# Patient Record
Sex: MALE | Race: WHITE | Employment: OTHER | RURAL
[De-identification: names, ages, dates, MRNs, and addresses within clinical notes are randomized per-mention and may not be internally consistent; named-entity substitution may affect disease eponyms.]

---

## 2017-01-03 ENCOUNTER — NURSE ONLY (OUTPATIENT)
Dept: PRIMARY CARE CLINIC | Age: 72
End: 2017-01-03
Payer: MEDICARE

## 2017-01-03 ENCOUNTER — HOSPITAL ENCOUNTER (OUTPATIENT)
Dept: OTHER | Age: 72
Discharge: HOME | End: 2017-01-03
Attending: INTERNAL MEDICINE | Admitting: INTERNAL MEDICINE

## 2017-01-03 DIAGNOSIS — R79.89 LOW TESTOSTERONE: ICD-10-CM

## 2017-01-03 DIAGNOSIS — E53.8 B12 DEFICIENCY: Primary | ICD-10-CM

## 2017-01-03 PROCEDURE — 96372 THER/PROPH/DIAG INJ SC/IM: CPT | Performed by: INTERNAL MEDICINE

## 2017-01-03 RX ORDER — TESTOSTERONE CYPIONATE 100 MG/ML
100 INJECTION, SOLUTION INTRAMUSCULAR ONCE
Status: COMPLETED | OUTPATIENT
Start: 2017-01-03 | End: 2017-01-03

## 2017-01-03 RX ORDER — CYANOCOBALAMIN 1000 UG/ML
1000 INJECTION INTRAMUSCULAR; SUBCUTANEOUS ONCE
Status: COMPLETED | OUTPATIENT
Start: 2017-01-03 | End: 2017-01-03

## 2017-01-03 RX ADMIN — TESTOSTERONE CYPIONATE 100 MG: 100 INJECTION, SOLUTION INTRAMUSCULAR at 12:14

## 2017-01-03 RX ADMIN — CYANOCOBALAMIN 1000 MCG: 1000 INJECTION INTRAMUSCULAR; SUBCUTANEOUS at 12:13

## 2017-01-04 DIAGNOSIS — Z79.01 ANTICOAGULANT LONG-TERM USE: ICD-10-CM

## 2017-01-04 DIAGNOSIS — I48.20 CHRONIC ATRIAL FIBRILLATION (HCC): ICD-10-CM

## 2017-01-09 RX ORDER — WARFARIN SODIUM 5 MG/1
TABLET ORAL
Qty: 90 TABLET | Refills: 3 | Status: SHIPPED | OUTPATIENT
Start: 2017-01-09 | End: 2017-02-07 | Stop reason: SDUPTHER

## 2017-01-23 RX ORDER — NIFEDIPINE 90 MG/1
TABLET, EXTENDED RELEASE ORAL
Qty: 90 TABLET | Refills: 3 | Status: SHIPPED | OUTPATIENT
Start: 2017-01-23 | End: 2018-01-23 | Stop reason: SDUPTHER

## 2017-01-31 RX ORDER — POTASSIUM CHLORIDE 750 MG/1
TABLET, FILM COATED, EXTENDED RELEASE ORAL
Qty: 90 TABLET | Refills: 3 | Status: SHIPPED | OUTPATIENT
Start: 2017-01-31 | End: 2018-02-12 | Stop reason: SDUPTHER

## 2017-02-02 RX ORDER — ATORVASTATIN CALCIUM 20 MG/1
TABLET, FILM COATED ORAL
Qty: 90 TABLET | Refills: 3 | Status: SHIPPED | OUTPATIENT
Start: 2017-02-02 | End: 2018-01-03 | Stop reason: SDUPTHER

## 2017-02-02 RX ORDER — TESTOSTERONE CYPIONATE 200 MG/ML
INJECTION, SOLUTION INTRAMUSCULAR
Qty: 1 ML | Refills: 2 | Status: SHIPPED | OUTPATIENT
Start: 2017-02-02 | End: 2017-05-22 | Stop reason: ALTCHOICE

## 2017-02-03 ENCOUNTER — NURSE ONLY (OUTPATIENT)
Dept: PRIMARY CARE CLINIC | Age: 72
End: 2017-02-03
Payer: MEDICARE

## 2017-02-03 ENCOUNTER — HOSPITAL ENCOUNTER (OUTPATIENT)
Dept: OTHER | Age: 72
Discharge: HOME | End: 2017-02-03
Attending: INTERNAL MEDICINE | Admitting: INTERNAL MEDICINE

## 2017-02-03 DIAGNOSIS — E53.8 B12 DEFICIENCY: Primary | ICD-10-CM

## 2017-02-03 DIAGNOSIS — E34.9 TESTOSTERONE DEFICIENCY: ICD-10-CM

## 2017-02-03 PROCEDURE — 96372 THER/PROPH/DIAG INJ SC/IM: CPT | Performed by: INTERNAL MEDICINE

## 2017-02-03 RX ORDER — CYANOCOBALAMIN 1000 UG/ML
1000 INJECTION INTRAMUSCULAR; SUBCUTANEOUS ONCE
Status: COMPLETED | OUTPATIENT
Start: 2017-02-03 | End: 2017-02-03

## 2017-02-03 RX ORDER — TESTOSTERONE CYPIONATE 100 MG/ML
100 INJECTION, SOLUTION INTRAMUSCULAR ONCE
Status: COMPLETED | OUTPATIENT
Start: 2017-02-03 | End: 2017-02-03

## 2017-02-03 RX ADMIN — CYANOCOBALAMIN 1000 MCG: 1000 INJECTION INTRAMUSCULAR; SUBCUTANEOUS at 11:55

## 2017-02-03 RX ADMIN — TESTOSTERONE CYPIONATE 100 MG: 100 INJECTION, SOLUTION INTRAMUSCULAR at 11:56

## 2017-02-06 DIAGNOSIS — Z79.01 ANTICOAGULANT LONG-TERM USE: ICD-10-CM

## 2017-02-06 DIAGNOSIS — I48.20 CHRONIC ATRIAL FIBRILLATION (HCC): ICD-10-CM

## 2017-02-08 RX ORDER — WARFARIN SODIUM 1 MG/1
TABLET ORAL
Qty: 30 TABLET | Refills: 3 | Status: SHIPPED | OUTPATIENT
Start: 2017-02-08 | End: 2017-06-08 | Stop reason: SDUPTHER

## 2017-02-08 RX ORDER — WARFARIN SODIUM 5 MG/1
TABLET ORAL
Qty: 90 TABLET | Refills: 3 | Status: SHIPPED | OUTPATIENT
Start: 2017-02-08 | End: 2018-05-15 | Stop reason: SDUPTHER

## 2017-02-21 ENCOUNTER — OFFICE VISIT (OUTPATIENT)
Dept: PRIMARY CARE CLINIC | Age: 72
End: 2017-02-21
Payer: MEDICARE

## 2017-02-21 VITALS
HEART RATE: 67 BPM | BODY MASS INDEX: 35.67 KG/M2 | WEIGHT: 263 LBS | DIASTOLIC BLOOD PRESSURE: 60 MMHG | RESPIRATION RATE: 18 BRPM | OXYGEN SATURATION: 98 % | SYSTOLIC BLOOD PRESSURE: 132 MMHG

## 2017-02-21 DIAGNOSIS — I10 ESSENTIAL HYPERTENSION: ICD-10-CM

## 2017-02-21 DIAGNOSIS — I48.91 ATRIAL FIBRILLATION, UNSPECIFIED TYPE (HCC): ICD-10-CM

## 2017-02-21 DIAGNOSIS — E29.1 HYPOGONADISM MALE: ICD-10-CM

## 2017-02-21 DIAGNOSIS — R73.02 GLUCOSE INTOLERANCE (IMPAIRED GLUCOSE TOLERANCE): ICD-10-CM

## 2017-02-21 DIAGNOSIS — E78.5 HYPERLIPIDEMIA, UNSPECIFIED HYPERLIPIDEMIA TYPE: Primary | ICD-10-CM

## 2017-02-21 PROCEDURE — 99213 OFFICE O/P EST LOW 20 MIN: CPT | Performed by: INTERNAL MEDICINE

## 2017-02-21 ASSESSMENT — ENCOUNTER SYMPTOMS
SHORTNESS OF BREATH: 0
VOMITING: 0
WHEEZING: 0
BACK PAIN: 1
NAUSEA: 0
ABDOMINAL PAIN: 0
SINUS PRESSURE: 0
COUGH: 0
EYE DISCHARGE: 0

## 2017-03-06 ENCOUNTER — NURSE ONLY (OUTPATIENT)
Dept: PRIMARY CARE CLINIC | Age: 72
End: 2017-03-06
Payer: MEDICARE

## 2017-03-06 ENCOUNTER — HOSPITAL ENCOUNTER (OUTPATIENT)
Dept: OTHER | Age: 72
Discharge: HOME | End: 2017-03-06
Attending: INTERNAL MEDICINE | Admitting: INTERNAL MEDICINE

## 2017-03-06 DIAGNOSIS — E53.8 B12 DEFICIENCY: ICD-10-CM

## 2017-03-06 DIAGNOSIS — E29.1 HYPOGONADISM MALE: ICD-10-CM

## 2017-03-06 PROCEDURE — 96372 THER/PROPH/DIAG INJ SC/IM: CPT | Performed by: INTERNAL MEDICINE

## 2017-03-06 RX ORDER — TESTOSTERONE CYPIONATE 200 MG/ML
200 INJECTION INTRAMUSCULAR ONCE
Status: COMPLETED | OUTPATIENT
Start: 2017-03-06 | End: 2017-03-06

## 2017-03-06 RX ORDER — CYANOCOBALAMIN 1000 UG/ML
1000 INJECTION INTRAMUSCULAR; SUBCUTANEOUS ONCE
Status: COMPLETED | OUTPATIENT
Start: 2017-03-06 | End: 2017-03-06

## 2017-03-06 RX ADMIN — CYANOCOBALAMIN 1000 MCG: 1000 INJECTION INTRAMUSCULAR; SUBCUTANEOUS at 13:51

## 2017-03-06 RX ADMIN — TESTOSTERONE CYPIONATE 200 MG: 200 INJECTION INTRAMUSCULAR at 13:53

## 2017-03-08 DIAGNOSIS — Z79.01 ANTICOAGULANT LONG-TERM USE: ICD-10-CM

## 2017-03-08 DIAGNOSIS — I48.20 CHRONIC ATRIAL FIBRILLATION (HCC): ICD-10-CM

## 2017-03-08 LAB — INR BLD: 3.4

## 2017-03-09 ENCOUNTER — ANTI-COAG VISIT (OUTPATIENT)
Dept: PRIMARY CARE CLINIC | Age: 72
End: 2017-03-09

## 2017-03-09 DIAGNOSIS — I48.91 ATRIAL FIBRILLATION, UNSPECIFIED TYPE (HCC): ICD-10-CM

## 2017-03-09 DIAGNOSIS — I82.90 BLOOD CLOT IN VEIN: ICD-10-CM

## 2017-03-09 DIAGNOSIS — Z79.01 ANTICOAGULANT LONG-TERM USE: ICD-10-CM

## 2017-03-14 RX ORDER — TAMSULOSIN HYDROCHLORIDE 0.4 MG/1
CAPSULE ORAL
Qty: 30 CAPSULE | Refills: 3 | Status: SHIPPED | OUTPATIENT
Start: 2017-03-14 | End: 2017-08-08 | Stop reason: SDUPTHER

## 2017-04-06 ENCOUNTER — NURSE ONLY (OUTPATIENT)
Dept: PRIMARY CARE CLINIC | Age: 72
End: 2017-04-06
Payer: MEDICARE

## 2017-04-06 ENCOUNTER — HOSPITAL ENCOUNTER (OUTPATIENT)
Dept: OTHER | Age: 72
Discharge: HOME | End: 2017-04-06
Attending: INTERNAL MEDICINE | Admitting: INTERNAL MEDICINE

## 2017-04-06 DIAGNOSIS — E53.8 B12 DEFICIENCY: Primary | ICD-10-CM

## 2017-04-06 PROCEDURE — 96372 THER/PROPH/DIAG INJ SC/IM: CPT | Performed by: INTERNAL MEDICINE

## 2017-04-06 RX ORDER — CYANOCOBALAMIN 1000 UG/ML
1000 INJECTION INTRAMUSCULAR; SUBCUTANEOUS ONCE
Status: COMPLETED | OUTPATIENT
Start: 2017-04-06 | End: 2017-04-06

## 2017-04-06 RX ADMIN — CYANOCOBALAMIN 1000 MCG: 1000 INJECTION INTRAMUSCULAR; SUBCUTANEOUS at 15:39

## 2017-04-10 ENCOUNTER — TELEPHONE (OUTPATIENT)
Dept: PRIMARY CARE CLINIC | Age: 72
End: 2017-04-10

## 2017-04-10 DIAGNOSIS — Z79.01 ANTICOAGULANT LONG-TERM USE: ICD-10-CM

## 2017-04-10 DIAGNOSIS — I48.20 CHRONIC ATRIAL FIBRILLATION (HCC): ICD-10-CM

## 2017-04-10 RX ORDER — CYCLOBENZAPRINE HCL 10 MG
TABLET ORAL
Qty: 30 TABLET | Refills: 3 | Status: SHIPPED | OUTPATIENT
Start: 2017-04-10 | End: 2017-08-08 | Stop reason: SDUPTHER

## 2017-04-11 RX ORDER — CITALOPRAM 20 MG/1
TABLET ORAL
Qty: 90 TABLET | Refills: 3 | Status: SHIPPED | OUTPATIENT
Start: 2017-04-11 | End: 2018-01-19 | Stop reason: SDUPTHER

## 2017-05-08 ENCOUNTER — HOSPITAL ENCOUNTER (OUTPATIENT)
Dept: OTHER | Age: 72
Discharge: HOME | End: 2017-05-08
Attending: INTERNAL MEDICINE | Admitting: INTERNAL MEDICINE

## 2017-05-10 LAB
CHOLESTEROL, TOTAL: 141 MG/DL
CHOLESTEROL/HDL RATIO: 2.7
HBA1C MFR BLD: 6.4 %
HDLC SERPL-MCNC: 31 MG/DL (ref 35–70)
LDL CHOLESTEROL CALCULATED: 83 MG/DL (ref 0–160)
PROSTATE SPECIFIC ANTIGEN: 0.5 NG/ML
TRIGL SERPL-MCNC: 134 MG/DL
VLDLC SERPL CALC-MCNC: 27 MG/DL

## 2017-05-11 DIAGNOSIS — I10 ESSENTIAL HYPERTENSION: ICD-10-CM

## 2017-05-11 DIAGNOSIS — Z12.5 SCREENING FOR PROSTATE CANCER: ICD-10-CM

## 2017-05-11 DIAGNOSIS — E78.5 HYPERLIPIDEMIA, UNSPECIFIED HYPERLIPIDEMIA TYPE: ICD-10-CM

## 2017-05-11 DIAGNOSIS — R73.02 GLUCOSE INTOLERANCE (IMPAIRED GLUCOSE TOLERANCE): ICD-10-CM

## 2017-05-22 ENCOUNTER — OFFICE VISIT (OUTPATIENT)
Dept: PRIMARY CARE CLINIC | Age: 72
End: 2017-05-22
Payer: MEDICARE

## 2017-05-22 ENCOUNTER — HOSPITAL ENCOUNTER (OUTPATIENT)
Dept: OTHER | Age: 72
Discharge: HOME | End: 2017-05-22
Attending: INTERNAL MEDICINE | Admitting: INTERNAL MEDICINE

## 2017-05-22 VITALS
HEIGHT: 72 IN | WEIGHT: 255.8 LBS | DIASTOLIC BLOOD PRESSURE: 64 MMHG | HEART RATE: 70 BPM | OXYGEN SATURATION: 97 % | RESPIRATION RATE: 18 BRPM | SYSTOLIC BLOOD PRESSURE: 122 MMHG | BODY MASS INDEX: 34.65 KG/M2

## 2017-05-22 DIAGNOSIS — I10 ESSENTIAL HYPERTENSION: Primary | ICD-10-CM

## 2017-05-22 DIAGNOSIS — I48.91 ATRIAL FIBRILLATION, UNSPECIFIED TYPE (HCC): ICD-10-CM

## 2017-05-22 DIAGNOSIS — E78.5 HYPERLIPIDEMIA, UNSPECIFIED HYPERLIPIDEMIA TYPE: ICD-10-CM

## 2017-05-22 DIAGNOSIS — R73.02 GLUCOSE INTOLERANCE (IMPAIRED GLUCOSE TOLERANCE): ICD-10-CM

## 2017-05-22 PROCEDURE — 99213 OFFICE O/P EST LOW 20 MIN: CPT | Performed by: INTERNAL MEDICINE

## 2017-05-22 ASSESSMENT — ENCOUNTER SYMPTOMS
ABDOMINAL PAIN: 0
NAUSEA: 0
WHEEZING: 0
SINUS PRESSURE: 0
EYE DISCHARGE: 0
COUGH: 0
SHORTNESS OF BREATH: 0
VOMITING: 0
BACK PAIN: 1

## 2017-05-22 ASSESSMENT — PATIENT HEALTH QUESTIONNAIRE - PHQ9
SUM OF ALL RESPONSES TO PHQ QUESTIONS 1-9: 0
2. FEELING DOWN, DEPRESSED OR HOPELESS: 0
SUM OF ALL RESPONSES TO PHQ9 QUESTIONS 1 & 2: 0
1. LITTLE INTEREST OR PLEASURE IN DOING THINGS: 0

## 2017-05-30 DIAGNOSIS — I48.91 ATRIAL FIBRILLATION, UNSPECIFIED TYPE (HCC): ICD-10-CM

## 2017-06-08 RX ORDER — WARFARIN SODIUM 1 MG/1
TABLET ORAL
Qty: 38 TABLET | Refills: 2 | Status: SHIPPED | OUTPATIENT
Start: 2017-06-08 | End: 2017-08-16 | Stop reason: SDUPTHER

## 2017-06-09 RX ORDER — FUROSEMIDE 40 MG/1
TABLET ORAL
Qty: 180 TABLET | Refills: 1 | Status: SHIPPED | OUTPATIENT
Start: 2017-06-09 | End: 2018-01-03 | Stop reason: SDUPTHER

## 2017-06-26 ENCOUNTER — HOSPITAL ENCOUNTER (OUTPATIENT)
Dept: OTHER | Age: 72
Discharge: HOME | End: 2017-06-26
Attending: INTERNAL MEDICINE | Admitting: INTERNAL MEDICINE

## 2017-06-26 ENCOUNTER — NURSE ONLY (OUTPATIENT)
Dept: PRIMARY CARE CLINIC | Age: 72
End: 2017-06-26
Payer: MEDICARE

## 2017-06-26 DIAGNOSIS — E53.8 B12 DEFICIENCY: Primary | ICD-10-CM

## 2017-06-26 PROCEDURE — 96372 THER/PROPH/DIAG INJ SC/IM: CPT | Performed by: INTERNAL MEDICINE

## 2017-06-26 RX ORDER — CYANOCOBALAMIN 1000 UG/ML
1000 INJECTION INTRAMUSCULAR; SUBCUTANEOUS ONCE
Status: COMPLETED | OUTPATIENT
Start: 2017-06-26 | End: 2017-06-26

## 2017-06-26 RX ADMIN — CYANOCOBALAMIN 1000 MCG: 1000 INJECTION INTRAMUSCULAR; SUBCUTANEOUS at 09:37

## 2017-07-05 ENCOUNTER — HOSPITAL ENCOUNTER (OUTPATIENT)
Dept: OTHER | Age: 72
Discharge: HOME | End: 2017-07-05
Attending: FAMILY MEDICINE | Admitting: FAMILY MEDICINE

## 2017-07-05 ENCOUNTER — TELEPHONE (OUTPATIENT)
Dept: PRIMARY CARE CLINIC | Age: 72
End: 2017-07-05

## 2017-07-05 ENCOUNTER — OFFICE VISIT (OUTPATIENT)
Dept: PRIMARY CARE CLINIC | Age: 72
End: 2017-07-05
Payer: MEDICARE

## 2017-07-05 VITALS
WEIGHT: 259 LBS | RESPIRATION RATE: 20 BRPM | HEIGHT: 72 IN | DIASTOLIC BLOOD PRESSURE: 60 MMHG | HEART RATE: 60 BPM | SYSTOLIC BLOOD PRESSURE: 110 MMHG | BODY MASS INDEX: 35.08 KG/M2 | OXYGEN SATURATION: 98 %

## 2017-07-05 DIAGNOSIS — M25.531 PAIN AND SWELLING OF RIGHT WRIST: Primary | ICD-10-CM

## 2017-07-05 DIAGNOSIS — M25.431 PAIN AND SWELLING OF RIGHT WRIST: ICD-10-CM

## 2017-07-05 DIAGNOSIS — M25.531 PAIN AND SWELLING OF RIGHT WRIST: ICD-10-CM

## 2017-07-05 DIAGNOSIS — M25.431 PAIN AND SWELLING OF RIGHT WRIST: Primary | ICD-10-CM

## 2017-07-05 PROCEDURE — 99213 OFFICE O/P EST LOW 20 MIN: CPT | Performed by: FAMILY MEDICINE

## 2017-07-05 RX ORDER — METHYLPREDNISOLONE 4 MG/1
TABLET ORAL
Qty: 1 KIT | Refills: 0 | Status: SHIPPED | OUTPATIENT
Start: 2017-07-05 | End: 2017-07-11

## 2017-07-07 ENCOUNTER — TELEPHONE (OUTPATIENT)
Dept: PRIMARY CARE CLINIC | Age: 72
End: 2017-07-07

## 2017-07-15 PROBLEM — M25.531 PAIN AND SWELLING OF RIGHT WRIST: Status: ACTIVE | Noted: 2017-07-15

## 2017-07-15 PROBLEM — M25.431 PAIN AND SWELLING OF RIGHT WRIST: Status: ACTIVE | Noted: 2017-07-15

## 2017-07-15 ASSESSMENT — ENCOUNTER SYMPTOMS
CONSTIPATION: 0
VOMITING: 0
EYE ITCHING: 0
RHINORRHEA: 0
SHORTNESS OF BREATH: 0
SORE THROAT: 0
ABDOMINAL PAIN: 0
COUGH: 0
NAUSEA: 0
DIARRHEA: 0
EYE DISCHARGE: 0

## 2017-07-31 ENCOUNTER — NURSE ONLY (OUTPATIENT)
Dept: PRIMARY CARE CLINIC | Age: 72
End: 2017-07-31
Payer: MEDICARE

## 2017-07-31 ENCOUNTER — HOSPITAL ENCOUNTER (OUTPATIENT)
Dept: OTHER | Age: 72
Discharge: HOME | End: 2017-07-31
Attending: INTERNAL MEDICINE | Admitting: INTERNAL MEDICINE

## 2017-07-31 DIAGNOSIS — E53.8 B12 DEFICIENCY: Primary | ICD-10-CM

## 2017-07-31 PROCEDURE — 96372 THER/PROPH/DIAG INJ SC/IM: CPT | Performed by: INTERNAL MEDICINE

## 2017-07-31 RX ORDER — CYANOCOBALAMIN 1000 UG/ML
1000 INJECTION INTRAMUSCULAR; SUBCUTANEOUS ONCE
Status: COMPLETED | OUTPATIENT
Start: 2017-07-31 | End: 2017-07-31

## 2017-07-31 RX ADMIN — CYANOCOBALAMIN 1000 MCG: 1000 INJECTION INTRAMUSCULAR; SUBCUTANEOUS at 12:03

## 2017-08-08 RX ORDER — TAMSULOSIN HYDROCHLORIDE 0.4 MG/1
CAPSULE ORAL
Qty: 30 CAPSULE | Refills: 3 | Status: SHIPPED | OUTPATIENT
Start: 2017-08-08 | End: 2018-01-19 | Stop reason: SDUPTHER

## 2017-08-08 RX ORDER — CYCLOBENZAPRINE HCL 10 MG
TABLET ORAL
Qty: 30 TABLET | Refills: 3 | Status: SHIPPED | OUTPATIENT
Start: 2017-08-08 | End: 2018-01-08 | Stop reason: SDUPTHER

## 2017-08-16 RX ORDER — WARFARIN SODIUM 1 MG/1
TABLET ORAL
Qty: 38 TABLET | Refills: 2 | Status: SHIPPED | OUTPATIENT
Start: 2017-08-16 | End: 2017-12-29 | Stop reason: SDUPTHER

## 2017-08-22 ENCOUNTER — TELEPHONE (OUTPATIENT)
Dept: PRIMARY CARE CLINIC | Age: 72
End: 2017-08-22

## 2017-08-24 ENCOUNTER — OFFICE VISIT (OUTPATIENT)
Dept: CARDIOLOGY | Facility: CLINIC | Age: 72
End: 2017-08-24

## 2017-08-24 VITALS
WEIGHT: 260 LBS | SYSTOLIC BLOOD PRESSURE: 114 MMHG | HEIGHT: 70 IN | DIASTOLIC BLOOD PRESSURE: 66 MMHG | HEART RATE: 66 BPM | BODY MASS INDEX: 37.22 KG/M2

## 2017-08-24 DIAGNOSIS — I48.91 ATRIAL FIBRILLATION AND FLUTTER (HCC): Primary | ICD-10-CM

## 2017-08-24 DIAGNOSIS — I48.92 ATRIAL FIBRILLATION AND FLUTTER (HCC): Primary | ICD-10-CM

## 2017-08-24 DIAGNOSIS — E78.2 MIXED HYPERLIPIDEMIA: ICD-10-CM

## 2017-08-24 DIAGNOSIS — I10 ESSENTIAL HYPERTENSION: ICD-10-CM

## 2017-08-24 PROCEDURE — 99214 OFFICE O/P EST MOD 30 MIN: CPT | Performed by: INTERNAL MEDICINE

## 2017-08-24 RX ORDER — NIFEDIPINE 90 MG/1
90 TABLET, EXTENDED RELEASE ORAL DAILY
COMMUNITY
End: 2019-09-06 | Stop reason: HOSPADM

## 2017-08-24 RX ORDER — ASPIRIN 81 MG/1
81 TABLET ORAL DAILY
COMMUNITY
End: 2021-11-01

## 2017-08-24 NOTE — PROGRESS NOTES
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 A-fib/A-flutter  a) s/p ECV x 2  b) Normal LVEF per echo 8/2011  c) chronic coumadin  2. DILAN-compliant with CPAP  3. HTN  4. HLP  7/15 144/172/29/81  5. Normal coronary arteries per remote LHC  6. DVt s/p IVC filter  7. 2015 L traumatic femur fx- Sajadi    Allergies  Allergies   Allergen Reactions   • Codeine    • Keflex [Cephalexin]    • Morphine And Related    • Penicillins        Current Medications    Current Outpatient Prescriptions:   •  aspirin 81 MG EC tablet, Take 81 mg by mouth Daily., Disp: , Rfl:   •  atorvastatin (LIPITOR) 20 MG tablet, Take 20 mg by mouth Daily., Disp: , Rfl:   •  citalopram (CeleXA) 20 MG tablet, Take 20 mg by mouth Daily., Disp: , Rfl:   •  cyclobenzaprine (FLEXERIL) 10 MG tablet, Take 10 mg by mouth Every Night., Disp: , Rfl:   •  furosemide (LASIX) 40 MG tablet, Take 40 mg by mouth 2 (Two) Times a Day As Needed., Disp: , Rfl:   •  hydrochlorothiazide (MICROZIDE) 12.5 MG capsule, Take 12.5 mg by mouth Daily., Disp: , Rfl:   •  metoprolol tartrate (LOPRESSOR) 25 MG tablet, Take 25 mg by mouth 2 (Two) Times a Day., Disp: , Rfl:   •  NIFEdipine XL (PROCARDIA XL) 90 MG 24 hr tablet, Take 90 mg by mouth Daily., Disp: , Rfl:   •  potassium chloride (K-DUR) 10 MEQ CR tablet, Take 10 mEq by mouth Daily., Disp: , Rfl:   •  tamsulosin (FLOMAX) 0.4 MG capsule 24 hr capsule, Take 1 capsule by mouth Daily., Disp: , Rfl:   •  warfarin (COUMADIN) 5 MG tablet, daily., Disp: , Rfl:       History of Present Illness   Pt here for follow up. No new complaints. Is able to do yard work and housework. Does some resistance  "exercises. Edema is stable. He takes lasix most days for it. 1-2 a day depending on his symptoms. States he was recently told he was prediabetic, has lost about 10 pounds.  Pt denies any chest pain, dyspnea, dyspnea on exertion, orthopnea, PND, palpitations, or claudication.     ROS:  All systems have been reviewed and are negative with the exception of those mentioned in the HPI.    OBJECTIVE:  Vitals:    08/24/17 1435   BP: 114/66   BP Location: Right arm   Patient Position: Sitting   Pulse: 66   Weight: 260 lb (118 kg)   Height: 70\" (177.8 cm)     Physical Exam   Constitutional: He is oriented to person, place, and time. He appears well-developed and well-nourished. No distress.   Cardiovascular: Normal rate, regular rhythm, normal heart sounds and intact distal pulses.    No murmur heard.  Pulses:       Radial pulses are 2+ on the right side, and 2+ on the left side.        Dorsalis pedis pulses are 2+ on the right side, and 2+ on the left side.        Posterior tibial pulses are 2+ on the right side, and 2+ on the left side.   Pulmonary/Chest: Effort normal and breath sounds normal. He has no wheezes. He has no rales.   Abdominal: Soft. Bowel sounds are normal. There is no tenderness. There is no guarding.   Musculoskeletal: He exhibits edema (L>R). He exhibits no tenderness.   Neurological: He is alert and oriented to person, place, and time.   Skin: Skin is warm and dry. No rash noted.   Psychiatric: He has a normal mood and affect.   Nursing note and vitals reviewed.      Diagnostic Data:  Procedures      ASSESSMENT:   Diagnosis Plan   1. Atrial fibrillation and flutter     2. Essential hypertension     3. Mixed hyperlipidemia         PLAN:  1. Well controled, a/c with coumadin  2. Well controled today, continue antihypertensives  3. Labs per PCP, continue statin therapy with atorvastatin    Ewelina Kumar MD, thank you for referring Mr. Palencia for evaluation.  I have forwarded my electronically generated " recommendations to you for review.  Please do not hesitate to call with any questions.    Scribed for Zack Almanza MD by Valerie Alicia PA-C. 8/24/2017  2:55 PM    Zack Almanza MD, FACC

## 2017-10-05 ENCOUNTER — HOSPITAL ENCOUNTER (OUTPATIENT)
Dept: OTHER | Age: 72
Discharge: HOME | End: 2017-10-05
Attending: INTERNAL MEDICINE | Admitting: INTERNAL MEDICINE

## 2017-10-05 ENCOUNTER — NURSE ONLY (OUTPATIENT)
Dept: PRIMARY CARE CLINIC | Age: 72
End: 2017-10-05
Payer: MEDICARE

## 2017-10-05 DIAGNOSIS — Z23 NEED FOR INFLUENZA VACCINATION: ICD-10-CM

## 2017-10-05 DIAGNOSIS — E53.8 B12 DEFICIENCY: Primary | ICD-10-CM

## 2017-10-05 PROCEDURE — 96372 THER/PROPH/DIAG INJ SC/IM: CPT | Performed by: INTERNAL MEDICINE

## 2017-10-05 PROCEDURE — G0008 ADMIN INFLUENZA VIRUS VAC: HCPCS | Performed by: INTERNAL MEDICINE

## 2017-10-05 PROCEDURE — 90688 IIV4 VACCINE SPLT 0.5 ML IM: CPT | Performed by: INTERNAL MEDICINE

## 2017-10-05 RX ORDER — CYANOCOBALAMIN 1000 UG/ML
1000 INJECTION INTRAMUSCULAR; SUBCUTANEOUS ONCE
Status: COMPLETED | OUTPATIENT
Start: 2017-10-05 | End: 2017-10-05

## 2017-10-05 RX ADMIN — CYANOCOBALAMIN 1000 MCG: 1000 INJECTION INTRAMUSCULAR; SUBCUTANEOUS at 14:27

## 2017-11-01 ENCOUNTER — OFFICE VISIT (OUTPATIENT)
Dept: PRIMARY CARE CLINIC | Age: 72
End: 2017-11-01
Payer: MEDICARE

## 2017-11-01 ENCOUNTER — HOSPITAL ENCOUNTER (OUTPATIENT)
Dept: OTHER | Age: 72
Discharge: HOME | End: 2017-11-01
Attending: INTERNAL MEDICINE | Admitting: INTERNAL MEDICINE

## 2017-11-01 VITALS
BODY MASS INDEX: 35.15 KG/M2 | SYSTOLIC BLOOD PRESSURE: 122 MMHG | HEART RATE: 62 BPM | OXYGEN SATURATION: 97 % | WEIGHT: 259.2 LBS | DIASTOLIC BLOOD PRESSURE: 60 MMHG | RESPIRATION RATE: 18 BRPM

## 2017-11-01 DIAGNOSIS — E78.5 HYPERLIPIDEMIA, UNSPECIFIED HYPERLIPIDEMIA TYPE: ICD-10-CM

## 2017-11-01 DIAGNOSIS — E53.8 B12 DEFICIENCY: ICD-10-CM

## 2017-11-01 DIAGNOSIS — Z91.81 HISTORY OF FALL: ICD-10-CM

## 2017-11-01 DIAGNOSIS — R73.02 GLUCOSE INTOLERANCE (IMPAIRED GLUCOSE TOLERANCE): ICD-10-CM

## 2017-11-01 DIAGNOSIS — I48.91 ATRIAL FIBRILLATION, UNSPECIFIED TYPE (HCC): ICD-10-CM

## 2017-11-01 DIAGNOSIS — I10 ESSENTIAL HYPERTENSION: ICD-10-CM

## 2017-11-01 DIAGNOSIS — N30.01 ACUTE CYSTITIS WITH HEMATURIA: Primary | ICD-10-CM

## 2017-11-01 LAB
BILIRUBIN, POC: ABNORMAL
BLOOD URINE, POC: ABNORMAL
CLARITY, POC: ABNORMAL
COLOR, POC: ABNORMAL
GLUCOSE URINE, POC: ABNORMAL
KETONES, POC: ABNORMAL
LEUKOCYTE EST, POC: ABNORMAL
NITRITE, POC: ABNORMAL
PH, POC: 6
PROTEIN, POC: ABNORMAL
SPECIFIC GRAVITY, POC: 1.01
UROBILINOGEN, POC: 0.2

## 2017-11-01 PROCEDURE — G8417 CALC BMI ABV UP PARAM F/U: HCPCS | Performed by: INTERNAL MEDICINE

## 2017-11-01 PROCEDURE — 1123F ACP DISCUSS/DSCN MKR DOCD: CPT | Performed by: INTERNAL MEDICINE

## 2017-11-01 PROCEDURE — 99214 OFFICE O/P EST MOD 30 MIN: CPT | Performed by: INTERNAL MEDICINE

## 2017-11-01 PROCEDURE — 3017F COLORECTAL CA SCREEN DOC REV: CPT | Performed by: INTERNAL MEDICINE

## 2017-11-01 PROCEDURE — G8484 FLU IMMUNIZE NO ADMIN: HCPCS | Performed by: INTERNAL MEDICINE

## 2017-11-01 PROCEDURE — 81002 URINALYSIS NONAUTO W/O SCOPE: CPT | Performed by: INTERNAL MEDICINE

## 2017-11-01 PROCEDURE — 1036F TOBACCO NON-USER: CPT | Performed by: INTERNAL MEDICINE

## 2017-11-01 PROCEDURE — G8427 DOCREV CUR MEDS BY ELIG CLIN: HCPCS | Performed by: INTERNAL MEDICINE

## 2017-11-01 PROCEDURE — 4040F PNEUMOC VAC/ADMIN/RCVD: CPT | Performed by: INTERNAL MEDICINE

## 2017-11-01 RX ORDER — SULFAMETHOXAZOLE AND TRIMETHOPRIM 800; 160 MG/1; MG/1
1 TABLET ORAL 2 TIMES DAILY
Qty: 14 TABLET | Refills: 0 | Status: SHIPPED | OUTPATIENT
Start: 2017-11-01 | End: 2017-11-08

## 2017-11-01 ASSESSMENT — ENCOUNTER SYMPTOMS
EYE DISCHARGE: 0
COUGH: 0
ABDOMINAL PAIN: 0
SHORTNESS OF BREATH: 0
SINUS PRESSURE: 0
WHEEZING: 0
VOMITING: 0
BACK PAIN: 0
NAUSEA: 0

## 2017-11-01 NOTE — PROGRESS NOTES
SUBJECTIVE:    Patient ID: Suleman Mccoy is a 74 y.o. male. Chief Complaint   Patient presents with   [facility] Other     odor in urine     HPI:  Pt reported some burning when he voids, frequency and urgency. Sx for the past few weeks and getting worse. Patient denied any fever or chills. Patient denied any flank pain. He tried nothing. Patient reports he had a fall recently and landed on his left hip. Patient had hip replaced in the past. Is having some pain in that area. Scheduled to follow up with Ortho on Monday for this. Patient has had hypertension for few years. He has been compliant with taking medications, without side effects from it. He has been following a low-sodium, is active and occasionally exercises. Weight is stable, compared to last visit. His blood pressure is stable at this time. Patient was diagnosed with Afib years ago. He has been compliant with taking meds w/o SE. He is taking coumadin as well. Patient denies any side effect including easy bruising, bleeding. ..ect. Patient has been compliant with taking Coumadin and checking INR as supposed to. No CP or palpitation. Patient's medications, allergies, past medical, surgical, social and family histories were reviewed and updated as appropriate.        Outpatient Prescriptions Marked as Taking for the 11/1/17 encounter (Office Visit) with Saqib Dahl MD   Medication Sig Dispense Refill    warfarin (COUMADIN) 1 MG tablet TAKE 1 TABLET BY MOUTH ONCE DAILY AND 3 TABLETS ON FRIDAYS 38 tablet 2    cyclobenzaprine (FLEXERIL) 10 MG tablet TAKE ONE TABLET BY MOUTH ONCE NIGHTLY AT BEDTIME 30 tablet 3    tamsulosin (FLOMAX) 0.4 MG capsule TAKE ONE CAPSULE BY MOUTH ONCE DAILY 30 capsule 3    furosemide (LASIX) 40 MG tablet TAKE ONE TABLET BY MOUTH TWICE A DAY AS NEEDED 180 tablet 1    citalopram (CELEXA) 20 MG tablet TAKE ONE TABLET BY MOUTH ONCE DAILY 90 tablet 3    metoprolol tartrate (LOPRESSOR) 25 MG tablet TAKE 1 TABLET BY MOUTH 2 TIMES DAILY 180 tablet 3    warfarin (COUMADIN) 5 MG tablet TAKE ONE TABLET BY MOUTH ONCE DAILY 90 tablet 3    atorvastatin (LIPITOR) 20 MG tablet TAKE ONE TABLET BY MOUTH ONCE DAILY 90 tablet 3    potassium chloride (KLOR-CON) 10 MEQ extended release tablet TAKE ONE TABLET BY MOUTH ONCE DAILY 90 tablet 3    NIFEdipine (PROCARDIA XL) 90 MG extended release tablet TAKE ONE TABLET BY MOUTH ONCE DAILY 90 tablet 3    fluticasone (FLONASE) 50 MCG/ACT nasal spray USE ONE SPRAY IN EACH NOSTRIL DAILY 16 g 3    hydrochlorothiazide (MICROZIDE) 12.5 MG capsule TAKE ONE CAPSULE BY MOUTH ONCE DAILY 90 capsule 3    polyethylene glycol (MIRALAX) powder Take 17 g by mouth daily as needed  510 g 0    triamcinolone (KENALOG) 0.1 % cream APPLY TO AFFECTED AREA TWICE A DAY 1 Tube 0    aspirin 81 MG tablet Take 81 mg by mouth daily. Review of Systems   Constitutional: Negative for chills, fatigue and fever. HENT: Negative for congestion, ear pain and sinus pressure. Eyes: Negative for discharge and visual disturbance. Respiratory: Negative for cough, shortness of breath and wheezing. Cardiovascular: Negative for chest pain and palpitations. JOY   Gastrointestinal: Negative for abdominal pain, nausea and vomiting. Endocrine: Negative for cold intolerance and heat intolerance. Genitourinary: Positive for dysuria, frequency and urgency. Musculoskeletal: Positive for arthralgias and gait problem. Negative for back pain. As in HPI   Skin: Negative for pallor and rash. Allergic/Immunologic: Negative for food allergies and immunocompromised state. Neurological: Negative for dizziness, numbness and headaches. Hematological: Negative for adenopathy. Bruises/bleeds easily. Psychiatric/Behavioral: Negative for agitation and sleep disturbance. The patient is not nervous/anxious.         OBJECTIVE:   Wt Readings from Last 3 Encounters:   11/01/17 259 lb 3.2 oz (117.6 kg)   07/05/17 259 lb (117.5 kg)   05/22/17 255 lb 12.8 oz (116 kg)     BP Readings from Last 3 Encounters:   11/01/17 122/60   07/05/17 110/60   05/22/17 122/64       /60 (Site: Right Arm, Position: Sitting, Cuff Size: Large Adult)   Pulse 62   Resp 18   Wt 259 lb 3.2 oz (117.6 kg)   SpO2 97%   BMI 35.15 kg/m²      Physical Exam   Constitutional: He is oriented to person, place, and time. He appears well-developed and well-nourished. obese   HENT:   Head: Normocephalic and atraumatic. Right Ear: External ear normal.   Left Ear: External ear normal.   Nose: Nose normal.   Mouth/Throat: Oropharynx is clear and moist.   Eyes: Conjunctivae and EOM are normal. Pupils are equal, round, and reactive to light. Neck: Neck supple. No JVD present. No thyromegaly present. Cardiovascular: Normal rate, regular rhythm and normal heart sounds. Exam reveals no friction rub. No murmur heard. Pulmonary/Chest: Effort normal and breath sounds normal. He has no wheezes. He has no rales. Abdominal: Soft. Bowel sounds are normal. There is no tenderness. There is no rebound. Musculoskeletal: He exhibits no edema or tenderness. Neurological: He is alert and oriented to person, place, and time. Skin: No rash noted. No erythema. Bruise/ecchymotic area that measures around 5 inches in diameter at the lateral upper aspect of his left thigh. Nontender to palpate. No fluctuation or fluid collection. Psychiatric: He has a normal mood and affect. Judgment normal.   Nursing note and vitals reviewed.         Lab Results   Component Value Date     03/25/2015    K 3.9 03/25/2015     03/25/2015    CO2 20 03/25/2015    GLUCOSE 122 03/25/2015    BUN 23 03/25/2015    CREATININE 1.06 03/25/2015    CALCIUM 9.3 03/25/2015    PROT 6.9 11/18/2014    LABALBU 4.3 03/25/2015    BILITOT 0.3 03/25/2015    ALT 20 03/25/2015    AST 19 03/25/2015       Hemoglobin A1C (%)   Date Value   05/08/2017 6.4 (H)     LDL Calculated (mg/dL)   Date

## 2017-11-01 NOTE — PROGRESS NOTES
Have you seen any other physician or provider since your last visit no    Have you had any other diagnostic tests since your last visit? no    Have you changed or stopped any medications since your last visit? no     Pt co he had a fall last Friday and injured his left knee and hip they are bruised he is going to see ortho Monday for that. Pt states his urine has been having an odor.

## 2017-11-08 RX ORDER — HYDROCHLOROTHIAZIDE 12.5 MG/1
CAPSULE, GELATIN COATED ORAL
Qty: 90 CAPSULE | Refills: 3 | Status: SHIPPED | OUTPATIENT
Start: 2017-11-08 | End: 2018-09-06 | Stop reason: SDUPTHER

## 2017-11-28 ENCOUNTER — HOSPITAL ENCOUNTER (OUTPATIENT)
Dept: OTHER | Age: 72
Discharge: HOME | End: 2017-11-28
Attending: INTERNAL MEDICINE | Admitting: INTERNAL MEDICINE

## 2017-11-28 ENCOUNTER — NURSE ONLY (OUTPATIENT)
Dept: PRIMARY CARE CLINIC | Age: 72
End: 2017-11-28
Payer: MEDICARE

## 2017-11-28 ENCOUNTER — TELEPHONE (OUTPATIENT)
Dept: PRIMARY CARE CLINIC | Age: 72
End: 2017-11-28

## 2017-11-28 DIAGNOSIS — E53.8 B12 DEFICIENCY: Primary | ICD-10-CM

## 2017-11-28 PROCEDURE — 96372 THER/PROPH/DIAG INJ SC/IM: CPT | Performed by: INTERNAL MEDICINE

## 2017-11-28 RX ORDER — CYANOCOBALAMIN 1000 UG/ML
1000 INJECTION INTRAMUSCULAR; SUBCUTANEOUS ONCE
Status: COMPLETED | OUTPATIENT
Start: 2017-11-28 | End: 2017-11-28

## 2017-11-28 RX ORDER — TRIAMCINOLONE ACETONIDE 1 MG/G
CREAM TOPICAL
Qty: 1 TUBE | Refills: 0 | Status: SHIPPED | OUTPATIENT
Start: 2017-11-28 | End: 2018-01-03 | Stop reason: ALTCHOICE

## 2017-11-28 RX ADMIN — CYANOCOBALAMIN 1000 MCG: 1000 INJECTION INTRAMUSCULAR; SUBCUTANEOUS at 13:42

## 2017-11-28 NOTE — PROGRESS NOTES
After obtaining consent, and per orders of Dr. Cara Martinez, injection of b12 given in right deltoid by Jacquelyn Aaron. Patient instructed to remain in clinic for 20 minutes afterwards, and to report any adverse reaction to me immediately.

## 2017-11-30 ENCOUNTER — TELEPHONE (OUTPATIENT)
Dept: PRIMARY CARE CLINIC | Age: 72
End: 2017-11-30

## 2017-11-30 DIAGNOSIS — R73.02 GLUCOSE INTOLERANCE (IMPAIRED GLUCOSE TOLERANCE): ICD-10-CM

## 2017-11-30 DIAGNOSIS — E78.5 HYPERLIPIDEMIA, UNSPECIFIED HYPERLIPIDEMIA TYPE: ICD-10-CM

## 2017-11-30 DIAGNOSIS — Z91.81 HISTORY OF FALL: ICD-10-CM

## 2017-11-30 DIAGNOSIS — I48.91 ATRIAL FIBRILLATION, UNSPECIFIED TYPE (HCC): ICD-10-CM

## 2017-11-30 LAB
AVERAGE GLUCOSE: NORMAL
HBA1C MFR BLD: 5.3 %
TSH SERPL DL<=0.05 MIU/L-ACNC: 1.73 UIU/ML

## 2017-11-30 NOTE — TELEPHONE ENCOUNTER
----- Message from Ankit Smith MD sent at 11/30/2017  2:49 PM EST -----  Cont same dose of coumadin.  Check INR monthly

## 2017-12-29 RX ORDER — WARFARIN SODIUM 1 MG/1
TABLET ORAL
Qty: 38 TABLET | Refills: 5 | Status: SHIPPED | OUTPATIENT
Start: 2017-12-29 | End: 2018-08-24 | Stop reason: SDUPTHER

## 2018-01-02 ENCOUNTER — NURSE ONLY (OUTPATIENT)
Dept: PRIMARY CARE CLINIC | Age: 73
End: 2018-01-02
Payer: MEDICARE

## 2018-01-02 ENCOUNTER — HOSPITAL ENCOUNTER (OUTPATIENT)
Dept: OTHER | Age: 73
Discharge: HOME | End: 2018-01-02
Attending: INTERNAL MEDICINE | Admitting: INTERNAL MEDICINE

## 2018-01-02 DIAGNOSIS — E53.8 B12 DEFICIENCY: ICD-10-CM

## 2018-01-02 DIAGNOSIS — Z79.01 ANTICOAGULANT LONG-TERM USE: Primary | ICD-10-CM

## 2018-01-02 PROCEDURE — 96372 THER/PROPH/DIAG INJ SC/IM: CPT | Performed by: INTERNAL MEDICINE

## 2018-01-02 RX ORDER — CYANOCOBALAMIN 1000 UG/ML
1000 INJECTION INTRAMUSCULAR; SUBCUTANEOUS ONCE
Status: COMPLETED | OUTPATIENT
Start: 2018-01-02 | End: 2018-01-02

## 2018-01-02 RX ADMIN — CYANOCOBALAMIN 1000 MCG: 1000 INJECTION INTRAMUSCULAR; SUBCUTANEOUS at 13:34

## 2018-01-03 ENCOUNTER — OFFICE VISIT (OUTPATIENT)
Dept: PRIMARY CARE CLINIC | Age: 73
End: 2018-01-03
Payer: MEDICARE

## 2018-01-03 VITALS
HEART RATE: 62 BPM | WEIGHT: 259 LBS | SYSTOLIC BLOOD PRESSURE: 124 MMHG | BODY MASS INDEX: 35.08 KG/M2 | OXYGEN SATURATION: 96 % | DIASTOLIC BLOOD PRESSURE: 60 MMHG | HEIGHT: 72 IN

## 2018-01-03 DIAGNOSIS — L98.9 SKIN LESIONS: ICD-10-CM

## 2018-01-03 DIAGNOSIS — I48.91 ATRIAL FIBRILLATION, UNSPECIFIED TYPE (HCC): ICD-10-CM

## 2018-01-03 DIAGNOSIS — E78.5 HYPERLIPIDEMIA, UNSPECIFIED HYPERLIPIDEMIA TYPE: Primary | ICD-10-CM

## 2018-01-03 DIAGNOSIS — R73.02 GLUCOSE INTOLERANCE (IMPAIRED GLUCOSE TOLERANCE): ICD-10-CM

## 2018-01-03 DIAGNOSIS — I10 ESSENTIAL HYPERTENSION: ICD-10-CM

## 2018-01-03 PROCEDURE — 3017F COLORECTAL CA SCREEN DOC REV: CPT | Performed by: INTERNAL MEDICINE

## 2018-01-03 PROCEDURE — 4040F PNEUMOC VAC/ADMIN/RCVD: CPT | Performed by: INTERNAL MEDICINE

## 2018-01-03 PROCEDURE — G8417 CALC BMI ABV UP PARAM F/U: HCPCS | Performed by: INTERNAL MEDICINE

## 2018-01-03 PROCEDURE — 1036F TOBACCO NON-USER: CPT | Performed by: INTERNAL MEDICINE

## 2018-01-03 PROCEDURE — 99213 OFFICE O/P EST LOW 20 MIN: CPT | Performed by: INTERNAL MEDICINE

## 2018-01-03 PROCEDURE — G8427 DOCREV CUR MEDS BY ELIG CLIN: HCPCS | Performed by: INTERNAL MEDICINE

## 2018-01-03 PROCEDURE — 1123F ACP DISCUSS/DSCN MKR DOCD: CPT | Performed by: INTERNAL MEDICINE

## 2018-01-03 PROCEDURE — G8484 FLU IMMUNIZE NO ADMIN: HCPCS | Performed by: INTERNAL MEDICINE

## 2018-01-03 RX ORDER — FLUTICASONE PROPIONATE 50 MCG
1 SPRAY, SUSPENSION (ML) NASAL DAILY
Qty: 16 G | Refills: 3 | Status: SHIPPED | OUTPATIENT
Start: 2018-01-03 | End: 2018-09-06 | Stop reason: SDUPTHER

## 2018-01-03 RX ORDER — FUROSEMIDE 40 MG/1
40 TABLET ORAL 2 TIMES DAILY PRN
Qty: 180 TABLET | Refills: 1 | Status: SHIPPED | OUTPATIENT
Start: 2018-01-03 | End: 2019-09-11 | Stop reason: ALTCHOICE

## 2018-01-03 RX ORDER — WARFARIN SODIUM 1 MG/1
TABLET ORAL
Qty: 38 TABLET | Refills: 5 | Status: CANCELLED | OUTPATIENT
Start: 2018-01-03

## 2018-01-03 RX ORDER — CLINDAMYCIN HYDROCHLORIDE 300 MG/1
CAPSULE ORAL
Refills: 2 | COMMUNITY
Start: 2017-12-04 | End: 2018-01-03 | Stop reason: ALTCHOICE

## 2018-01-03 RX ORDER — CLOTRIMAZOLE 1 %
CREAM (GRAM) TOPICAL
Qty: 45 G | Refills: 1 | Status: SHIPPED | OUTPATIENT
Start: 2018-01-03 | End: 2018-01-19 | Stop reason: SDUPTHER

## 2018-01-03 RX ORDER — ATORVASTATIN CALCIUM 20 MG/1
20 TABLET, FILM COATED ORAL DAILY
Qty: 90 TABLET | Refills: 3 | Status: SHIPPED | OUTPATIENT
Start: 2018-01-03 | End: 2018-11-13 | Stop reason: SDUPTHER

## 2018-01-03 ASSESSMENT — ENCOUNTER SYMPTOMS
ABDOMINAL PAIN: 0
COUGH: 0
SHORTNESS OF BREATH: 0
NAUSEA: 0
BACK PAIN: 0
WHEEZING: 0
EYE DISCHARGE: 0
VOMITING: 0
SINUS PRESSURE: 0

## 2018-01-03 NOTE — PROGRESS NOTES
(FLEXERIL) 10 MG tablet TAKE ONE TABLET BY MOUTH ONCE NIGHTLY AT BEDTIME 30 tablet 3    tamsulosin (FLOMAX) 0.4 MG capsule TAKE ONE CAPSULE BY MOUTH ONCE DAILY 30 capsule 3    furosemide (LASIX) 40 MG tablet TAKE ONE TABLET BY MOUTH TWICE A DAY AS NEEDED 180 tablet 1    citalopram (CELEXA) 20 MG tablet TAKE ONE TABLET BY MOUTH ONCE DAILY 90 tablet 3    warfarin (COUMADIN) 5 MG tablet TAKE ONE TABLET BY MOUTH ONCE DAILY 90 tablet 3    atorvastatin (LIPITOR) 20 MG tablet TAKE ONE TABLET BY MOUTH ONCE DAILY 90 tablet 3    potassium chloride (KLOR-CON) 10 MEQ extended release tablet TAKE ONE TABLET BY MOUTH ONCE DAILY 90 tablet 3    NIFEdipine (PROCARDIA XL) 90 MG extended release tablet TAKE ONE TABLET BY MOUTH ONCE DAILY 90 tablet 3    fluticasone (FLONASE) 50 MCG/ACT nasal spray USE ONE SPRAY IN EACH NOSTRIL DAILY 16 g 3    polyethylene glycol (MIRALAX) powder Take 17 g by mouth daily as needed  510 g 0    aspirin 81 MG tablet Take 81 mg by mouth daily. Review of Systems   Constitutional: Negative for chills, fatigue and fever. HENT: Negative for congestion, ear pain and sinus pressure. Eyes: Negative for discharge and visual disturbance. Respiratory: Negative for cough, shortness of breath and wheezing. Cardiovascular: Negative for chest pain and palpitations. JOY   Gastrointestinal: Negative for abdominal pain, nausea and vomiting. Endocrine: Negative for cold intolerance and heat intolerance. Genitourinary: Positive for dysuria, frequency and urgency. Musculoskeletal: Positive for arthralgias and gait problem. Negative for back pain. Skin: Negative for pallor and rash. Allergic/Immunologic: Negative for food allergies and immunocompromised state. Neurological: Negative for dizziness, numbness and headaches. Hematological: Negative for adenopathy. Bruises/bleeds easily. Psychiatric/Behavioral: Negative for agitation and sleep disturbance.  The patient is not nervous/anxious. OBJECTIVE:   Wt Readings from Last 3 Encounters:   01/03/18 259 lb (117.5 kg)   11/01/17 259 lb 3.2 oz (117.6 kg)   07/05/17 259 lb (117.5 kg)     BP Readings from Last 3 Encounters:   01/03/18 124/60   11/01/17 122/60   07/05/17 110/60       /60 (Site: Right Arm, Position: Sitting, Cuff Size: Medium Adult)   Pulse 62   Ht 6' (1.829 m)   Wt 259 lb (117.5 kg)   SpO2 96%   BMI 35.13 kg/m²      Physical Exam   Constitutional: He is oriented to person, place, and time. He appears well-developed and well-nourished. obese   HENT:   Head: Normocephalic and atraumatic. Right Ear: External ear normal.   Left Ear: External ear normal.   Nose: Nose normal.   Mouth/Throat: Oropharynx is clear and moist.   Eyes: Conjunctivae and EOM are normal. Pupils are equal, round, and reactive to light. Neck: Neck supple. No JVD present. No thyromegaly present. Cardiovascular: Normal rate, regular rhythm and normal heart sounds. Exam reveals no friction rub. No murmur heard. Pulmonary/Chest: Effort normal and breath sounds normal. He has no wheezes. He has no rales. Abdominal: Soft. Bowel sounds are normal. There is no tenderness. There is no rebound. Musculoskeletal: He exhibits no tenderness. Edema: +1 B LE R>L. Neurological: He is alert and oriented to person, place, and time. Skin: No rash noted. No erythema. Several skin lesion on his face, head and chest. Slightly raised, slightly brownish discoloration. Nontender. Slightly rough surface. Psychiatric: He has a normal mood and affect. Judgment normal.   Nursing note and vitals reviewed.       Lab Results   Component Value Date     03/25/2015    K 3.9 03/25/2015     03/25/2015    CO2 20 03/25/2015    GLUCOSE 122 03/25/2015    BUN 23 03/25/2015    CREATININE 1.06 03/25/2015    CALCIUM 9.3 03/25/2015    PROT 6.9 11/18/2014    LABALBU 4.3 03/25/2015    BILITOT 0.3 03/25/2015    ALT 20 03/25/2015    AST 19 03/25/2015

## 2018-01-03 NOTE — PROGRESS NOTES
Have you seen any other physician or provider since your last visit no    Have you had any other diagnostic tests since your last visit?  yes - Dr. Mal Prieto ordered labs on last visit    Have you changed or stopped any medications since your last visit? no       Pt is here for 2 month f/u for HTN and hyperlipidemia

## 2018-01-08 RX ORDER — CYCLOBENZAPRINE HCL 10 MG
TABLET ORAL
Qty: 30 TABLET | Refills: 4 | Status: SHIPPED | OUTPATIENT
Start: 2018-01-08 | End: 2018-06-06 | Stop reason: SDUPTHER

## 2018-01-10 DIAGNOSIS — Z79.01 ANTICOAGULANT LONG-TERM USE: ICD-10-CM

## 2018-01-19 RX ORDER — CITALOPRAM 20 MG/1
TABLET ORAL
Qty: 90 TABLET | Refills: 3 | Status: SHIPPED | OUTPATIENT
Start: 2018-01-19 | End: 2019-02-19 | Stop reason: SDUPTHER

## 2018-01-19 RX ORDER — CLOTRIMAZOLE 1 %
CREAM (GRAM) TOPICAL
Qty: 30 G | Refills: 1 | Status: SHIPPED | OUTPATIENT
Start: 2018-01-19 | End: 2018-03-12 | Stop reason: ALTCHOICE

## 2018-01-19 RX ORDER — TRIAMCINOLONE ACETONIDE 1 MG/G
CREAM TOPICAL
Qty: 30 G | Refills: 0 | Status: SHIPPED | OUTPATIENT
Start: 2018-01-19 | End: 2018-03-12 | Stop reason: ALTCHOICE

## 2018-01-19 RX ORDER — TAMSULOSIN HYDROCHLORIDE 0.4 MG/1
CAPSULE ORAL
Qty: 30 CAPSULE | Refills: 4 | Status: SHIPPED | OUTPATIENT
Start: 2018-01-19 | End: 2018-07-02 | Stop reason: SDUPTHER

## 2018-02-01 ENCOUNTER — HOSPITAL ENCOUNTER (OUTPATIENT)
Dept: OTHER | Age: 73
Discharge: HOME | End: 2018-02-01
Attending: INTERNAL MEDICINE | Admitting: INTERNAL MEDICINE

## 2018-02-01 ENCOUNTER — NURSE ONLY (OUTPATIENT)
Dept: PRIMARY CARE CLINIC | Age: 73
End: 2018-02-01
Payer: MEDICARE

## 2018-02-01 DIAGNOSIS — E53.8 B12 DEFICIENCY: Primary | ICD-10-CM

## 2018-02-01 PROCEDURE — 96372 THER/PROPH/DIAG INJ SC/IM: CPT | Performed by: INTERNAL MEDICINE

## 2018-02-01 RX ORDER — CYANOCOBALAMIN 1000 UG/ML
1000 INJECTION INTRAMUSCULAR; SUBCUTANEOUS ONCE
Status: COMPLETED | OUTPATIENT
Start: 2018-02-01 | End: 2018-02-01

## 2018-02-01 RX ADMIN — CYANOCOBALAMIN 1000 MCG: 1000 INJECTION INTRAMUSCULAR; SUBCUTANEOUS at 14:07

## 2018-02-13 RX ORDER — POTASSIUM CHLORIDE 750 MG/1
TABLET, FILM COATED, EXTENDED RELEASE ORAL
Qty: 90 TABLET | Refills: 3 | Status: SHIPPED | OUTPATIENT
Start: 2018-02-13 | End: 2018-05-25 | Stop reason: SDUPTHER

## 2018-03-06 ENCOUNTER — NURSE ONLY (OUTPATIENT)
Dept: PRIMARY CARE CLINIC | Age: 73
End: 2018-03-06
Payer: MEDICARE

## 2018-03-06 ENCOUNTER — HOSPITAL ENCOUNTER (OUTPATIENT)
Dept: OTHER | Age: 73
Discharge: HOME | End: 2018-03-06
Attending: INTERNAL MEDICINE | Admitting: INTERNAL MEDICINE

## 2018-03-06 DIAGNOSIS — E53.8 B12 DEFICIENCY: Primary | ICD-10-CM

## 2018-03-06 PROCEDURE — 96372 THER/PROPH/DIAG INJ SC/IM: CPT | Performed by: INTERNAL MEDICINE

## 2018-03-06 RX ORDER — CYANOCOBALAMIN 1000 UG/ML
1000 INJECTION INTRAMUSCULAR; SUBCUTANEOUS ONCE
Status: COMPLETED | OUTPATIENT
Start: 2018-03-06 | End: 2018-03-06

## 2018-03-06 RX ADMIN — CYANOCOBALAMIN 1000 MCG: 1000 INJECTION INTRAMUSCULAR; SUBCUTANEOUS at 15:56

## 2018-03-06 NOTE — PROGRESS NOTES
After obtaining consent, and per orders of Dr. Zenaida Pope, injection of b12 given in right deltoid by Patrick Mcqueen. Patient instructed to remain in clinic for 20 minutes afterwards, and to report any adverse reaction to me immediately.

## 2018-03-12 ENCOUNTER — OFFICE VISIT (OUTPATIENT)
Dept: PRIMARY CARE CLINIC | Age: 73
End: 2018-03-12
Payer: MEDICARE

## 2018-03-12 VITALS
HEART RATE: 101 BPM | DIASTOLIC BLOOD PRESSURE: 70 MMHG | OXYGEN SATURATION: 96 % | TEMPERATURE: 97.8 F | RESPIRATION RATE: 20 BRPM | WEIGHT: 252 LBS | SYSTOLIC BLOOD PRESSURE: 116 MMHG | BODY MASS INDEX: 34.13 KG/M2 | HEIGHT: 72 IN

## 2018-03-12 DIAGNOSIS — J06.9 UPPER RESPIRATORY TRACT INFECTION, UNSPECIFIED TYPE: Primary | ICD-10-CM

## 2018-03-12 PROCEDURE — 1123F ACP DISCUSS/DSCN MKR DOCD: CPT | Performed by: FAMILY MEDICINE

## 2018-03-12 PROCEDURE — 3017F COLORECTAL CA SCREEN DOC REV: CPT | Performed by: FAMILY MEDICINE

## 2018-03-12 PROCEDURE — 99213 OFFICE O/P EST LOW 20 MIN: CPT | Performed by: FAMILY MEDICINE

## 2018-03-12 PROCEDURE — 4040F PNEUMOC VAC/ADMIN/RCVD: CPT | Performed by: FAMILY MEDICINE

## 2018-03-12 PROCEDURE — G8417 CALC BMI ABV UP PARAM F/U: HCPCS | Performed by: FAMILY MEDICINE

## 2018-03-12 PROCEDURE — G8427 DOCREV CUR MEDS BY ELIG CLIN: HCPCS | Performed by: FAMILY MEDICINE

## 2018-03-12 PROCEDURE — G8482 FLU IMMUNIZE ORDER/ADMIN: HCPCS | Performed by: FAMILY MEDICINE

## 2018-03-12 PROCEDURE — 1036F TOBACCO NON-USER: CPT | Performed by: FAMILY MEDICINE

## 2018-03-12 RX ORDER — AZITHROMYCIN 250 MG/1
TABLET, FILM COATED ORAL
Qty: 1 PACKET | Refills: 0 | Status: SHIPPED | OUTPATIENT
Start: 2018-03-12 | End: 2018-05-03 | Stop reason: ALTCHOICE

## 2018-03-12 RX ORDER — BENZONATATE 200 MG/1
200 CAPSULE ORAL 3 TIMES DAILY PRN
Qty: 30 CAPSULE | Refills: 0 | Status: SHIPPED | OUTPATIENT
Start: 2018-03-12 | End: 2018-03-19

## 2018-03-12 ASSESSMENT — ENCOUNTER SYMPTOMS
EYE ITCHING: 0
ABDOMINAL PAIN: 0
EYE REDNESS: 0
NAUSEA: 0
SORE THROAT: 0
EYE DISCHARGE: 0
CONSTIPATION: 0
VOMITING: 0
DIARRHEA: 1
RHINORRHEA: 0
SHORTNESS OF BREATH: 1
COUGH: 1

## 2018-03-12 NOTE — PROGRESS NOTES
1. Have you seen another provider since your last visit? Dermatology    2. Have you had any other diagnostic tests since your last visit? No    3. Have you changed or stopped any medications since your last visit?  No
(trace) is present. Left Ear: External ear normal. A middle ear effusion (trace) is present. Nose: Right sinus exhibits no maxillary sinus tenderness and no frontal sinus tenderness. Left sinus exhibits no maxillary sinus tenderness and no frontal sinus tenderness. Mouth/Throat: Posterior oropharyngeal erythema (PND) present. Eyes: Conjunctivae and EOM are normal.   Neck: Neck supple. Cardiovascular: Normal rate, regular rhythm and normal heart sounds. Exam reveals no gallop and no friction rub. No murmur heard. Pulmonary/Chest: Effort normal and breath sounds normal. No respiratory distress. Abdominal: Soft. Bowel sounds are normal. He exhibits no distension. There is no tenderness. Lymphadenopathy:     He has no cervical adenopathy. Neurological: He is alert and oriented to person, place, and time. No cranial nerve deficit. Skin: Skin is warm and dry. No results found for requested labs within last 30 days. Hemoglobin A1C (%)   Date Value   11/28/2017 5.3     LDL Calculated (mg/dL)   Date Value   05/08/2017 83         Lab Results   Component Value Date    WBC 10.4 07/12/2014    NEUTROABS 7.7 07/12/2014    HGB 13.7 07/12/2014    HCT 40.3 07/12/2014    MCV 85.4 07/12/2014     07/12/2014       Lab Results   Component Value Date    TSH 1.730 11/28/2017         ASSESSMENT/PLAN:   Problem List Items Addressed This Visit     Upper respiratory tract infection - Primary     Patient encouraged to use flonase twice a day. Will cover for bacterial infection with zithromax. May take tessalon perles as needed for cough. Return if symptoms worsen or fail to improve.

## 2018-03-21 DIAGNOSIS — Z79.01 ANTICOAGULANT LONG-TERM USE: ICD-10-CM

## 2018-05-01 ENCOUNTER — HOSPITAL ENCOUNTER (OUTPATIENT)
Dept: OTHER | Age: 73
Discharge: HOME | End: 2018-05-01
Attending: INTERNAL MEDICINE | Admitting: INTERNAL MEDICINE

## 2018-05-01 ENCOUNTER — NURSE ONLY (OUTPATIENT)
Dept: PRIMARY CARE CLINIC | Age: 73
End: 2018-05-01
Payer: MEDICARE

## 2018-05-01 DIAGNOSIS — E53.8 B12 DEFICIENCY: Primary | ICD-10-CM

## 2018-05-01 PROCEDURE — 96372 THER/PROPH/DIAG INJ SC/IM: CPT | Performed by: INTERNAL MEDICINE

## 2018-05-01 RX ORDER — CYANOCOBALAMIN 1000 UG/ML
1000 INJECTION INTRAMUSCULAR; SUBCUTANEOUS ONCE
Status: COMPLETED | OUTPATIENT
Start: 2018-05-01 | End: 2018-05-01

## 2018-05-01 RX ADMIN — CYANOCOBALAMIN 1000 MCG: 1000 INJECTION INTRAMUSCULAR; SUBCUTANEOUS at 14:49

## 2018-05-02 DIAGNOSIS — Z79.01 ANTICOAGULANT LONG-TERM USE: ICD-10-CM

## 2018-05-03 ENCOUNTER — OFFICE VISIT (OUTPATIENT)
Dept: PRIMARY CARE CLINIC | Age: 73
End: 2018-05-03
Payer: MEDICARE

## 2018-05-03 VITALS
DIASTOLIC BLOOD PRESSURE: 84 MMHG | HEART RATE: 64 BPM | RESPIRATION RATE: 18 BRPM | SYSTOLIC BLOOD PRESSURE: 126 MMHG | WEIGHT: 268.4 LBS | OXYGEN SATURATION: 98 % | BODY MASS INDEX: 36.4 KG/M2

## 2018-05-03 DIAGNOSIS — R73.02 GLUCOSE INTOLERANCE (IMPAIRED GLUCOSE TOLERANCE): ICD-10-CM

## 2018-05-03 DIAGNOSIS — E78.5 HYPERLIPIDEMIA, UNSPECIFIED HYPERLIPIDEMIA TYPE: ICD-10-CM

## 2018-05-03 DIAGNOSIS — I48.91 ATRIAL FIBRILLATION, UNSPECIFIED TYPE (HCC): ICD-10-CM

## 2018-05-03 DIAGNOSIS — E53.8 B12 DEFICIENCY: ICD-10-CM

## 2018-05-03 DIAGNOSIS — I10 ESSENTIAL HYPERTENSION: Primary | ICD-10-CM

## 2018-05-03 PROCEDURE — 3017F COLORECTAL CA SCREEN DOC REV: CPT | Performed by: INTERNAL MEDICINE

## 2018-05-03 PROCEDURE — 99213 OFFICE O/P EST LOW 20 MIN: CPT | Performed by: INTERNAL MEDICINE

## 2018-05-03 PROCEDURE — G8427 DOCREV CUR MEDS BY ELIG CLIN: HCPCS | Performed by: INTERNAL MEDICINE

## 2018-05-03 PROCEDURE — G8417 CALC BMI ABV UP PARAM F/U: HCPCS | Performed by: INTERNAL MEDICINE

## 2018-05-03 PROCEDURE — 1036F TOBACCO NON-USER: CPT | Performed by: INTERNAL MEDICINE

## 2018-05-03 PROCEDURE — 4040F PNEUMOC VAC/ADMIN/RCVD: CPT | Performed by: INTERNAL MEDICINE

## 2018-05-03 PROCEDURE — 1123F ACP DISCUSS/DSCN MKR DOCD: CPT | Performed by: INTERNAL MEDICINE

## 2018-05-03 ASSESSMENT — ENCOUNTER SYMPTOMS
COUGH: 0
EYE DISCHARGE: 0
VOMITING: 0
BACK PAIN: 0
ABDOMINAL PAIN: 0
WHEEZING: 0
SINUS PRESSURE: 0
NAUSEA: 0
SHORTNESS OF BREATH: 0

## 2018-05-15 RX ORDER — WARFARIN SODIUM 5 MG/1
TABLET ORAL
Qty: 90 TABLET | Refills: 3 | Status: SHIPPED | OUTPATIENT
Start: 2018-05-15 | End: 2019-03-19 | Stop reason: SDUPTHER

## 2018-06-06 ENCOUNTER — HOSPITAL ENCOUNTER (OUTPATIENT)
Dept: OTHER | Age: 73
Discharge: HOME | End: 2018-06-06
Attending: INTERNAL MEDICINE | Admitting: INTERNAL MEDICINE

## 2018-06-07 LAB
ALBUMIN SERPL-MCNC: 4.3 G/DL
ALP BLD-CCNC: 100 U/L
ALT SERPL-CCNC: 25 U/L
ANION GAP SERPL CALCULATED.3IONS-SCNC: ABNORMAL MMOL/L
AST SERPL-CCNC: 18 U/L
AVERAGE GLUCOSE: ABNORMAL
BILIRUB SERPL-MCNC: 0.4 MG/DL (ref 0.1–1.4)
BUN BLDV-MCNC: 18 MG/DL
CALCIUM SERPL-MCNC: 8.9 MG/DL
CHLORIDE BLD-SCNC: 101 MMOL/L
CO2: 24 MMOL/L
CREAT SERPL-MCNC: 0.98 MG/DL
GFR CALCULATED: ABNORMAL
GLUCOSE BLD-MCNC: 198 MG/DL
HBA1C MFR BLD: 5.9 %
POTASSIUM SERPL-SCNC: 4.2 MMOL/L
SODIUM BLD-SCNC: 142 MMOL/L
TOTAL PROTEIN: 6.5

## 2018-06-07 RX ORDER — CYCLOBENZAPRINE HCL 10 MG
TABLET ORAL
Qty: 30 TABLET | Refills: 5 | Status: SHIPPED | OUTPATIENT
Start: 2018-06-07 | End: 2019-02-12 | Stop reason: SDUPTHER

## 2018-06-08 ENCOUNTER — HOSPITAL ENCOUNTER (OUTPATIENT)
Dept: OTHER | Age: 73
Discharge: HOME | End: 2018-06-08
Attending: INTERNAL MEDICINE | Admitting: INTERNAL MEDICINE

## 2018-06-11 DIAGNOSIS — R73.02 GLUCOSE INTOLERANCE (IMPAIRED GLUCOSE TOLERANCE): ICD-10-CM

## 2018-06-11 DIAGNOSIS — I48.91 ATRIAL FIBRILLATION, UNSPECIFIED TYPE (HCC): ICD-10-CM

## 2018-06-11 DIAGNOSIS — E78.5 HYPERLIPIDEMIA, UNSPECIFIED HYPERLIPIDEMIA TYPE: ICD-10-CM

## 2018-06-11 DIAGNOSIS — I10 ESSENTIAL HYPERTENSION: ICD-10-CM

## 2018-06-12 ENCOUNTER — TELEPHONE (OUTPATIENT)
Dept: PRIMARY CARE CLINIC | Age: 73
End: 2018-06-12

## 2018-06-12 NOTE — TELEPHONE ENCOUNTER
Called pt left a message for them to return call regarding lab work. If pt returns call please inform of results of labs .

## 2018-06-12 NOTE — TELEPHONE ENCOUNTER
----- Message from Henriette Babinski, MD sent at 6/11/2018  4:53 PM EDT -----  Cont same dose of coumadin.  Check INR monthly

## 2018-07-02 RX ORDER — TAMSULOSIN HYDROCHLORIDE 0.4 MG/1
CAPSULE ORAL
Qty: 30 CAPSULE | Refills: 4 | Status: SHIPPED | OUTPATIENT
Start: 2018-07-02 | End: 2018-12-14 | Stop reason: SDUPTHER

## 2018-07-05 ENCOUNTER — NURSE ONLY (OUTPATIENT)
Dept: PRIMARY CARE CLINIC | Age: 73
End: 2018-07-05
Payer: MEDICARE

## 2018-07-05 ENCOUNTER — HOSPITAL ENCOUNTER (OUTPATIENT)
Dept: OTHER | Age: 73
Discharge: HOME | End: 2018-07-05
Attending: INTERNAL MEDICINE | Admitting: INTERNAL MEDICINE

## 2018-07-05 DIAGNOSIS — E53.8 B12 DEFICIENCY: Primary | ICD-10-CM

## 2018-07-05 DIAGNOSIS — Z79.01 ANTICOAGULANT LONG-TERM USE: Primary | ICD-10-CM

## 2018-07-05 PROCEDURE — 96372 THER/PROPH/DIAG INJ SC/IM: CPT | Performed by: INTERNAL MEDICINE

## 2018-07-05 RX ORDER — CYANOCOBALAMIN 1000 UG/ML
1000 INJECTION INTRAMUSCULAR; SUBCUTANEOUS ONCE
Status: COMPLETED | OUTPATIENT
Start: 2018-07-05 | End: 2018-07-05

## 2018-07-05 RX ADMIN — CYANOCOBALAMIN 1000 MCG: 1000 INJECTION INTRAMUSCULAR; SUBCUTANEOUS at 15:29

## 2018-07-10 DIAGNOSIS — Z79.01 ANTICOAGULANT LONG-TERM USE: ICD-10-CM

## 2018-08-13 ENCOUNTER — HOSPITAL ENCOUNTER (OUTPATIENT)
Facility: HOSPITAL | Age: 73
Discharge: HOME | End: 2018-08-13
Payer: MEDICARE

## 2018-08-13 ENCOUNTER — NURSE ONLY (OUTPATIENT)
Dept: PRIMARY CARE CLINIC | Age: 73
End: 2018-08-13
Payer: MEDICARE

## 2018-08-13 DIAGNOSIS — E53.8 B12 DEFICIENCY: Primary | ICD-10-CM

## 2018-08-13 PROCEDURE — 96372 THER/PROPH/DIAG INJ SC/IM: CPT | Performed by: INTERNAL MEDICINE

## 2018-08-13 PROCEDURE — 36415 COLL VENOUS BLD VENIPUNCTURE: CPT

## 2018-08-13 RX ORDER — CYANOCOBALAMIN 1000 UG/ML
1000 INJECTION INTRAMUSCULAR; SUBCUTANEOUS ONCE
Status: COMPLETED | OUTPATIENT
Start: 2018-08-13 | End: 2018-08-13

## 2018-08-13 RX ADMIN — CYANOCOBALAMIN 1000 MCG: 1000 INJECTION INTRAMUSCULAR; SUBCUTANEOUS at 11:58

## 2018-08-13 NOTE — PROGRESS NOTES
Per Pat Villar MD orders Melene Chamber was given 1 ml  of b12 IM, in the right deltoid. No complications or reactions were noted. Patient tolerated procedure well.

## 2018-08-14 ENCOUNTER — TELEPHONE (OUTPATIENT)
Dept: PRIMARY CARE CLINIC | Age: 73
End: 2018-08-14

## 2018-08-14 DIAGNOSIS — Z79.01 ANTICOAGULANT LONG-TERM USE: ICD-10-CM

## 2018-08-14 NOTE — TELEPHONE ENCOUNTER
----- Message from Josh Dorantes MD sent at 8/14/2018 12:01 PM EDT -----  Cont same dose of coumadin.  Check INR monthly

## 2018-08-22 ENCOUNTER — OFFICE VISIT (OUTPATIENT)
Dept: PRIMARY CARE CLINIC | Age: 73
End: 2018-08-22
Payer: MEDICARE

## 2018-08-22 VITALS
BODY MASS INDEX: 36.29 KG/M2 | OXYGEN SATURATION: 97 % | TEMPERATURE: 98.3 F | SYSTOLIC BLOOD PRESSURE: 118 MMHG | DIASTOLIC BLOOD PRESSURE: 64 MMHG | WEIGHT: 267.6 LBS | HEART RATE: 68 BPM | RESPIRATION RATE: 18 BRPM

## 2018-08-22 DIAGNOSIS — R73.02 GLUCOSE INTOLERANCE (IMPAIRED GLUCOSE TOLERANCE): ICD-10-CM

## 2018-08-22 DIAGNOSIS — Z23 NEED FOR VACCINATION AGAINST STREPTOCOCCUS PNEUMONIAE USING PNEUMOCOCCAL CONJUGATE VACCINE 13: ICD-10-CM

## 2018-08-22 DIAGNOSIS — R35.0 BENIGN PROSTATIC HYPERPLASIA WITH URINARY FREQUENCY: ICD-10-CM

## 2018-08-22 DIAGNOSIS — N40.1 BENIGN PROSTATIC HYPERPLASIA WITH URINARY FREQUENCY: ICD-10-CM

## 2018-08-22 DIAGNOSIS — I10 ESSENTIAL HYPERTENSION: ICD-10-CM

## 2018-08-22 DIAGNOSIS — I48.91 ATRIAL FIBRILLATION, UNSPECIFIED TYPE (HCC): Primary | ICD-10-CM

## 2018-08-22 DIAGNOSIS — Z12.5 SCREENING FOR PROSTATE CANCER: ICD-10-CM

## 2018-08-22 PROCEDURE — 90670 PCV13 VACCINE IM: CPT | Performed by: INTERNAL MEDICINE

## 2018-08-22 PROCEDURE — 1123F ACP DISCUSS/DSCN MKR DOCD: CPT | Performed by: INTERNAL MEDICINE

## 2018-08-22 PROCEDURE — 1101F PT FALLS ASSESS-DOCD LE1/YR: CPT | Performed by: INTERNAL MEDICINE

## 2018-08-22 PROCEDURE — G0009 ADMIN PNEUMOCOCCAL VACCINE: HCPCS | Performed by: INTERNAL MEDICINE

## 2018-08-22 PROCEDURE — 99213 OFFICE O/P EST LOW 20 MIN: CPT | Performed by: INTERNAL MEDICINE

## 2018-08-22 PROCEDURE — 1036F TOBACCO NON-USER: CPT | Performed by: INTERNAL MEDICINE

## 2018-08-22 PROCEDURE — 3017F COLORECTAL CA SCREEN DOC REV: CPT | Performed by: INTERNAL MEDICINE

## 2018-08-22 PROCEDURE — G8417 CALC BMI ABV UP PARAM F/U: HCPCS | Performed by: INTERNAL MEDICINE

## 2018-08-22 PROCEDURE — G8427 DOCREV CUR MEDS BY ELIG CLIN: HCPCS | Performed by: INTERNAL MEDICINE

## 2018-08-22 PROCEDURE — 4040F PNEUMOC VAC/ADMIN/RCVD: CPT | Performed by: INTERNAL MEDICINE

## 2018-08-22 RX ORDER — POTASSIUM CHLORIDE 750 MG/1
TABLET, FILM COATED, EXTENDED RELEASE ORAL
Qty: 90 TABLET | Refills: 0 | Status: SHIPPED | OUTPATIENT
Start: 2018-08-22 | End: 2018-12-14 | Stop reason: SDUPTHER

## 2018-08-22 RX ORDER — FINASTERIDE 5 MG/1
5 TABLET, FILM COATED ORAL DAILY
Qty: 30 TABLET | Refills: 3 | Status: SHIPPED | OUTPATIENT
Start: 2018-08-22 | End: 2018-11-27 | Stop reason: SDUPTHER

## 2018-08-22 ASSESSMENT — ENCOUNTER SYMPTOMS
ABDOMINAL PAIN: 0
SHORTNESS OF BREATH: 0
BACK PAIN: 0
NAUSEA: 0
VOMITING: 0
COUGH: 0
SINUS PRESSURE: 0
WHEEZING: 0
EYE DISCHARGE: 0

## 2018-08-22 ASSESSMENT — PATIENT HEALTH QUESTIONNAIRE - PHQ9
SUM OF ALL RESPONSES TO PHQ9 QUESTIONS 1 & 2: 0
1. LITTLE INTEREST OR PLEASURE IN DOING THINGS: 0
2. FEELING DOWN, DEPRESSED OR HOPELESS: 0
SUM OF ALL RESPONSES TO PHQ QUESTIONS 1-9: 0
SUM OF ALL RESPONSES TO PHQ QUESTIONS 1-9: 0

## 2018-08-22 NOTE — PROGRESS NOTES
tablet     Sig: TAKE ONE TABLET BY MOUTH ONCE DAILY     Dispense:  90 tablet     Refill:  0    finasteride (PROSCAR) 5 MG tablet     Sig: Take 1 tablet by mouth daily     Dispense:  30 tablet     Refill:  3        Written by Saeid Yanez CMA, acting as a scribe for Dr. William Borden on 8/22/2018 at 1:53 PM.       I, Dr. Wilder Goddard, personally performed the services described in the documentation as scribed by Saeid Yanez CMA, in my presence and it is both accurate and complete.

## 2018-08-24 RX ORDER — WARFARIN SODIUM 1 MG/1
TABLET ORAL
Qty: 38 TABLET | Refills: 5 | Status: SHIPPED | OUTPATIENT
Start: 2018-08-24 | End: 2019-04-04 | Stop reason: SDUPTHER

## 2018-08-27 ENCOUNTER — OFFICE VISIT (OUTPATIENT)
Dept: CARDIOLOGY | Facility: CLINIC | Age: 73
End: 2018-08-27

## 2018-08-27 ENCOUNTER — LAB (OUTPATIENT)
Dept: LAB | Facility: HOSPITAL | Age: 73
End: 2018-08-27
Attending: INTERNAL MEDICINE

## 2018-08-27 VITALS
HEART RATE: 62 BPM | HEIGHT: 72 IN | WEIGHT: 266 LBS | DIASTOLIC BLOOD PRESSURE: 50 MMHG | BODY MASS INDEX: 36.03 KG/M2 | SYSTOLIC BLOOD PRESSURE: 110 MMHG

## 2018-08-27 DIAGNOSIS — I10 ESSENTIAL HYPERTENSION: ICD-10-CM

## 2018-08-27 DIAGNOSIS — R06.09 DOE (DYSPNEA ON EXERTION): Primary | ICD-10-CM

## 2018-08-27 DIAGNOSIS — E78.2 MIXED HYPERLIPIDEMIA: ICD-10-CM

## 2018-08-27 DIAGNOSIS — I48.0 PAROXYSMAL ATRIAL FIBRILLATION (HCC): Primary | ICD-10-CM

## 2018-08-27 DIAGNOSIS — R06.09 DOE (DYSPNEA ON EXERTION): ICD-10-CM

## 2018-08-27 PROCEDURE — 99213 OFFICE O/P EST LOW 20 MIN: CPT | Performed by: INTERNAL MEDICINE

## 2018-08-27 PROCEDURE — 85025 COMPLETE CBC W/AUTO DIFF WBC: CPT

## 2018-08-27 PROCEDURE — 36415 COLL VENOUS BLD VENIPUNCTURE: CPT

## 2018-08-27 PROCEDURE — 80053 COMPREHEN METABOLIC PANEL: CPT

## 2018-08-27 RX ORDER — FINASTERIDE 5 MG/1
5 TABLET, FILM COATED ORAL DAILY
Refills: 3 | COMMUNITY
Start: 2018-08-22

## 2018-08-27 RX ORDER — WARFARIN SODIUM 1 MG/1
1 TABLET ORAL SEE ADMIN INSTRUCTIONS
Status: ON HOLD | COMMUNITY
End: 2019-09-03

## 2018-08-27 NOTE — PROGRESS NOTES
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 A-fib/A-flutter  a) s/p ECV x 2  b) Normal LVEF per echo 8/2011  c) chronic coumadin  2. DILAN-compliant with CPAP  3. HTN  4. HLP  7/15 144/172/29/81  5. Normal coronary arteries per remote LHC  6. DVt s/p IVC filter  7. 2015 L traumatic femur fx- Sajadi    Allergies  Allergies   Allergen Reactions   • Codeine    • Keflex [Cephalexin]    • Morphine And Related    • Penicillins        Current Medications    Current Outpatient Prescriptions:   •  aspirin 81 MG EC tablet, Take 81 mg by mouth Daily., Disp: , Rfl:   •  atorvastatin (LIPITOR) 20 MG tablet, Take 20 mg by mouth Daily., Disp: , Rfl:   •  citalopram (CeleXA) 20 MG tablet, Take 20 mg by mouth Daily., Disp: , Rfl:   •  cyclobenzaprine (FLEXERIL) 10 MG tablet, Take 10 mg by mouth Every Night., Disp: , Rfl:   •  finasteride (PROSCAR) 5 MG tablet, 5 mg Daily., Disp: , Rfl: 3  •  fluticasone (VERAMYST) 27.5 MCG/SPRAY nasal spray, 2 sprays into the nostril(s) as directed by provider As Needed for Rhinitis., Disp: , Rfl:   •  furosemide (LASIX) 40 MG tablet, Take 40 mg by mouth 2 (Two) Times a Day As Needed., Disp: , Rfl:   •  hydrochlorothiazide (MICROZIDE) 12.5 MG capsule, Take 12.5 mg by mouth Daily., Disp: , Rfl:   •  metoprolol tartrate (LOPRESSOR) 25 MG tablet, Take 25 mg by mouth 2 (Two) Times a Day., Disp: , Rfl:   •  NIFEdipine XL (PROCARDIA XL) 90 MG 24 hr tablet, Take 90 mg by mouth Daily., Disp: , Rfl:   •  potassium chloride (K-DUR) 10 MEQ CR tablet, Take 10 mEq by mouth Daily., Disp: , Rfl:   •  tamsulosin (FLOMAX) 0.4 MG capsule 24 hr capsule, Take 1 capsule by mouth Daily., Disp: , Rfl:   •  warfarin  "(COUMADIN) 1 MG tablet, Take 1 mg by mouth Daily. 3 tablets on fridays, Disp: , Rfl:   •  warfarin (COUMADIN) 5 MG tablet, daily., Disp: , Rfl:       History of Present Illness   Pt here for follow up. No new complaints. Is able to do yard work and housework. Vacationed 3X this summer and gained some weight.  Pt denies any chest pain, dyspnea, dyspnea on exertion, orthopnea, PND, palpitations, or claudication.     ROS:  All systems have been reviewed and are negative with the exception of those mentioned in the HPI.    OBJECTIVE:  Vitals:    08/27/18 1356   BP: 110/50   BP Location: Right arm   Patient Position: Sitting   Pulse: 62   Weight: 121 kg (266 lb)   Height: 182.9 cm (72\")     Physical Exam   Constitutional: He is oriented to person, place, and time. He appears well-developed and well-nourished. No distress.   Cardiovascular: Normal rate, regular rhythm, normal heart sounds and intact distal pulses.    No murmur heard.  Pulses:       Radial pulses are 2+ on the right side, and 2+ on the left side.        Dorsalis pedis pulses are 2+ on the right side, and 2+ on the left side.        Posterior tibial pulses are 2+ on the right side, and 2+ on the left side.   Pulmonary/Chest: Effort normal and breath sounds normal. He has no wheezes. He has no rales.   Abdominal: Soft. Bowel sounds are normal. There is no tenderness. There is no guarding.   Musculoskeletal: He exhibits edema (L>R). He exhibits no tenderness.   Neurological: He is alert and oriented to person, place, and time.   Skin: Skin is warm and dry. No rash noted.   Psychiatric: He has a normal mood and affect.   Nursing note and vitals reviewed.      Diagnostic Data:  Procedures      ASSESSMENT:   Diagnosis Plan   1. Paroxysmal atrial fibrillation (CMS/HCC)     2. Essential hypertension     3. Mixed hyperlipidemia         PLAN:  1. Well controlled, a/c with coumadin  2. Well controled today, continue antihypertensives would check cbc, cmp as bp low " today  3. Labs per PCP, continue statin therapy with atorvastatin    Ewelina Kumar MD, thank you for referring Mr. Palencia for evaluation.  I have forwarded my electronically generated recommendations to you for review.  Please do not hesitate to call with any questions.      Zack Almanza MD, FACC

## 2018-09-06 ENCOUNTER — NURSE ONLY (OUTPATIENT)
Dept: PRIMARY CARE CLINIC | Age: 73
End: 2018-09-06
Payer: MEDICARE

## 2018-09-06 ENCOUNTER — HOSPITAL ENCOUNTER (OUTPATIENT)
Facility: HOSPITAL | Age: 73
Discharge: HOME | End: 2018-09-06
Payer: MEDICARE

## 2018-09-06 DIAGNOSIS — E53.8 B12 DEFICIENCY: Primary | ICD-10-CM

## 2018-09-06 PROCEDURE — 36415 COLL VENOUS BLD VENIPUNCTURE: CPT

## 2018-09-06 PROCEDURE — 96372 THER/PROPH/DIAG INJ SC/IM: CPT | Performed by: INTERNAL MEDICINE

## 2018-09-06 RX ORDER — FLUTICASONE PROPIONATE 50 MCG
SPRAY, SUSPENSION (ML) NASAL
Qty: 16 G | Refills: 3 | Status: SHIPPED | OUTPATIENT
Start: 2018-09-06 | End: 2019-01-20

## 2018-09-06 RX ORDER — CYANOCOBALAMIN 1000 UG/ML
1000 INJECTION INTRAMUSCULAR; SUBCUTANEOUS ONCE
Status: COMPLETED | OUTPATIENT
Start: 2018-09-06 | End: 2018-09-06

## 2018-09-06 RX ORDER — HYDROCHLOROTHIAZIDE 12.5 MG/1
CAPSULE, GELATIN COATED ORAL
Qty: 90 CAPSULE | Refills: 3 | Status: SHIPPED | OUTPATIENT
Start: 2018-09-06 | End: 2019-10-30 | Stop reason: SDUPTHER

## 2018-09-06 RX ADMIN — CYANOCOBALAMIN 1000 MCG: 1000 INJECTION INTRAMUSCULAR; SUBCUTANEOUS at 13:34

## 2018-09-09 DIAGNOSIS — I48.91 ATRIAL FIBRILLATION, UNSPECIFIED TYPE (HCC): ICD-10-CM

## 2018-10-10 ENCOUNTER — HOSPITAL ENCOUNTER (OUTPATIENT)
Facility: HOSPITAL | Age: 73
Discharge: HOME | End: 2018-10-10
Payer: MEDICARE

## 2018-10-10 ENCOUNTER — NURSE ONLY (OUTPATIENT)
Dept: PRIMARY CARE CLINIC | Age: 73
End: 2018-10-10
Payer: MEDICARE

## 2018-10-10 DIAGNOSIS — E53.8 B12 DEFICIENCY: Primary | ICD-10-CM

## 2018-10-10 DIAGNOSIS — Z23 NEED FOR INFLUENZA VACCINATION: ICD-10-CM

## 2018-10-10 PROCEDURE — G0008 ADMIN INFLUENZA VIRUS VAC: HCPCS | Performed by: INTERNAL MEDICINE

## 2018-10-10 PROCEDURE — 96372 THER/PROPH/DIAG INJ SC/IM: CPT | Performed by: INTERNAL MEDICINE

## 2018-10-10 PROCEDURE — 90688 IIV4 VACCINE SPLT 0.5 ML IM: CPT | Performed by: INTERNAL MEDICINE

## 2018-10-10 PROCEDURE — 36415 COLL VENOUS BLD VENIPUNCTURE: CPT

## 2018-10-10 RX ORDER — CYANOCOBALAMIN 1000 UG/ML
1000 INJECTION INTRAMUSCULAR; SUBCUTANEOUS ONCE
Status: COMPLETED | OUTPATIENT
Start: 2018-10-10 | End: 2018-10-10

## 2018-10-10 RX ADMIN — CYANOCOBALAMIN 1000 MCG: 1000 INJECTION INTRAMUSCULAR; SUBCUTANEOUS at 11:58

## 2018-11-01 RX ORDER — NIFEDIPINE 90 MG/1
TABLET, EXTENDED RELEASE ORAL
Qty: 90 TABLET | Refills: 3 | Status: SHIPPED | OUTPATIENT
Start: 2018-11-01 | End: 2019-09-11 | Stop reason: ALTCHOICE

## 2018-11-05 ENCOUNTER — NURSE ONLY (OUTPATIENT)
Dept: PRIMARY CARE CLINIC | Age: 73
End: 2018-11-05
Payer: MEDICARE

## 2018-11-05 ENCOUNTER — HOSPITAL ENCOUNTER (OUTPATIENT)
Facility: HOSPITAL | Age: 73
Discharge: HOME | End: 2018-11-05
Payer: MEDICARE

## 2018-11-05 DIAGNOSIS — E53.8 B12 DEFICIENCY: Primary | ICD-10-CM

## 2018-11-05 PROCEDURE — 36415 COLL VENOUS BLD VENIPUNCTURE: CPT

## 2018-11-05 PROCEDURE — 96372 THER/PROPH/DIAG INJ SC/IM: CPT | Performed by: INTERNAL MEDICINE

## 2018-11-05 RX ORDER — CYANOCOBALAMIN 1000 UG/ML
1000 INJECTION INTRAMUSCULAR; SUBCUTANEOUS ONCE
Status: COMPLETED | OUTPATIENT
Start: 2018-11-05 | End: 2018-11-05

## 2018-11-05 RX ADMIN — CYANOCOBALAMIN 1000 MCG: 1000 INJECTION INTRAMUSCULAR; SUBCUTANEOUS at 14:31

## 2018-11-06 ENCOUNTER — TELEPHONE (OUTPATIENT)
Dept: PRIMARY CARE CLINIC | Age: 73
End: 2018-11-06

## 2018-11-13 RX ORDER — ATORVASTATIN CALCIUM 20 MG/1
TABLET, FILM COATED ORAL
Qty: 90 TABLET | Refills: 3 | Status: SHIPPED | OUTPATIENT
Start: 2018-11-13 | End: 2019-02-12 | Stop reason: SDUPTHER

## 2018-11-20 ENCOUNTER — OFFICE VISIT (OUTPATIENT)
Dept: PRIMARY CARE CLINIC | Age: 73
End: 2018-11-20
Payer: MEDICARE

## 2018-11-20 VITALS
BODY MASS INDEX: 35.94 KG/M2 | HEART RATE: 97 BPM | RESPIRATION RATE: 18 BRPM | WEIGHT: 265 LBS | SYSTOLIC BLOOD PRESSURE: 138 MMHG | OXYGEN SATURATION: 97 % | DIASTOLIC BLOOD PRESSURE: 64 MMHG

## 2018-11-20 DIAGNOSIS — R73.02 GLUCOSE INTOLERANCE (IMPAIRED GLUCOSE TOLERANCE): ICD-10-CM

## 2018-11-20 DIAGNOSIS — I10 ESSENTIAL HYPERTENSION: ICD-10-CM

## 2018-11-20 DIAGNOSIS — E78.5 HYPERLIPIDEMIA, UNSPECIFIED HYPERLIPIDEMIA TYPE: Primary | ICD-10-CM

## 2018-11-20 DIAGNOSIS — I48.91 ATRIAL FIBRILLATION, UNSPECIFIED TYPE (HCC): ICD-10-CM

## 2018-11-20 PROBLEM — M25.531 PAIN AND SWELLING OF RIGHT WRIST: Status: RESOLVED | Noted: 2017-07-15 | Resolved: 2018-11-20

## 2018-11-20 PROBLEM — M25.431 PAIN AND SWELLING OF RIGHT WRIST: Status: RESOLVED | Noted: 2017-07-15 | Resolved: 2018-11-20

## 2018-11-20 PROBLEM — J06.9 UPPER RESPIRATORY TRACT INFECTION: Status: RESOLVED | Noted: 2018-03-12 | Resolved: 2018-11-20

## 2018-11-20 PROCEDURE — 1101F PT FALLS ASSESS-DOCD LE1/YR: CPT | Performed by: INTERNAL MEDICINE

## 2018-11-20 PROCEDURE — 3017F COLORECTAL CA SCREEN DOC REV: CPT | Performed by: INTERNAL MEDICINE

## 2018-11-20 PROCEDURE — 99213 OFFICE O/P EST LOW 20 MIN: CPT | Performed by: INTERNAL MEDICINE

## 2018-11-20 PROCEDURE — 4040F PNEUMOC VAC/ADMIN/RCVD: CPT | Performed by: INTERNAL MEDICINE

## 2018-11-20 PROCEDURE — G8427 DOCREV CUR MEDS BY ELIG CLIN: HCPCS | Performed by: INTERNAL MEDICINE

## 2018-11-20 PROCEDURE — G8417 CALC BMI ABV UP PARAM F/U: HCPCS | Performed by: INTERNAL MEDICINE

## 2018-11-20 PROCEDURE — 1123F ACP DISCUSS/DSCN MKR DOCD: CPT | Performed by: INTERNAL MEDICINE

## 2018-11-20 PROCEDURE — 1036F TOBACCO NON-USER: CPT | Performed by: INTERNAL MEDICINE

## 2018-11-20 PROCEDURE — G8482 FLU IMMUNIZE ORDER/ADMIN: HCPCS | Performed by: INTERNAL MEDICINE

## 2018-11-20 ASSESSMENT — ENCOUNTER SYMPTOMS
EYE DISCHARGE: 0
NAUSEA: 0
VOMITING: 0
WHEEZING: 0
SINUS PRESSURE: 0
SHORTNESS OF BREATH: 0
COUGH: 0
ABDOMINAL PAIN: 0
BACK PAIN: 1

## 2018-11-20 NOTE — PROGRESS NOTES
SUBJECTIVE:    Patient ID: Mary Rosen is a 74 y. o.male. Chief Complaint   Patient presents with    Hyperlipidemia    Hypertension    Atrial Fibrillation         HPI:  Patient has had hyperlipidemia for several years. He has been compliant with low fat diet. He has been compliant with taking the medications, without side effects. His weight is stable compared to last visit. He is active, and never exercises. Patient was diagnosed with Afib few years ago. He has been compliant with taking meds w/o SE. He is taking coumadin as well. Patient denies any side effect including easy bruising, bleeding. ..ect. Patient has been compliant with taking Coumadin and checking INR as supposed to. No CP or palpitation. Patient was noted to have a slightly elevated glucose on fasting lab. He denies any personal or family history of diabetes. He does not check his blood sugars. There are no symptoms of hyperglycemia. Blood pressure has been stable. Patient's medications, allergies, past medical, surgical, social and family histories were reviewed and updated as appropriate in electronic medical record.         Outpatient Prescriptions Marked as Taking for the 11/20/18 encounter (Office Visit) with Abbi Nugent MD   Medication Sig Dispense Refill    atorvastatin (LIPITOR) 20 MG tablet TAKE ONE TABLET BY MOUTH ONCE DAILY 90 tablet 3    NIFEdipine (PROCARDIA XL) 90 MG extended release tablet TAKE ONE TABLET BY MOUTH ONCE DAILY 90 tablet 3    hydrochlorothiazide (MICROZIDE) 12.5 MG capsule TAKE ONE CAPSULE BY MOUTH ONCE DAILY 90 capsule 3    fluticasone (FLONASE) 50 MCG/ACT nasal spray USE 1 SPRAY IN EACH NOSTRIL ONCE DAILY 16 g 3    warfarin (COUMADIN) 1 MG tablet TAKE 1 TABLET BY MOUTH ONCE DAILY AND 3 TABLETS ON FRIDAYS 38 tablet 5    potassium chloride (KLOR-CON) 10 MEQ extended release tablet TAKE ONE TABLET BY MOUTH ONCE DAILY 90 tablet 0    finasteride (PROSCAR) 5 MG tablet Take 1 tablet by mouth advised him on low-sodium diet, exercise and weight control. I am going to continue current medication. Will monitor his renal function every few months, have advised him to check blood pressure frequently and to keep a record of this. - Comprehensive Metabolic Panel;  Future

## 2018-11-27 RX ORDER — FINASTERIDE 5 MG/1
5 TABLET, FILM COATED ORAL DAILY
Qty: 30 TABLET | Refills: 3 | Status: SHIPPED | OUTPATIENT
Start: 2018-11-27 | End: 2019-04-04 | Stop reason: SDUPTHER

## 2018-12-14 RX ORDER — POTASSIUM CHLORIDE 750 MG/1
TABLET, EXTENDED RELEASE ORAL
Qty: 90 TABLET | Refills: 0 | Status: SHIPPED | OUTPATIENT
Start: 2018-12-14 | End: 2019-01-24 | Stop reason: SDUPTHER

## 2018-12-14 RX ORDER — TAMSULOSIN HYDROCHLORIDE 0.4 MG/1
CAPSULE ORAL
Qty: 30 CAPSULE | Refills: 4 | Status: SHIPPED | OUTPATIENT
Start: 2018-12-14 | End: 2019-06-06 | Stop reason: SDUPTHER

## 2018-12-31 ENCOUNTER — HOSPITAL ENCOUNTER (OUTPATIENT)
Facility: HOSPITAL | Age: 73
Discharge: HOME | End: 2018-12-31
Payer: MEDICARE

## 2018-12-31 ENCOUNTER — NURSE ONLY (OUTPATIENT)
Dept: PRIMARY CARE CLINIC | Age: 73
End: 2018-12-31
Payer: MEDICARE

## 2018-12-31 DIAGNOSIS — E53.8 B12 DEFICIENCY: Primary | ICD-10-CM

## 2018-12-31 DIAGNOSIS — I48.91 ATRIAL FIBRILLATION, UNSPECIFIED TYPE (HCC): ICD-10-CM

## 2018-12-31 PROCEDURE — 96372 THER/PROPH/DIAG INJ SC/IM: CPT | Performed by: INTERNAL MEDICINE

## 2018-12-31 PROCEDURE — 36415 COLL VENOUS BLD VENIPUNCTURE: CPT

## 2018-12-31 RX ORDER — CYANOCOBALAMIN 1000 UG/ML
1000 INJECTION INTRAMUSCULAR; SUBCUTANEOUS ONCE
Status: COMPLETED | OUTPATIENT
Start: 2018-12-31 | End: 2018-12-31

## 2018-12-31 RX ADMIN — CYANOCOBALAMIN 1000 MCG: 1000 INJECTION INTRAMUSCULAR; SUBCUTANEOUS at 12:18

## 2019-01-20 ENCOUNTER — APPOINTMENT (OUTPATIENT)
Dept: GENERAL RADIOLOGY | Facility: HOSPITAL | Age: 74
DRG: 194 | End: 2019-01-20
Payer: MEDICARE

## 2019-01-20 ENCOUNTER — HOSPITAL ENCOUNTER (EMERGENCY)
Facility: HOSPITAL | Age: 74
Discharge: HOME | DRG: 194 | End: 2019-01-20
Attending: FAMILY MEDICINE
Payer: MEDICARE

## 2019-01-20 ENCOUNTER — HOSPITAL ENCOUNTER (INPATIENT)
Facility: HOSPITAL | Age: 74
LOS: 1 days | Discharge: HOME | DRG: 194 | End: 2019-01-22
Attending: EMERGENCY MEDICINE | Admitting: PEDIATRICS
Payer: MEDICARE

## 2019-01-20 ENCOUNTER — APPOINTMENT (OUTPATIENT)
Dept: CT IMAGING | Facility: HOSPITAL | Age: 74
DRG: 194 | End: 2019-01-20
Payer: MEDICARE

## 2019-01-20 VITALS
HEART RATE: 94 BPM | HEIGHT: 72 IN | RESPIRATION RATE: 20 BRPM | WEIGHT: 270 LBS | SYSTOLIC BLOOD PRESSURE: 136 MMHG | TEMPERATURE: 98 F | DIASTOLIC BLOOD PRESSURE: 61 MMHG | OXYGEN SATURATION: 95 % | BODY MASS INDEX: 36.57 KG/M2

## 2019-01-20 DIAGNOSIS — J18.9 PNEUMONIA DUE TO ORGANISM: Primary | ICD-10-CM

## 2019-01-20 DIAGNOSIS — R09.02 HYPOXIA: ICD-10-CM

## 2019-01-20 DIAGNOSIS — S20.229A CONTUSION OF BACK, UNSPECIFIED LATERALITY, INITIAL ENCOUNTER: ICD-10-CM

## 2019-01-20 DIAGNOSIS — R53.1 GENERAL WEAKNESS: ICD-10-CM
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
## 2019-01-20 LAB
ANION GAP SERPL CALCULATED.3IONS-SCNC: 12 MMOL/L (ref 3–16)
BASOPHILS ABSOLUTE: 0 K/UL (ref 0–0.1)
BASOPHILS RELATIVE PERCENT: 0.3 %
BUN BLDV-MCNC: 17 MG/DL (ref 6–20)
CALCIUM SERPL-MCNC: 9.2 MG/DL (ref 8.5–10.5)
CHLORIDE BLD-SCNC: 98 MMOL/L (ref 98–107)
CO2: 26 MMOL/L (ref 20–30)
CREAT SERPL-MCNC: 1.2 MG/DL (ref 0.4–1.2)
EOSINOPHILS ABSOLUTE: 0 K/UL (ref 0–0.4)
EOSINOPHILS RELATIVE PERCENT: 0 %
GFR AFRICAN AMERICAN: >59
GFR NON-AFRICAN AMERICAN: 59
GLUCOSE BLD-MCNC: 159 MG/DL (ref 74–106)
HCT VFR BLD CALC: 47.5 % (ref 40–54)
HEMOGLOBIN: 15.5 G/DL (ref 13–18)
IMMATURE GRANULOCYTES #: 0 K/UL
IMMATURE GRANULOCYTES %: 0.3 % (ref 0–5)
LYMPHOCYTES ABSOLUTE: 1.5 K/UL (ref 1.5–4)
LYMPHOCYTES RELATIVE PERCENT: 15.8 %
MCH RBC QN AUTO: 28.2 PG (ref 27–32)
MCHC RBC AUTO-ENTMCNC: 32.6 G/DL (ref 31–35)
MCV RBC AUTO: 86.5 FL (ref 80–100)
MONOCYTES ABSOLUTE: 1.1 K/UL (ref 0.2–0.8)
MONOCYTES RELATIVE PERCENT: 11.9 %
NEUTROPHILS ABSOLUTE: 6.6 K/UL (ref 2–7.5)
NEUTROPHILS RELATIVE PERCENT: 71.7 %
PDW BLD-RTO: 13 % (ref 11–16)
PLATELET # BLD: 203 K/UL (ref 150–400)
PMV BLD AUTO: 10.7 FL (ref 6–10)
POTASSIUM SERPL-SCNC: 4.1 MMOL/L (ref 3.4–5.1)
PRO-BNP: 567 PG/ML (ref 0–1800)
RBC # BLD: 5.49 M/UL (ref 4.5–6)
REASON FOR REJECTION: NORMAL
REJECTED TEST: NORMAL
SODIUM BLD-SCNC: 136 MMOL/L (ref 136–145)
TROPONIN: <0.3 NG/ML
WBC # BLD: 9.3 K/UL (ref 4–11)

## 2019-01-20 PROCEDURE — 99285 EMERGENCY DEPT VISIT HI MDM: CPT

## 2019-01-20 PROCEDURE — 72131 CT LUMBAR SPINE W/O DYE: CPT

## 2019-01-20 PROCEDURE — 6370000000 HC RX 637 (ALT 250 FOR IP): Performed by: EMERGENCY MEDICINE

## 2019-01-20 PROCEDURE — 83880 ASSAY OF NATRIURETIC PEPTIDE: CPT

## 2019-01-20 PROCEDURE — 93005 ELECTROCARDIOGRAM TRACING: CPT

## 2019-01-20 PROCEDURE — 85025 COMPLETE CBC W/AUTO DIFF WBC: CPT

## 2019-01-20 PROCEDURE — 99283 EMERGENCY DEPT VISIT LOW MDM: CPT

## 2019-01-20 PROCEDURE — 96365 THER/PROPH/DIAG IV INF INIT: CPT

## 2019-01-20 PROCEDURE — 36415 COLL VENOUS BLD VENIPUNCTURE: CPT

## 2019-01-20 PROCEDURE — 84484 ASSAY OF TROPONIN QUANT: CPT

## 2019-01-20 PROCEDURE — 87040 BLOOD CULTURE FOR BACTERIA: CPT

## 2019-01-20 PROCEDURE — 71045 X-RAY EXAM CHEST 1 VIEW: CPT

## 2019-01-20 PROCEDURE — 6360000002 HC RX W HCPCS: Performed by: FAMILY MEDICINE

## 2019-01-20 PROCEDURE — 80048 BASIC METABOLIC PNL TOTAL CA: CPT

## 2019-01-20 RX ORDER — LEVOFLOXACIN 250 MG/1
250 TABLET ORAL DAILY
Qty: 10 TABLET | Refills: 0 | Status: ON HOLD | OUTPATIENT
Start: 2019-01-20 | End: 2019-01-22 | Stop reason: HOSPADM

## 2019-01-20 RX ORDER — HYDROCODONE BITARTRATE AND ACETAMINOPHEN 7.5; 325 MG/1; MG/1
1 TABLET ORAL EVERY 6 HOURS PRN
Qty: 12 TABLET | Refills: 0 | Status: SHIPPED | OUTPATIENT
Start: 2019-01-20 | End: 2019-01-23

## 2019-01-20 RX ORDER — LEVOFLOXACIN 5 MG/ML
500 INJECTION, SOLUTION INTRAVENOUS ONCE
Status: COMPLETED | OUTPATIENT
Start: 2019-01-20 | End: 2019-01-20

## 2019-01-20 RX ORDER — OXYCODONE HYDROCHLORIDE AND ACETAMINOPHEN 5; 325 MG/1; MG/1
2 TABLET ORAL ONCE
Status: COMPLETED | OUTPATIENT
Start: 2019-01-20 | End: 2019-01-20

## 2019-01-20 RX ADMIN — OXYCODONE AND ACETAMINOPHEN 2 TABLET: 5; 325 TABLET ORAL at 23:33

## 2019-01-20 RX ADMIN — LEVOFLOXACIN 500 MG: 5 INJECTION, SOLUTION INTRAVENOUS at 17:30

## 2019-01-20 ASSESSMENT — ENCOUNTER SYMPTOMS
COUGH: 1
SHORTNESS OF BREATH: 0
VOMITING: 0
ABDOMINAL PAIN: 0
NAUSEA: 0
TROUBLE SWALLOWING: 0

## 2019-01-20 ASSESSMENT — PAIN SCALES - GENERAL: PAINLEVEL_OUTOF10: 10

## 2019-01-21 ENCOUNTER — APPOINTMENT (OUTPATIENT)
Dept: GENERAL RADIOLOGY | Facility: HOSPITAL | Age: 74
DRG: 194 | End: 2019-01-21
Payer: MEDICARE

## 2019-01-21 ENCOUNTER — APPOINTMENT (OUTPATIENT)
Dept: CT IMAGING | Facility: HOSPITAL | Age: 74
DRG: 194 | End: 2019-01-21
Payer: MEDICARE
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
## 2019-01-21 PROBLEM — R09.02 HYPOXIA: Status: ACTIVE | Noted: 2019-01-21

## 2019-01-21 PROBLEM — J18.9 PNEUMONIA: Status: ACTIVE | Noted: 2019-01-21

## 2019-01-21 LAB
INR BLD: 1.8 (ref 0.94–1.06)
PROTHROMBIN TIME: 18 SEC (ref 9.4–10.6)
RAPID INFLUENZA  B AGN: NEGATIVE
RAPID INFLUENZA A AGN: NEGATIVE

## 2019-01-21 PROCEDURE — 94640 AIRWAY INHALATION TREATMENT: CPT

## 2019-01-21 PROCEDURE — G8979 MOBILITY GOAL STATUS: HCPCS

## 2019-01-21 PROCEDURE — 6370000000 HC RX 637 (ALT 250 FOR IP): Performed by: INTERNAL MEDICINE

## 2019-01-21 PROCEDURE — 6360000002 HC RX W HCPCS: Performed by: PEDIATRICS

## 2019-01-21 PROCEDURE — 6370000000 HC RX 637 (ALT 250 FOR IP): Performed by: PEDIATRICS

## 2019-01-21 PROCEDURE — 74176 CT ABD & PELVIS W/O CONTRAST: CPT

## 2019-01-21 PROCEDURE — G8988 SELF CARE GOAL STATUS: HCPCS

## 2019-01-21 PROCEDURE — G8978 MOBILITY CURRENT STATUS: HCPCS

## 2019-01-21 PROCEDURE — G8987 SELF CARE CURRENT STATUS: HCPCS

## 2019-01-21 PROCEDURE — 36415 COLL VENOUS BLD VENIPUNCTURE: CPT

## 2019-01-21 PROCEDURE — G8998 SWALLOW D/C STATUS: HCPCS

## 2019-01-21 PROCEDURE — 85610 PROTHROMBIN TIME: CPT

## 2019-01-21 PROCEDURE — 97161 PT EVAL LOW COMPLEX 20 MIN: CPT

## 2019-01-21 PROCEDURE — 72070 X-RAY EXAM THORAC SPINE 2VWS: CPT

## 2019-01-21 PROCEDURE — 94760 N-INVAS EAR/PLS OXIMETRY 1: CPT

## 2019-01-21 PROCEDURE — 2580000003 HC RX 258: Performed by: PEDIATRICS

## 2019-01-21 PROCEDURE — 1200000000 HC SEMI PRIVATE

## 2019-01-21 PROCEDURE — G8997 SWALLOW GOAL STATUS: HCPCS

## 2019-01-21 PROCEDURE — 92610 EVALUATE SWALLOWING FUNCTION: CPT

## 2019-01-21 PROCEDURE — 99222 1ST HOSP IP/OBS MODERATE 55: CPT | Performed by: INTERNAL MEDICINE

## 2019-01-21 PROCEDURE — 87804 INFLUENZA ASSAY W/OPTIC: CPT

## 2019-01-21 PROCEDURE — G8996 SWALLOW CURRENT STATUS: HCPCS

## 2019-01-21 PROCEDURE — 2700000000 HC OXYGEN THERAPY PER DAY

## 2019-01-21 PROCEDURE — 97165 OT EVAL LOW COMPLEX 30 MIN: CPT

## 2019-01-21 RX ORDER — FUROSEMIDE 40 MG/1
40 TABLET ORAL 2 TIMES DAILY PRN
Status: DISCONTINUED | OUTPATIENT
Start: 2019-01-21 | End: 2019-01-22 | Stop reason: HOSPADM

## 2019-01-21 RX ORDER — BISACODYL 10 MG
10 SUPPOSITORY, RECTAL RECTAL DAILY PRN
Status: CANCELLED | OUTPATIENT
Start: 2019-01-21

## 2019-01-21 RX ORDER — CITALOPRAM 20 MG/1
20 TABLET ORAL DAILY
Status: DISCONTINUED | OUTPATIENT
Start: 2019-01-21 | End: 2019-01-22 | Stop reason: HOSPADM

## 2019-01-21 RX ORDER — ATORVASTATIN CALCIUM 20 MG/1
20 TABLET, FILM COATED ORAL DAILY
Status: DISCONTINUED | OUTPATIENT
Start: 2019-01-21 | End: 2019-01-22 | Stop reason: HOSPADM

## 2019-01-21 RX ORDER — SODIUM CHLORIDE 0.9 % (FLUSH) 0.9 %
10 SYRINGE (ML) INJECTION EVERY 12 HOURS SCHEDULED
Status: DISCONTINUED | OUTPATIENT
Start: 2019-01-21 | End: 2019-01-22 | Stop reason: HOSPADM

## 2019-01-21 RX ORDER — IPRATROPIUM BROMIDE AND ALBUTEROL SULFATE 2.5; .5 MG/3ML; MG/3ML
1 SOLUTION RESPIRATORY (INHALATION)
Status: DISCONTINUED | OUTPATIENT
Start: 2019-01-21 | End: 2019-01-22 | Stop reason: HOSPADM

## 2019-01-21 RX ORDER — HYDROCHLOROTHIAZIDE 25 MG/1
12.5 TABLET ORAL DAILY
Status: DISCONTINUED | OUTPATIENT
Start: 2019-01-21 | End: 2019-01-22 | Stop reason: HOSPADM

## 2019-01-21 RX ORDER — FINASTERIDE 5 MG/1
5 TABLET, FILM COATED ORAL DAILY
Status: DISCONTINUED | OUTPATIENT
Start: 2019-01-21 | End: 2019-01-22 | Stop reason: HOSPADM

## 2019-01-21 RX ORDER — WARFARIN SODIUM 5 MG/1
5 TABLET ORAL DAILY
Status: DISCONTINUED | OUTPATIENT
Start: 2019-01-21 | End: 2019-01-21

## 2019-01-21 RX ORDER — CYCLOBENZAPRINE HCL 10 MG
10 TABLET ORAL NIGHTLY
Status: DISCONTINUED | OUTPATIENT
Start: 2019-01-21 | End: 2019-01-22 | Stop reason: HOSPADM

## 2019-01-21 RX ORDER — POTASSIUM CHLORIDE 750 MG/1
10 TABLET, EXTENDED RELEASE ORAL DAILY
Status: DISCONTINUED | OUTPATIENT
Start: 2019-01-21 | End: 2019-01-22 | Stop reason: HOSPADM

## 2019-01-21 RX ORDER — ASPIRIN 81 MG/1
81 TABLET, CHEWABLE ORAL DAILY
Status: DISCONTINUED | OUTPATIENT
Start: 2019-01-21 | End: 2019-01-22 | Stop reason: HOSPADM

## 2019-01-21 RX ORDER — METHYLPREDNISOLONE SODIUM SUCCINATE 40 MG/ML
40 INJECTION, POWDER, LYOPHILIZED, FOR SOLUTION INTRAMUSCULAR; INTRAVENOUS EVERY 12 HOURS
Status: DISCONTINUED | OUTPATIENT
Start: 2019-01-21 | End: 2019-01-22 | Stop reason: HOSPADM

## 2019-01-21 RX ORDER — LEVOFLOXACIN 5 MG/ML
500 INJECTION, SOLUTION INTRAVENOUS EVERY 24 HOURS
Status: DISCONTINUED | OUTPATIENT
Start: 2019-01-21 | End: 2019-01-22 | Stop reason: HOSPADM

## 2019-01-21 RX ORDER — WARFARIN SODIUM 1 MG/1
1 TABLET ORAL
Status: DISCONTINUED | OUTPATIENT
Start: 2019-01-24 | End: 2019-01-21

## 2019-01-21 RX ORDER — HYDROCODONE BITARTRATE AND ACETAMINOPHEN 7.5; 325 MG/1; MG/1
1 TABLET ORAL EVERY 6 HOURS PRN
Status: DISCONTINUED | OUTPATIENT
Start: 2019-01-21 | End: 2019-01-22 | Stop reason: HOSPADM

## 2019-01-21 RX ORDER — WARFARIN SODIUM 1 MG/1
1 TABLET ORAL DAILY
Status: DISCONTINUED | OUTPATIENT
Start: 2019-01-21 | End: 2019-01-21

## 2019-01-21 RX ORDER — TAMSULOSIN HYDROCHLORIDE 0.4 MG/1
0.4 CAPSULE ORAL DAILY
Status: DISCONTINUED | OUTPATIENT
Start: 2019-01-21 | End: 2019-01-22 | Stop reason: HOSPADM

## 2019-01-21 RX ORDER — SODIUM CHLORIDE 0.9 % (FLUSH) 0.9 %
10 SYRINGE (ML) INJECTION PRN
Status: DISCONTINUED | OUTPATIENT
Start: 2019-01-21 | End: 2019-01-22 | Stop reason: HOSPADM

## 2019-01-21 RX ADMIN — IPRATROPIUM BROMIDE AND ALBUTEROL SULFATE 1 AMPULE: .5; 3 SOLUTION RESPIRATORY (INHALATION) at 21:57

## 2019-01-21 RX ADMIN — WARFARIN SODIUM 6 MG: 5 TABLET ORAL at 17:49

## 2019-01-21 RX ADMIN — METHYLPREDNISOLONE SODIUM SUCCINATE 40 MG: 40 INJECTION, POWDER, FOR SOLUTION INTRAMUSCULAR; INTRAVENOUS at 09:59

## 2019-01-21 RX ADMIN — NIFEDIPINE 90 MG: 60 TABLET, FILM COATED, EXTENDED RELEASE ORAL at 09:48

## 2019-01-21 RX ADMIN — IPRATROPIUM BROMIDE AND ALBUTEROL SULFATE 1 AMPULE: .5; 3 SOLUTION RESPIRATORY (INHALATION) at 09:42

## 2019-01-21 RX ADMIN — METOPROLOL TARTRATE 25 MG: 25 TABLET ORAL at 09:48

## 2019-01-21 RX ADMIN — POTASSIUM CHLORIDE 10 MEQ: 10 TABLET, EXTENDED RELEASE ORAL at 09:48

## 2019-01-21 RX ADMIN — ATORVASTATIN CALCIUM 20 MG: 20 TABLET, FILM COATED ORAL at 21:02

## 2019-01-21 RX ADMIN — LEVOFLOXACIN 500 MG: 5 INJECTION, SOLUTION INTRAVENOUS at 10:00

## 2019-01-21 RX ADMIN — IPRATROPIUM BROMIDE AND ALBUTEROL SULFATE 1 AMPULE: .5; 3 SOLUTION RESPIRATORY (INHALATION) at 17:53

## 2019-01-21 RX ADMIN — CITALOPRAM HYDROBROMIDE 20 MG: 20 TABLET ORAL at 09:48

## 2019-01-21 RX ADMIN — METOPROLOL TARTRATE 25 MG: 25 TABLET ORAL at 21:02

## 2019-01-21 RX ADMIN — Medication 10 ML: at 21:02

## 2019-01-21 RX ADMIN — METHYLPREDNISOLONE SODIUM SUCCINATE 40 MG: 40 INJECTION, POWDER, FOR SOLUTION INTRAMUSCULAR; INTRAVENOUS at 21:02

## 2019-01-21 RX ADMIN — ASPIRIN 81 MG 81 MG: 81 TABLET ORAL at 09:48

## 2019-01-21 RX ADMIN — CYCLOBENZAPRINE HYDROCHLORIDE 10 MG: 10 TABLET, FILM COATED ORAL at 21:02

## 2019-01-21 RX ADMIN — TAMSULOSIN HYDROCHLORIDE 0.4 MG: 0.4 CAPSULE ORAL at 09:48

## 2019-01-21 RX ADMIN — HYDROCHLOROTHIAZIDE 12.5 MG: 25 TABLET ORAL at 09:48

## 2019-01-21 RX ADMIN — FINASTERIDE 5 MG: 5 TABLET, FILM COATED ORAL at 09:48

## 2019-01-21 RX ADMIN — IPRATROPIUM BROMIDE AND ALBUTEROL SULFATE 1 AMPULE: .5; 3 SOLUTION RESPIRATORY (INHALATION) at 14:52

## 2019-01-21 ASSESSMENT — PAIN SCALES - GENERAL: PAINLEVEL_OUTOF10: 2

## 2019-01-22 VITALS
WEIGHT: 250.4 LBS | SYSTOLIC BLOOD PRESSURE: 150 MMHG | TEMPERATURE: 97.5 F | HEIGHT: 72 IN | DIASTOLIC BLOOD PRESSURE: 76 MMHG | HEART RATE: 104 BPM | RESPIRATION RATE: 18 BRPM | OXYGEN SATURATION: 91 % | BODY MASS INDEX: 33.92 KG/M2

## 2019-01-22 LAB
ANION GAP SERPL CALCULATED.3IONS-SCNC: 16 MMOL/L (ref 3–16)
BUN BLDV-MCNC: 41 MG/DL (ref 6–20)
CALCIUM SERPL-MCNC: 9.3 MG/DL (ref 8.5–10.5)
CHLORIDE BLD-SCNC: 98 MMOL/L (ref 98–107)
CO2: 23 MMOL/L (ref 20–30)
CREAT SERPL-MCNC: 1.3 MG/DL (ref 0.4–1.2)
GFR AFRICAN AMERICAN: >59
GFR NON-AFRICAN AMERICAN: 54
GLUCOSE BLD-MCNC: 270 MG/DL (ref 74–106)
HCT VFR BLD CALC: 41.3 % (ref 40–54)
HEMOGLOBIN: 13.7 G/DL (ref 13–18)
INR BLD: 2.7 (ref 0.94–1.06)
MCH RBC QN AUTO: 27.9 PG (ref 27–32)
MCHC RBC AUTO-ENTMCNC: 33.2 G/DL (ref 31–35)
MCV RBC AUTO: 84.1 FL (ref 80–100)
PDW BLD-RTO: 12.7 % (ref 11–16)
PLATELET # BLD: 238 K/UL (ref 150–400)
PMV BLD AUTO: 11 FL (ref 6–10)
POTASSIUM SERPL-SCNC: 3.6 MMOL/L (ref 3.4–5.1)
PROTHROMBIN TIME: 25.8 SEC (ref 9.4–10.6)
RBC # BLD: 4.91 M/UL (ref 4.5–6)
SODIUM BLD-SCNC: 137 MMOL/L (ref 136–145)
WBC # BLD: 11.4 K/UL (ref 4–11)

## 2019-01-22 PROCEDURE — 2580000003 HC RX 258: Performed by: PEDIATRICS

## 2019-01-22 PROCEDURE — 99238 HOSP IP/OBS DSCHRG MGMT 30/<: CPT | Performed by: INTERNAL MEDICINE

## 2019-01-22 PROCEDURE — 85027 COMPLETE CBC AUTOMATED: CPT

## 2019-01-22 PROCEDURE — 94760 N-INVAS EAR/PLS OXIMETRY 1: CPT

## 2019-01-22 PROCEDURE — 36415 COLL VENOUS BLD VENIPUNCTURE: CPT

## 2019-01-22 PROCEDURE — 85610 PROTHROMBIN TIME: CPT

## 2019-01-22 PROCEDURE — 6370000000 HC RX 637 (ALT 250 FOR IP): Performed by: PEDIATRICS

## 2019-01-22 PROCEDURE — 2700000000 HC OXYGEN THERAPY PER DAY

## 2019-01-22 PROCEDURE — 94667 MNPJ CHEST WALL 1ST: CPT

## 2019-01-22 PROCEDURE — 94618 PULMONARY STRESS TESTING: CPT

## 2019-01-22 PROCEDURE — 94640 AIRWAY INHALATION TREATMENT: CPT

## 2019-01-22 PROCEDURE — 6360000002 HC RX W HCPCS: Performed by: NURSE PRACTITIONER

## 2019-01-22 PROCEDURE — 80048 BASIC METABOLIC PNL TOTAL CA: CPT

## 2019-01-22 PROCEDURE — 6360000002 HC RX W HCPCS: Performed by: PEDIATRICS

## 2019-01-22 PROCEDURE — 6370000000 HC RX 637 (ALT 250 FOR IP): Performed by: NURSE PRACTITIONER

## 2019-01-22 RX ORDER — ALBUTEROL SULFATE 90 UG/1
2 AEROSOL, METERED RESPIRATORY (INHALATION) EVERY 6 HOURS PRN
Qty: 1 INHALER | Refills: 0 | Status: SHIPPED | OUTPATIENT
Start: 2019-01-22 | End: 2019-05-21 | Stop reason: ALTCHOICE

## 2019-01-22 RX ORDER — FUROSEMIDE 10 MG/ML
20 INJECTION INTRAMUSCULAR; INTRAVENOUS ONCE
Status: COMPLETED | OUTPATIENT
Start: 2019-01-22 | End: 2019-01-22

## 2019-01-22 RX ORDER — POLYETHYLENE GLYCOL 3350 17 G/17G
17 POWDER, FOR SOLUTION ORAL DAILY
Status: DISCONTINUED | OUTPATIENT
Start: 2019-01-22 | End: 2019-01-22 | Stop reason: HOSPADM

## 2019-01-22 RX ORDER — GUAIFENESIN 600 MG/1
600 TABLET, EXTENDED RELEASE ORAL 2 TIMES DAILY
Qty: 20 TABLET | Refills: 0 | Status: SHIPPED | OUTPATIENT
Start: 2019-01-22 | End: 2019-02-12 | Stop reason: ALTCHOICE

## 2019-01-22 RX ORDER — LEVOFLOXACIN 750 MG/1
750 TABLET ORAL DAILY
Qty: 8 TABLET | Refills: 0 | Status: SHIPPED | OUTPATIENT
Start: 2019-01-22 | End: 2019-01-30

## 2019-01-22 RX ORDER — SENNOSIDES 8.6 MG
1 TABLET ORAL 2 TIMES DAILY
Status: DISCONTINUED | OUTPATIENT
Start: 2019-01-22 | End: 2019-01-22 | Stop reason: CLARIF

## 2019-01-22 RX ORDER — SENNA PLUS 8.6 MG/1
1 TABLET ORAL 2 TIMES DAILY
Status: DISCONTINUED | OUTPATIENT
Start: 2019-01-22 | End: 2019-01-22 | Stop reason: HOSPADM

## 2019-01-22 RX ORDER — PREDNISONE 10 MG/1
TABLET ORAL
Qty: 18 TABLET | Refills: 0 | Status: SHIPPED | OUTPATIENT
Start: 2019-01-22 | End: 2019-02-12 | Stop reason: ALTCHOICE

## 2019-01-22 RX ORDER — ACETYLCYSTEINE 200 MG/ML
200 SOLUTION ORAL; RESPIRATORY (INHALATION)
Status: DISCONTINUED | OUTPATIENT
Start: 2019-01-22 | End: 2019-01-22 | Stop reason: HOSPADM

## 2019-01-22 RX ORDER — GUAIFENESIN 600 MG/1
600 TABLET, EXTENDED RELEASE ORAL 2 TIMES DAILY
Status: DISCONTINUED | OUTPATIENT
Start: 2019-01-22 | End: 2019-01-22 | Stop reason: HOSPADM

## 2019-01-22 RX ADMIN — IPRATROPIUM BROMIDE AND ALBUTEROL SULFATE 1 AMPULE: .5; 3 SOLUTION RESPIRATORY (INHALATION) at 09:21

## 2019-01-22 RX ADMIN — IPRATROPIUM BROMIDE AND ALBUTEROL SULFATE 1 AMPULE: .5; 3 SOLUTION RESPIRATORY (INHALATION) at 14:19

## 2019-01-22 RX ADMIN — FINASTERIDE 5 MG: 5 TABLET, FILM COATED ORAL at 09:30

## 2019-01-22 RX ADMIN — POLYETHYLENE GLYCOL 3350 17 G: 17 POWDER, FOR SOLUTION ORAL at 10:47

## 2019-01-22 RX ADMIN — LEVOFLOXACIN 500 MG: 5 INJECTION, SOLUTION INTRAVENOUS at 09:29

## 2019-01-22 RX ADMIN — ASPIRIN 81 MG 81 MG: 81 TABLET ORAL at 09:31

## 2019-01-22 RX ADMIN — METOPROLOL TARTRATE 25 MG: 25 TABLET ORAL at 09:30

## 2019-01-22 RX ADMIN — SENNOSIDES 8.6 MG: 8.6 TABLET, FILM COATED ORAL at 10:47

## 2019-01-22 RX ADMIN — FUROSEMIDE 20 MG: 10 INJECTION, SOLUTION INTRAMUSCULAR; INTRAVENOUS at 14:28

## 2019-01-22 RX ADMIN — GUAIFENESIN 600 MG: 600 TABLET, EXTENDED RELEASE ORAL at 14:28

## 2019-01-22 RX ADMIN — POTASSIUM CHLORIDE 10 MEQ: 10 TABLET, EXTENDED RELEASE ORAL at 09:30

## 2019-01-22 RX ADMIN — Medication 10 ML: at 09:29

## 2019-01-22 RX ADMIN — TAMSULOSIN HYDROCHLORIDE 0.4 MG: 0.4 CAPSULE ORAL at 09:29

## 2019-01-22 RX ADMIN — METHYLPREDNISOLONE SODIUM SUCCINATE 40 MG: 40 INJECTION, POWDER, FOR SOLUTION INTRAMUSCULAR; INTRAVENOUS at 09:29

## 2019-01-22 RX ADMIN — CITALOPRAM HYDROBROMIDE 20 MG: 20 TABLET ORAL at 09:30

## 2019-01-22 RX ADMIN — IPRATROPIUM BROMIDE AND ALBUTEROL SULFATE 1 AMPULE: .5; 3 SOLUTION RESPIRATORY (INHALATION) at 02:55

## 2019-01-22 RX ADMIN — HYDROCHLOROTHIAZIDE 12.5 MG: 25 TABLET ORAL at 09:30

## 2019-01-22 RX ADMIN — NIFEDIPINE 90 MG: 60 TABLET, FILM COATED, EXTENDED RELEASE ORAL at 09:30

## 2019-01-22 RX ADMIN — ATORVASTATIN CALCIUM 20 MG: 20 TABLET, FILM COATED ORAL at 09:29

## 2019-01-24 ENCOUNTER — CARE COORDINATION (OUTPATIENT)
Dept: CARE COORDINATION | Age: 74
End: 2019-01-24

## 2019-01-24 RX ORDER — POTASSIUM CHLORIDE 750 MG/1
TABLET, EXTENDED RELEASE ORAL
Qty: 90 TABLET | Refills: 0 | Status: SHIPPED | OUTPATIENT
Start: 2019-01-24 | End: 2019-07-05 | Stop reason: SDUPTHER

## 2019-01-26 LAB
BLOOD CULTURE, ROUTINE: NORMAL
CULTURE, BLOOD 2: NORMAL

## 2019-02-12 ENCOUNTER — HOSPITAL ENCOUNTER (OUTPATIENT)
Facility: HOSPITAL | Age: 74
Discharge: HOME | End: 2019-02-12
Payer: MEDICARE

## 2019-02-12 ENCOUNTER — OFFICE VISIT (OUTPATIENT)
Dept: PRIMARY CARE CLINIC | Age: 74
End: 2019-02-12
Payer: MEDICARE

## 2019-02-12 VITALS
SYSTOLIC BLOOD PRESSURE: 138 MMHG | BODY MASS INDEX: 33.5 KG/M2 | OXYGEN SATURATION: 95 % | DIASTOLIC BLOOD PRESSURE: 64 MMHG | RESPIRATION RATE: 18 BRPM | HEART RATE: 93 BPM | WEIGHT: 247 LBS

## 2019-02-12 DIAGNOSIS — I10 ESSENTIAL HYPERTENSION: ICD-10-CM

## 2019-02-12 DIAGNOSIS — Z91.81 HISTORY OF FALL: ICD-10-CM

## 2019-02-12 DIAGNOSIS — I48.91 ATRIAL FIBRILLATION, UNSPECIFIED TYPE (HCC): ICD-10-CM

## 2019-02-12 DIAGNOSIS — Z87.01 HISTORY OF PNEUMONIA: ICD-10-CM

## 2019-02-12 DIAGNOSIS — R93.89 ABNORMAL CXR: ICD-10-CM

## 2019-02-12 DIAGNOSIS — I10 ESSENTIAL HYPERTENSION: Primary | ICD-10-CM

## 2019-02-12 PROCEDURE — G8417 CALC BMI ABV UP PARAM F/U: HCPCS | Performed by: INTERNAL MEDICINE

## 2019-02-12 PROCEDURE — 36415 COLL VENOUS BLD VENIPUNCTURE: CPT

## 2019-02-12 PROCEDURE — 1036F TOBACCO NON-USER: CPT | Performed by: INTERNAL MEDICINE

## 2019-02-12 PROCEDURE — 1123F ACP DISCUSS/DSCN MKR DOCD: CPT | Performed by: INTERNAL MEDICINE

## 2019-02-12 PROCEDURE — 1101F PT FALLS ASSESS-DOCD LE1/YR: CPT | Performed by: INTERNAL MEDICINE

## 2019-02-12 PROCEDURE — G8427 DOCREV CUR MEDS BY ELIG CLIN: HCPCS | Performed by: INTERNAL MEDICINE

## 2019-02-12 PROCEDURE — 99213 OFFICE O/P EST LOW 20 MIN: CPT | Performed by: INTERNAL MEDICINE

## 2019-02-12 PROCEDURE — 4040F PNEUMOC VAC/ADMIN/RCVD: CPT | Performed by: INTERNAL MEDICINE

## 2019-02-12 PROCEDURE — G8482 FLU IMMUNIZE ORDER/ADMIN: HCPCS | Performed by: INTERNAL MEDICINE

## 2019-02-12 PROCEDURE — 3017F COLORECTAL CA SCREEN DOC REV: CPT | Performed by: INTERNAL MEDICINE

## 2019-02-12 PROCEDURE — 1111F DSCHRG MED/CURRENT MED MERGE: CPT | Performed by: INTERNAL MEDICINE

## 2019-02-12 RX ORDER — ATORVASTATIN CALCIUM 20 MG/1
TABLET, FILM COATED ORAL
Qty: 90 TABLET | Refills: 3 | Status: SHIPPED | OUTPATIENT
Start: 2019-02-12 | End: 2019-11-19 | Stop reason: SDUPTHER

## 2019-02-12 RX ORDER — CYCLOBENZAPRINE HCL 10 MG
TABLET ORAL
Qty: 30 TABLET | Refills: 5 | Status: SHIPPED | OUTPATIENT
Start: 2019-02-12 | End: 2019-07-29 | Stop reason: SDUPTHER

## 2019-02-12 ASSESSMENT — ENCOUNTER SYMPTOMS
WHEEZING: 0
BACK PAIN: 1
SHORTNESS OF BREATH: 0
SINUS PRESSURE: 0
COUGH: 0
EYE DISCHARGE: 0
ABDOMINAL PAIN: 0
NAUSEA: 0
VOMITING: 0

## 2019-02-12 ASSESSMENT — PATIENT HEALTH QUESTIONNAIRE - PHQ9
2. FEELING DOWN, DEPRESSED OR HOPELESS: 0
1. LITTLE INTEREST OR PLEASURE IN DOING THINGS: 0
SUM OF ALL RESPONSES TO PHQ QUESTIONS 1-9: 0
SUM OF ALL RESPONSES TO PHQ QUESTIONS 1-9: 0
SUM OF ALL RESPONSES TO PHQ9 QUESTIONS 1 & 2: 0

## 2019-02-13 ENCOUNTER — TELEPHONE (OUTPATIENT)
Dept: PRIMARY CARE CLINIC | Age: 74
End: 2019-02-13

## 2019-02-13 ENCOUNTER — HOSPITAL ENCOUNTER (OUTPATIENT)
Facility: HOSPITAL | Age: 74
Discharge: HOME | End: 2019-02-13
Payer: MEDICARE

## 2019-02-13 ENCOUNTER — NURSE ONLY (OUTPATIENT)
Dept: PRIMARY CARE CLINIC | Age: 74
End: 2019-02-13
Payer: MEDICARE

## 2019-02-13 DIAGNOSIS — R30.0 DYSURIA: Primary | ICD-10-CM

## 2019-02-13 LAB
BILIRUBIN, POC: 1
BLOOD URINE, POC: ABNORMAL
CLARITY, POC: CLEAR
COLOR, POC: ABNORMAL
GLUCOSE URINE, POC: ABNORMAL
KETONES, POC: ABNORMAL
LEUKOCYTE EST, POC: ABNORMAL
NITRITE, POC: ABNORMAL
PH, POC: 6
PROTEIN, POC: 30
SPECIFIC GRAVITY, POC: 1.02
UROBILINOGEN, POC: 0.2

## 2019-02-13 PROCEDURE — 81002 URINALYSIS NONAUTO W/O SCOPE: CPT | Performed by: INTERNAL MEDICINE

## 2019-02-13 PROCEDURE — 36415 COLL VENOUS BLD VENIPUNCTURE: CPT

## 2019-02-13 RX ORDER — CIPROFLOXACIN 250 MG/1
250 TABLET, FILM COATED ORAL 2 TIMES DAILY
Qty: 14 TABLET | Refills: 0 | Status: SHIPPED | OUTPATIENT
Start: 2019-02-13 | End: 2019-02-23
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
## 2019-02-20 RX ORDER — CITALOPRAM 20 MG/1
TABLET ORAL
Qty: 90 TABLET | Refills: 3 | Status: SHIPPED | OUTPATIENT
Start: 2019-02-20 | End: 2019-11-19 | Stop reason: SDUPTHER

## 2019-03-19 RX ORDER — WARFARIN SODIUM 5 MG/1
TABLET ORAL
Qty: 90 TABLET | Refills: 3 | Status: SHIPPED | OUTPATIENT
Start: 2019-03-19 | End: 2019-09-11 | Stop reason: ALTCHOICE

## 2019-04-04 RX ORDER — FINASTERIDE 5 MG/1
5 TABLET, FILM COATED ORAL DAILY
Qty: 30 TABLET | Refills: 3 | Status: SHIPPED | OUTPATIENT
Start: 2019-04-04 | End: 2019-12-26

## 2019-04-04 RX ORDER — WARFARIN SODIUM 1 MG/1
TABLET ORAL
Qty: 38 TABLET | Refills: 5 | Status: SHIPPED | OUTPATIENT
Start: 2019-04-04 | End: 2019-09-11 | Stop reason: ALTCHOICE

## 2019-05-20 ENCOUNTER — HOSPITAL ENCOUNTER (OUTPATIENT)
Facility: HOSPITAL | Age: 74
Discharge: HOME | End: 2019-05-20
Payer: MEDICARE

## 2019-05-20 ENCOUNTER — NURSE ONLY (OUTPATIENT)
Dept: PRIMARY CARE CLINIC | Age: 74
End: 2019-05-20
Payer: MEDICARE

## 2019-05-20 DIAGNOSIS — I48.91 ATRIAL FIBRILLATION, UNSPECIFIED TYPE (HCC): Primary | ICD-10-CM

## 2019-05-20 DIAGNOSIS — E53.8 B12 DEFICIENCY: ICD-10-CM

## 2019-05-20 DIAGNOSIS — I48.91 ATRIAL FIBRILLATION, UNSPECIFIED TYPE (HCC): ICD-10-CM

## 2019-05-20 PROCEDURE — 96372 THER/PROPH/DIAG INJ SC/IM: CPT | Performed by: INTERNAL MEDICINE

## 2019-05-20 PROCEDURE — 36415 COLL VENOUS BLD VENIPUNCTURE: CPT

## 2019-05-20 RX ORDER — CYANOCOBALAMIN 1000 UG/ML
1000 INJECTION INTRAMUSCULAR; SUBCUTANEOUS ONCE
Status: COMPLETED | OUTPATIENT
Start: 2019-05-20 | End: 2019-05-20

## 2019-05-20 RX ADMIN — CYANOCOBALAMIN 1000 MCG: 1000 INJECTION INTRAMUSCULAR; SUBCUTANEOUS at 12:52

## 2019-05-20 NOTE — PROGRESS NOTES
Administrations This Visit     cyanocobalamin injection 1,000 mcg     Admin Date  05/20/2019 Action  Given Dose  1000 mcg Route  Intramuscular Administered By  [facility] [place]

## 2019-05-21 ENCOUNTER — OFFICE VISIT (OUTPATIENT)
Dept: PRIMARY CARE CLINIC | Age: 74
End: 2019-05-21
Payer: MEDICARE

## 2019-05-21 VITALS
HEART RATE: 79 BPM | OXYGEN SATURATION: 97 % | DIASTOLIC BLOOD PRESSURE: 58 MMHG | SYSTOLIC BLOOD PRESSURE: 120 MMHG | WEIGHT: 259.2 LBS | BODY MASS INDEX: 35.15 KG/M2 | RESPIRATION RATE: 18 BRPM

## 2019-05-21 DIAGNOSIS — R73.02 GLUCOSE INTOLERANCE (IMPAIRED GLUCOSE TOLERANCE): ICD-10-CM

## 2019-05-21 DIAGNOSIS — E78.5 HYPERLIPIDEMIA, UNSPECIFIED HYPERLIPIDEMIA TYPE: ICD-10-CM

## 2019-05-21 DIAGNOSIS — I48.91 ATRIAL FIBRILLATION, UNSPECIFIED TYPE (HCC): ICD-10-CM

## 2019-05-21 DIAGNOSIS — I10 ESSENTIAL HYPERTENSION: Primary | ICD-10-CM

## 2019-05-21 DIAGNOSIS — I25.10 CORONARY ARTERY CALCIFICATION SEEN ON CT SCAN: ICD-10-CM

## 2019-05-21 PROBLEM — J18.9 PNEUMONIA: Status: RESOLVED | Noted: 2019-01-21 | Resolved: 2019-05-21

## 2019-05-21 PROBLEM — R09.02 HYPOXIA: Status: RESOLVED | Noted: 2019-01-21 | Resolved: 2019-05-21

## 2019-05-21 PROCEDURE — 3017F COLORECTAL CA SCREEN DOC REV: CPT | Performed by: INTERNAL MEDICINE

## 2019-05-21 PROCEDURE — G8598 ASA/ANTIPLAT THER USED: HCPCS | Performed by: INTERNAL MEDICINE

## 2019-05-21 PROCEDURE — G8427 DOCREV CUR MEDS BY ELIG CLIN: HCPCS | Performed by: INTERNAL MEDICINE

## 2019-05-21 PROCEDURE — G8417 CALC BMI ABV UP PARAM F/U: HCPCS | Performed by: INTERNAL MEDICINE

## 2019-05-21 PROCEDURE — 1036F TOBACCO NON-USER: CPT | Performed by: INTERNAL MEDICINE

## 2019-05-21 PROCEDURE — 1123F ACP DISCUSS/DSCN MKR DOCD: CPT | Performed by: INTERNAL MEDICINE

## 2019-05-21 PROCEDURE — 99214 OFFICE O/P EST MOD 30 MIN: CPT | Performed by: INTERNAL MEDICINE

## 2019-05-21 PROCEDURE — 4040F PNEUMOC VAC/ADMIN/RCVD: CPT | Performed by: INTERNAL MEDICINE

## 2019-05-21 ASSESSMENT — ENCOUNTER SYMPTOMS
SINUS PRESSURE: 0
NAUSEA: 0
WHEEZING: 0
SHORTNESS OF BREATH: 0
BACK PAIN: 1
ABDOMINAL PAIN: 0
VOMITING: 0
COUGH: 0
EYE DISCHARGE: 0

## 2019-05-21 NOTE — PROGRESS NOTES
10 MG tablet TAKE ONE TABLET BY MOUTH ONCE NIGHTLY AT BEDTIME 30 tablet 5    atorvastatin (LIPITOR) 20 MG tablet TAKE ONE TABLET BY MOUTH ONCE DAILY 90 tablet 3    potassium chloride (KLOR-CON M) 10 MEQ extended release tablet TAKE ONE TABLET BY MOUTH ONCE DAILY 90 tablet 0    tamsulosin (FLOMAX) 0.4 MG capsule TAKE ONE CAPSULE BY MOUTH ONCE DAILY 30 capsule 4    NIFEdipine (PROCARDIA XL) 90 MG extended release tablet TAKE ONE TABLET BY MOUTH ONCE DAILY 90 tablet 3    hydrochlorothiazide (MICROZIDE) 12.5 MG capsule TAKE ONE CAPSULE BY MOUTH ONCE DAILY 90 capsule 3    furosemide (LASIX) 40 MG tablet Take 1 tablet by mouth 2 times daily as needed (swelling) 180 tablet 1    aspirin 81 MG tablet Take 81 mg by mouth daily. Review of Systems   Constitutional: Negative for chills, fatigue and fever. HENT: Negative for congestion, ear pain and sinus pressure. Eyes: Negative for discharge and visual disturbance. Respiratory: Negative for cough, shortness of breath and wheezing. Cardiovascular: Positive for leg swelling (occasional ). Negative for chest pain and palpitations. JOY   Gastrointestinal: Negative for abdominal pain, nausea and vomiting. Endocrine: Negative for cold intolerance and heat intolerance. Genitourinary: Negative for dysuria, frequency and urgency. Musculoskeletal: Positive for arthralgias and back pain. Skin: Negative for pallor and rash. Allergic/Immunologic: Negative for food allergies and immunocompromised state. Neurological: Negative for dizziness, numbness and headaches. Hematological: Negative for adenopathy. Does not bruise/bleed easily. Psychiatric/Behavioral: Negative for agitation and sleep disturbance. The patient is not nervous/anxious.         Past Medical History:   Diagnosis Date    Atrial fibrillation ([facility] [address]     [facility]     Cataract     GERD (gastroesophageal reflux disease)     History of DVT (deep vein thrombosis)     s/p No thyromegaly present. Cardiovascular: Normal rate, regular rhythm and normal heart sounds. Exam reveals no friction rub. No murmur heard. Pulmonary/Chest: Effort normal and breath sounds normal. He has no wheezes. He has no rales. Abdominal: Soft. Bowel sounds are normal. There is no tenderness. There is no rebound. Musculoskeletal: He exhibits edema (1+ B legs). He exhibits no tenderness. Neurological: He is alert and oriented to person, place, and time. No cranial nerve deficit. Skin: No rash noted. No erythema. Psychiatric: He has a normal mood and affect. Judgment normal.   Nursing note and vitals reviewed. Lab Results   Component Value Date     02/13/2019    K 3.8 02/13/2019     02/13/2019    CO2 27 02/13/2019    GLUCOSE 119 02/13/2019    BUN 13 02/13/2019    CREATININE 1.06 02/13/2019    CALCIUM 9.0 02/13/2019    PROT 6.4 11/05/2018    LABALBU 4.2 11/05/2018    BILITOT 0.4 11/05/2018    ALT 26 11/05/2018    AST 23 11/05/2018       Hemoglobin A1C (%)   Date Value   11/05/2018 6.1 (H)     LDL Calculated (mg/dL)   Date Value   05/08/2017 83         Lab Results   Component Value Date    WBC 6.2 02/12/2019    NEUTROABS 3.8 02/12/2019    HGB 14.0 02/12/2019    HCT 42.0 02/12/2019    MCV 85 02/12/2019     02/12/2019       Lab Results   Component Value Date    TSH 1.730 11/28/2017       ASSESSMENT/PLAN:     1. Essential hypertension  BP is stable. I have advised him on low-sodium diet, exercise and weight control. I am going to continue current medication. Will monitor his renal function every few months, have advised him to check blood pressure frequently and to keep a record of this. - Lipid Panel; Future  - Comprehensive Metabolic Panel; Future  - CBC Auto Differential; Future  - TSH without Reflex; Future    2. Atrial fibrillation, unspecified type ([address]  Sinus rythem on exam. Will cont on meds. INR has been therapeutic.  I had a long discussion with the patient

## 2019-05-21 NOTE — PROGRESS NOTES
Have you seen any other physician or provider since your last visit no    Have you had any other diagnostic tests since your last visit? yes - INR    Have you changed or stopped any medications since your last visit? no         Pt here for fu on htn, hld, a fib.

## 2019-06-06 RX ORDER — TAMSULOSIN HYDROCHLORIDE 0.4 MG/1
CAPSULE ORAL
Qty: 30 CAPSULE | Refills: 4 | Status: SHIPPED | OUTPATIENT
Start: 2019-06-06 | End: 2019-12-26

## 2019-06-27 ENCOUNTER — NURSE ONLY (OUTPATIENT)
Dept: PRIMARY CARE CLINIC | Age: 74
End: 2019-06-27
Payer: MEDICARE

## 2019-06-27 ENCOUNTER — HOSPITAL ENCOUNTER (OUTPATIENT)
Facility: HOSPITAL | Age: 74
Discharge: HOME | End: 2019-06-27
Payer: MEDICARE

## 2019-06-27 DIAGNOSIS — I48.91 ATRIAL FIBRILLATION, UNSPECIFIED TYPE (HCC): ICD-10-CM

## 2019-06-27 DIAGNOSIS — E53.8 B12 DEFICIENCY: Primary | ICD-10-CM

## 2019-06-27 PROCEDURE — 36415 COLL VENOUS BLD VENIPUNCTURE: CPT

## 2019-06-27 PROCEDURE — 96372 THER/PROPH/DIAG INJ SC/IM: CPT | Performed by: INTERNAL MEDICINE

## 2019-06-27 RX ORDER — CYANOCOBALAMIN 1000 UG/ML
1000 INJECTION INTRAMUSCULAR; SUBCUTANEOUS ONCE
Status: COMPLETED | OUTPATIENT
Start: 2019-06-27 | End: 2019-06-27

## 2019-06-27 RX ADMIN — CYANOCOBALAMIN 1000 MCG: 1000 INJECTION INTRAMUSCULAR; SUBCUTANEOUS at 13:39

## 2019-07-01 ENCOUNTER — HOSPITAL ENCOUNTER (OUTPATIENT)
Dept: GENERAL RADIOLOGY | Facility: HOSPITAL | Age: 74
Discharge: HOME | End: 2019-07-01
Payer: MEDICARE

## 2019-07-01 ENCOUNTER — HOSPITAL ENCOUNTER (OUTPATIENT)
Facility: HOSPITAL | Age: 74
Discharge: HOME | End: 2019-07-01
Payer: MEDICARE

## 2019-07-01 DIAGNOSIS — E78.5 HYPERLIPIDEMIA, UNSPECIFIED HYPERLIPIDEMIA TYPE: ICD-10-CM

## 2019-07-01 DIAGNOSIS — I10 ESSENTIAL HYPERTENSION: ICD-10-CM

## 2019-07-01 DIAGNOSIS — Z87.01 HISTORY OF PNEUMONIA: ICD-10-CM

## 2019-07-01 DIAGNOSIS — R93.89 ABNORMAL CXR: ICD-10-CM

## 2019-07-01 DIAGNOSIS — R73.02 GLUCOSE INTOLERANCE (IMPAIRED GLUCOSE TOLERANCE): ICD-10-CM

## 2019-07-01 DIAGNOSIS — I48.91 ATRIAL FIBRILLATION, UNSPECIFIED TYPE (HCC): ICD-10-CM

## 2019-07-01 PROCEDURE — 36415 COLL VENOUS BLD VENIPUNCTURE: CPT

## 2019-07-01 PROCEDURE — 71046 X-RAY EXAM CHEST 2 VIEWS: CPT

## 2019-07-03 ENCOUNTER — ANTI-COAG VISIT (OUTPATIENT)
Dept: PRIMARY CARE CLINIC | Age: 74
End: 2019-07-03

## 2019-07-03 DIAGNOSIS — I48.91 ATRIAL FIBRILLATION, UNSPECIFIED TYPE (HCC): ICD-10-CM

## 2019-07-03 DIAGNOSIS — Z79.01 ANTICOAGULANT LONG-TERM USE: ICD-10-CM

## 2019-07-03 DIAGNOSIS — I82.90 BLOOD CLOT IN VEIN: ICD-10-CM

## 2019-07-05 RX ORDER — POTASSIUM CHLORIDE 750 MG/1
TABLET, FILM COATED, EXTENDED RELEASE ORAL
Qty: 90 TABLET | Refills: 0 | Status: SHIPPED | OUTPATIENT
Start: 2019-07-05 | End: 2019-10-30 | Stop reason: SDUPTHER

## 2019-08-30 ENCOUNTER — HOSPITAL ENCOUNTER (OUTPATIENT)
Facility: HOSPITAL | Age: 74
Discharge: HOME | End: 2019-08-30
Payer: MEDICARE

## 2019-08-30 ENCOUNTER — NURSE ONLY (OUTPATIENT)
Dept: PRIMARY CARE CLINIC | Age: 74
End: 2019-08-30
Payer: MEDICARE

## 2019-08-30 DIAGNOSIS — I48.91 ATRIAL FIBRILLATION, UNSPECIFIED TYPE (HCC): Primary | ICD-10-CM

## 2019-08-30 DIAGNOSIS — I48.91 ATRIAL FIBRILLATION, UNSPECIFIED TYPE (HCC): ICD-10-CM

## 2019-08-30 DIAGNOSIS — E53.8 B12 DEFICIENCY: Primary | ICD-10-CM

## 2019-08-30 PROCEDURE — 36415 COLL VENOUS BLD VENIPUNCTURE: CPT

## 2019-08-30 PROCEDURE — 96372 THER/PROPH/DIAG INJ SC/IM: CPT | Performed by: INTERNAL MEDICINE

## 2019-08-30 RX ORDER — CYANOCOBALAMIN 1000 UG/ML
1000 INJECTION INTRAMUSCULAR; SUBCUTANEOUS ONCE
Status: COMPLETED | OUTPATIENT
Start: 2019-08-30 | End: 2019-08-30

## 2019-08-30 RX ADMIN — CYANOCOBALAMIN 1000 MCG: 1000 INJECTION INTRAMUSCULAR; SUBCUTANEOUS at 13:08

## 2019-08-30 NOTE — PROGRESS NOTES
Administrations This Visit     cyanocobalamin injection 1,000 mcg     Admin Date  08/30/2019  13:08 Action  Given Dose  1000 mcg Route  Intramuscular Site  Deltoid Right Administered By  Ricky Hodges    Ordering Provider:  Ledy Jimenes MD    NDC:  01165-988-28    Lot#:  [id number]    :  [id number] [facility]    Patient Supplied?:  No

## 2019-09-02 ENCOUNTER — HOSPITAL ENCOUNTER (INPATIENT)
Facility: HOSPITAL | Age: 74
LOS: 4 days | Discharge: HOME | End: 2019-09-06
Attending: EMERGENCY MEDICINE | Admitting: INTERNAL MEDICINE

## 2019-09-02 ENCOUNTER — APPOINTMENT (OUTPATIENT)
Dept: GENERAL RADIOLOGY | Facility: HOSPITAL | Age: 74
End: 2019-09-02

## 2019-09-02 DIAGNOSIS — N39.0 URINARY TRACT INFECTION WITHOUT HEMATURIA, SITE UNSPECIFIED: ICD-10-CM

## 2019-09-02 DIAGNOSIS — R33.9 URINARY RETENTION: ICD-10-CM

## 2019-09-02 DIAGNOSIS — I48.3 TYPICAL ATRIAL FLUTTER (HCC): ICD-10-CM

## 2019-09-02 DIAGNOSIS — I48.92 ATRIAL FLUTTER, UNSPECIFIED TYPE (HCC): Primary | ICD-10-CM

## 2019-09-02 DIAGNOSIS — R77.8 ELEVATED TROPONIN: ICD-10-CM

## 2019-09-02 PROBLEM — N32.0 BLADDER OUTLET OBSTRUCTION: Status: ACTIVE | Noted: 2019-09-02

## 2019-09-02 LAB
ALBUMIN SERPL-MCNC: 4.5 G/DL (ref 3.5–5.2)
ALBUMIN/GLOB SERPL: 1.3 G/DL
ALP SERPL-CCNC: 131 U/L (ref 39–117)
ALT SERPL W P-5'-P-CCNC: 21 U/L (ref 1–41)
ANION GAP SERPL CALCULATED.3IONS-SCNC: 15.6 MMOL/L (ref 5–15)
AST SERPL-CCNC: 27 U/L (ref 1–40)
BACTERIA UR QL AUTO: ABNORMAL /HPF
BASOPHILS # BLD AUTO: 0.04 10*3/MM3 (ref 0–0.2)
BASOPHILS NFR BLD AUTO: 0.4 % (ref 0–1.5)
BILIRUB SERPL-MCNC: 0.9 MG/DL (ref 0.2–1.2)
BILIRUB UR QL STRIP: NEGATIVE
BUN BLD-MCNC: 21 MG/DL (ref 8–23)
BUN/CREAT SERPL: 19.4 (ref 7–25)
CALCIUM SPEC-SCNC: 9.7 MG/DL (ref 8.6–10.5)
CHLORIDE SERPL-SCNC: 103 MMOL/L (ref 98–107)
CLARITY UR: ABNORMAL
CO2 SERPL-SCNC: 23.4 MMOL/L (ref 22–29)
COLOR UR: YELLOW
CREAT BLD-MCNC: 1.08 MG/DL (ref 0.76–1.27)
DEPRECATED RDW RBC AUTO: 42.5 FL (ref 37–54)
EOSINOPHIL # BLD AUTO: 0.03 10*3/MM3 (ref 0–0.4)
EOSINOPHIL NFR BLD AUTO: 0.3 % (ref 0.3–6.2)
ERYTHROCYTE [DISTWIDTH] IN BLOOD BY AUTOMATED COUNT: 13.7 % (ref 12.3–15.4)
GFR SERPL CREATININE-BSD FRML MDRD: 66 ML/MIN/1.73
GLOBULIN UR ELPH-MCNC: 3.6 GM/DL
GLUCOSE BLD-MCNC: 148 MG/DL (ref 65–99)
GLUCOSE UR STRIP-MCNC: NEGATIVE MG/DL
HCT VFR BLD AUTO: 48.4 % (ref 37.5–51)
HGB BLD-MCNC: 15.6 G/DL (ref 13–17.7)
HGB UR QL STRIP.AUTO: ABNORMAL
HOLD SPECIMEN: NORMAL
HYALINE CASTS UR QL AUTO: ABNORMAL /LPF
IMM GRANULOCYTES # BLD AUTO: 0.03 10*3/MM3 (ref 0–0.05)
IMM GRANULOCYTES NFR BLD AUTO: 0.3 % (ref 0–0.5)
INR PPP: 1.27 (ref 0.9–1.1)
KETONES UR QL STRIP: NEGATIVE
LEUKOCYTE ESTERASE UR QL STRIP.AUTO: ABNORMAL
LYMPHOCYTES # BLD AUTO: 2.15 10*3/MM3 (ref 0.7–3.1)
LYMPHOCYTES NFR BLD AUTO: 19.2 % (ref 19.6–45.3)
MAGNESIUM SERPL-MCNC: 2.1 MG/DL (ref 1.6–2.4)
MCH RBC QN AUTO: 27.4 PG (ref 26.6–33)
MCHC RBC AUTO-ENTMCNC: 32.2 G/DL (ref 31.5–35.7)
MCV RBC AUTO: 84.9 FL (ref 79–97)
MONOCYTES # BLD AUTO: 0.96 10*3/MM3 (ref 0.1–0.9)
MONOCYTES NFR BLD AUTO: 8.6 % (ref 5–12)
NEUTROPHILS # BLD AUTO: 7.98 10*3/MM3 (ref 1.7–7)
NEUTROPHILS NFR BLD AUTO: 71.2 % (ref 42.7–76)
NITRITE UR QL STRIP: POSITIVE
NRBC BLD AUTO-RTO: 0 /100 WBC (ref 0–0.2)
PH UR STRIP.AUTO: 5.5 [PH] (ref 5–8)
PLATELET # BLD AUTO: 252 10*3/MM3 (ref 140–450)
PMV BLD AUTO: 10.1 FL (ref 6–12)
POTASSIUM BLD-SCNC: 4.1 MMOL/L (ref 3.5–5.2)
PROT SERPL-MCNC: 8.1 G/DL (ref 6–8.5)
PROT UR QL STRIP: ABNORMAL
PROTHROMBIN TIME: 16.2 SECONDS (ref 12–15.1)
RBC # BLD AUTO: 5.7 10*6/MM3 (ref 4.14–5.8)
RBC # UR: ABNORMAL /HPF
REF LAB TEST METHOD: ABNORMAL
SODIUM BLD-SCNC: 142 MMOL/L (ref 136–145)
SP GR UR STRIP: 1.02 (ref 1–1.03)
SQUAMOUS #/AREA URNS HPF: ABNORMAL /HPF
TRANS CELLS #/AREA URNS HPF: ABNORMAL /HPF
TROPONIN T SERPL-MCNC: 0.14 NG/ML (ref 0–0.03)
TROPONIN T SERPL-MCNC: 0.17 NG/ML (ref 0–0.03)
UROBILINOGEN UR QL STRIP: ABNORMAL
WBC CLUMPS # UR AUTO: ABNORMAL /HPF
WBC NRBC COR # BLD: 11.19 10*3/MM3 (ref 3.4–10.8)
WBC UR QL AUTO: ABNORMAL /HPF
WHOLE BLOOD HOLD SPECIMEN: NORMAL
WHOLE BLOOD HOLD SPECIMEN: NORMAL

## 2019-09-02 PROCEDURE — 54001 SLITTING OF PREPUCE: CPT | Performed by: UROLOGY

## 2019-09-02 PROCEDURE — 80053 COMPREHEN METABOLIC PANEL: CPT

## 2019-09-02 PROCEDURE — 81001 URINALYSIS AUTO W/SCOPE: CPT | Performed by: EMERGENCY MEDICINE

## 2019-09-02 PROCEDURE — 83735 ASSAY OF MAGNESIUM: CPT

## 2019-09-02 PROCEDURE — 84484 ASSAY OF TROPONIN QUANT: CPT | Performed by: INTERNAL MEDICINE

## 2019-09-02 PROCEDURE — 87186 SC STD MICRODIL/AGAR DIL: CPT | Performed by: EMERGENCY MEDICINE

## 2019-09-02 PROCEDURE — 85610 PROTHROMBIN TIME: CPT

## 2019-09-02 PROCEDURE — 25010000002 LEVOFLOXACIN PER 250 MG: Performed by: EMERGENCY MEDICINE

## 2019-09-02 PROCEDURE — 25010000002 HYDROMORPHONE 1 MG/ML SOLUTION: Performed by: EMERGENCY MEDICINE

## 2019-09-02 PROCEDURE — 99285 EMERGENCY DEPT VISIT HI MDM: CPT

## 2019-09-02 PROCEDURE — 51702 INSERT TEMP BLADDER CATH: CPT

## 2019-09-02 PROCEDURE — 71045 X-RAY EXAM CHEST 1 VIEW: CPT

## 2019-09-02 PROCEDURE — 99222 1ST HOSP IP/OBS MODERATE 55: CPT | Performed by: INTERNAL MEDICINE

## 2019-09-02 PROCEDURE — 25010000002 ENOXAPARIN PER 10 MG: Performed by: INTERNAL MEDICINE

## 2019-09-02 PROCEDURE — 87086 URINE CULTURE/COLONY COUNT: CPT | Performed by: EMERGENCY MEDICINE

## 2019-09-02 PROCEDURE — 85025 COMPLETE CBC W/AUTO DIFF WBC: CPT

## 2019-09-02 PROCEDURE — 84484 ASSAY OF TROPONIN QUANT: CPT

## 2019-09-02 PROCEDURE — 0VTTXZZ RESECTION OF PREPUCE, EXTERNAL APPROACH: ICD-10-PCS | Performed by: UROLOGY

## 2019-09-02 PROCEDURE — 99284 EMERGENCY DEPT VISIT MOD MDM: CPT | Performed by: UROLOGY

## 2019-09-02 PROCEDURE — 87077 CULTURE AEROBIC IDENTIFY: CPT | Performed by: EMERGENCY MEDICINE

## 2019-09-02 PROCEDURE — 93005 ELECTROCARDIOGRAM TRACING: CPT

## 2019-09-02 RX ORDER — CITALOPRAM 20 MG/1
20 TABLET ORAL DAILY
Status: DISCONTINUED | OUTPATIENT
Start: 2019-09-03 | End: 2019-09-06 | Stop reason: HOSPADM

## 2019-09-02 RX ORDER — ACETAMINOPHEN 325 MG/1
650 TABLET ORAL EVERY 4 HOURS PRN
Status: DISCONTINUED | OUTPATIENT
Start: 2019-09-02 | End: 2019-09-06 | Stop reason: HOSPADM

## 2019-09-02 RX ORDER — DILTIAZEM HYDROCHLORIDE 5 MG/ML
20 INJECTION INTRAVENOUS ONCE
Status: COMPLETED | OUTPATIENT
Start: 2019-09-02 | End: 2019-09-02

## 2019-09-02 RX ORDER — ATORVASTATIN CALCIUM 20 MG/1
20 TABLET, FILM COATED ORAL DAILY
Status: DISCONTINUED | OUTPATIENT
Start: 2019-09-03 | End: 2019-09-06 | Stop reason: HOSPADM

## 2019-09-02 RX ORDER — SODIUM CHLORIDE, SODIUM LACTATE, POTASSIUM CHLORIDE, CALCIUM CHLORIDE 600; 310; 30; 20 MG/100ML; MG/100ML; MG/100ML; MG/100ML
30 INJECTION, SOLUTION INTRAVENOUS CONTINUOUS
Status: ACTIVE | OUTPATIENT
Start: 2019-09-02 | End: 2019-09-03

## 2019-09-02 RX ORDER — LORAZEPAM 2 MG/ML
0.5 INJECTION INTRAMUSCULAR ONCE
Status: COMPLETED | OUTPATIENT
Start: 2019-09-03 | End: 2019-09-02

## 2019-09-02 RX ORDER — ONDANSETRON 4 MG/1
4 TABLET, FILM COATED ORAL EVERY 6 HOURS PRN
Status: DISCONTINUED | OUTPATIENT
Start: 2019-09-02 | End: 2019-09-06 | Stop reason: HOSPADM

## 2019-09-02 RX ORDER — ASPIRIN 81 MG/1
81 TABLET ORAL DAILY
Status: DISCONTINUED | OUTPATIENT
Start: 2019-09-03 | End: 2019-09-04

## 2019-09-02 RX ORDER — ACETAMINOPHEN 160 MG/5ML
650 SOLUTION ORAL EVERY 4 HOURS PRN
Status: DISCONTINUED | OUTPATIENT
Start: 2019-09-02 | End: 2019-09-06 | Stop reason: HOSPADM

## 2019-09-02 RX ORDER — SODIUM CHLORIDE 0.9 % (FLUSH) 0.9 %
10 SYRINGE (ML) INJECTION AS NEEDED
Status: DISCONTINUED | OUTPATIENT
Start: 2019-09-02 | End: 2019-09-06 | Stop reason: HOSPADM

## 2019-09-02 RX ORDER — ACETAMINOPHEN 650 MG/1
650 SUPPOSITORY RECTAL EVERY 4 HOURS PRN
Status: DISCONTINUED | OUTPATIENT
Start: 2019-09-02 | End: 2019-09-06 | Stop reason: HOSPADM

## 2019-09-02 RX ORDER — LEVOFLOXACIN 5 MG/ML
500 INJECTION, SOLUTION INTRAVENOUS EVERY 24 HOURS
Status: DISCONTINUED | OUTPATIENT
Start: 2019-09-03 | End: 2019-09-04

## 2019-09-02 RX ORDER — BACITRACIN ZINC 500 [USP'U]/G
OINTMENT TOPICAL ONCE
Status: COMPLETED | OUTPATIENT
Start: 2019-09-02 | End: 2019-09-02

## 2019-09-02 RX ORDER — SODIUM CHLORIDE 0.9 % (FLUSH) 0.9 %
10 SYRINGE (ML) INJECTION EVERY 12 HOURS SCHEDULED
Status: DISCONTINUED | OUTPATIENT
Start: 2019-09-02 | End: 2019-09-06 | Stop reason: HOSPADM

## 2019-09-02 RX ORDER — DILTIAZEM HYDROCHLORIDE 5 MG/ML
25 INJECTION INTRAVENOUS ONCE
Status: COMPLETED | OUTPATIENT
Start: 2019-09-02 | End: 2019-09-02

## 2019-09-02 RX ORDER — FINASTERIDE 5 MG/1
5 TABLET, FILM COATED ORAL DAILY
Status: DISCONTINUED | OUTPATIENT
Start: 2019-09-03 | End: 2019-09-06 | Stop reason: HOSPADM

## 2019-09-02 RX ORDER — TAMSULOSIN HYDROCHLORIDE 0.4 MG/1
0.4 CAPSULE ORAL DAILY
Status: DISCONTINUED | OUTPATIENT
Start: 2019-09-03 | End: 2019-09-06 | Stop reason: HOSPADM

## 2019-09-02 RX ORDER — ONDANSETRON 2 MG/ML
4 INJECTION INTRAMUSCULAR; INTRAVENOUS EVERY 6 HOURS PRN
Status: DISCONTINUED | OUTPATIENT
Start: 2019-09-02 | End: 2019-09-06 | Stop reason: HOSPADM

## 2019-09-02 RX ORDER — METOPROLOL TARTRATE 5 MG/5ML
5 INJECTION INTRAVENOUS ONCE
Status: COMPLETED | OUTPATIENT
Start: 2019-09-02 | End: 2019-09-02

## 2019-09-02 RX ORDER — DOCUSATE SODIUM 100 MG/1
100 CAPSULE, LIQUID FILLED ORAL DAILY
Status: DISCONTINUED | OUTPATIENT
Start: 2019-09-03 | End: 2019-09-06 | Stop reason: HOSPADM

## 2019-09-02 RX ORDER — LIDOCAINE HYDROCHLORIDE 20 MG/ML
10 INJECTION, SOLUTION INFILTRATION; PERINEURAL ONCE
Status: COMPLETED | OUTPATIENT
Start: 2019-09-02 | End: 2019-09-02

## 2019-09-02 RX ORDER — ALUMINA, MAGNESIA, AND SIMETHICONE 2400; 2400; 240 MG/30ML; MG/30ML; MG/30ML
15 SUSPENSION ORAL EVERY 6 HOURS PRN
Status: DISCONTINUED | OUTPATIENT
Start: 2019-09-02 | End: 2019-09-06 | Stop reason: HOSPADM

## 2019-09-02 RX ORDER — METOPROLOL TARTRATE 5 MG/5ML
2.5 INJECTION INTRAVENOUS ONCE
Status: COMPLETED | OUTPATIENT
Start: 2019-09-02 | End: 2019-09-02

## 2019-09-02 RX ORDER — LEVOFLOXACIN 5 MG/ML
750 INJECTION, SOLUTION INTRAVENOUS ONCE
Status: COMPLETED | OUTPATIENT
Start: 2019-09-02 | End: 2019-09-02

## 2019-09-02 RX ADMIN — SODIUM CHLORIDE, PRESERVATIVE FREE 10 ML: 5 INJECTION INTRAVENOUS at 20:43

## 2019-09-02 RX ADMIN — METOPROLOL TARTRATE 2.5 MG: 1 INJECTION, SOLUTION INTRAVENOUS at 18:47

## 2019-09-02 RX ADMIN — DILTIAZEM HYDROCHLORIDE 25 MG: 5 INJECTION INTRAVENOUS at 14:11

## 2019-09-02 RX ADMIN — SODIUM CHLORIDE, POTASSIUM CHLORIDE, SODIUM LACTATE AND CALCIUM CHLORIDE 30 ML/HR: 600; 310; 30; 20 INJECTION, SOLUTION INTRAVENOUS at 20:35

## 2019-09-02 RX ADMIN — LIDOCAINE HYDROCHLORIDE 10 ML: 20 INJECTION, SOLUTION INFILTRATION; PERINEURAL at 14:23

## 2019-09-02 RX ADMIN — BACITRACIN ZINC: 500 OINTMENT TOPICAL at 17:00

## 2019-09-02 RX ADMIN — SODIUM CHLORIDE, PRESERVATIVE FREE 10 ML: 5 INJECTION INTRAVENOUS at 20:54

## 2019-09-02 RX ADMIN — DILTIAZEM HYDROCHLORIDE 10 MG/HR: 5 INJECTION INTRAVENOUS at 22:54

## 2019-09-02 RX ADMIN — DILTIAZEM HYDROCHLORIDE 30 MG: 30 TABLET, FILM COATED ORAL at 20:53

## 2019-09-02 RX ADMIN — HYDROMORPHONE HYDROCHLORIDE 0.5 MG: 1 INJECTION, SOLUTION INTRAMUSCULAR; INTRAVENOUS; SUBCUTANEOUS at 16:13

## 2019-09-02 RX ADMIN — LIDOCAINE HYDROCHLORIDE: 20 JELLY TOPICAL at 14:35

## 2019-09-02 RX ADMIN — ENOXAPARIN SODIUM 40 MG: 40 INJECTION SUBCUTANEOUS at 20:53

## 2019-09-02 RX ADMIN — DILTIAZEM HYDROCHLORIDE 5 MG/HR: 5 INJECTION INTRAVENOUS at 14:35

## 2019-09-02 RX ADMIN — METOPROLOL TARTRATE 5 MG: 1 INJECTION, SOLUTION INTRAVENOUS at 16:17

## 2019-09-02 RX ADMIN — DILTIAZEM HYDROCHLORIDE 20 MG: 5 INJECTION INTRAVENOUS at 20:53

## 2019-09-02 RX ADMIN — LEVOFLOXACIN 750 MG: 5 INJECTION, SOLUTION INTRAVENOUS at 18:24

## 2019-09-02 RX ADMIN — LORAZEPAM 0.5 MG: 2 INJECTION, SOLUTION INTRAMUSCULAR; INTRAVENOUS at 23:24

## 2019-09-02 RX ADMIN — DILTIAZEM HYDROCHLORIDE 20 MG: 5 INJECTION INTRAVENOUS at 13:23

## 2019-09-02 RX ADMIN — METOPROLOL TARTRATE 25 MG: 25 TABLET ORAL at 20:53

## 2019-09-02 NOTE — CONSULTS
Reason for Consult  Vehicle Vaca catheterization    HPI  Mr. Palencia is a 74 y.o. male with PMHx of atrial fibrillation, CHF, DVT, on warfarin, DILAN, decreased cognitive function, who presents with urinary retention, atrial fibrillation, and difficult Vaca catheterization.      The patient complains that he is at difficulty voiding for approximately 2 weeks.  He has never seen a urologist before.  He states the urine is just been dribbling out and has been unable to empty his bladder completely he feels.  He is uncircumcised, and states that he has urinated into his foreskin for many years without retracting it or cleaning it regularly.  He is not sexually active.  He complains of pain that is dull constant throbbing in his suprapubic area, nonradiating, and associated with it since of urgency to urinate.  ER staff have scanned his bladder for greater than 300 mL with an ultrasound machine.  Multiple attempts were made to place Vaca catheters, but his foreskin was fused completely shut almost and immediate resistance was met by staff.    His wife has to sign most of his consents.  Most of the history was elicited with her assistance, and he was drowsy for most of the interview.    Past Medical History  Past Medical History:   Diagnosis Date   • Atrial fibrillation (CMS/HCC)    • Atrial flutter (CMS/HCC)    • Cardiomyopathy (CMS/HCC)    • Congestive heart failure (CMS/HCC)    • DVT (deep venous thrombosis) (CMS/HCC)    • Edema    • Esophageal reflux    • History of hiatal hernia    • History of mitral valve insufficiency    • History of MRSA infection    • Hypertension    • Mitral regurgitation    • Obesity    • Obstructive sleep apnea    • Traumatic fracture of femur with routine healing 2015   • Tricuspid regurgitation        Past Surgical History  Past Surgical History:   Procedure Laterality Date   • ANKLE SURGERY     • BACK SURGERY     • CHOLECYSTECTOMY     • FEMUR SURGERY Left    • KNEE SURGERY      Dr. Lundy     • NOSE SURGERY     • OTHER SURGICAL HISTORY  1974    ear   • TOTAL HIP ARTHROPLASTY Left      [facility] Dr. uJan Franklin       Medications    Current Facility-Administered Medications:   •  bacitracin ointment, , Topical, Once, Richard Peralta MD  •  diltiaZEM (CARDIZEM) 125 mg in sodium chloride 0.9 % 125 mL (1 mg/mL) infusion, 5-15 mg/hr, Intravenous, Titrated, Tremaine Angulo MD, Last Rate: 15 mL/hr at 09/02/19 1513, 15 mg/hr at 09/02/19 1513  •  levoFLOXacin (LEVAQUIN) 750 mg/150 mL D5W (premix) (LEVAQUIN) 750 mg, 750 mg, Intravenous, Once, Tremaine Angulo MD  •  lidocaine (XYLOCAINE) 2% injection 10 mL, 10 mL, Injection, Once, Tremaine Angulo MD  •  sodium chloride 0.9 % flush 10 mL, 10 mL, Intravenous, PRN, Emergency, Triage Protocol, MD    Current Outpatient Medications:   •  aspirin 81 MG EC tablet, Take 81 mg by mouth Daily., Disp: , Rfl:   •  atorvastatin (LIPITOR) 20 MG tablet, Take 20 mg by mouth Daily., Disp: , Rfl:   •  citalopram (CeleXA) 20 MG tablet, Take 20 mg by mouth Daily., Disp: , Rfl:   •  cyclobenzaprine (FLEXERIL) 10 MG tablet, Take 10 mg by mouth Every Night., Disp: , Rfl:   •  finasteride (PROSCAR) 5 MG tablet, 5 mg Daily., Disp: , Rfl: 3  •  fluticasone (VERAMYST) 27.5 MCG/SPRAY nasal spray, 2 sprays into the nostril(s) as directed by provider As Needed for Rhinitis., Disp: , Rfl:   •  furosemide (LASIX) 40 MG tablet, Take 40 mg by mouth 2 (Two) Times a Day As Needed., Disp: , Rfl:   •  hydrochlorothiazide (MICROZIDE) 12.5 MG capsule, Take 12.5 mg by mouth Daily., Disp: , Rfl:   •  metoprolol tartrate (LOPRESSOR) 25 MG tablet, Take 25 mg by mouth 2 (Two) Times a Day., Disp: , Rfl:   •  NIFEdipine XL (PROCARDIA XL) 90 MG 24 hr tablet, Take 90 mg by mouth Daily., Disp: , Rfl:   •  potassium chloride (K-DUR) 10 MEQ CR tablet, Take 10 mEq by mouth Daily., Disp: , Rfl:   •  tamsulosin (FLOMAX) 0.4 MG capsule 24 hr capsule, Take 1 capsule by mouth  "Daily., Disp: , Rfl:   •  warfarin (COUMADIN) 1 MG tablet, Take 1 mg by mouth Daily. 3 tablets on fridays, Disp: , Rfl:   •  warfarin (COUMADIN) 5 MG tablet, daily., Disp: , Rfl:     Allergies  Allergies   Allergen Reactions   • Codeine    • Keflex [Cephalexin]    • Morphine And Related    • Penicillins        Social History  Social History     Socioeconomic History   • Marital status:      Spouse name: Not on file   • Number of children: Not on file   • Years of education: Not on file   • Highest education level: Not on file   Tobacco Use   • Smoking status: Former Smoker   • Smokeless tobacco: Never Used   Substance and Sexual Activity   • Alcohol use: No   • Drug use: No   • Sexual activity: Defer       Family History  Family History   Problem Relation Age of Onset   • Heart attack Mother    • Heart attack Sister 50       Review of Systems  Constitutional: No fevers.  Drowsy  Skin: Negative for rash  Endocrine: No heat/cold intolerance   Cardiovascular: Negative for chest pain   Respiratory: Negative for shortness of breath or wheezing  Gastrointestinal: No constipation, nausea or vomiting  Genitourinary: Positive for urgency and urinary retention  Musculoskeletal: No flank pain  Neurological:  Negative for frequent headaches or dizziness  Lymph/Heme: Negative for leg swelling or calf pain.    Physical Exam  /66   Pulse (!) 147   Temp 100.3 °F (37.9 °C) (Oral)   Resp 24   Ht 182.9 cm (72\")   Wt 116 kg (256 lb 12.8 oz)   SpO2 97%   BMI 34.83 kg/m²   Constitutional: NAD, WDWN.   HEENT: NCAT. Conjunctivae normal.  MMM.    Cardiovascular: Regular rate.  Pulmonary/Chest: Respirations are even and non-labored bilaterally.  Abdominal: Soft. No distension, tenderness, masses or guarding. No CVA tenderness.  Obese  Neurological: A + O. Cranial Nerves II-XII grossly intact.   Extremities: CLARISSA x 4, Warm. No clubbing.  No cyanosis.    Skin: Warm and dry.  No rashes noted.  Psychiatric:  Normal mood and " affect    Genitourinary  Penis: uncircumcised penis, large amount of lichen sclerosus at the end of the foreskin, which is almost completely few shot and there is a dense amount of tissue making it incredibly difficult to even pass a Vaca catheter blindly through the small opening in the foreskin.  Testes: descended bilaterally, no masses, nontender to palpation. Remainder of scrotal contents normal. No hernia appreciated.  Urine: Milky yellow    No intake/output data recorded.    Labs  Lab Results   Component Value Date    GLUCOSE 148 (H) 09/02/2019    CALCIUM 9.7 09/02/2019     09/02/2019    K 4.1 09/02/2019    CO2 23.4 09/02/2019     09/02/2019    BUN 21 09/02/2019    CREATININE 1.08 09/02/2019    EGFRIFNONA 66 09/02/2019    BCR 19.4 09/02/2019    ANIONGAP 15.6 (H) 09/02/2019       Lab Results   Component Value Date    WBC 11.19 (H) 09/02/2019    HGB 15.6 09/02/2019    HCT 48.4 09/02/2019    MCV 84.9 09/02/2019     09/02/2019       Brief Urine Lab Results  (Last result in the past 365 days)      Color   Clarity   Blood   Leuk Est   Nitrite   Protein   CREAT   Urine HCG        09/02/19 1437 Yellow Turbid Large (3+) Large (3+) Positive 100 mg/dL (2+)                Radiographic Studies  Xr Chest 1 View  Result Date: 9/2/2019  PROCEDURE: XR CHEST 1 VW-  HISTORY: Dysrhythmia Triage Protocol  COMPARISON: None.  FINDINGS: The heart is normal in size. The mediastinum is unremarkable. The lungs are clear. There is no pneumothorax.  There are no acute osseous abnormalities.      No acute cardiopulmonary process.  Continued followup is recommended.  This report was finalized on 9/2/2019 1:42 PM by David Amaral M.D..      I have personally reviewed these labs and imaging.     Assessment  Mr. Palencia is a 74 y.o. male with a PMHx as above who presents with urinary retention, phimosis with likely lichen sclerosus, possible neoplasm, and extremely difficult Vaca catheterization..    After multiple  attempts to open up and stretch his foreskin and pass Vaca catheter blindly, I was unsuccessful as well.  Therefore the patient and his wife were consented for bedside dorsal slit circumcision.    After informed consent was obtained, the patient was anesthetized with 2% lidocaine circumferentially around the base of the penis as well as deep dorsal blocks at 2 and 10:00.  I then crushed the 12:00 foreskin with a hemostat to aid in hemostasis.  Of note his INR was subtherapeutic.  I then incised the 12 o'clock position of the foreskin proximally enough to be able to evaluate the glands.  There was an incredibly large amount of inflamed sclerotic tissue in the head of the penis was almost fused to the inside of the foreskin.  After the tissue was open enough to visualize the meatus, the edges were sewn with 4-0 Vicryl suture.  Hemostasis was achieved.  I then placed a 16 [place] Vaca catheter with large amount of milky yellow urine return.     The patient plans to be admitted for his atrial fibrillation.    Plan  1.  Recommend continued antibiotic coverage for possible urine infection.  2.  Please hold patient's anticoagulation for the next 24 hours.  I will evaluate him tomorrow.  3.  He will be able to have a void trial prior to discharge.  4.  Once the edema and swelling has gone down, I will evaluate his foreskin in the area surrounding his glans penis as an outpatient.  We may need to perform a small excisional biopsy in the future.    Richard Peralta MD

## 2019-09-02 NOTE — H&P
[facility] Medicine Services  HISTORY AND PHYSICAL    Primary Care Physician: Ewelina Kumar MD    Subjective     Chief Complaint:    Chief Complaint   Patient presents with   • Rapid Heart Rate       History of Present Illness:   Patient is 74-year-old  male with multiple medical problems as listed below in the past medical history.  He presented to the emergency department with 2-week history of dribbling urine and unable to empty his bladder.  In the ER he was complaining of dull constant throbbing pain nonradiating, increases with urgency to urinate.  In the ER urology was consulted as they were unable to pass the Vaca catheter.  He performed bedside dorsal slit circumcision and was able to pass a Vaca catheter.  He does appear to have significant urinary tract infection, he was started on Levaquin.  The Vaca catheter has been draining fairly well since then, beside all this on initial presentation to the triage area his heart rate was noted noted to be 140 and on the monitor it was noted to be A. fib/flutter.  Patient has known to have this problem has been on Coumadin, was noted to be subtherapeutic on further evaluation.  He did mention that he is not very compliant with medications and off and on Mrs. multiple medications.  He was started on Cardizem drip along with consultation with Dr. Goodwin and transferred to ICU.  He does have increased troponin.  I have reviewed labs/imaging/records from this hospitalization, including ER staff to establish a comprehensive understanding of this patient's clinical issues, as well as to establish plan of care appropriately.     Review of Systems   1. Constitutional: Negative for fever. Negative for chills, diaphoresis, positive for fatigue and denies any unexpected weight change.   2. HENT: Negative for congestion and hearing loss.   3. Eyes: Negative for redness and visual disturbance.   4. Respiratory: negative for shortness of  breath. Negative for chest pain . Negative for cough and chest tightness.   5. Cardiovascular: Negative for chest pain and palpitations.   6. Gastrointestinal: Negative for abdominal distention, abdominal pain and blood in stool.  Except as noted in the HPI.  7. Endocrine: Negative for cold intolerance and heat intolerance.   8. Genitourinary: Positive for difficulty urinating, dysuria and frequency.   9. Musculoskeletal: Negative for arthralgias, back pain and myalgias.   10. Skin: Negative for color change, rash and wound.   11. Neurological: Negative for syncope, weakness and headaches.   12. Hematological: Negative for adenopathy. Does bruise/bleed easily.   13. Psychiatric/Behavioral: Negative for confusion. The patient is not nervous/anxious.     Past Medical History:   Past Medical History:   Diagnosis Date   • Atrial fibrillation (CMS/HCC)    • Atrial flutter (CMS/HCC)    • Cardiomyopathy (CMS/HCC)    • Congestive heart failure (CMS/HCC)    • DVT (deep venous thrombosis) (CMS/HCC)    • Edema    • Esophageal reflux    • History of hiatal hernia    • History of mitral valve insufficiency    • History of MRSA infection    • Hypertension    • Mitral regurgitation    • Obesity    • Obstructive sleep apnea    • Traumatic fracture of femur with routine healing 2015   • Tricuspid regurgitation        Past Surgical History:  Past Surgical History:   Procedure Laterality Date   • ANKLE SURGERY     • BACK SURGERY     • CHOLECYSTECTOMY     • FEMUR SURGERY Left    • KNEE SURGERY      Dr. Lundy    • NOSE SURGERY     • OTHER SURGICAL HISTORY  1974    ear   • TOTAL HIP ARTHROPLASTY Left      [facility] Dr. Juan Franklin       Family History: family history includes Heart attack in his mother; Heart attack (age of onset: 50) in his sister.    Social History:  reports that he has quit smoking. He has never used smokeless tobacco. He reports that he does not drink alcohol or use drugs.    Home medications:   Prior  to Admission Medications     Prescriptions Last Dose Informant Patient Reported? Taking?    aspirin 81 MG EC tablet   Yes No    Take 81 mg by mouth Daily.    atorvastatin (LIPITOR) 20 MG tablet   Yes No    Take 20 mg by mouth Daily.    citalopram (CeleXA) 20 MG tablet   Yes No    Take 20 mg by mouth Daily.    cyclobenzaprine (FLEXERIL) 10 MG tablet   Yes No    Take 10 mg by mouth Every Night.    finasteride (PROSCAR) 5 MG tablet   Yes No    5 mg Daily.    fluticasone (VERAMYST) 27.5 MCG/SPRAY nasal spray   Yes No    2 sprays into the nostril(s) as directed by provider As Needed for Rhinitis.    furosemide (LASIX) 40 MG tablet   Yes No    Take 40 mg by mouth 2 (Two) Times a Day As Needed.    hydrochlorothiazide (MICROZIDE) 12.5 MG capsule   Yes No    Take 12.5 mg by mouth Daily.    metoprolol tartrate (LOPRESSOR) 25 MG tablet   Yes No    Take 25 mg by mouth 2 (Two) Times a Day.    NIFEdipine XL (PROCARDIA XL) 90 MG 24 hr tablet   Yes No    Take 90 mg by mouth Daily.    potassium chloride (K-DUR) 10 MEQ CR tablet   Yes No    Take 10 mEq by mouth Daily.    tamsulosin (FLOMAX) 0.4 MG capsule 24 hr capsule   Yes No    Take 1 capsule by mouth Daily.    warfarin (COUMADIN) 1 MG tablet   Yes No    Take 1 mg by mouth Daily. 3 tablets on fridays    warfarin (COUMADIN) 5 MG tablet   Yes No    daily.          Emergency department medications:   Medications   sodium chloride 0.9 % flush 10 mL (not administered)   diltiaZEM (CARDIZEM) 125 mg in sodium chloride 0.9 % 125 mL (1 mg/mL) infusion (15 mg/hr Intravenous Currently Infusing 9/2/19 1931)   diltiaZEM (CARDIZEM) injection 20 mg (not administered)   levoFLOXacin (LEVAQUIN) 500 mg/100 mL D5W (premix) (LEVAQUIN) 500 mg (not administered)   aspirin EC tablet 81 mg (not administered)   atorvastatin (LIPITOR) tablet 20 mg (not administered)   citalopram (CeleXA) tablet 20 mg (not administered)   finasteride (PROSCAR) tablet 5 mg (not administered)   metoprolol tartrate (LOPRESSOR)  tablet 25 mg (not administered)   tamsulosin (FLOMAX) 24 hr capsule 0.4 mg (not administered)   sodium chloride 0.9 % flush 10 mL (not administered)   sodium chloride 0.9 % flush 10 mL (not administered)   docusate sodium (COLACE) capsule 100 mg (not administered)   ondansetron (ZOFRAN) tablet 4 mg (not administered)     Or   ondansetron (ZOFRAN) injection 4 mg (not administered)   aluminum-magnesium hydroxide-simethicone (MAALOX MAX) 400-400-40 MG/5ML suspension 15 mL (not administered)   enoxaparin (LOVENOX) syringe 40 mg (not administered)   lactated ringers infusion (not administered)   acetaminophen (TYLENOL) tablet 650 mg (not administered)     Or   acetaminophen (TYLENOL) 160 MG/5ML solution 650 mg (not administered)     Or   acetaminophen (TYLENOL) suppository 650 mg (not administered)   diltiaZEM (CARDIZEM) tablet 30 mg (not administered)   diltiaZEM (CARDIZEM) injection 20 mg (20 mg Intravenous Given 9/2/19 1323)   diltiaZEM (CARDIZEM) injection 25 mg (25 mg Intravenous Given 9/2/19 1411)   lidocaine (XYLOCAINE) 2 % jelly ( Topical Given 9/2/19 1435)   levoFLOXacin (LEVAQUIN) 750 mg/150 mL D5W (premix) (LEVAQUIN) 750 mg (750 mg Intravenous New Bag 9/2/19 1824)   metoprolol tartrate (LOPRESSOR) injection 5 mg (5 mg Intravenous Given 9/2/19 1617)   HYDROmorphone (DILAUDID) injection 0.5 mg (0.5 mg Intravenous Given 9/2/19 1613)   lidocaine (XYLOCAINE) 2% injection 10 mL (10 mL Injection Given by Other 9/2/19 1423)   bacitracin ointment ( Topical Given by Other 9/2/19 1700)   metoprolol tartrate (LOPRESSOR) injection 2.5 mg (2.5 mg Intravenous Given 9/2/19 1847)       Medications:  Medications Prior to Admission   Medication Sig Dispense Refill Last Dose   • aspirin 81 MG EC tablet Take 81 mg by mouth Daily.   Taking   • atorvastatin (LIPITOR) 20 MG tablet Take 20 mg by mouth Daily.   Taking   • citalopram (CeleXA) 20 MG tablet Take 20 mg by mouth Daily.   Taking   • cyclobenzaprine (FLEXERIL) 10 MG tablet  "Take 10 mg by mouth Every Night.   Taking   • finasteride (PROSCAR) 5 MG tablet 5 mg Daily.  3 Taking   • fluticasone (VERAMYST) 27.5 MCG/SPRAY nasal spray 2 sprays into the nostril(s) as directed by provider As Needed for Rhinitis.   Taking   • furosemide (LASIX) 40 MG tablet Take 40 mg by mouth 2 (Two) Times a Day As Needed.   Taking   • hydrochlorothiazide (MICROZIDE) 12.5 MG capsule Take 12.5 mg by mouth Daily.   Taking   • metoprolol tartrate (LOPRESSOR) 25 MG tablet Take 25 mg by mouth 2 (Two) Times a Day.   Taking   • NIFEdipine XL (PROCARDIA XL) 90 MG 24 hr tablet Take 90 mg by mouth Daily.   Taking   • potassium chloride (K-DUR) 10 MEQ CR tablet Take 10 mEq by mouth Daily.   Taking   • tamsulosin (FLOMAX) 0.4 MG capsule 24 hr capsule Take 1 capsule by mouth Daily.   Taking   • warfarin (COUMADIN) 1 MG tablet Take 1 mg by mouth Daily. 3 tablets on fridays   Taking   • warfarin (COUMADIN) 5 MG tablet daily.   Taking       Allergies:  Allergies   Allergen Reactions   • Codeine    • Keflex [Cephalexin]    • Morphine And Related    • Penicillins          Objective     Physical Exam:  Vital Signs: /81   Pulse (!) 126   Temp 99.7 °F (37.6 °C) (Oral)   Resp 16   Ht 170.2 cm (67\")   Wt 115 kg (253 lb 9.6 oz)   SpO2 94%   BMI 39.72 kg/m²      Physical Exam:     General Appearance:   Alert, cooperative, in no acute distress.     Head:   Normocephalic, without obvious abnormality, atraumatic.     Eyes:       Normal, conjunctivae and sclerae, no icterus, no pallor, corneas clear, PERRLA        Throat:   Oral mucosa dry      Neck:  No adenopathy, supple, trachea midline, no thyromegaly, no carotid bruit, no JVD      Back:   No CVA tenderness on Percussion.     Lungs:    Clear to auscultation and fair air movement noted.      Heart:   Irregular rhythm and rate,        Abdomen:   Obese. Normal bowel sounds, no masses, no organomegaly, soft non-tender, non-distended, no guarding, no rebound tenderness      "   Extremities:  Moves all extremities, 1+ edema, no cyanosis, no redness.     Pulses:  Pulses palpable and equal bilaterally.     Skin:  No bleeding, bruising or rash        Neurologic:  Cranial nerves grossly intact, move all extremities         Results Review:  Lab Results (last 7 days)     Procedure Component Value Units Date/Time    Urinalysis, Microscopic Only - Urine, Clean Catch [[id number]]  (Abnormal) Collected:  09/02/19 1437    Specimen:  Urine, Clean Catch Updated:  09/02/19 1506     RBC, UA 31-50 /HPF      WBC, UA Too Numerous to Count /HPF      Bacteria, UA 4+ /HPF      Squamous Epithelial Cells, UA None Seen /HPF      Transitional Epithelial Cells, UA 0-2 /HPF      Hyaline Casts, UA None Seen /LPF      WBC Clumps, UA Small/1+ /HPF      Methodology Manual Light Microscopy    Urine Culture - Urine, Urine, Clean Catch [[id number]] Collected:  09/02/19 1437    Specimen:  Urine, Clean Catch Updated:  09/02/19 1506    Urinalysis With Culture If Indicated - Urine, Clean Catch [[id number]]  (Abnormal) Collected:  09/02/19 1437    Specimen:  Urine, Clean Catch Updated:  09/02/19 1454     Color, UA Yellow     Appearance, UA Turbid     pH, UA 5.5     Specific Gravity, UA 1.019     Glucose, UA Negative     Ketones, UA Negative     Bilirubin, UA Negative     Blood, UA Large (3+)     Protein,  mg/dL (2+)     Leuk Esterase, UA Large (3+)     Nitrite, UA Positive     Urobilinogen, UA 0.2 E.U./dL    [place] Draw [[id number]] Collected:  09/02/19 1304    Specimen:  Blood Updated:  09/02/19 1415    Narrative:       The following orders were created for panel order [place] Draw.  Procedure                               Abnormality         Status                     ---------                               -----------         ------                     Light Blue Top[[id number]]                                   Final result               Green Top (Gel)[[id number]]                                  Final result                Lavender Top[[id number]]                                     Final result               Gold Top - SST[[id number]]                                   Final result               Green Top (No Gel)[[id number]]                               Final result                 Please view results for these tests on the individual orders.    Light Blue Top [[id number]] Collected:  09/02/19 1305    Specimen:  Blood Updated:  09/02/19 1415     Extra Tube hold for add-on     Comment: Auto resulted       Green Top (Gel) [[id number]] Collected:  09/02/19 1304    Specimen:  Blood Updated:  09/02/19 1415     Extra Tube Hold for add-ons.     Comment: Auto resulted.       Lavender Top [[id number]] Collected:  09/02/19 1304    Specimen:  Blood Updated:  09/02/19 1415     Extra Tube hold for add-on     Comment: Auto resulted       Gold Top - SST [[id number]] Collected:  09/02/19 1304    Specimen:  Blood Updated:  09/02/19 1415     Extra Tube Hold for add-ons.     Comment: Auto resulted.       Green Top (No Gel) [[id number]] Collected:  09/02/19 1304    Specimen:  Blood Updated:  09/02/19 1415     Extra Tube Hold for add-ons.     Comment: Auto resulted.       Troponin [[id number]]  (Abnormal) Collected:  09/02/19 1304    Specimen:  Blood Updated:  09/02/19 1349     Troponin T 0.141 ng/mL     Narrative:       Troponin T Reference Range:  <= 0.03 ng/mL-   Negative for AMI  >0.03 ng/mL-     Abnormal for myocardial necrosis.  Clinicians would have to utilize clinical acumen, EKG, Troponin and serial changes to determine if it is an Acute Myocardial Infarction or myocardial injury due to an underlying chronic condition.     Protime-INR [[id number]]  (Abnormal) Collected:  09/02/19 1305    Specimen:  Blood Updated:  09/02/19 1332     Protime 16.2 Seconds      INR 1.27    Narrative:       Suggested INR therapeutic range for stable oral anticoagulant therapy:    Low Intensity therapy:   1.5-2.0  Moderate Intensity therapy:   2.0-3.0  High Intensity  therapy:   2.5-4.0    Comprehensive Metabolic Panel [[id number]]  (Abnormal) Collected:  09/02/19 1304    Specimen:  Blood Updated:  09/02/19 1330     Glucose 148 mg/dL      BUN 21 mg/dL      Creatinine 1.08 mg/dL      Sodium 142 mmol/L      Potassium 4.1 mmol/L      Chloride 103 mmol/L      CO2 23.4 mmol/L      Calcium 9.7 mg/dL      Total Protein 8.1 g/dL      Albumin 4.50 g/dL      ALT (SGPT) 21 U/L      AST (SGOT) 27 U/L      Alkaline Phosphatase 131 U/L      Total Bilirubin 0.9 mg/dL      eGFR Non African Amer 66 mL/min/1.73      Globulin 3.6 gm/dL      A/G Ratio 1.3 g/dL      BUN/Creatinine Ratio 19.4     Anion Gap 15.6 mmol/L     Narrative:       GFR Normal >60  Chronic Kidney Disease <60  Kidney Failure <15    Magnesium [[id number]]  (Normal) Collected:  09/02/19 1304    Specimen:  Blood Updated:  09/02/19 1330     Magnesium 2.1 mg/dL     CBC & Differential [[id number]] Collected:  09/02/19 1304    Specimen:  Blood Updated:  09/02/19 1311    Narrative:       The following orders were created for panel order CBC & Differential.  Procedure                               Abnormality         Status                     ---------                               -----------         ------                     CBC Auto Differential[[id number]]        Abnormal            Final result                 Please view results for these tests on the individual orders.    CBC Auto Differential [[id number]]  (Abnormal) Collected:  09/02/19 1304    Specimen:  Blood Updated:  09/02/19 1311     WBC 11.19 10*3/mm3      RBC 5.70 10*6/mm3      Hemoglobin 15.6 g/dL      Hematocrit 48.4 %      MCV 84.9 fL      MCH 27.4 pg      MCHC 32.2 g/dL      RDW 13.7 %      RDW-SD 42.5 fl      MPV 10.1 fL      Platelets 252 10*3/mm3      Neutrophil % 71.2 %      Lymphocyte % 19.2 %      Monocyte % 8.6 %      Eosinophil % 0.3 %      Basophil % 0.4 %      Immature Grans % 0.3 %      Neutrophils, Absolute 7.98 10*3/mm3      Lymphocytes, Absolute 2.15  10*3/mm3      Monocytes, Absolute 0.96 10*3/mm3      Eosinophils, Absolute 0.03 10*3/mm3      Basophils, Absolute 0.04 10*3/mm3      Immature Grans, Absolute 0.03 10*3/mm3      nRBC 0.0 /100 WBC         Imaging Results (last 72 hours)     Procedure Component Value Units Date/Time    XR Chest 1 View [[id number]] Collected:  09/02/19 1342     Updated:  09/02/19 1344    Narrative:       PROCEDURE: XR CHEST 1 VW-     HISTORY: Dysrhythmia Triage Protocol     COMPARISON: None.     FINDINGS: The heart is normal in size. The mediastinum is unremarkable.  The lungs are clear. There is no pneumothorax.  There are no acute  osseous abnormalities.       Impression:       No acute cardiopulmonary process.     Continued followup is recommended.     This report was finalized on 9/2/2019 1:42 PM by David Amaral M.D..              I have personally reviewed and interpreted available lab data, radiology studies and ECG obtained at time of admission.     Assessment / Plan     Assessment/Problem List:     1.   Atrial flutter (CMS/HCC)  2.   Bladder outlet obstruction  3.   DILAN on CPAP  4.   Hypertension  5.   Hyperlipidemia  6.   Deep vein thrombosis (CMS/HCC)  7.   Cardiomyopathy (CMS/HCC)  8.   Mitral regurgitation  9.   Congestive heart failure (CMS/HCC)  10.   Esophageal reflux  11.   Obesity  12.   Tricuspid regurgitation          Plan:  · Patient is being admitted to ICU, will follow serial troponins, Dr. Goodwin has been consulted from cardiology for further evaluation and treatment.  We will start him on oral Cardizem as well as continue with the current IV Cardizem as well.  · Dr. Peralta has evaluated the patient and plan to do further surgical intervention in the morning as per his note.  We will keep him n.p.o. after midnight.  I will also hold off starting warfarin and give him a dose of Lovenox 40 mg one-time today, postop he will need to be restarted on his warfarin.  · Continue most of his home medications, blood  pressures on the low side we will hold off Procardia.  · Details were discussed with the patient as well as family in the room.   · Further recommendations will depend on clinical course of the patient during the current hospitalization.    · I also discussed the details with the nursing staff.    Rest as ordered.    Anticipated stay is greater than 2 midnights.    Gagan Zee MD, DAVID 09/02/19 8:19 PM    Dictated using Dragon.

## 2019-09-02 NOTE — ED NOTES
Two unsuccessful catheter attempts by myself and Sherry ADAMS. Dr. Angulo to bedside to attempt [place] catheter insertion. Unsuccessful.      Carol Jones RN  09/02/19 2321

## 2019-09-02 NOTE — ED PROVIDER NOTES
"Subjective   74 year old male presenting with urinary complaints. He is brought in by his wife who provides most of the history. Both patient and his wire are fairly poor historians. She states that he has been \"wetting everywhere\" for the last few days. He notes some urgency and incomplete emptying but denies fevers, chills, nausea, vomiting, dysuria, abdominal bloating/pain. She notes he may of had some sort of prostate problem in the past but neither of them can expand on that. He did note feeling a \"little short of breath\" last night which he did not think much of. He was noted to be in atrial flutter with rates in the 140s on triage, he has a history of the same. He denies chest pain, palpitations, cough.             Review of Systems   Constitutional: Negative.    HENT: Negative.    Eyes: Negative.    Respiratory: Negative.    Cardiovascular: Negative.    Gastrointestinal: Negative.    Genitourinary: Positive for difficulty urinating and frequency. Negative for dysuria, flank pain, hematuria and scrotal swelling.   Musculoskeletal: Negative.    Skin: Negative.    Neurological: Negative.    Psychiatric/Behavioral: Negative.        Past Medical History:   Diagnosis Date   • Atrial fibrillation (CMS/HCC)    • Atrial flutter (CMS/HCC)    • Cardiomyopathy (CMS/HCC)    • Congestive heart failure (CMS/HCC)    • DVT (deep venous thrombosis) (CMS/HCC)    • Edema    • Esophageal reflux    • History of hiatal hernia    • History of mitral valve insufficiency    • History of MRSA infection    • Hypertension    • Mitral regurgitation    • Obesity    • Obstructive sleep apnea    • Traumatic fracture of femur with routine healing 2015   • Tricuspid regurgitation        Allergies   Allergen Reactions   • Codeine    • Keflex [Cephalexin]    • Morphine And Related    • Penicillins        Past Surgical History:   Procedure Laterality Date   • ANKLE SURGERY     • BACK SURGERY     • CHOLECYSTECTOMY     • FEMUR SURGERY Left    • " KNEE SURGERY      Dr. Lundy    • NOSE SURGERY     • OTHER SURGICAL HISTORY  1974    ear   • TOTAL HIP ARTHROPLASTY Left      [facility] Dr. Juan Franklin       Family History   Problem Relation Age of Onset   • Heart attack Mother    • Heart attack Sister 50       Social History     Socioeconomic History   • Marital status:      Spouse name: Not on file   • Number of children: Not on file   • Years of education: Not on file   • Highest education level: Not on file   Tobacco Use   • Smoking status: Former Smoker   • Smokeless tobacco: Never Used   Substance and Sexual Activity   • Alcohol use: No   • Drug use: No   • Sexual activity: Defer           Objective   Physical Exam   Constitutional: He is oriented to person, place, and time. He appears well-developed and well-nourished. No distress.   HENT:   Head: Normocephalic and atraumatic.   Right Ear: External ear normal.   Left Ear: External ear normal.   Nose: Nose normal.   Mouth/Throat: Oropharynx is clear and moist.   Eyes: Conjunctivae and EOM are normal. Pupils are equal, round, and reactive to light.   Neck: Normal range of motion. Neck supple.   Cardiovascular: Regular rhythm, normal heart sounds and intact distal pulses.   Regular tachycardia   Pulmonary/Chest: Effort normal and breath sounds normal. No respiratory distress.   Abdominal: Soft. Bowel sounds are normal. He exhibits no distension. There is no tenderness. There is no rebound and no guarding.   Musculoskeletal: Normal range of motion. He exhibits no tenderness or deformity.   Trace lower extremity edema   Neurological: He is alert and oriented to person, place, and time.   Skin: Skin is warm and dry. No rash noted.   Psychiatric: He has a normal mood and affect. His behavior is normal.   Nursing note and vitals reviewed.      Procedures           ED Course                  MDM  Number of Diagnoses or Management Options  Atrial flutter, unspecified type (CMS/HCC):   Elevated  troponin:   Urinary retention:   Urinary tract infection without hematuria, site unspecified:   Diagnosis management comments: 74 year old male with urinary complaints and incidental rapid atrial flutter. Well developed, well nourished elderly man in no distress with exam as above. He is in atrial flutter with rapid rates, he also has a low grade temperature. His abdominal exam is benign. Will check labs, UA, EKG, chest xray. Will place obregon as I suspect he's got urinary retention. Will give rate control medication as it is unclear when the atrial flutter started. Disposition pending work up.    DDX: urinary retention, UTI, atrial arrhythmia, electrolyte abnormality, ACS    EKG interpreted by me: atrial flutter, rapid rate, non specific ST/T changes likely rate related, this is an abnormal EKG, no recent for comparison     2:24 PM Initial labs notable for elevated troponin, mild leukocytosis.  INR is subtherapeutic.  Chest x-ray radiology reveals no acute abnormality.  Nursing was unable to pass Obregon catheter.  I attempted to pass catheter, his meatus is obstructed by foreskin, cannot pass catheter past the foreskin.  Discussed with Dr. Peralta, he recommended first making sure patient was in retention with bladder scan.  Secondly if patient is in retention to attempt to stretch opening with hemostats.  Disposition pending.    5:44 PM Patient was in fact in retention, had about 300ml in the bladder. I was unable to pass a catheter even after attempting to stretch the foreskin with hemostats.  Dr. Peralta came to bedside and performed dorsal slit.  Catheter was placed and drained a significant amount of grossly purulent urine.  Heart rate is controlled after several doses of Cardizem.  Discussed with Dr. Goodwin who will consult.  Discussed with Dr. Zee for admission.    30 minutes of critical care provided. This time excludes other billable procedures. Time does include preparation of documents, medical consultations,  review of old records, and direct bedside care. Patient was at high risk for life-threatening deterioration due to atrial flutter with rapid rates, elevated troponin, urinary retention.          Amount and/or Complexity of Data Reviewed  Decide to obtain previous medical records or to obtain history from someone other than the patient: yes    Critical Care  Total time providing critical care: 30-74 minutes        Final diagnoses:   Atrial flutter, unspecified type (CMS/[facility])   Urinary retention   Urinary tract infection without hematuria, site unspecified   Elevated troponin            Tremaine Angulo MD  09/02/19 1185

## 2019-09-03 ENCOUNTER — TELEPHONE (OUTPATIENT)
Dept: PRIMARY CARE CLINIC | Age: 74
End: 2019-09-03

## 2019-09-03 ENCOUNTER — APPOINTMENT (OUTPATIENT)
Dept: CARDIOLOGY | Facility: HOSPITAL | Age: 74
End: 2019-09-03

## 2019-09-03 LAB
ALBUMIN SERPL-MCNC: 3.5 G/DL (ref 3.5–5.2)
ALBUMIN/GLOB SERPL: 1.1 G/DL
ALP SERPL-CCNC: 96 U/L (ref 39–117)
ALT SERPL W P-5'-P-CCNC: 15 U/L (ref 1–41)
ANION GAP SERPL CALCULATED.3IONS-SCNC: 13 MMOL/L (ref 5–15)
AST SERPL-CCNC: 20 U/L (ref 1–40)
BASOPHILS # BLD AUTO: 0.03 10*3/MM3 (ref 0–0.2)
BASOPHILS NFR BLD AUTO: 0.2 % (ref 0–1.5)
BH CV ECHO MEAS - AO MAX PG: 28 MMHG
BILIRUB SERPL-MCNC: 1.2 MG/DL (ref 0.2–1.2)
BUN BLD-MCNC: 14 MG/DL (ref 8–23)
BUN/CREAT SERPL: 14 (ref 7–25)
CALCIUM SPEC-SCNC: 8.8 MG/DL (ref 8.6–10.5)
CHLORIDE SERPL-SCNC: 105 MMOL/L (ref 98–107)
CHOLEST SERPL-MCNC: 119 MG/DL (ref 0–200)
CO2 SERPL-SCNC: 24 MMOL/L (ref 22–29)
CREAT BLD-MCNC: 1 MG/DL (ref 0.76–1.27)
DEPRECATED RDW RBC AUTO: 43.3 FL (ref 37–54)
EOSINOPHIL # BLD AUTO: 0 10*3/MM3 (ref 0–0.4)
EOSINOPHIL NFR BLD AUTO: 0 % (ref 0.3–6.2)
ERYTHROCYTE [DISTWIDTH] IN BLOOD BY AUTOMATED COUNT: 13.8 % (ref 12.3–15.4)
GFR SERPL CREATININE-BSD FRML MDRD: 73 ML/MIN/1.73
GLOBULIN UR ELPH-MCNC: 3.1 GM/DL
GLUCOSE BLD-MCNC: 161 MG/DL (ref 65–99)
HCT VFR BLD AUTO: 43.6 % (ref 37.5–51)
HDLC SERPL-MCNC: 36 MG/DL (ref 40–60)
HGB BLD-MCNC: 14.2 G/DL (ref 13–17.7)
IMM GRANULOCYTES # BLD AUTO: 0.06 10*3/MM3 (ref 0–0.05)
IMM GRANULOCYTES NFR BLD AUTO: 0.5 % (ref 0–0.5)
INR PPP: 1.19 (ref 0.9–1.1)
LDLC SERPL CALC-MCNC: 67 MG/DL (ref 0–100)
LDLC/HDLC SERPL: 1.86 {RATIO}
LYMPHOCYTES # BLD AUTO: 1.59 10*3/MM3 (ref 0.7–3.1)
LYMPHOCYTES NFR BLD AUTO: 12.3 % (ref 19.6–45.3)
MAGNESIUM SERPL-MCNC: 2 MG/DL (ref 1.6–2.4)
MAXIMAL PREDICTED HEART RATE: 144 BPM
MCH RBC QN AUTO: 28.1 PG (ref 26.6–33)
MCHC RBC AUTO-ENTMCNC: 32.6 G/DL (ref 31.5–35.7)
MCV RBC AUTO: 86.3 FL (ref 79–97)
MONOCYTES # BLD AUTO: 1.18 10*3/MM3 (ref 0.1–0.9)
MONOCYTES NFR BLD AUTO: 9.1 % (ref 5–12)
NEUTROPHILS # BLD AUTO: 10.05 10*3/MM3 (ref 1.7–7)
NEUTROPHILS NFR BLD AUTO: 77.9 % (ref 42.7–76)
NRBC BLD AUTO-RTO: 0 /100 WBC (ref 0–0.2)
PHOSPHATE SERPL-MCNC: 2.5 MG/DL (ref 2.5–4.5)
PLATELET # BLD AUTO: 189 10*3/MM3 (ref 140–450)
PMV BLD AUTO: 10.4 FL (ref 6–12)
POTASSIUM BLD-SCNC: 4 MMOL/L (ref 3.5–5.2)
PROCALCITONIN SERPL-MCNC: 0.11 NG/ML (ref 0.1–0.25)
PROT SERPL-MCNC: 6.6 G/DL (ref 6–8.5)
PROTHROMBIN TIME: 15.5 SECONDS (ref 12–15.1)
RBC # BLD AUTO: 5.05 10*6/MM3 (ref 4.14–5.8)
SODIUM BLD-SCNC: 142 MMOL/L (ref 136–145)
STRESS TARGET HR: 122 BPM
TRIGL SERPL-MCNC: 80 MG/DL (ref 0–150)
TROPONIN T SERPL-MCNC: 0.26 NG/ML (ref 0–0.03)
TSH SERPL DL<=0.05 MIU/L-ACNC: 0.69 UIU/ML (ref 0.27–4.2)
VLDLC SERPL-MCNC: 16 MG/DL
WBC NRBC COR # BLD: 12.91 10*3/MM3 (ref 3.4–10.8)

## 2019-09-03 PROCEDURE — 85025 COMPLETE CBC W/AUTO DIFF WBC: CPT | Performed by: INTERNAL MEDICINE

## 2019-09-03 PROCEDURE — 25010000002 LORAZEPAM PER 2 MG: Performed by: INTERNAL MEDICINE

## 2019-09-03 PROCEDURE — 93005 ELECTROCARDIOGRAM TRACING: CPT | Performed by: INTERNAL MEDICINE

## 2019-09-03 PROCEDURE — 25010000002 LEVOFLOXACIN PER 250 MG: Performed by: INTERNAL MEDICINE

## 2019-09-03 PROCEDURE — 84443 ASSAY THYROID STIM HORMONE: CPT | Performed by: INTERNAL MEDICINE

## 2019-09-03 PROCEDURE — 85610 PROTHROMBIN TIME: CPT | Performed by: INTERNAL MEDICINE

## 2019-09-03 PROCEDURE — 84100 ASSAY OF PHOSPHORUS: CPT | Performed by: INTERNAL MEDICINE

## 2019-09-03 PROCEDURE — 93306 TTE W/DOPPLER COMPLETE: CPT

## 2019-09-03 PROCEDURE — 83735 ASSAY OF MAGNESIUM: CPT | Performed by: INTERNAL MEDICINE

## 2019-09-03 PROCEDURE — 84145 PROCALCITONIN (PCT): CPT | Performed by: INTERNAL MEDICINE

## 2019-09-03 PROCEDURE — 99232 SBSQ HOSP IP/OBS MODERATE 35: CPT | Performed by: INTERNAL MEDICINE

## 2019-09-03 PROCEDURE — 84484 ASSAY OF TROPONIN QUANT: CPT | Performed by: INTERNAL MEDICINE

## 2019-09-03 PROCEDURE — 99024 POSTOP FOLLOW-UP VISIT: CPT | Performed by: UROLOGY

## 2019-09-03 PROCEDURE — 80053 COMPREHEN METABOLIC PANEL: CPT | Performed by: INTERNAL MEDICINE

## 2019-09-03 PROCEDURE — 80061 LIPID PANEL: CPT | Performed by: INTERNAL MEDICINE

## 2019-09-03 RX ORDER — CHOLECALCIFEROL (VITAMIN D3) 125 MCG
5 CAPSULE ORAL NIGHTLY PRN
Status: DISCONTINUED | OUTPATIENT
Start: 2019-09-03 | End: 2019-09-06 | Stop reason: HOSPADM

## 2019-09-03 RX ORDER — LORAZEPAM 2 MG/ML
0.5 INJECTION INTRAMUSCULAR ONCE
Status: COMPLETED | OUTPATIENT
Start: 2019-09-03 | End: 2019-09-03

## 2019-09-03 RX ORDER — SODIUM CHLORIDE, SODIUM LACTATE, POTASSIUM CHLORIDE, CALCIUM CHLORIDE 600; 310; 30; 20 MG/100ML; MG/100ML; MG/100ML; MG/100ML
50 INJECTION, SOLUTION INTRAVENOUS CONTINUOUS
Status: ACTIVE | OUTPATIENT
Start: 2019-09-03 | End: 2019-09-04

## 2019-09-03 RX ORDER — DIAPER,BRIEF,INFANT-TODD,DISP
EACH MISCELLANEOUS EVERY 12 HOURS SCHEDULED
Status: DISCONTINUED | OUTPATIENT
Start: 2019-09-04 | End: 2019-09-03

## 2019-09-03 RX ORDER — WARFARIN SODIUM 5 MG/1
5 TABLET ORAL
Status: DISCONTINUED | OUTPATIENT
Start: 2019-09-03 | End: 2019-09-03

## 2019-09-03 RX ORDER — DIAPER,BRIEF,INFANT-TODD,DISP
EACH MISCELLANEOUS EVERY 12 HOURS SCHEDULED
Status: DISCONTINUED | OUTPATIENT
Start: 2019-09-03 | End: 2019-09-06 | Stop reason: HOSPADM

## 2019-09-03 RX ORDER — DILTIAZEM HYDROCHLORIDE 60 MG/1
60 TABLET, FILM COATED ORAL EVERY 8 HOURS SCHEDULED
Status: DISCONTINUED | OUTPATIENT
Start: 2019-09-03 | End: 2019-09-04

## 2019-09-03 RX ADMIN — LEVOFLOXACIN 500 MG: 5 INJECTION, SOLUTION INTRAVENOUS at 18:06

## 2019-09-03 RX ADMIN — DILTIAZEM HYDROCHLORIDE 60 MG: 60 TABLET, FILM COATED ORAL at 21:41

## 2019-09-03 RX ADMIN — BACITRACIN ZINC 1 APPLICATION: 500 OINTMENT TOPICAL at 22:20

## 2019-09-03 RX ADMIN — DILTIAZEM HYDROCHLORIDE 30 MG: 30 TABLET, FILM COATED ORAL at 05:22

## 2019-09-03 RX ADMIN — SODIUM CHLORIDE, PRESERVATIVE FREE 10 ML: 5 INJECTION INTRAVENOUS at 09:44

## 2019-09-03 RX ADMIN — ATORVASTATIN CALCIUM 20 MG: 20 TABLET, FILM COATED ORAL at 09:36

## 2019-09-03 RX ADMIN — CITALOPRAM HYDROBROMIDE 20 MG: 20 TABLET ORAL at 09:36

## 2019-09-03 RX ADMIN — LORAZEPAM 0.5 MG: 2 INJECTION, SOLUTION INTRAMUSCULAR; INTRAVENOUS at 22:19

## 2019-09-03 RX ADMIN — DILTIAZEM HYDROCHLORIDE 10 MG/HR: 5 INJECTION INTRAVENOUS at 09:37

## 2019-09-03 RX ADMIN — DILTIAZEM HYDROCHLORIDE 60 MG: 60 TABLET, FILM COATED ORAL at 14:44

## 2019-09-03 RX ADMIN — DILTIAZEM HYDROCHLORIDE 30 MG: 30 TABLET, FILM COATED ORAL at 11:54

## 2019-09-03 RX ADMIN — ASPIRIN 81 MG: 81 TABLET, COATED ORAL at 09:36

## 2019-09-03 RX ADMIN — TAMSULOSIN HYDROCHLORIDE 0.4 MG: 0.4 CAPSULE ORAL at 09:36

## 2019-09-03 RX ADMIN — ACETAMINOPHEN 649.6 MG: 650 SOLUTION ORAL at 20:07

## 2019-09-03 RX ADMIN — SODIUM CHLORIDE, PRESERVATIVE FREE 10 ML: 5 INJECTION INTRAVENOUS at 03:56

## 2019-09-03 RX ADMIN — MELATONIN TAB 5 MG 5 MG: 5 TAB at 22:19

## 2019-09-03 RX ADMIN — SODIUM CHLORIDE, PRESERVATIVE FREE 10 ML: 5 INJECTION INTRAVENOUS at 20:34

## 2019-09-03 RX ADMIN — APIXABAN 5 MG: 5 TABLET, FILM COATED ORAL at 18:05

## 2019-09-03 RX ADMIN — METOPROLOL TARTRATE 25 MG: 25 TABLET ORAL at 09:36

## 2019-09-03 RX ADMIN — METOPROLOL TARTRATE 25 MG: 25 TABLET ORAL at 20:07

## 2019-09-03 RX ADMIN — LORAZEPAM 0.5 MG: 2 INJECTION, SOLUTION INTRAMUSCULAR; INTRAVENOUS at 20:34

## 2019-09-03 RX ADMIN — SODIUM CHLORIDE, POTASSIUM CHLORIDE, SODIUM LACTATE AND CALCIUM CHLORIDE 50 ML/HR: 600; 310; 30; 20 INJECTION, SOLUTION INTRAVENOUS at 23:00

## 2019-09-03 RX ADMIN — FINASTERIDE 5 MG: 5 TABLET, FILM COATED ORAL at 09:36

## 2019-09-03 RX ADMIN — DOCUSATE SODIUM 100 MG: 100 CAPSULE, LIQUID FILLED ORAL at 09:35

## 2019-09-03 NOTE — TELEPHONE ENCOUNTER
Tried calling pt to find out what dose of coumadin he has been on no answer please find out if he calls back and route to . Thanks.

## 2019-09-03 NOTE — PROGRESS NOTES
"Discharge Planning Assessment  [facility]     Patient Name: Angela Palencia  MRN: [id number]  Today's Date: 9/3/2019    Admit Date: 9/2/2019    Discharge Needs Assessment     Row Name 09/03/19 1145       Living Environment    Lives With  spouse    Name(s) of Who Lives With Patient  Julisa    Current Living Arrangements  home/apartment/condo    Primary Care Provided by  self;spouse/significant other    Provides Primary Care For  no one    Family Caregiver if Needed  spouse    Quality of Family Relationships  involved;supportive    Able to Return to Prior Arrangements  yes       Resource/Environmental Concerns    Resource/Environmental Concerns  none       Transition Planning    Patient/Family Anticipates Transition to  home with family    Patient/Family Anticipated Services at Transition  none    Transportation Anticipated  family or friend will provide       Discharge Needs Assessment    Readmission Within the Last 30 Days  no previous admission in last 30 days    Concerns to be Addressed  no discharge needs identified;denies needs/concerns at this time    Equipment Currently Used at Home  bipap/cpap;cane, straight;commode;walker, standard;wheelchair CPAP with [facility] in [place]    Equipment Needed After Discharge  none        Discharge Plan     Row Name 09/03/19 4821       Plan    Plan Comments  SW met with pt and wife, Julisa, in room for discharge planning. Wife provided information as pt was sleepy and drifting during our conversation. Wife states pt lives at home with her, is independent with ADL's, she assist with household chores and things as needed. Pt has CPAP at home provided by [facility] in [place], he did have O2 with them, but does not \"need it or use it\" so they are taking it back. Wife states pt does wear his CPAP every night. Pt also has walker, w/c, BSC and cane at home if needed.   Pt does not currently have home health, wife does not expect to need it at discharge, no preferences at this " time. Wife denies any needs or assistance at this time. She has no financial concerns or difficulty affording medications and food. Wife plans to take pt home when ready for discharge. Pt normally see's Dr. Almanza in [place] for cardiology, wife will take pt for follow-up with him. Pt also has Dr. Kumar for PCP in [place]. No needs at this time, wife very involved and supportive.         Destination      No service coordination in this encounter.      Durable Medical Equipment      No service coordination in this encounter.      Dialysis/Infusion      No service coordination in this encounter.      Home Medical Care      No service coordination in this encounter.      Therapy      No service coordination in this encounter.      Community Resources      No service coordination in this encounter.        Expected Discharge Date and Time     Expected Discharge Date Expected Discharge Time    Sep 5, 2019         Demographic Summary     Row Name 09/03/19 1143       General Information    Admission Type  inpatient    Arrived From  home    Referral Source  admission list    Reason for Consult  discharge planning    Preferred Language  English        Functional Status     Row Name 09/03/19 1143       Functional Status    Usual Activity Tolerance  good       Functional Status, IADL    Medications  independent;assistive person    Meal Preparation  independent;assistive person    Housekeeping  assistive person    Laundry  assistive person    Shopping  independent;assistive person    IADL Comments  Wife assistive, involved        Psychosocial     Row Name 09/03/19 1144       Intellectual Performance WDL    Level of Consciousness  Alert Sleepy;alert       Coping/Stress    Major Change/Loss/Stressor  denies    Patient Personal Strengths  strong support system    Sources of Support  spouse       Developmental Stage (Mimi's)    Developmental Stage  Stage 8 (63 years-death/Late Adulthood) Integrity vs. Despair       C-SSRS  (Screen-Recent) Past Month    Q1 Wished to be Dead (Past Month)  no    Q2 Suicidal Thoughts (Past Month)  no    Q6 Suicide Behavior (Lifetime)  no       Violence Risk    Feels Like Hurting Others  no    Previous Attempt to Harm Others  no        Abuse/Neglect     Row Name 09/03/19 1145       Personal Safety    Feels Unsafe at Home or Work/School  no    Feels Threatened by Someone  no    Does Anyone Try to Keep You From Having Contact with Others or Doing Things Outside Your Home?  no    Physical Signs of Abuse Present  no        Legal     Row Name 09/03/19 1145       Financial/Legal    Financial/Environmental Concerns  -- no concerns        Substance Abuse    No documentation.       Patient Forms    No documentation.           CARROLL Shook   11:53 AM  09/03/19

## 2019-09-03 NOTE — PROGRESS NOTES
"Urology Inpatient Progress Note    Subjective  Patient has done well overnight.  He does not complain of any penile pain.  No major oozing from his wound.    Physical Exam  Visit Vitals  BP (P) 126/65   Pulse (P) 89   Temp 98 °F (36.7 °C) (Oral)   Resp 25   Ht 170.2 cm (67\")   Wt 115 kg (253 lb 9.6 oz)   SpO2 96%   BMI 39.72 kg/m²     Constitutional: NAD, WDWN.  Cardiovascular: Regular rate.  Pulmonary/Chest: Respirations are even and non-labored bilaterally.  Abdominal: Soft. No distension, tenderness, masses or guarding. No CVA tenderness.  Extremities: CLARISSA x 4, Warm. No clubbing.  No cyanosis.    Skin: Pink, warm and dry.  No rashes noted.    Genitourinary  Penis: Dorsal slit incisions intact, with no active bleeding.  Still large amount of edema of lower foreskin.  Glans penis visible.  Small amount of discharge around had a penis    Urine clear yellow    I/O last 3 completed shifts:  In: 678 [P.O.:100; I.V.:578]  Out: 1125 [Urine:1125]      Current Facility-Administered Medications:   •  acetaminophen (TYLENOL) tablet 650 mg, 650 mg, Oral, Q4H PRN **OR** acetaminophen (TYLENOL) 160 MG/5ML solution 650 mg, 650 mg, Oral, Q4H PRN **OR** acetaminophen (TYLENOL) suppository 650 mg, 650 mg, Rectal, Q4H PRN, Gagan Zee MD, FASN  •  aluminum-magnesium hydroxide-simethicone (MAALOX MAX) 400-400-40 MG/5ML suspension 15 mL, 15 mL, Oral, Q6H PRN, Gagan Zee MD, FASN  •  aspirin EC tablet 81 mg, 81 mg, Oral, Daily, Gagan Zee MD, ADVID, 81 mg at 09/03/19 0936  •  atorvastatin (LIPITOR) tablet 20 mg, 20 mg, Oral, Daily, Gagan Zee MD, FASN, 20 mg at 09/03/19 0936  •  citalopram (CeleXA) tablet 20 mg, 20 mg, Oral, Daily, Gagan Zee MD, FASN, 20 mg at 09/03/19 0936  •  diltiaZEM (CARDIZEM) 125 mg in sodium chloride 0.9 % 125 mL (1 mg/mL) infusion, 5-15 mg/hr, Intravenous, Titrated, Tremaine Angulo MD, Last Rate: 5 mL/hr at 09/03/19 1156, 5 mg/hr at 09/03/19 1156  •  diltiaZEM (CARDIZEM) tablet 30 " mg, 30 mg, Oral, Q6H, Gagan Zee MD, FASN, 30 mg at 09/03/19 1154  •  docusate sodium (COLACE) capsule 100 mg, 100 mg, Oral, Daily, Gagan Zee MD, FASN, 100 mg at 09/03/19 0935  •  finasteride (PROSCAR) tablet 5 mg, 5 mg, Oral, Daily, Gagan Zee MD, FASN, 5 mg at 09/03/19 0936  •  lactated ringers infusion, 30 mL/hr, Intravenous, Continuous, Gagan Zee MD, FASMANDO, Last Rate: 30 mL/hr at 09/02/19 2035, 30 mL/hr at 09/02/19 2035  •  levoFLOXacin (LEVAQUIN) 500 mg/100 mL D5W (premix) (LEVAQUIN) 500 mg, 500 mg, Intravenous, Q24H, Gagan Zee MD, FASN  •  metoprolol tartrate (LOPRESSOR) tablet 25 mg, 25 mg, Oral, Q12H, Gagan Zee MD, FASN, 25 mg at 09/03/19 0936  •  ondansetron (ZOFRAN) tablet 4 mg, 4 mg, Oral, Q6H PRN **OR** ondansetron (ZOFRAN) injection 4 mg, 4 mg, Intravenous, Q6H PRN, Gagan Zee MD, FASN  •  Pharmacy to dose warfarin, , Does not apply, Continuous PRN, Jhonathan Chowdhury,   •  sodium chloride 0.9 % flush 10 mL, 10 mL, Intravenous, PRN, Emergency, Triage Protocol, MD, 10 mL at 09/03/19 0356  •  sodium chloride 0.9 % flush 10 mL, 10 mL, Intravenous, Q12H, Gagan Zee MD FASN, 10 mL at 09/03/19 0944  •  sodium chloride 0.9 % flush 10 mL, 10 mL, Intravenous, PRN, Gagan Zee MD, FASN  •  tamsulosin (FLOMAX) 24 hr capsule 0.4 mg, 0.4 mg, Oral, Daily, Gagan Zee MD, FASN, 0.4 mg at 09/03/19 0936  •  warfarin (COUMADIN) tablet 6 mg, 6 mg, Oral, Once per day on Sun Mon Tue Wed Thu SatLuciana Morgan Blanton, DO  •  [START ON 9/6/2019] warfarin (COUMADIN) tablet 8 mg, 8 mg, Oral, Once per day on Fri, Jhonathan Chowdhury DO    Labs  Lab Results   Component Value Date    GLUCOSE 161 (H) 09/03/2019    CALCIUM 8.8 09/03/2019     09/03/2019    K 4.0 09/03/2019    CO2 24.0 09/03/2019     09/03/2019    BUN 14 09/03/2019    CREATININE 1.00 09/03/2019    EGFRIFNONA 73 09/03/2019    BCR 14.0 09/03/2019    ANIONGAP 13.0 09/03/2019       Lab Results    Component Value Date    WBC 12.91 (H) 09/03/2019    HGB 14.2 09/03/2019    HCT 43.6 09/03/2019    MCV 86.3 09/03/2019     09/03/2019       Urine Culture   Date Value Ref Range Status   09/02/2019 >100,000 CFU/mL Gram Negative Bacilli (A)  Preliminary       Brief Urine Lab Results  (Last result in the past 365 days)      Color   Clarity   Blood   Leuk Est   Nitrite   Protein   CREAT   Urine HCG        09/02/19 1437 Yellow Turbid Large (3+) Large (3+) Positive 100 mg/dL (2+)                 Radiographic Studies  Xr Chest 1 View    Result Date: 9/2/2019  No acute cardiopulmonary process.  Continued followup is recommended.  This report was finalized on 9/2/2019 1:42 PM by David Amaral M.D..      Patient Active Problem List   Diagnosis   • Atrial fibrillation and flutter (CMS/HCC)   • DILAN on CPAP   • Hypertension   • Hyperlipidemia   • Deep vein thrombosis (CMS/HCC)   • Cardiomyopathy (CMS/HCC)   • Mitral regurgitation   • Congestive heart failure (CMS/HCC)   • Esophageal reflux   • Obesity   • Tricuspid regurgitation   • History of dyspnea   • Hiatal hernia   • Atrial flutter (CMS/HCC)   • Bladder outlet obstruction       Assessment  74 y.o. male with a PMHx of  has a past medical history of Atrial fibrillation (CMS/HCC), Atrial flutter (CMS/HCC), Cardiomyopathy (CMS/HCC), Congestive heart failure (CMS/HCC), DVT (deep venous thrombosis) (CMS/HCC), Edema, Esophageal reflux, History of hiatal hernia, History of mitral valve insufficiency, History of MRSA infection, Hypertension, Mitral regurgitation, Obesity, Obstructive sleep apnea, Traumatic fracture of femur with routine healing (2015), and Tricuspid regurgitation., who presents with A. fib with RVR, urinary infection, severe phimosis and balanitis with scar tissue formation and urinary retention.  He is status post bedside dorsal slit in the ER on 9/2/2019 with Vaca catheter placement.    Plan  1.  Continue Vaca catheter until day of discharge.  At  that time he may have a fill and void.  2.  At this point cardiac risks outweigh bleeding risks, therefore anticoagulation has been restarted.  No further surgeries planned.  3.  Follow-up with me in 2 weeks for evaluation.  Please call with any further questions.  I will sign off.

## 2019-09-03 NOTE — PROGRESS NOTES
[facility]    Name:  Angela Palencia   Age:  74 y.o.  Sex:  male  :  1945  MRN:  [id number]   Visit Number:  [id number]  Admission Date:  2019  Date Of Service:  19  Primary Care Physician:  Ewelina Kumar MD     LOS: 1 day :  Patient Care Team:  Ewelina Kumar MD as PCP - General  Zack Almanza MD as Consulting Physician (Cardiology):    Chief Complaint:      Patient seen in follow-up for UTI and A. fib with RVR    Subjective / Interval History:     Patient is a 74-year-old male history of A. fib with RVR, CHF, hypertension, presented to the emergency room with confusion and difficulty urinating.  Patient was found to have urinary tract infection and A. fib with RVR upon evaluation.  This morning when I saw patient he was resting comfortably in bed eating breakfast.  Wife was at bedside.  He did have continued confusion and would inappropriately respond to questions.  Wife did state that this seemed to be somewhat improved from admission.  Neither wife nor her  could confirm that he takes his medications every day.  Both were poor historians.  Nursing had no overnight events to report.  Patient denied chest pain, shortness of breath, nausea/vomiting/diarrhea.  According to wife, patient ambulates at home without difficulty and is able to take care for ADLs.    Review of Systems:     General ROS: Patient denies any fevers, chills or loss of consciousness.  Respiratory ROS: Denies cough or shortness of breath.  Cardiovascular ROS: Denies chest pain or palpitations. No history of exertional chest pain.  Gastrointestinal ROS: Denies nausea and vomiting. Denies any abdominal pain. No diarrhea.  Neurological ROS: Denies any focal weakness. No loss of consciousness. Denies any numbness.  Dermatological ROS: Denies any redness or pruritis.    Vital Signs:    Temp:  [98.3 °F (36.8 °C)-101.2 °F (38.4 °C)] 98.3 °F (36.8 °C)  Heart Rate:  []  96  Resp:  [16-25] 25  BP: (111-157)/(61-99) 125/70    Intake and output:    I/O last 3 completed shifts:  In: 678 [P.O.:100; I.V.:578]  Out: 1125 [Urine:1125]  No intake/output data recorded.    Physical Examination:    General Appearance:  Alert and cooperative, not in any acute distress.   Head:  Atraumatic and normocephalic, without obvious abnormality.   Eyes:          PERRLA, EOMI   Neck: Supple, no carotid bruit.   Lungs:    CTAP, nonlabored breathing.   Heart:   Irregularly irregular, no murmur no JVD   Abdomen:   Normal bowel sounds, no masses, no organomegaly. Soft, non-tender, non-distended, no guarding, no rebound tenderness.   Extremities: Moves all extremities well, no edema, no cyanosis, no clubbing.   Skin: No bleeding, bruising or rash.   Neurologic: Awake, alert and disoriented to time.  Able to move all extremities spontaneously.     Laboratory results:    Results from last 7 days   Lab Units 09/03/19  0355 09/02/19  1304   SODIUM mmol/L 142 142   POTASSIUM mmol/L 4.0 4.1   CHLORIDE mmol/L 105 103   CO2 mmol/L 24.0 23.4   BUN mg/dL 14 21   CREATININE mg/dL 1.00 1.08   CALCIUM mg/dL 8.8 9.7   BILIRUBIN mg/dL 1.2 0.9   ALK PHOS U/L 96 131*   ALT (SGPT) U/L 15 21   AST (SGOT) U/L 20 27   GLUCOSE mg/dL 161* 148*     Results from last 7 days   Lab Units 09/03/19  0355 09/02/19  1304   WBC 10*3/mm3 12.91* 11.19*   HEMOGLOBIN g/dL 14.2 15.6   HEMATOCRIT % 43.6 48.4   PLATELETS 10*3/mm3 189 252     Results from last 7 days   Lab Units 09/02/19  1305   INR  1.27*     Results from last 7 days   Lab Units 09/03/19  0355 09/02/19 2049 09/02/19  1304   TROPONIN T ng/mL 0.263* 0.173* 0.141*           I have reviewed the patient's laboratory results.    Radiology results:    Imaging Results (last 24 hours)     Procedure Component Value Units Date/Time    XR Chest 1 View [[id number]] Collected:  09/02/19 1342     Updated:  09/02/19 1344    Narrative:       PROCEDURE: XR CHEST 1 VW-     HISTORY: Dysrhythmia Triage  Protocol     COMPARISON: None.     FINDINGS: The heart is normal in size. The mediastinum is unremarkable.  The lungs are clear. There is no pneumothorax.  There are no acute  osseous abnormalities.       Impression:       No acute cardiopulmonary process.     Continued followup is recommended.     This report was finalized on 9/2/2019 1:42 PM by David Amaral M.D..          I have reviewed the patient's radiology reports.    Medication Review:     I have reviewed the patients active and prn medications.       Atrial flutter (CMS/HCC)    DILAN on CPAP    Hypertension    Hyperlipidemia    Deep vein thrombosis (CMS/HCC)    Cardiomyopathy (CMS/HCC)    Mitral regurgitation    Congestive heart failure (CMS/HCC)    Esophageal reflux    Obesity    Tricuspid regurgitation    Bladder outlet obstruction      Assessment:    1.  A. fib with RVR  2.  Subtherapeutic INR  3.  Sepsis secondary to UTI, complicated  4.  Elevated troponins, likely NSTEMI type II secondary to #1  5.  DILAN on CPAP  6.  Hypertension/hyperlipidemia  7.  Congestive heart failure not in exacerbation  8.  Obesity    Plan:    Will keep patient in ICU today.  Cardizem drip continued and heart rate currently below 110.  P.o. Cardizem ordered to be given this a.m.  Will transition off of Cardizem drip as tolerated.  Elevated troponins are likely secondary from A. fib with RVR.  Patient denies any history of chest pain, shortness of breath, and dyspnea with exertion.  Dr. Goodwin has been consulted and appreciate his recommendations.  Plan to restart Coumadin with pharmacy dosing.  Target INR 2-3  Urine cultures pending.  We will continue the Levaquin therapy for a total of 5 days.  Dr. Peralta was consulted from the emergency room and performed bedside dorsal slit circumcision for insertion of Vaca catheter.  Personally spoke with Dr. Mendoza this morning and no plans for further surgery today.  Will resume cardiac diet.  CPAP ordered for nighttime use.  I have  personally reviewed EKG which showed atrial fibrillation with RVR with rate greater than 140.  Chest x-ray personally showed no acute process.  Will continue with home medications for chronic conditions such as hypertension and hyperlipidemia.  Anticipate longer than 2 midnight stay in the hospital.  To transfer out of ICU either this afternoon or tomorrow.    Jhonathan Chowdhury,   09/03/19  9:21 AM    Dictated utilizing Dragon dictation.

## 2019-09-03 NOTE — CONSULTS
Referring Provider: Dr Chowdhury   Reason for Consultation: Abnorm elderly gentleman with significant dementia brought in for altered mental sensorium.al enzymes     Patient Care Team:  Ewelina Kumar MD as PCP - General  Zack Almanza MD as Consulting Physician (Cardiology)        History of present illness:    At the emergency room noted to have urinary retention with UTI being treated for the same.  In the interim patient has been noted to be in atrial flutter with borderline rate control.  Serial enzymes were trended out which revealed gradual increase in the troponins.    Patient does not complain of any chest pains.  No detailed history can be obtained from the patient most of this is obtained from the chart.    Review of Systems   Pertinent items are noted in HPI  Review of Systems      History  Baseline EKG: Atrial fibrillation with nonspecific ST wave changes.    LV function assessment EF 55 to 60% echocardiogram 2015.    CAD work-up: Cardiac catheterization in the remote past told to be normal.    Hypertension.    Dyslipidemia.    Deep venous thrombosis of the leg status post IVC filter placement.    IVC filter placement.    Atrial fibrillation/flutter status post electrical cardioversion x2 in atrial flutter most of the times lately.  On rate control therapy.    Chronic venous insufficiency: Skin change of both the lower extremities on chronic diuretic therapy.    Dementia.    Anticoagulation therapy: Has been on chronic Coumadin therapy.    Obesity moderate.    Personal history/family history review of symptoms: Not available.  Past Surgical History:   Procedure Laterality Date   • ANKLE SURGERY     • BACK SURGERY     • CHOLECYSTECTOMY     • FEMUR SURGERY Left    • KNEE SURGERY      Dr. Lundy    • NOSE SURGERY     • OTHER SURGICAL HISTORY  1974    ear   • TOTAL HIP ARTHROPLASTY Left      [facility] Dr. Juan Franklin   , Family History   Problem Relation Age of Onset   • Heart attack Mother     • Heart attack Sister 50   , Social History     Tobacco Use   • Smoking status: Former Smoker   • Smokeless tobacco: Never Used   Substance Use Topics   • Alcohol use: No   • Drug use: No   , Medications Prior to Admission   Medication Sig Dispense Refill Last Dose   • furosemide (LASIX) 40 MG tablet Take 40 mg by mouth 2 (Two) Times a Day As Needed.   9/1/2019 at Unknown time   • aspirin 81 MG EC tablet Take 81 mg by mouth Daily.   9/1/2019   • atorvastatin (LIPITOR) 20 MG tablet Take 20 mg by mouth Daily.   9/1/2019   • citalopram (CeleXA) 20 MG tablet Take 20 mg by mouth Daily.   9/1/2019   • cyclobenzaprine (FLEXERIL) 10 MG tablet Take 10 mg by mouth Every Night.   9/1/2019   • finasteride (PROSCAR) 5 MG tablet 5 mg Daily.  3 9/1/2019   • fluticasone (VERAMYST) 27.5 MCG/SPRAY nasal spray 2 sprays into the nostril(s) as directed by provider As Needed for Rhinitis.   Taking   • hydrochlorothiazide (MICROZIDE) 12.5 MG capsule Take 12.5 mg by mouth Daily.   9/1/2019   • metoprolol tartrate (LOPRESSOR) 25 MG tablet Take 25 mg by mouth 2 (Two) Times a Day.   9/1/2019   • NIFEdipine XL (PROCARDIA XL) 90 MG 24 hr tablet Take 90 mg by mouth Daily.   9/1/2019   • potassium chloride (K-DUR) 10 MEQ CR tablet Take 10 mEq by mouth Daily.   9/1/2019   • tamsulosin (FLOMAX) 0.4 MG capsule 24 hr capsule Take 1 capsule by mouth Daily.   9/1/2019   • warfarin (COUMADIN) 5 MG tablet 6 mg See Admin Instructions. 6 mg PO every day except 8 mg PO on Fridays 9/1/2019   , Scheduled Meds:    apixaban 5 mg Oral Q12H   aspirin 81 mg Oral Daily   atorvastatin 20 mg Oral Daily   citalopram 20 mg Oral Daily   diltiaZEM 60 mg Oral Q8H   docusate sodium 100 mg Oral Daily   finasteride 5 mg Oral Daily   levoFLOXacin 500 mg Intravenous Q24H   metoprolol tartrate 25 mg Oral Q12H   sodium chloride 10 mL Intravenous Q12H   tamsulosin 0.4 mg Oral Daily   warfarin 6 mg Oral Once per day on Sun Mon Tue Wed Thu Sat   , Continuous Infusions:   "  lactated ringers 30 mL/hr Last Rate: 30 mL/hr (09/02/19 2035)   Pharmacy to dose warfarin     , PRN Meds:  •  acetaminophen **OR** acetaminophen **OR** acetaminophen  •  aluminum-magnesium hydroxide-simethicone  •  ondansetron **OR** ondansetron  •  Pharmacy to dose warfarin  •  sodium chloride  •  sodium chloride, Allergies:  Codeine; Keflex [cephalexin]; Morphine and related; and Penicillins     OBJECTIVE:    Vital Sign Min/Max for last 24 hours  Temp  Min: 98 °F (36.7 °C)  Max: 101.2 °F (38.4 °C)   BP  Min: 111/79  Max: 157/88   Pulse  Min: 73  Max: 151   Resp  Min: 16  Max: 25   SpO2  Min: 92 %  Max: 100 %   No Data Recorded   Weight  Min: 115 kg (253 lb 9.6 oz)  Max: 115 kg (253 lb 9.6 oz)     Flowsheet Rows      First Filed Value   Admission Height  182.9 cm (72\") Documented at 09/02/2019 1254   Admission Weight  116 kg (256 lb 12.8 oz) Documented at 09/02/2019 1254               Physical Exam:     General Appearance:     sleepy mildly confused nevertheless wakes up to answer the questions.   Head:    Normocephalic, without obvious abnormality, atraumatic   Eyes:            Lids and lashes normal, conjunctivae and sclerae normal, no   icterus, no pallor, corneas clear, PERRLA   Ears:    Ears appear intact with no abnormalities noted   Throat:   No oral lesions, no thrush, oral mucosa moist   Neck:   No adenopathy, supple, trachea midline, no thyromegaly, no   carotid bruit, no JVD   Back:     No kyphosis present, no scoliosis present, no skin lesions,      erythema or scars, no tenderness to percussion or                   palpation,   range of motion normal   Lungs:     Clear to auscultation,respirations regular, even and                  unlabored    Heart:    Regular rhythm and normal rate, normal S1 and S2, no            murmur, no gallop, no rub, no click   Chest Wall:    No abnormalities observed   Abdomen:     Normal bowel sounds, no masses, no organomegaly, soft        non-tender, non-distended, no " guarding, no rebound                tenderness   Rectal:     Deferred   Extremities:  Reduced discoloration of both the lower extremities 1+ leg edema present.   Pulses:   Pulses palpable and equal bilaterally   Skin:   No bleeding, bruising or rash   Lymph nodes:   No palpable adenopathy   Neurologic:   Cranial nerves 2 - 12 grossly intact, sensation intact, DTR       present and equal bilaterally     Atrial flutter with diffuse nonspecific ST wave changes.        LAB DATA :           WBC   Date Value Ref Range Status   09/03/2019 12.91 (H) 3.40 - 10.80 10*3/mm3 Final     RBC   Date Value Ref Range Status   09/03/2019 5.05 4.14 - 5.80 10*6/mm3 Final     Hemoglobin   Date Value Ref Range Status   09/03/2019 14.2 13.0 - 17.7 g/dL Final     Hematocrit   Date Value Ref Range Status   09/03/2019 43.6 37.5 - 51.0 % Final     MCV   Date Value Ref Range Status   09/03/2019 86.3 79.0 - 97.0 fL Final     MCH   Date Value Ref Range Status   09/03/2019 28.1 26.6 - 33.0 pg Final     MCHC   Date Value Ref Range Status   09/03/2019 32.6 31.5 - 35.7 g/dL Final     RDW   Date Value Ref Range Status   09/03/2019 13.8 12.3 - 15.4 % Final     RDW-SD   Date Value Ref Range Status   09/03/2019 43.3 37.0 - 54.0 fl Final     MPV   Date Value Ref Range Status   09/03/2019 10.4 6.0 - 12.0 fL Final     Platelets   Date Value Ref Range Status   09/03/2019 189 140 - 450 10*3/mm3 Final     Neutrophil %   Date Value Ref Range Status   09/03/2019 77.9 (H) 42.7 - 76.0 % Final     Lymphocyte %   Date Value Ref Range Status   09/03/2019 12.3 (L) 19.6 - 45.3 % Final     Monocyte %   Date Value Ref Range Status   09/03/2019 9.1 5.0 - 12.0 % Final     Eosinophil %   Date Value Ref Range Status   09/03/2019 0.0 (L) 0.3 - 6.2 % Final     Basophil %   Date Value Ref Range Status   09/03/2019 0.2 0.0 - 1.5 % Final     Immature Grans %   Date Value Ref Range Status   09/03/2019 0.5 0.0 - 0.5 % Final     Neutrophils, Absolute   Date Value Ref Range Status    09/03/2019 10.05 (H) 1.70 - 7.00 10*3/mm3 Final     Lymphocytes, Absolute   Date Value Ref Range Status   09/03/2019 1.59 0.70 - 3.10 10*3/mm3 Final     Monocytes, Absolute   Date Value Ref Range Status   09/03/2019 1.18 (H) 0.10 - 0.90 10*3/mm3 Final     Eosinophils, Absolute   Date Value Ref Range Status   09/03/2019 0.00 0.00 - 0.40 10*3/mm3 Final     Basophils, Absolute   Date Value Ref Range Status   09/03/2019 0.03 0.00 - 0.20 10*3/mm3 Final     Immature Grans, Absolute   Date Value Ref Range Status   09/03/2019 0.06 (H) 0.00 - 0.05 10*3/mm3 Final     nRBC   Date Value Ref Range Status   09/03/2019 0.0 0.0 - 0.2 /100 WBC Final       Lab Results   Component Value Date    GLUCOSE 161 (H) 09/03/2019    BUN 14 09/03/2019    CREATININE 1.00 09/03/2019    EGFRIFNONA 73 09/03/2019    BCR 14.0 09/03/2019    CO2 24.0 09/03/2019    CALCIUM 8.8 09/03/2019    ALBUMIN 3.50 09/03/2019    AST 20 09/03/2019    ALT 15 09/03/2019       Lab Results   Component Value Date    TROPONINI <0.30 01/20/2019    TROPONINT 0.263 (C) 09/03/2019       No results found for: DDIMER    No results found for: SITE, ALLENTEST, PHART, BJE9MYZ, PO2ART, EPL0RWK, BASEEXCESS, N5VRYCJV, HGBBG, HCTABG, OXYHEMOGLOBI, METHHGBN, CARBOXYHGB, CO2CT, BAROMETRIC, MODALITY, FIO2  No results found for: HGBA1C      No results found for: LIPASE    IMAGING DATA:     Xr Chest 1 View    Result Date: 9/2/2019  Narrative: PROCEDURE: XR CHEST 1 VW-  HISTORY: Dysrhythmia Triage Protocol  COMPARISON: None.  FINDINGS: The heart is normal in size. The mediastinum is unremarkable. The lungs are clear. There is no pneumothorax.  There are no acute osseous abnormalities.      Impression: No acute cardiopulmonary process.  Continued followup is recommended.  This report was finalized on 9/2/2019 1:42 PM by David Amaral M.D..        DIAGNOSIS  #1 atrial fibrillation:  Patient has had a long history of atrial flutter fibrillation.  The rate control appears to be  borderline at this time in the setting of sepsis we will keep on rate limiting medications with a combination of beta-blocker and calcium channel blocker for now.  We will switch from IV Cardizem to p.o. Cardizem.  No plans on cardioversion as it apparently has been tried in the past and he has had recurrence of the same.  Suggested to do an echocardiogram to assess the left atrial size and LV function in the interim.    2.  Anticoagulation therapy:  Has been subtherapeutic.  He would be an ideal candidate to go on NELSON we will start him on Eliquis 5 mg twice a day at this time and discontinue the Coumadin.    3.Cs abnormal cardiac enzymes  Patient has a high risk profile for CAD apparently has had work-up in the remote past which has been negative.  We will continue to trend out the enzymes within water mentation that he has at this time he claims he just does not want to do anything with respect to the same.  Unless there is significant findings on echocardiogram we will treat this medically.    4.  Chronic venous insufficiency: Has significant changes of the same and bilateral lower extremities and has been on Lasix therapy long run.  He also has been on Procardia in the interim.  Will try to avoid the same and in turn to reduce the amount of Lasix that he takes on outpatient basis.    5.  Dementia:  This would be the limiting factor in his overall care.    #6 deep venous thrombosis:  By the chart he is status post IVC filter placement.  No further details are available at this time.  This to be pursued on outpatient basis.    7.  Obstructive sleep apnea:  He has been noncompliant with his CPAP mask also.  This in turn would perpetuate his arrhythmia.    #8 UTI with sepsis:  Therapy ongoing with his overall clinical status improving anticipate improvement in his rate control also.        Atrial flutter (CMS/HCC)    DILAN on CPAP    Hypertension    Hyperlipidemia    Deep vein thrombosis (CMS/HCC)    Cardiomyopathy  (CMS/HCC)    Mitral regurgitation    Congestive heart failure (CMS/HCC)    Esophageal reflux    Obesity    Tricuspid regurgitation    Bladder outlet obstruction          I discussed the patients findings and my recommendations with patient    Sourav Goodwin MD  09/03/19  1:36 PM      Please note that portions of this note may have been completed with a voice recognition program. Efforts were made to edit the dictations, but occasionally words are mistranscribed.

## 2019-09-03 NOTE — PLAN OF CARE
Problem: Fall Risk (Adult)  Goal: Identify Related Risk Factors and Signs and Symptoms  Outcome: Ongoing (interventions implemented as appropriate)      Problem: Patient Care Overview  Goal: Plan of Care Review  Outcome: Ongoing (interventions implemented as appropriate)   09/03/19 7020   Coping/Psychosocial   Plan of Care Reviewed With patient;spouse   Plan of Care Review   Progress no change       Problem: Cardiac Output Decreased (Adult)  Goal: Effective Tissue Perfusion  Outcome: Ongoing (interventions implemented as appropriate)

## 2019-09-03 NOTE — PROGRESS NOTES
Adult Nutrition  Assessment/PES    Patient Name:  Angela Palencia  YOB: 1945  MRN: [id number]  Admit Date:  9/2/2019    Assessment Date:  9/3/2019    Comments:  Rec #1: Continue current diet order; Encouraging intake. Pt receiving 25% PO intake over 2 meals. Rec #2: Consider MVI with minerals daily. RD to follow pt. Consult RD PRN.       Reason for Assessment     Row Name 09/03/19 1426          Reason for Assessment    Reason For Assessment  diagnosis/disease state;identified at risk by screening criteria     Diagnosis  cardiac disease;other (see comments) CHF, A-flutter, HTN, HLD, UTI     Identified At Risk by Screening Criteria  BMI             Labs/Tests/Procedures/Meds     Row Name 09/03/19 1427          Labs/Procedures/Meds    Lab Results Reviewed  reviewed, pertinent     Lab Results Comments  High: Glu        Medications    Pertinent Medications Reviewed  reviewed           Estimated/Assessed Needs     Row Name 09/03/19 1427          Calculation Measurements    Weight Used For Calculations  68.1 kg (150 lb 2.1 oz) IBW        Estimated/Assessed Needs    Additional Documentation  Fluid Requirements (Group);[place]-St. Jeor Equation (Group);Protein Requirements (Group);Calorie Requirements (Group)        Calorie Requirements    Weight Used For Calorie Calculations  -- AF 1.2     Estimated Calorie Need Method  [place]-St Jeor     Estimated Calorie Requirement Comment  ~[phone]        [place]-St. Jeor Equation    RMR ([place]-St. Jeor Equation)  1369.625        Protein Requirements    Est Protein Requirement Amount (gms/kg)  1.5 gm protein  gm     Estimated Protein Requirements (gms/day)  102.15        Fluid Requirements    Estimated Fluid Requirement Method  Rian-Segar Formula     [place]-Segar Method (over 20 kg)  2862         Nutrition Prescription Ordered     Row Name 09/03/19 1428          Nutrition Prescription PO    Current PO Diet  Regular     Common Modifiers  Cardiac          Evaluation of Received Nutrient/Fluid Intake     Row Name 09/03/19 1427          PO Evaluation    Number of Days PO Intake Evaluated  1 day     Number of Meals  2     % PO Intake  25               Problem/Interventions:  Problem 1     Row Name 09/03/19 1430          Nutrition Diagnoses Problem 1    Problem 1  Overweight/Obesity     Etiology (related to)  Factors Affecting Nutrition     Food Habit/Preferences  Large Meals     Signs/Symptoms (evidenced by)  BMI     BMI  35 - 39.9                 Intervention Goal     Row Name 09/03/19 1430          Intervention Goal    General  Meet nutritional needs for age/condition     PO  Meet estimated needs;Increase intake;PO intake (%)     PO Intake %  50 %     Weight  Maintain weight         Nutrition Intervention     Row Name 09/03/19 1431          Nutrition Intervention    RD/Tech Action  Follow Tx progress;Care plan reviewd;Encourage intake         Nutrition Prescription     Row Name 09/03/19 1431          Nutrition Prescription PO    PO Prescription  Begin/change diet     Begin/Change Diet to  Regular     New PO Prescription Ordered?  No, recommended        Other Orders    Obtain Weight  Daily     Obtain Weight Ordered?  No, recommended     Supplement  Vitamin mineral supplement     Supplement Ordered?  No, recommended         Education/Evaluation     Row Name 09/03/19 1431          Education    Education  Will Instruct as appropriate        Monitor/Evaluation    Monitor  Per protocol;I&O;PO intake;Pertinent labs;Weight           Electronically signed by:  Vania Gu RD  09/03/19 2:32 PM

## 2019-09-03 NOTE — PROGRESS NOTES
" Pharmacy Consult  -  Warfarin    Angela Palencia is a  74 y.o. male , pharmacy consulted for warfarin dosing  Height - 170.2 cm (67\")  Weight - 115 kg (253 lb 9.6 oz)    Consulting Provider: Luciana  Indication: - A.Fib with RVR  Goal INR: -  2-3  Home Regimen: 5 mg PO daily and 3 mg PO on Fridays   Home dose will be clarified when med list is reconciled    Bridge Therapy: had one dose of enoxaparin 40 mg SC 9/2/19 PM before discontinued      Drug-Drug Interactions with current regimen:  Levofloxacin 500 mg IV     Warfarin Dosing During Admission:    Date  9/2 9/3          INR  1.27 1.19          Dose  n/a 5 mg planned             Labs:  Results from last 7 days   Lab Units 09/03/19  0955 09/03/19  0355 09/02/19  1305 09/02/19  1304   INR  1.19*  --  1.27*  --    HEMOGLOBIN g/dL  --  14.2  --  15.6   HEMATOCRIT %  --  43.6  --  48.4   PLATELETS 10*3/mm3  --  189  --  252     Results from last 7 days   Lab Units 09/03/19  0355 09/02/19  1304   SODIUM mmol/L 142 142   POTASSIUM mmol/L 4.0 4.1   CHLORIDE mmol/L 105 103   CO2 mmol/L 24.0 23.4   BUN mg/dL 14 21   CREATININE mg/dL 1.00 1.08   CALCIUM mg/dL 8.8 9.7   BILIRUBIN mg/dL 1.2 0.9   ALK PHOS U/L 96 131*   ALT (SGPT) U/L 15 21   AST (SGOT) U/L 20 27   GLUCOSE mg/dL 161* 148*       Assessment/Plan:   Ordered daily warfarin 5 mg to start 9/3/@ 1800.  Dr. Peralta perfomed a dorsal slit circumcision 9/2/19 PM and recommended holding anticoagulation for 24 hr.  INR is subtherapeutic. Dr. Goodwin has been consulted for increasing troponin.  Pharmacy will continue to follow PT/INR results and monitor for signs and symptoms of bleeding or thrombosis.    Thank you  Renata Tejada [facility]  9/3/2019  11:00 AM  "

## 2019-09-04 LAB
ALBUMIN SERPL-MCNC: 3.1 G/DL (ref 3.5–5.2)
ALBUMIN/GLOB SERPL: 0.9 G/DL
ALP SERPL-CCNC: 89 U/L (ref 39–117)
ALT SERPL W P-5'-P-CCNC: 12 U/L (ref 1–41)
ANION GAP SERPL CALCULATED.3IONS-SCNC: 10.5 MMOL/L (ref 5–15)
AST SERPL-CCNC: 15 U/L (ref 1–40)
BACTERIA SPEC AEROBE CULT: ABNORMAL
BASOPHILS # BLD AUTO: 0.04 10*3/MM3 (ref 0–0.2)
BASOPHILS NFR BLD AUTO: 0.3 % (ref 0–1.5)
BILIRUB SERPL-MCNC: 1.1 MG/DL (ref 0.2–1.2)
BUN BLD-MCNC: 17 MG/DL (ref 8–23)
BUN/CREAT SERPL: 17.5 (ref 7–25)
CALCIUM SPEC-SCNC: 8.7 MG/DL (ref 8.6–10.5)
CHLORIDE SERPL-SCNC: 103 MMOL/L (ref 98–107)
CO2 SERPL-SCNC: 24.5 MMOL/L (ref 22–29)
CREAT BLD-MCNC: 0.97 MG/DL (ref 0.76–1.27)
DEPRECATED RDW RBC AUTO: 43.5 FL (ref 37–54)
EOSINOPHIL # BLD AUTO: 0.01 10*3/MM3 (ref 0–0.4)
EOSINOPHIL NFR BLD AUTO: 0.1 % (ref 0.3–6.2)
ERYTHROCYTE [DISTWIDTH] IN BLOOD BY AUTOMATED COUNT: 13.6 % (ref 12.3–15.4)
GFR SERPL CREATININE-BSD FRML MDRD: 75 ML/MIN/1.73
GLOBULIN UR ELPH-MCNC: 3.4 GM/DL
GLUCOSE BLD-MCNC: 118 MG/DL (ref 65–99)
HCT VFR BLD AUTO: 41.3 % (ref 37.5–51)
HGB BLD-MCNC: 13.1 G/DL (ref 13–17.7)
IMM GRANULOCYTES # BLD AUTO: 0.04 10*3/MM3 (ref 0–0.05)
IMM GRANULOCYTES NFR BLD AUTO: 0.3 % (ref 0–0.5)
LYMPHOCYTES # BLD AUTO: 1.34 10*3/MM3 (ref 0.7–3.1)
LYMPHOCYTES NFR BLD AUTO: 11.6 % (ref 19.6–45.3)
MCH RBC QN AUTO: 27.8 PG (ref 26.6–33)
MCHC RBC AUTO-ENTMCNC: 31.7 G/DL (ref 31.5–35.7)
MCV RBC AUTO: 87.5 FL (ref 79–97)
MONOCYTES # BLD AUTO: 0.68 10*3/MM3 (ref 0.1–0.9)
MONOCYTES NFR BLD AUTO: 5.9 % (ref 5–12)
NEUTROPHILS # BLD AUTO: 9.4 10*3/MM3 (ref 1.7–7)
NEUTROPHILS NFR BLD AUTO: 81.8 % (ref 42.7–76)
NRBC BLD AUTO-RTO: 0.2 /100 WBC (ref 0–0.2)
PLATELET # BLD AUTO: 171 10*3/MM3 (ref 140–450)
PMV BLD AUTO: 10.2 FL (ref 6–12)
POTASSIUM BLD-SCNC: 3.5 MMOL/L (ref 3.5–5.2)
PROT SERPL-MCNC: 6.5 G/DL (ref 6–8.5)
RBC # BLD AUTO: 4.72 10*6/MM3 (ref 4.14–5.8)
SODIUM BLD-SCNC: 138 MMOL/L (ref 136–145)
WBC NRBC COR # BLD: 11.51 10*3/MM3 (ref 3.4–10.8)

## 2019-09-04 PROCEDURE — 85025 COMPLETE CBC W/AUTO DIFF WBC: CPT | Performed by: INTERNAL MEDICINE

## 2019-09-04 PROCEDURE — 80053 COMPREHEN METABOLIC PANEL: CPT | Performed by: INTERNAL MEDICINE

## 2019-09-04 PROCEDURE — 97161 PT EVAL LOW COMPLEX 20 MIN: CPT

## 2019-09-04 PROCEDURE — 93005 ELECTROCARDIOGRAM TRACING: CPT | Performed by: INTERNAL MEDICINE

## 2019-09-04 PROCEDURE — 99231 SBSQ HOSP IP/OBS SF/LOW 25: CPT | Performed by: INTERNAL MEDICINE

## 2019-09-04 RX ORDER — METOPROLOL TARTRATE 50 MG/1
50 TABLET, FILM COATED ORAL EVERY 12 HOURS SCHEDULED
Status: DISCONTINUED | OUTPATIENT
Start: 2019-09-04 | End: 2019-09-06 | Stop reason: HOSPADM

## 2019-09-04 RX ORDER — LEVOFLOXACIN 500 MG/1
500 TABLET, FILM COATED ORAL EVERY 24 HOURS
Status: DISCONTINUED | OUTPATIENT
Start: 2019-09-04 | End: 2019-09-06 | Stop reason: HOSPADM

## 2019-09-04 RX ORDER — CLOPIDOGREL BISULFATE 75 MG/1
75 TABLET ORAL DAILY
Status: DISCONTINUED | OUTPATIENT
Start: 2019-09-04 | End: 2019-09-06 | Stop reason: HOSPADM

## 2019-09-04 RX ORDER — DILTIAZEM HYDROCHLORIDE 120 MG/1
120 CAPSULE, COATED, EXTENDED RELEASE ORAL
Status: DISCONTINUED | OUTPATIENT
Start: 2019-09-04 | End: 2019-09-04

## 2019-09-04 RX ORDER — DILTIAZEM HYDROCHLORIDE 120 MG/1
120 CAPSULE, COATED, EXTENDED RELEASE ORAL 2 TIMES DAILY
Status: DISCONTINUED | OUTPATIENT
Start: 2019-09-04 | End: 2019-09-05

## 2019-09-04 RX ADMIN — BACITRACIN ZINC: 500 OINTMENT TOPICAL at 20:15

## 2019-09-04 RX ADMIN — APIXABAN 5 MG: 5 TABLET, FILM COATED ORAL at 05:08

## 2019-09-04 RX ADMIN — METOPROLOL TARTRATE 50 MG: 50 TABLET ORAL at 20:03

## 2019-09-04 RX ADMIN — MELATONIN TAB 5 MG 5 MG: 5 TAB at 22:58

## 2019-09-04 RX ADMIN — SODIUM CHLORIDE, POTASSIUM CHLORIDE, SODIUM LACTATE AND CALCIUM CHLORIDE 50 ML/HR: 600; 310; 30; 20 INJECTION, SOLUTION INTRAVENOUS at 00:48

## 2019-09-04 RX ADMIN — DILTIAZEM HYDROCHLORIDE 120 MG: 120 CAPSULE, COATED, EXTENDED RELEASE ORAL at 20:03

## 2019-09-04 RX ADMIN — APIXABAN 5 MG: 5 TABLET, FILM COATED ORAL at 17:56

## 2019-09-04 RX ADMIN — SODIUM CHLORIDE, PRESERVATIVE FREE 10 ML: 5 INJECTION INTRAVENOUS at 20:04

## 2019-09-04 RX ADMIN — TAMSULOSIN HYDROCHLORIDE 0.4 MG: 0.4 CAPSULE ORAL at 08:59

## 2019-09-04 RX ADMIN — FINASTERIDE 5 MG: 5 TABLET, FILM COATED ORAL at 08:59

## 2019-09-04 RX ADMIN — ATORVASTATIN CALCIUM 20 MG: 20 TABLET, FILM COATED ORAL at 08:59

## 2019-09-04 RX ADMIN — DILTIAZEM HYDROCHLORIDE 120 MG: 120 CAPSULE, COATED, EXTENDED RELEASE ORAL at 08:59

## 2019-09-04 RX ADMIN — LEVOFLOXACIN 500 MG: 500 TABLET, FILM COATED ORAL at 17:56

## 2019-09-04 RX ADMIN — SODIUM CHLORIDE, POTASSIUM CHLORIDE, SODIUM LACTATE AND CALCIUM CHLORIDE 50 ML/HR: 600; 310; 30; 20 INJECTION, SOLUTION INTRAVENOUS at 22:58

## 2019-09-04 RX ADMIN — SODIUM CHLORIDE, PRESERVATIVE FREE 10 ML: 5 INJECTION INTRAVENOUS at 08:59

## 2019-09-04 RX ADMIN — DOCUSATE SODIUM 100 MG: 100 CAPSULE, LIQUID FILLED ORAL at 08:58

## 2019-09-04 RX ADMIN — CLOPIDOGREL BISULFATE 75 MG: 75 TABLET ORAL at 08:59

## 2019-09-04 RX ADMIN — BACITRACIN ZINC: 500 OINTMENT TOPICAL at 09:00

## 2019-09-04 RX ADMIN — METOPROLOL TARTRATE 25 MG: 25 TABLET ORAL at 08:58

## 2019-09-04 RX ADMIN — DILTIAZEM HYDROCHLORIDE 60 MG: 60 TABLET, FILM COATED ORAL at 05:08

## 2019-09-04 RX ADMIN — CITALOPRAM HYDROBROMIDE 20 MG: 20 TABLET ORAL at 08:59

## 2019-09-04 NOTE — PROGRESS NOTES
[facility]    [facility]    Name:  Angela Palencia   Age:  74 y.o.  Sex:  male  :  1945  MRN:  [id number]   Visit Number:  [id number]  Admission Date:  2019  Date Of Service:  19  Primary Care Physician:  Ewelina Kumar MD     LOS: 2 days :  Patient Care Team:  Ewelina Kumar MD as PCP - General  Zack Amlanza MD as Consulting Physician (Cardiology):    Chief Complaint:      Patient seen in follow-up for UTI and A. fib with RVR    Subjective / Interval History:     Patient is resting comfortably in bed eating breakfast and reading the newspaper this morning.  No family present at bedside.  No acute over night events reported by nursing.  They state that he slept well however was restless because of the bed being uncomfortable.  Patient was alert and oriented x3 this morning.  He denied shortness of breath, cough, chest pain, nausea/vomiting/diarrhea.  He has been much improved.    Review of Systems:     General ROS: Patient denies any fevers, chills or loss of consciousness.  Respiratory ROS: Denies cough or shortness of breath.  Cardiovascular ROS: Denies chest pain or palpitations. No history of exertional chest pain.  Gastrointestinal ROS: Denies nausea and vomiting. Denies any abdominal pain. No diarrhea.  Neurological ROS: Denies any focal weakness. No loss of consciousness. Denies any numbness.  Dermatological ROS: Denies any redness or pruritis.    Vital Signs:    Temp:  [97.6 °F (36.4 °C)-101.1 °F (38.4 °C)] 97.6 °F (36.4 °C)  Heart Rate:  [] 88  Resp:  [20-25] 20  BP: ()/(45-98) 121/98    Intake and output:    I/O last 3 completed shifts:  In: 2369 [P.O.:780; I.V.:1589]  Out: 1775 [Urine:1775]  I/O this shift:  In: 358 [P.O.:358]  Out: -     Physical Examination:    General Appearance:  Alert and cooperative, not in any acute distress.   Head:  Atraumatic and normocephalic, without obvious abnormality.   Eyes:          PERRLA, EOMI   Neck:  Supple, trachea midline, no thyromegaly, no carotid bruit.   Lungs:   CTAB, nonlabored   Heart:   Irregularly irregular, no murmur   Abdomen:   Normal bowel sounds, no masses, no organomegaly. Soft, non-tender, non-distended, no guarding, no rebound tenderness.   Extremities: Moves all extremities well, no edema, no cyanosis, no clubbing.   Skin: No bleeding, bruising or rash.   Neurologic: Awake, alert and oriented times 3. Moves all 4 extremities equally.     Laboratory results:    Results from last 7 days   Lab Units 09/04/19  0816 09/03/19  0355 09/02/19  1304   SODIUM mmol/L 138 142 142   POTASSIUM mmol/L 3.5 4.0 4.1   CHLORIDE mmol/L 103 105 103   CO2 mmol/L 24.5 24.0 23.4   BUN mg/dL 17 14 21   CREATININE mg/dL 0.97 1.00 1.08   CALCIUM mg/dL 8.7 8.8 9.7   BILIRUBIN mg/dL 1.1 1.2 0.9   ALK PHOS U/L 89 96 131*   ALT (SGPT) U/L 12 15 21   AST (SGOT) U/L 15 20 27   GLUCOSE mg/dL 118* 161* 148*     Results from last 7 days   Lab Units 09/04/19  0816 09/03/19  0355 09/02/19  1304   WBC 10*3/mm3 11.51* 12.91* 11.19*   HEMOGLOBIN g/dL 13.1 14.2 15.6   HEMATOCRIT % 41.3 43.6 48.4   PLATELETS 10*3/mm3 171 189 252     Results from last 7 days   Lab Units 09/03/19  0955 09/02/19  1305   INR  1.19* 1.27*     Results from last 7 days   Lab Units 09/03/19  0355 09/02/19  2049 09/02/19  1304   TROPONIN T ng/mL 0.263* 0.173* 0.141*     Results from last 7 days   Lab Units 09/02/19  1437   URINECX  >100,000 CFU/mL Escherichia coli*       I have reviewed the patient's laboratory results.    Radiology results:    Imaging Results (last 24 hours)     ** No results found for the last 24 hours. **          I have reviewed the patient's radiology reports.    Medication Review:     I have reviewed the patients active and prn medications.       Atrial flutter (CMS/HCC)    DILAN on CPAP    Hypertension    Hyperlipidemia    Deep vein thrombosis (CMS/HCC)    Cardiomyopathy (CMS/HCC)    Mitral regurgitation    Congestive heart failure  (CMS/[facility])    Esophageal reflux    Obesity    Tricuspid regurgitation    Bladder outlet obstruction      Assessment:    1.  A. fib with RVR  2.  Subtherapeutic INR  3.  Sepsis secondary to UTI, complicated  4.  Elevated troponins, likely NSTEMI type II secondary to #1  5.  DILAN on CPAP  6.  Hypertension/hyperlipidemia  7.  Congestive heart failure not in exacerbation  8.  Obesity    Plan:    Transfer patient out of ICU to telemetry floor.  Urine culture indicated pansensitive E. coli.  Will transition antibiotics from IV to oral today as mentation has improved significantly.  Transitioning IV Cardizem to p.o. Cardizem for rate control of atrial fibrillation.  Dr. Goodwin is following and does not recommend cardioversion at this time as it has already been tried in the past.  Will obtain echocardiogram to assess left atrial size and LV function.  Patient has been transitioned from Coumadin to Eliquis per cardiology recommendations.  Continue to monitor patient on telemetry and will treat abnormal cardiac enzymes medically pending echocardiogram results.  Continue CPAP at night.  Jhonathan Chowdhury DO  09/04/19  1:45 PM    Dictated utilizing Dragon dictation.

## 2019-09-04 NOTE — PLAN OF CARE
Problem: Patient Care Overview  Goal: Plan of Care Review  Outcome: Ongoing (interventions implemented as appropriate)   09/04/19 1132   Coping/Psychosocial   Plan of Care Reviewed With patient;spouse   OTHER   Outcome Summary Patient participates well in skilled PT evaluation and demonstrates decreased strength, transfers, balance and gait. He is expected to benefit from additional PT services while hospitalized and upon discharge to home with home health care.

## 2019-09-04 NOTE — PROGRESS NOTES
"   LOS: 2 days   Patient Care Team:  Ewelina Kumar MD as PCP - General  Zack Almanza MD as Consulting Physician (Cardiology)      Interval History:   Patient feeling much better.  Breathing much better.  Does not want any work-up pursued at this time.  No shortness of breath or chest pains.        Review of Systems:   Pertinent items are noted in HPI.      OBJECTIVE:    Vital Sign Min/Max for last 24 hours  Temp  Min: 98 °F (36.7 °C)  Max: 101.1 °F (38.4 °C)   BP  Min: 92/53  Max: 158/75   Pulse  Min: 66  Max: 151   Resp  Min: 20  Max: 25   SpO2  Min: 91 %  Max: 98 %   Flow (L/min)  Min: 2  Max: 2   Weight  Min: 115 kg (253 lb 3.2 oz)  Max: 115 kg (253 lb 3.2 oz)     Flowsheet Rows      First Filed Value   Admission Height  182.9 cm (72\") Documented at 09/02/2019 1254   Admission Weight  116 kg (256 lb 12.8 oz) Documented at 09/02/2019 1254          Physical Exam:     General Appearance:    Alert, cooperative, in no acute distress   Head:    Normocephalic, without obvious abnormality, atraumatic   Eyes:            Lids and lashes normal, conjunctivae and sclerae normal, no   icterus, no pallor, corneas clear, PERRLA   Ears:    Ears appear intact with no abnormalities noted   Throat:   No oral lesions, no thrush, oral mucosa moist   Neck:   No adenopathy, supple, trachea midline, no thyromegaly, no   carotid bruit, no JVD   Back:     No kyphosis present, no scoliosis present, no skin lesions,      erythema or scars, no tenderness to percussion or                   palpation,   range of motion normal   Lungs:     Clear to auscultation,respirations regular, even and                  unlabored    Heart:    Regular rhythm and normal rate, normal S1 and S2, no            murmur, no gallop, no rub, no click   Chest Wall:    No abnormalities observed   Abdomen:     Normal bowel sounds, no masses, no organomegaly, soft        non-tender, non-distended, no guarding, no rebound                tenderness   Rectal:     " Deferred   Extremities:   Moves all extremities well, no edema, no cyanosis, no             redness   Pulses:   Pulses palpable and equal bilaterally   Skin:   No bleeding, bruising or rash   Lymph nodes:   No palpable adenopathy   Neurologic:   Cranial nerves 2 - 12 grossly intact, sensation intact, DTR       present and equal bilaterally        Results Review:     I reviewed the patient's new clinical results.    Telemetry: Average heart rate of 90 beats a minute.    LAB DATA :     Results from last 7 days   Lab Units 09/03/19  1353   CHOLESTEROL mg/dL 119   TRIGLYCERIDES mg/dL 80   HDL CHOL mg/dL 36*   LDL CHOL mg/dL 67       Laboratory results:    Results from last 7 days   Lab Units 09/03/19  0355 09/02/19  1304   SODIUM mmol/L 142 142   POTASSIUM mmol/L 4.0 4.1   CHLORIDE mmol/L 105 103   CO2 mmol/L 24.0 23.4   BUN mg/dL 14 21   CREATININE mg/dL 1.00 1.08   CALCIUM mg/dL 8.8 9.7   BILIRUBIN mg/dL 1.2 0.9   ALK PHOS U/L 96 131*   ALT (SGPT) U/L 15 21   AST (SGOT) U/L 20 27   GLUCOSE mg/dL 161* 148*     Results from last 7 days   Lab Units 09/03/19  0355 09/02/19  1304   WBC 10*3/mm3 12.91* 11.19*   HEMOGLOBIN g/dL 14.2 15.6   HEMATOCRIT % 43.6 48.4   PLATELETS 10*3/mm3 189 252     Results from last 7 days   Lab Units 09/03/19  0955 09/02/19  1305   INR  1.19* 1.27*     Results from last 7 days   Lab Units 09/02/19  1437   URINECX  >100,000 CFU/mL Escherichia coli*         Results from last 7 days   Lab Units 09/03/19  0355   TROPONIN T ng/mL 0.263*                 No results found for: HGBA1C  Results from last 7 days   Lab Units 09/03/19  1353   TSH uIU/mL 0.691           IMAGING DATA:     Xr Chest 1 View    Result Date: 9/2/2019  Narrative: PROCEDURE: XR CHEST 1 VW-  HISTORY: Dysrhythmia Triage Protocol  COMPARISON: None.  FINDINGS: The heart is normal in size. The mediastinum is unremarkable. The lungs are clear. There is no pneumothorax.  There are no acute osseous abnormalities.      Impression: No acute  cardiopulmonary process.  Continued followup is recommended.  This report was finalized on 9/2/2019 1:42 PM by David Amaral M.D..            ASSESSMENT PLAN:  #1 abnormal cardiac enzymes:  Abnormal troponin suggestive of myocardial injury.  Has a high risk profile for ischemic heart disease.  Would prefer medical management at this time further decisions with respect to ischemia work-up will be as per primary cardiology.  Patient and family are in agreement with this plan.    #2 atrial fibrillation:  Rate controlled looks reasonable.  We will try to switch to long-term beta-blocker therapy his blood pressures this morning has been on the low side hence will gradually uptitrate the same continue beta-blockers along with the same.    #3 CHF:  LV function appears to be borderline with mild aortic stenosis.  Continue medical management for now.    4.  Anticoagulation therapy:  Switch to novel oral anticoagulation therapy from Coumadin.  Has tolerated it well.  Explained the pros and cons of the same.    #5 dementia:    #6 obstructive sleep apnea:  Compliance stressed upon patient not too keen on pursuing the same either.    7.  Deep venous thrombosis.  Has IVC filter the nature of this is not very clear.  This is been there for a long time.  There has been no plans on extraction of the same.  Presently not an acute issue will not pursue this at this time.    #8 sepsis syndrome on therapy at this time.  Appears to be secondary to urinary tract infection.        Atrial flutter (CMS/HCC)    DILAN on CPAP    Hypertension    Hyperlipidemia    Deep vein thrombosis (CMS/HCC)    Cardiomyopathy (CMS/HCC)    Mitral regurgitation    Congestive heart failure (CMS/HCC)    Esophageal reflux    Obesity    Tricuspid regurgitation    Bladder outlet obstruction            Sourav Goodwin MD  09/04/19  8:18 AM

## 2019-09-04 NOTE — PLAN OF CARE
Problem: Fall Risk (Adult)  Goal: Identify Related Risk Factors and Signs and Symptoms  Outcome: Ongoing (interventions implemented as appropriate)      Problem: Patient Care Overview  Goal: Plan of Care Review  Outcome: Ongoing (interventions implemented as appropriate)   09/04/19 0251   Coping/Psychosocial   Plan of Care Reviewed With patient;spouse   Plan of Care Review   Progress no change       Problem: Skin Injury Risk (Adult)  Goal: Identify Related Risk Factors and Signs and Symptoms  Outcome: Ongoing (interventions implemented as appropriate)
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
## 2019-09-05 LAB — TROPONIN T SERPL-MCNC: 0.26 NG/ML (ref 0–0.03)

## 2019-09-05 PROCEDURE — 25010000002 AMIODARONE IN DEXTROSE 5% 150-4.21 MG/100ML-% SOLUTION: Performed by: INTERNAL MEDICINE

## 2019-09-05 PROCEDURE — 25010000002 AMIODARONE IN DEXTROSE 5% 360-4.14 MG/200ML-% SOLUTION: Performed by: INTERNAL MEDICINE

## 2019-09-05 PROCEDURE — 84484 ASSAY OF TROPONIN QUANT: CPT | Performed by: INTERNAL MEDICINE

## 2019-09-05 PROCEDURE — 97116 GAIT TRAINING THERAPY: CPT

## 2019-09-05 PROCEDURE — 99231 SBSQ HOSP IP/OBS SF/LOW 25: CPT | Performed by: INTERNAL MEDICINE

## 2019-09-05 RX ORDER — AMIODARONE HYDROCHLORIDE 200 MG/1
200 TABLET ORAL ONCE
Status: COMPLETED | OUTPATIENT
Start: 2019-09-06 | End: 2019-09-06

## 2019-09-05 RX ORDER — DILTIAZEM HYDROCHLORIDE 120 MG/1
120 CAPSULE, COATED, EXTENDED RELEASE ORAL
Status: DISCONTINUED | OUTPATIENT
Start: 2019-09-05 | End: 2019-09-06

## 2019-09-05 RX ADMIN — AMIODARONE HYDROCHLORIDE 1 MG/MIN: 1.8 INJECTION, SOLUTION INTRAVENOUS at 10:19

## 2019-09-05 RX ADMIN — METOPROLOL TARTRATE 50 MG: 50 TABLET ORAL at 08:33

## 2019-09-05 RX ADMIN — METOPROLOL TARTRATE 50 MG: 50 TABLET ORAL at 20:24

## 2019-09-05 RX ADMIN — BACITRACIN ZINC: 500 OINTMENT TOPICAL at 20:25

## 2019-09-05 RX ADMIN — APIXABAN 5 MG: 5 TABLET, FILM COATED ORAL at 06:41

## 2019-09-05 RX ADMIN — BACITRACIN ZINC: 500 OINTMENT TOPICAL at 08:41

## 2019-09-05 RX ADMIN — APIXABAN 5 MG: 5 TABLET, FILM COATED ORAL at 17:24

## 2019-09-05 RX ADMIN — LEVOFLOXACIN 500 MG: 500 TABLET, FILM COATED ORAL at 17:24

## 2019-09-05 RX ADMIN — CITALOPRAM HYDROBROMIDE 20 MG: 20 TABLET ORAL at 08:33

## 2019-09-05 RX ADMIN — AMIODARONE HYDROCHLORIDE 150 MG: 1.5 INJECTION, SOLUTION INTRAVENOUS at 09:52

## 2019-09-05 RX ADMIN — SODIUM CHLORIDE, PRESERVATIVE FREE 10 ML: 5 INJECTION INTRAVENOUS at 20:37

## 2019-09-05 RX ADMIN — DOCUSATE SODIUM 100 MG: 100 CAPSULE, LIQUID FILLED ORAL at 08:33

## 2019-09-05 RX ADMIN — FINASTERIDE 5 MG: 5 TABLET, FILM COATED ORAL at 08:33

## 2019-09-05 RX ADMIN — TAMSULOSIN HYDROCHLORIDE 0.4 MG: 0.4 CAPSULE ORAL at 08:33

## 2019-09-05 RX ADMIN — CLOPIDOGREL BISULFATE 75 MG: 75 TABLET ORAL at 08:33

## 2019-09-05 RX ADMIN — AMIODARONE HYDROCHLORIDE 0.5 MG/MIN: 1.8 INJECTION, SOLUTION INTRAVENOUS at 16:12

## 2019-09-05 RX ADMIN — ATORVASTATIN CALCIUM 20 MG: 20 TABLET, FILM COATED ORAL at 08:33

## 2019-09-05 RX ADMIN — DILTIAZEM HYDROCHLORIDE 120 MG: 120 CAPSULE, COATED, EXTENDED RELEASE ORAL at 08:33

## 2019-09-05 NOTE — PLAN OF CARE
Problem: Fall Risk (Adult)  Goal: Absence of Fall   09/05/19 7121   Fall Risk (Adult)   Absence of Fall making progress toward outcome       Problem: Skin Injury Risk (Adult)  Goal: Skin Health and Integrity  Outcome: Ongoing (interventions implemented as appropriate)      Problem: Arrhythmia/Dysrhythmia (Symptomatic) (Adult)  Goal: Signs and Symptoms of Listed Potential Problems Will be Absent, Minimized or Managed (Arrhythmia/Dysrhythmia)  Outcome: Ongoing (interventions implemented as appropriate)

## 2019-09-05 NOTE — PROGRESS NOTES
[facility]    Name:  Angela Palencia   Age:  74 y.o.  Sex:  male  :  1945  MRN:  [id number]   Visit Number:  [id number]  Admission Date:  2019  Date Of Service:  19  Primary Care Physician:  Ewelina Kumar MD     LOS: 3 days :  Patient Care Team:  Ewelina Kumar MD as PCP - General  Zack Almanza MD as Consulting Physician (Cardiology):    Chief Complaint:      Patient seen in follow-up for A. fib with RVR and sepsis secondary to UTI    Subjective / Interval History:     Patient was resting comfortably in bedside chair this morning.  Wife was present.  He denied any chest pain, shortness of breath, cough, dysuria, nausea or vomiting.  No fever or chills.  No acute events reported overnight aside from accelerated heart rate.    Review of Systems:     General ROS: Patient denies any fevers, chills or loss of consciousness.  Respiratory ROS: Denies cough or shortness of breath.  Cardiovascular ROS: Denies chest pain or palpitations. No history of exertional chest pain.  Gastrointestinal ROS: Denies nausea and vomiting. Denies any abdominal pain. No diarrhea.  Neurological ROS: Denies any focal weakness. No loss of consciousness. Denies any numbness.  Dermatological ROS: Denies any redness or pruritis.    Vital Signs:    Temp:  [98 °F (36.7 °C)-100.2 °F (37.9 °C)] 98.5 °F (36.9 °C)  Heart Rate:  [] 80  Resp:  [18-20] 18  BP: (100-140)/(62-80) 112/62    Intake and output:    I/O last 3 completed shifts:  In:  [P.O.:858; I.V.:1144]  Out: 1240 [Urine:1240]  I/O this shift:  In: 600 [P.O.:600]  Out: -     Physical Examination:    General Appearance:  Alert and cooperative, not in any acute distress.   Head:  Atraumatic and normocephalic, without obvious abnormality.   Eyes:          PERRLA, EOMI   Neck: Supple, trachea midline   Lungs:    CTA B, nonlabored   Heart:   Irregularly irregular, no murmur   Abdomen:   Normal bowel sounds, no masses,  no organomegaly. Soft, non-tender, non-distended, no guarding, no rebound tenderness.   Extremities: Moves all extremities well, no edema, no cyanosis, no clubbing.   Skin: No bleeding, bruising or rash.   Neurologic: Awake, alert and oriented times 3. Moves all 4 extremities equally.     Laboratory results:    Results from last 7 days   Lab Units 09/04/19  0816 09/03/19  0355 09/02/19  1304   SODIUM mmol/L 138 142 142   POTASSIUM mmol/L 3.5 4.0 4.1   CHLORIDE mmol/L 103 105 103   CO2 mmol/L 24.5 24.0 23.4   BUN mg/dL 17 14 21   CREATININE mg/dL 0.97 1.00 1.08   CALCIUM mg/dL 8.7 8.8 9.7   BILIRUBIN mg/dL 1.1 1.2 0.9   ALK PHOS U/L 89 96 131*   ALT (SGPT) U/L 12 15 21   AST (SGOT) U/L 15 20 27   GLUCOSE mg/dL 118* 161* 148*     Results from last 7 days   Lab Units 09/04/19  0816 09/03/19  0355 09/02/19  1304   WBC 10*3/mm3 11.51* 12.91* 11.19*   HEMOGLOBIN g/dL 13.1 14.2 15.6   HEMATOCRIT % 41.3 43.6 48.4   PLATELETS 10*3/mm3 171 189 252     Results from last 7 days   Lab Units 09/03/19  0955 09/02/19  1305   INR  1.19* 1.27*     Results from last 7 days   Lab Units 09/05/19  0831 09/03/19  0355 09/02/19  2049   TROPONIN T ng/mL 0.257* 0.263* 0.173*     Results from last 7 days   Lab Units 09/02/19  1437   URINECX  >100,000 CFU/mL Escherichia coli*       I have reviewed the patient's laboratory results.    Radiology results:    Imaging Results (last 24 hours)     ** No results found for the last 24 hours. **          I have reviewed the patient's radiology reports.    Medication Review:     I have reviewed the patients active and prn medications.       Atrial flutter (CMS/HCC)    DILAN on CPAP    Hypertension    Hyperlipidemia    Deep vein thrombosis (CMS/HCC)    Cardiomyopathy (CMS/HCC)    Mitral regurgitation    Congestive heart failure (CMS/HCC)    Esophageal reflux    Obesity    Tricuspid regurgitation    Bladder outlet obstruction      Assessment:    1.  A. fib with RVR  2.  Subtherapeutic INR  3.  Sepsis  secondary to UTI, complicated -POA  4.  Elevated troponins, likely NSTEMI type II secondary to #1  5.  DILAN on CPAP  6.  Hypertension/hyperlipidemia  7.  Congestive heart failure not in exacerbation  8.  Obesity    Plan:    Patient was resting comfortably in bedside chair this morning.  Wife was present.  He denied any chest pain, shortness of breath, cough, dysuria, nausea or vomiting.  No fever or chills.  No acute events reported overnight aside from accelerated heart rate.  Patient has had difficult to control A. fib with RVR that was not responding to Cardizem.  Amiodarone has been started by Dr. Goodwin.  Will attempt to transition to p.o. amiodarone.  Will continue with Eliquis.  Continue oral antibiotic.  Likely discharge tomorrow.      Jhonathan Chowdhury DO  09/05/19  1:56 PM    Dictated utilizing Dragon dictation.

## 2019-09-05 NOTE — THERAPY TREATMENT NOTE
Acute Care - Physical Therapy Treatment Note  [facility]     Patient Name: Angela Palencia  : 1945  MRN: [id number]  Today's Date: 2019             Admit Date: 2019    Visit Dx:    ICD-10-CM ICD-9-CM   1. Atrial flutter, unspecified type (CMS/HCC) I48.92 427.32   2. Urinary retention R33.9 788.20   3. Urinary tract infection without hematuria, site unspecified N39.0 599.0   4. Elevated troponin R74.8 790.6     Patient Active Problem List   Diagnosis   • Atrial fibrillation and flutter (CMS/HCC)   • DILAN on CPAP   • Hypertension   • Hyperlipidemia   • Deep vein thrombosis (CMS/HCC)   • Cardiomyopathy (CMS/HCC)   • Mitral regurgitation   • Congestive heart failure (CMS/HCC)   • Esophageal reflux   • Obesity   • Tricuspid regurgitation   • History of dyspnea   • Hiatal hernia   • Atrial flutter (CMS/HCC)   • Bladder outlet obstruction       Therapy Treatment    Rehabilitation Treatment Summary     Row Name 19             Treatment Time/Intention    Discipline  physical therapy assistant  -      Document Type  therapy note (daily note)  -      Mode of Treatment  physical therapy  -RM      Care Plan Review  care plan/treatment goals reviewed  -RM      Care Plan Review, Other Participant(s)  spouse  -RM      Existing Precautions/Restrictions  fall;cardiac  -RM      Recorded by [RM] Jovan Novoa, [facility] 19      Row Name 19             Safety Issues, Functional Mobility    Safety Issues Affecting Function (Mobility)  awareness of need for assistance;impulsivity;positioning of assistive device  -RM      Impairments Affecting Function (Mobility)  strength;endurance/activity tolerance  -RM      Recorded by [RM] Jovan Novoa, [facility] 19      Row Name 19             Transfer Assessment/Treatment    Transfer Assessment/Treatment  stand-sit transfer  -RM      Recorded by [] Jovan Novoa, [facility] 19      Row Name 19              Sit-Stand Transfer    Sit-Stand [place] (Transfers)  contact guard;verbal cues  -RM      Assistive Device (Sit-Stand Transfers)  walker, front-wheeled  -RM      Recorded by [] Jovan Novoa, PTA 09/05/19 1409      Row Name 09/05/19 0934             Stand-Sit Transfer    Stand-Sit [place] (Transfers)  contact guard;verbal cues  -RM      Assistive Device (Stand-Sit Transfers)  walker, front-wheeled  -RM      Recorded by [] Jovan Novoa, PTA 09/05/19 1409      Row Name 09/05/19 0934             Gait/Stairs Assessment/Training    [place] Level (Gait)  contact guard;verbal cues  -RM      Assistive Device (Gait)  walker, front-wheeled  -RM      Distance in Feet (Gait)  92  -RM      Pattern (Gait)  step-through;step-to  -RM      Deviations/Abnormal Patterns (Gait)  base of support, wide;stride length decreased;gait speed decreased  -RM      Bilateral Gait Deviations  forward flexed posture  -RM      Left Sided Gait Deviations  heel strike decreased  -RM      Recorded by [] Jovan Novoa, PTA 09/05/19 1409      Row Name 09/05/19 0934             Positioning and Restraints    Pre-Treatment Position  sitting in chair/recliner  -RM      Post Treatment Position  chair  -RM      In Chair  reclined;call light within reach;encouraged to call for assist;with family/caregiver;notified nsg  -RM      Recorded by [] Jovan Novoa, PTA 09/05/19 1409      Row Name 09/05/19 0934             Pain Scale: Numbers Pre/Post-Treatment    Pain Scale: Numbers, Pretreatment  0/10 - no pain  -RM      Pain Scale: Numbers, Post-Treatment  0/10 - no pain  -RM      Recorded by [] Jovan Novoa, PTA 09/05/19 1409      Row Name                Wound 09/05/19 0800 penis     Wound - Properties Group Date first assessed: 09/05/19 [AT] Time first assessed: 0800 [AT] Location: penis [AT] Primary Wound Type: -- [AT], surgical  Stage, Pressure Injury: other (see comments) [AT], circumcision  Recorded by:  [AT] Giorgio  ANGIE Pozo 09/05/19 1250    Row Name 09/05/19 0934             Outcome Summary/Treatment Plan (PT)    Daily Summary of Progress (PT)  progress toward functional goals is good  -RM      Plan for Continued Treatment (PT)  Cont PT per POC.   -RM      Recorded by [] Jovan Novoa, PTA 09/05/19 1409        User Key  (r) = Recorded By, (t) = Taken By, (c) = Cosigned By    Initials Name Effective Dates Discipline     Jovan Novoa, PTA 03/07/18 -  PT    AT Standish, Nohelia, RN 05/29/18 -  Nurse          Wound 09/05/19 0800 penis  (Active)   Dressing Appearance moist drainage 9/5/2019  8:00 AM   Closure JUAN 9/5/2019  8:00 AM   Dressing Care, Wound dressing changed 9/5/2019  8:00 AM           Physical Therapy Education     Title: PT OT SLP Therapies (In Progress)     Topic: Physical Therapy (In Progress)     Point: Mobility training (Done)     Learning Progress Summary           Patient Acceptance, E,TB, VU by  at 9/4/2019 11:31 AM    Comment:  Role of PT while hospitalized and upon discharge to home                               User Key     Initials Effective Dates Name Provider Type Discipline     04/03/18 -  Nereyda Sol, PT Physical Therapist PT                PT Recommendation and Plan     Outcome Summary/Treatment Plan (PT)  Daily Summary of Progress (PT): progress toward functional goals is good  Plan for Continued Treatment (PT): Cont PT per POC.   Plan of Care Reviewed With: patient, family  Progress: improving  Outcome Summary: Pt tolerates treatment well. Pt requires decreased assistance for mobility this treatment. Pt ambulated 92'with cga with rw . See flowsheet for details.   Outcome Measures     Row Name 09/05/19 0934             How much help from another person do you currently need...    Turning from your back to your side while in flat bed without using bedrails?  3  -RM      Moving from lying on back to sitting on the side of a flat bed without bedrails?  3  -RM      Moving to and from a bed  to a chair (including a wheelchair)?  3  -RM      Standing up from a chair using your arms (e.g., wheelchair, bedside chair)?  3  -RM      Climbing 3-5 steps with a railing?  2  -RM      To walk in hospital room?  3  -RM      AM-PAC 6 Clicks Score (PT)  17  -RM         Functional Assessment    Outcome Measure Options  AM-PAC 6 Clicks Basic Mobility (PT)  -RM        User Key  (r) = Recorded By, (t) = Taken By, (c) = Cosigned By    Initials Name Provider Type    Jovan Watson PTA Physical Therapy Assistant         Time Calculation:   PT Charges     Row Name 09/05/19 1414             Time Calculation    Start Time  0934  -RM      Stop Time  1005  -RM      Time Calculation (min)  31 min  -RM      PT Received On  09/05/19  -RM      PT Goal Re-Cert Due Date  09/14/19  -RM         Time Calculation- PT    Total Timed Code Minutes- PT  31 minute(s)  -RM         Timed Charges    64213 - Gait Training Minutes   31  -RM        User Key  (r) = Recorded By, (t) = Taken By, (c) = Cosigned By    Initials Name Provider Type    Jovan Watson, JAKE Physical Therapy Assistant        Therapy Charges for Today     Code Description Service Date Service Provider Modifiers Qty    79384049407 HC GAIT TRAINING EA 15 MIN 9/5/2019 Jovan Novoa, JAKE GP 2          PT G-Codes  Outcome Measure Options: AM-PAC 6 Clicks Basic Mobility (PT)  AM-PAC 6 Clicks Score (PT): 17    Jovan Novoa PTA  9/5/2019

## 2019-09-05 NOTE — PHARMACY RECOMMENDATION
* * * Possible QT Interval Prolongation * * *    Amiodarone; Citalopram; Levofloxacin    Patient is on two or more QT interval prolonging medications which may have an additive effect.    If long QT interval is a concern, consider discontinuation of one or both agents unless medically necessary and place/keep patient on telemetry.    Thank you.    Apolinar Barnett, Pharm.D.  09/05/19  8:57 AM

## 2019-09-05 NOTE — PROGRESS NOTES
Continued Stay Note   [place]     Patient Name: Angela Palencia  MRN: [id number]  Today's Date: 9/5/2019    Admit Date: 9/2/2019    Discharge Plan     Row Name 09/05/19 1238       Plan    Plan  Home    Patient/Family in Agreement with Plan  yes    Plan Comments F/U with pt re DCP; his wife is at bedside.  Pt states no changes in DCP; he is going home.  Pt denies any needs at this time.  CM will continue to follow.        Discharge Codes    No documentation.       Expected Discharge Date and Time     Expected Discharge Date Expected Discharge Time    Sep 5, 2019             Chata Aaron RN

## 2019-09-05 NOTE — PROGRESS NOTES
"   LOS: 3 days   Patient Care Team:  Ewelina Kumar MD as PCP - General  Zack Almanza MD as Consulting Physician (Cardiology)        Interval History:     Patient feels reasonably well is in a hurry to go home.  Has no symptoms of chest pain or dizziness.    Review of Systems:   Pertinent items are noted in HPI.      OBJECTIVE:    Vital Sign Min/Max for last 24 hours  Temp  Min: 97.6 °F (36.4 °C)  Max: 100.2 °F (37.9 °C)   BP  Min: 100/80  Max: 140/66   Pulse  Min: 74  Max: 120   Resp  Min: 19  Max: 20   SpO2  Min: 94 %  Max: 97 %   Flow (L/min)  Min: 2  Max: 2   No Data Recorded     Flowsheet Rows      First Filed Value   Admission Height  182.9 cm (72\") Documented at 09/02/2019 1254   Admission Weight  116 kg (256 lb 12.8 oz) Documented at 09/02/2019 1254          Physical Exam:     General Appearance:    Alert, cooperative, in no acute distress   Head:    Normocephalic, without obvious abnormality, atraumatic   Eyes:            Lids and lashes normal, conjunctivae and sclerae normal, no   icterus, no pallor, corneas clear, PERRLA   Ears:    Ears appear intact with no abnormalities noted   Throat:   No oral lesions, no thrush, oral mucosa moist   Neck:   No adenopathy, supple, trachea midline, no thyromegaly, no   carotid bruit, no JVD   Back:     No kyphosis present, no scoliosis present, no skin lesions,      erythema or scars, no tenderness to percussion or                   palpation,   range of motion normal   Lungs:     Clear to auscultation,respirations regular, even and                  unlabored    Heart:    Regular rhythm and normal rate, normal S1 and S2, no            murmur, no gallop, no rub, no click   Chest Wall:    No abnormalities observed   Abdomen:     Normal bowel sounds, no masses, no organomegaly, soft        non-tender, non-distended, no guarding, no rebound                tenderness   Rectal:     Deferred   Extremities:   Moves all extremities well, no edema, no cyanosis, no            "  redness   Pulses:   Pulses palpable and equal bilaterally   Skin:   No bleeding, bruising or rash   Lymph nodes:   No palpable adenopathy   Neurologic:   Cranial nerves 2 - 12 grossly intact, sensation intact, DTR       present and equal bilaterally       Telemetry average heart rate of 110 beats a minute.  LAB DATA :     Results from last 7 days   Lab Units 09/03/19  1353   CHOLESTEROL mg/dL 119   TRIGLYCERIDES mg/dL 80   HDL CHOL mg/dL 36*   LDL CHOL mg/dL 67       Laboratory results:    Results from last 7 days   Lab Units 09/04/19  0816 09/03/19  0355 09/02/19  1304   SODIUM mmol/L 138 142 142   POTASSIUM mmol/L 3.5 4.0 4.1   CHLORIDE mmol/L 103 105 103   CO2 mmol/L 24.5 24.0 23.4   BUN mg/dL 17 14 21   CREATININE mg/dL 0.97 1.00 1.08   CALCIUM mg/dL 8.7 8.8 9.7   BILIRUBIN mg/dL 1.1 1.2 0.9   ALK PHOS U/L 89 96 131*   ALT (SGPT) U/L 12 15 21   AST (SGOT) U/L 15 20 27   GLUCOSE mg/dL 118* 161* 148*     Results from last 7 days   Lab Units 09/04/19  0816 09/03/19  0355 09/02/19  1304   WBC 10*3/mm3 11.51* 12.91* 11.19*   HEMOGLOBIN g/dL 13.1 14.2 15.6   HEMATOCRIT % 41.3 43.6 48.4   PLATELETS 10*3/mm3 171 189 252     Results from last 7 days   Lab Units 09/03/19  0955 09/02/19  1305   INR  1.19* 1.27*     Results from last 7 days   Lab Units 09/02/19  1437   URINECX  >100,000 CFU/mL Escherichia coli*         Results from last 7 days   Lab Units 09/03/19  0355   TROPONIN T ng/mL 0.263*                 No results found for: HGBA1C  Results from last 7 days   Lab Units 09/03/19  1353   TSH uIU/mL 0.691           IMAGING DATA:     Xr Chest 1 View    Result Date: 9/2/2019  Narrative: PROCEDURE: XR CHEST 1 VW-  HISTORY: Dysrhythmia Triage Protocol  COMPARISON: None.  FINDINGS: The heart is normal in size. The mediastinum is unremarkable. The lungs are clear. There is no pneumothorax.  There are no acute osseous abnormalities.      Impression: No acute cardiopulmonary process.  Continued followup is recommended.  This  report was finalized on 9/2/2019 1:42 PM by David Amaral M.D..            ASSESSMENT PLAN:    Abnormal cardiac enzymes:  High risk profile for coronary artery disease not very open to the idea of any CAD work-up.  To be followed up by primary cardiology on outpatient basis continue medical treatment.    Atrial fibrillation:  Difficulty with rate control with persistent heart rates in the 120s in spite of beta-blocker and calcium channel blocker combination.  LV function is mildly diminished.  Given the overall scenario would prefer to switch to amiodarone.  Discussed with family who are agreeable with this risks and benefits have been explained.  Will load with amiodarone today.    Anticoagulation therapy:  Switch to NovoLog oral anticoagulant therapy has tolerated it well discontinue the Coumadin for good.    LV dysfunction:  Mild with global hypokinesis seen high probability of this being ischemic in origin.  Medications being titrated as the blood pressure tolerates.    Dementia:  Patient appears to be lucid today talking appropriately.    Obstructive sleep apnea:  Has refused to wear the mask in the past.  This would be helpful on the long run.    Deep venous thrombosis:  History of most probably in the right side.  On anticoagulation therapy has an IVC filter  in situ.  By history does not appear to have any plans of extraction.  Will defer this to primary cardiology.    Sepsis syndrome:  Appears to be originating from urinary tract infection.  Therapy is ongoing.      Atrial flutter (CMS/HCC)    DILAN on CPAP    Hypertension    Hyperlipidemia    Deep vein thrombosis (CMS/HCC)    Cardiomyopathy (CMS/HCC)    Mitral regurgitation    Congestive heart failure (CMS/HCC)    Esophageal reflux    Obesity    Tricuspid regurgitation    Bladder outlet obstruction            Sourav Goodwin MD  09/05/19  8:21 AM

## 2019-09-05 NOTE — PLAN OF CARE
Problem: Patient Care Overview  Goal: Plan of Care Review  Outcome: Ongoing (interventions implemented as appropriate)   09/05/19 1898   Coping/Psychosocial   Plan of Care Reviewed With patient;family   Plan of Care Review   Progress improving   OTHER   Outcome Summary Pt tolerates treatment well. Pt requires decreased assistance for mobility this treatment. Pt ambulated 92'with cga with rw . See flowsheet for details.

## 2019-09-05 NOTE — PLAN OF CARE
Problem: Fall Risk (Adult)  Goal: Identify Related Risk Factors and Signs and Symptoms  Outcome: Ongoing (interventions implemented as appropriate)   09/05/19 0445   Fall Risk (Adult)   Related Risk Factors (Fall Risk) age-related changes;bladder function altered;gait/mobility problems;environment unfamiliar   Signs and Symptoms (Fall Risk) presence of risk factors     Goal: Absence of Fall  Outcome: Ongoing (interventions implemented as appropriate)   09/05/19 0445   Fall Risk (Adult)   Absence of Fall making progress toward outcome       Problem: Patient Care Overview  Goal: Plan of Care Review  Outcome: Ongoing (interventions implemented as appropriate)   09/05/19 0445   Coping/Psychosocial   Plan of Care Reviewed With patient   Plan of Care Review   Progress no change   OTHER   Outcome Summary No acute events this shift. Patient transfered from ICU yesterday. Patient still in atrial flutter with uncontrolled rate on tele. VSS otherwise. Supporitve wife at bedside. Will continue to monitor.        Problem: Cardiac Output Decreased (Adult)  Goal: Effective Tissue Perfusion  Outcome: Ongoing (interventions implemented as appropriate)   09/05/19 0445   Cardiac Output Decreased (Adult)   Effective Tissue Perfusion making progress toward outcome

## 2019-09-06 ENCOUNTER — APPOINTMENT (OUTPATIENT)
Dept: CARDIOLOGY | Facility: HOSPITAL | Age: 74
End: 2019-09-06

## 2019-09-06 VITALS
OXYGEN SATURATION: 99 % | BODY MASS INDEX: 40.14 KG/M2 | HEIGHT: 67 IN | DIASTOLIC BLOOD PRESSURE: 69 MMHG | TEMPERATURE: 98 F | WEIGHT: 255.73 LBS | RESPIRATION RATE: 16 BRPM | SYSTOLIC BLOOD PRESSURE: 125 MMHG | HEART RATE: 82 BPM

## 2019-09-06 PROCEDURE — 92960 CARDIOVERSION ELECTRIC EXT: CPT

## 2019-09-06 PROCEDURE — 25010000002 DIPHENHYDRAMINE PER 50 MG: Performed by: INTERNAL MEDICINE

## 2019-09-06 PROCEDURE — 5A2204Z RESTORATION OF CARDIAC RHYTHM, SINGLE: ICD-10-PCS | Performed by: INTERNAL MEDICINE

## 2019-09-06 PROCEDURE — 99239 HOSP IP/OBS DSCHRG MGMT >30: CPT | Performed by: INTERNAL MEDICINE

## 2019-09-06 PROCEDURE — 93005 ELECTROCARDIOGRAM TRACING: CPT | Performed by: INTERNAL MEDICINE

## 2019-09-06 PROCEDURE — 25010000002 AMIODARONE IN DEXTROSE 5% 360-4.14 MG/200ML-% SOLUTION: Performed by: INTERNAL MEDICINE

## 2019-09-06 PROCEDURE — 25010000002 MIDAZOLAM PER 1 MG: Performed by: INTERNAL MEDICINE

## 2019-09-06 RX ORDER — VALSARTAN 160 MG/1
160 TABLET ORAL
Qty: 30 TABLET | Refills: 1 | Status: SHIPPED | OUTPATIENT
Start: 2019-09-06 | End: 2020-07-15

## 2019-09-06 RX ORDER — VALSARTAN 80 MG/1
160 TABLET ORAL
Status: DISCONTINUED | OUTPATIENT
Start: 2019-09-06 | End: 2019-09-06 | Stop reason: HOSPADM

## 2019-09-06 RX ORDER — DIPHENHYDRAMINE HYDROCHLORIDE 50 MG/ML
INJECTION INTRAMUSCULAR; INTRAVENOUS
Status: COMPLETED | OUTPATIENT
Start: 2019-09-06 | End: 2019-09-06

## 2019-09-06 RX ORDER — SPIRONOLACTONE 25 MG/1
25 TABLET ORAL DAILY
Status: DISCONTINUED | OUTPATIENT
Start: 2019-09-06 | End: 2019-09-06 | Stop reason: HOSPADM

## 2019-09-06 RX ORDER — CLOPIDOGREL BISULFATE 75 MG/1
75 TABLET ORAL DAILY
Qty: 30 TABLET | Refills: 1 | Status: SHIPPED | OUTPATIENT
Start: 2019-09-07 | End: 2019-10-02

## 2019-09-06 RX ORDER — MIDAZOLAM HYDROCHLORIDE 1 MG/ML
INJECTION INTRAMUSCULAR; INTRAVENOUS
Status: COMPLETED | OUTPATIENT
Start: 2019-09-06 | End: 2019-09-06

## 2019-09-06 RX ORDER — METOPROLOL TARTRATE 5 MG/5ML
INJECTION INTRAVENOUS
Status: COMPLETED | OUTPATIENT
Start: 2019-09-06 | End: 2019-09-06

## 2019-09-06 RX ORDER — SPIRONOLACTONE 25 MG/1
25 TABLET ORAL DAILY
Qty: 30 TABLET | Refills: 1 | Status: SHIPPED | OUTPATIENT
Start: 2019-09-06

## 2019-09-06 RX ORDER — AMIODARONE HYDROCHLORIDE 200 MG/1
200 TABLET ORAL
Status: DISCONTINUED | OUTPATIENT
Start: 2019-09-06 | End: 2019-09-06 | Stop reason: HOSPADM

## 2019-09-06 RX ADMIN — DOCUSATE SODIUM 100 MG: 100 CAPSULE, LIQUID FILLED ORAL at 08:41

## 2019-09-06 RX ADMIN — METOPROLOL TARTRATE 50 MG: 50 TABLET ORAL at 08:41

## 2019-09-06 RX ADMIN — ATORVASTATIN CALCIUM 20 MG: 20 TABLET, FILM COATED ORAL at 08:41

## 2019-09-06 RX ADMIN — CLOPIDOGREL BISULFATE 75 MG: 75 TABLET ORAL at 08:41

## 2019-09-06 RX ADMIN — APIXABAN 5 MG: 5 TABLET, FILM COATED ORAL at 06:12

## 2019-09-06 RX ADMIN — BACITRACIN ZINC: 500 OINTMENT TOPICAL at 08:41

## 2019-09-06 RX ADMIN — MIDAZOLAM HYDROCHLORIDE 1 MG: 1 INJECTION, SOLUTION INTRAMUSCULAR; INTRAVENOUS at 12:22

## 2019-09-06 RX ADMIN — AMIODARONE HYDROCHLORIDE 200 MG: 200 TABLET ORAL at 15:00

## 2019-09-06 RX ADMIN — METOPROLOL TARTRATE 5 MG: 1 INJECTION, SOLUTION INTRAVENOUS at 12:26

## 2019-09-06 RX ADMIN — FINASTERIDE 5 MG: 5 TABLET, FILM COATED ORAL at 08:41

## 2019-09-06 RX ADMIN — DILTIAZEM HYDROCHLORIDE 120 MG: 120 CAPSULE, COATED, EXTENDED RELEASE ORAL at 08:41

## 2019-09-06 RX ADMIN — AMIODARONE HYDROCHLORIDE 200 MG: 200 TABLET ORAL at 11:21

## 2019-09-06 RX ADMIN — TAMSULOSIN HYDROCHLORIDE 0.4 MG: 0.4 CAPSULE ORAL at 08:41

## 2019-09-06 RX ADMIN — CITALOPRAM HYDROBROMIDE 20 MG: 20 TABLET ORAL at 08:41

## 2019-09-06 RX ADMIN — MIDAZOLAM HYDROCHLORIDE 1 MG: 1 INJECTION, SOLUTION INTRAMUSCULAR; INTRAVENOUS at 12:24

## 2019-09-06 RX ADMIN — SPIRONOLACTONE 25 MG: 25 TABLET ORAL at 15:00

## 2019-09-06 RX ADMIN — AMIODARONE HYDROCHLORIDE 0.5 MG/MIN: 1.8 INJECTION, SOLUTION INTRAVENOUS at 04:32

## 2019-09-06 RX ADMIN — VALSARTAN 160 MG: 80 TABLET, FILM COATED ORAL at 15:00

## 2019-09-06 RX ADMIN — DIPHENHYDRAMINE HYDROCHLORIDE 25 MG: 50 INJECTION INTRAMUSCULAR; INTRAVENOUS at 12:23

## 2019-09-06 NOTE — PROGRESS NOTES
"   LOS: 4 days   Patient Care Team:  Ewelina Kumar MD as PCP - General  Zack Almanza MD as Consulting Physician (Cardiology)      Interval History:   Patient wants to go home.  He is status post electrical cardioversion has stayed in sinus rhythm.  No new complaints at this time.    Review of Systems:   Pertinent items are noted in HPI.      OBJECTIVE:    Vital Sign Min/Max for last 24 hours  Temp  Min: 98 °F (36.7 °C)  Max: 99.3 °F (37.4 °C)   BP  Min: 115/86  Max: 143/85   Pulse  Min: 67  Max: 134   Resp  Min: 16  Max: 19   SpO2  Min: 92 %  Max: 99 %   No Data Recorded   Weight  Min: 116 kg (255 lb 11.7 oz)  Max: 116 kg (255 lb 11.7 oz)     Flowsheet Rows      First Filed Value   Admission Height  182.9 cm (72\") Documented at 09/02/2019 1254   Admission Weight  116 kg (256 lb 12.8 oz) Documented at 09/02/2019 1254          Physical Exam:     General Appearance:    Alert, cooperative, in no acute distress   Head:    Normocephalic, without obvious abnormality, atraumatic   Eyes:            Lids and lashes normal, conjunctivae and sclerae normal, no   icterus, no pallor, corneas clear, PERRLA   Ears:    Ears appear intact with no abnormalities noted   Throat:   No oral lesions, no thrush, oral mucosa moist   Neck:   No adenopathy, supple, trachea midline, no thyromegaly, no   carotid bruit, no JVD   Back:     No kyphosis present, no scoliosis present, no skin lesions,      erythema or scars, no tenderness to percussion or                   palpation,   range of motion normal   Lungs:     Clear to auscultation,respirations regular, even and                  unlabored    Heart:    Regular rhythm and normal rate, normal S1 and S2, no            murmur, no gallop, no rub, no click   Chest Wall:    No abnormalities observed   Abdomen:     Normal bowel sounds, no masses, no organomegaly, soft        non-tender, non-distended, no guarding, no rebound                tenderness   Rectal:     Deferred   Extremities:   " Moves all extremities well, no edema, no cyanosis, no             redness   Pulses:   Pulses palpable and equal bilaterally   Skin:   No bleeding, bruising or rash   Lymph nodes:   No palpable adenopathy   Neurologic:   Cranial nerves 2 - 12 grossly intact, sensation intact, DTR       present and equal bilaterally        Results Review:     I reviewed the patient's new clinical results.    EKG: Sinus rhythm [place] of 180 ms rate of 80 beats a minute.  Nonspecific ST wave changes inferior Q waves.    LAB DATA :     Results from last 7 days   Lab Units 09/03/19  1353   CHOLESTEROL mg/dL 119   TRIGLYCERIDES mg/dL 80   HDL CHOL mg/dL 36*   LDL CHOL mg/dL 67       Laboratory results:    Results from last 7 days   Lab Units 09/04/19  0816 09/03/19  0355 09/02/19  1304   SODIUM mmol/L 138 142 142   POTASSIUM mmol/L 3.5 4.0 4.1   CHLORIDE mmol/L 103 105 103   CO2 mmol/L 24.5 24.0 23.4   BUN mg/dL 17 14 21   CREATININE mg/dL 0.97 1.00 1.08   CALCIUM mg/dL 8.7 8.8 9.7   BILIRUBIN mg/dL 1.1 1.2 0.9   ALK PHOS U/L 89 96 131*   ALT (SGPT) U/L 12 15 21   AST (SGOT) U/L 15 20 27   GLUCOSE mg/dL 118* 161* 148*     Results from last 7 days   Lab Units 09/04/19  0816 09/03/19  0355 09/02/19  1304   WBC 10*3/mm3 11.51* 12.91* 11.19*   HEMOGLOBIN g/dL 13.1 14.2 15.6   HEMATOCRIT % 41.3 43.6 48.4   PLATELETS 10*3/mm3 171 189 252     Results from last 7 days   Lab Units 09/03/19  0955 09/02/19  1305   INR  1.19* 1.27*     Results from last 7 days   Lab Units 09/02/19  1437   URINECX  >100,000 CFU/mL Escherichia coli*         Results from last 7 days   Lab Units 09/05/19  0831   TROPONIN T ng/mL 0.257*                 No results found for: HGBA1C  Results from last 7 days   Lab Units 09/03/19  1353   TSH uIU/mL 0.691           IMAGING DATA:     Xr Chest 1 View    Result Date: 9/2/2019  Narrative: PROCEDURE: XR CHEST 1 VW-  HISTORY: Dysrhythmia Triage Protocol  COMPARISON: None.  FINDINGS: The heart is normal in size. The mediastinum is  unremarkable. The lungs are clear. There is no pneumothorax.  There are no acute osseous abnormalities.      Impression: No acute cardiopulmonary process.  Continued followup is recommended.  This report was finalized on 9/2/2019 1:42 PM by David Amaral M.D..          ASSESSMENT PLAN:    #1 atrial fibrillation:  Patient continued to have rapid ventricular rate in spite of beta-blocker calcium channel blocker and amiodarone.  Hence has been cardioverted to sinus rhythm with the synchronized shock.  Has stayed in sinus rhythm now.  Given his LV function will discontinue the Cardizem keep him on metoprolol with addition of guideline directed medical therapy for CHF.    2.  Anticoagulations therapy:  Has been switched to novel oral anticoagulation has tolerated it well continue the same discontinue the Coumadin.    #3  Hypertension: Blood pressure seems to be doing well.  Continue present medications will add guideline directed medical therapy in the form of ARB as well as Aldactone to his drug regimen at this time.    #4 coronary artery disease:  Abnormal troponin noted medical therapy for now.  Further work-up will be as per primary cardiology needs to follow-up with them in 2 to 3 weeks time.    5.  LV dysfunction:  Appropriate medical therapy initiated.  To follow-up with primary cardiology.    6.  Sepsis syndrome:  Status post therapy for urinary tract infection has recovered quite well continue the same as per the hospitalist team.    #7 valvular heart disease:  Mild aortic stenosis noted.  Medical therapy for now.    Suggested cardiac medications on discharge:  Eliquis 5 mg twice daily, Plavix 75 daily, metoprolol 50 twice a day, Aldactone 25 mg daily, valsartan 160 mg daily, amiodarone 200 mg daily  Follow-up with primary cardiology over the next 3 to 4 weeks time.    Thank you for letting me take part in the care of Mr. Palencia    Atrial flutter (CMS/[facility])    DILAN on CPAP    Hypertension    Hyperlipidemia     Deep vein thrombosis (CMS/HCC)    Cardiomyopathy (CMS/HCC)    Mitral regurgitation    Congestive heart failure (CMS/HCC)    Esophageal reflux    Obesity    Tricuspid regurgitation    Bladder outlet obstruction            Sourav Goodwin MD  09/06/19  1:46 PM

## 2019-09-06 NOTE — PLAN OF CARE
Problem: Fall Risk (Adult)  Goal: Absence of Fall  Outcome: Ongoing (interventions implemented as appropriate)   09/06/19 0642   Fall Risk (Adult)   Absence of Fall making progress toward outcome       Problem: Patient Care Overview  Goal: Plan of Care Review  Outcome: Ongoing (interventions implemented as appropriate)   09/06/19 0642   Coping/Psychosocial   Plan of Care Reviewed With patient;spouse   Plan of Care Review   Progress no change       Problem: Cardiac Output Decreased (Adult)  Goal: Effective Tissue Perfusion  Outcome: Ongoing (interventions implemented as appropriate)   09/06/19 0642   Cardiac Output Decreased (Adult)   Effective Tissue Perfusion making progress toward outcome       Problem: Skin Injury Risk (Adult)  Goal: Skin Health and Integrity  Outcome: Ongoing (interventions implemented as appropriate)   09/06/19 0642   Skin Injury Risk (Adult)   Skin Health and Integrity making progress toward outcome       Problem: Arrhythmia/Dysrhythmia (Symptomatic) (Adult)  Goal: Signs and Symptoms of Listed Potential Problems Will be Absent, Minimized or Managed (Arrhythmia/Dysrhythmia)  Outcome: Ongoing (interventions implemented as appropriate)   09/06/19 0642   Goal/Outcome Evaluation   Problems Assessed (Arrhythmia/Dysrhythmia) all   Problems Present (Dysrhythmia) situational response

## 2019-09-06 NOTE — SIGNIFICANT NOTE
09/06/19 0905   Rehab Treatment   Discipline physical therapy assistant   Reason Treatment Not Performed other (see comments)  (Pt on cardiac drip this am and per nurse report pt will be cardio verted at noon. Therapy will f/u with pt at a later time. )

## 2019-09-07 ENCOUNTER — READMISSION MANAGEMENT (OUTPATIENT)
Dept: CALL CENTER | Facility: HOSPITAL | Age: 74
End: 2019-09-07

## 2019-09-07 NOTE — OUTREACH NOTE
Prep Survey      Responses   Facility patient discharged from?  [facility]   Is patient eligible?  Yes   Discharge diagnosis  Aflutter, bladder outlet obstruction, DILAN on CPAP, HTN, HLD, DVT, Cardiomyopathy, MR, CHF, GERD, TR, obesity   Does the patient have one of the following disease processes/diagnoses(primary or secondary)?  Other   Does the patient have Home health ordered?  No   Is there a DME ordered?  No   Comments regarding appointments  See AVS   Prep survey completed?  Yes          Carmen Ashley RN

## 2019-09-08 NOTE — DISCHARGE SUMMARY
[facility]      Name:  Angela Palencia   Age:  74 y.o.  Sex:  male  :  1945  MRN:  [id number]   Visit Number:  [id number]    Admission Date:  2019  Date of Discharge:  2019  Primary Care Physician:  Ewelina Kumar MD    Important issues to note:    Patient cardioverted during hospital stay for A. fib with RVR difficult to control.  Patient INR subtherapeutic on admission with Coumadin.  Transitioned to Eliquis at discharge.    Discharge Diagnoses:   UTI, complicated-POA  N STEMI type II    Atrial flutter (CMS/HCC)    DILAN on CPAP    Hypertension    Hyperlipidemia    Deep vein thrombosis (CMS/HCC)    Cardiomyopathy (CMS/HCC)    Mitral regurgitation    Congestive heart failure (CMS/HCC)    Esophageal reflux    Obesity    Tricuspid regurgitation    Bladder outlet obstruction      Presenting Problem:    Atrial flutter, unspecified type (CMS/HCC) [I48.92]     Consults:     Consults     Date and Time Order Name Status Description    2019 1600 Urology (on-call MD unless specified) Completed         Consulting Physician(s)     Provider Relationship Specialty    Richard Peralta MD Consulting Physician Urology          Procedures Performed:  Cardioversion  Dorsal slit circumcision for catheter placement       History of presenting illness:    Patient is 74-year-old  male with multiple medical problems as listed below in the past medical history.  He presented to the emergency department with 2-week history of dribbling urine and unable to empty his bladder.  In the ER he was complaining of dull constant throbbing pain nonradiating, increases with urgency to urinate.  In the ER urology was consulted as they were unable to pass the Vaca catheter.  He performed bedside dorsal slit circumcision and was able to pass a Vaca catheter.  He does appear to have significant urinary tract infection, he was started on Levaquin.  The Vaca catheter has  been draining fairly well since then, beside all this on initial presentation to the triage area his heart rate was noted noted to be 140 and on the monitor it was noted to be A. fib/flutter.  Patient has known to have this problem has been on Coumadin, was noted to be subtherapeutic on further evaluation.  He did mention that he is not very compliant with medications and off and on Mrs. multiple medications.  He was started on Cardizem drip along with consultation with Dr. Goodwin and transferred to ICU.  He does have increased troponin.  (Per H&P)    Hospital Course:    Patient was initially admitted to the ICU.  In the emergency room, urinary catheter was difficult to place.  Dr. Peralta, urology, consulted from emergency room.  Dorsal slit circumcision performed at bedside and catheter placed.  No complications.  UTI was determined based on urinalysis.  Patient was started on Levaquin.  Urine culture was pansensitive E. coli.  Patient also presented to be in A. fib with RVR, rate in the 140s.  He was not started on IV Cardizem drip with failed transition to p.o. Cardizem.  He continued to have A. fib with RVR throughout hospital stay.  Dr. Goodwin had been consulted from admission and started amiodarone drip.  Unfortunately patient was still with accelerated heart rate greater than 110.  Decision was made to cardiovert.  Patient return to normal sinus rhythm without complication.  Patient is a poor historian and noncompliant with his home medications.  His INR was subtherapeutic on admission.  Decision was made to discontinue warfarin and start Eliquis on discharge.    Physical Exam:    General Appearance:  Alert and cooperative, not in any acute distress.   Head:  Atraumatic and normocephalic, without obvious abnormality.   Eyes:          PERRLA, conjunctivae and sclerae normal, no Icterus. No pallor. Extraocular movements are within normal limits.   Ears:  Ears appear intact with no abnormalities noted.   Throat: No  oral lesions, no thrush, oral mucosa moist.   Neck: Supple, trachea midline, no thyromegaly, no carotid bruit.   Back:   No kyphoscoliosis present. No tenderness to palpation,   range of motion normal.   Lungs:   Chest shape is normal. Breath sounds heard bilaterally equally.  No crackles or wheezing. No Pleural rub or bronchial breathing.   Heart:  Normal S1 and S2, no murmur, no gallop, no rub. No JVD.   Abdomen:   Normal bowel sounds, no masses, no organomegaly. Soft, non-tender, non-distended, no guarding, no rebound tenderness.   Extremities: Moves all extremities well, no edema, no cyanosis, no clubbing.   Pulses: Pulses palpable and equal bilaterally.   Skin: No bleeding, bruising or rash.   Lymph nodes: No palpable adenopathy.   Neurologic: Alert and oriented x 3. Moves all four limbs equally. No tremors. No facial asymetry.     Pertinent Lab Results:     Results from last 7 days   Lab Units 09/04/19  0816 09/03/19  0355 09/02/19  1304   SODIUM mmol/L 138 142 142   POTASSIUM mmol/L 3.5 4.0 4.1   CHLORIDE mmol/L 103 105 103   CO2 mmol/L 24.5 24.0 23.4   BUN mg/dL 17 14 21   CREATININE mg/dL 0.97 1.00 1.08   CALCIUM mg/dL 8.7 8.8 9.7   BILIRUBIN mg/dL 1.1 1.2 0.9   ALK PHOS U/L 89 96 131*   ALT (SGPT) U/L 12 15 21   AST (SGOT) U/L 15 20 27   GLUCOSE mg/dL 118* 161* 148*     Results from last 7 days   Lab Units 09/04/19  0816 09/03/19  0355 09/02/19  1304   WBC 10*3/mm3 11.51* 12.91* 11.19*   HEMOGLOBIN g/dL 13.1 14.2 15.6   HEMATOCRIT % 41.3 43.6 48.4   PLATELETS 10*3/mm3 171 189 252     Results from last 7 days   Lab Units 09/03/19  0955 09/02/19  1305   INR  1.19* 1.27*     Results from last 7 days   Lab Units 09/05/19  0831 09/03/19  0355 09/02/19  2049   TROPONIN T ng/mL 0.257* 0.263* 0.173*                     Results from last 7 days   Lab Units 09/02/19  1437   COLOR UA  Yellow   GLUCOSE UA  Negative   KETONES UA  Negative   LEUKOCYTES UA  Large (3+)*   PH, URINE  5.5   BILIRUBIN UA  Negative    UROBILINOGEN UA  0.2 E.U./dL     Pain Management Panel     There is no flowsheet data to display.        Results from last 7 days   Lab Units 09/02/19  1437   URINECX  >100,000 CFU/mL Escherichia coli*       Pertinent Radiology Results:    Imaging Results (all)     Procedure Component Value Units Date/Time    XR Chest 1 View [[id number]] Collected:  09/02/19 1342     Updated:  09/02/19 1344    Narrative:       PROCEDURE: XR CHEST 1 VW-     HISTORY: Dysrhythmia Triage Protocol     COMPARISON: None.     FINDINGS: The heart is normal in size. The mediastinum is unremarkable.  The lungs are clear. There is no pneumothorax.  There are no acute  osseous abnormalities.       Impression:       No acute cardiopulmonary process.     Continued followup is recommended.     This report was finalized on 9/2/2019 1:42 PM by David Amaral M.D..          Condition on Discharge:      Stable.    Code status during the hospital stay:    Code Status and Medical Interventions:   Ordered at: 09/02/19 2018     Level Of Support Discussed With:    Patient     Code Status:    CPR     Medical Interventions (Level of Support Prior to Arrest):    Full       Discharge Disposition:    Home or Self Care    Discharge Medications:       Discharge Medications      New Medications      Instructions Start Date   apixaban 5 MG tablet tablet  Commonly known as:  ELIQUIS   5 mg, Oral, Every 12 Hours      clopidogrel 75 MG tablet  Commonly known as:  PLAVIX   75 mg, Oral, Daily      spironolactone 25 MG tablet  Commonly known as:  ALDACTONE   25 mg, Oral, Daily      valsartan 160 MG tablet  Commonly known as:  DIOVAN   160 mg, Oral, Every 24 Hours Scheduled         Continue These Medications      Instructions Start Date   aspirin 81 MG EC tablet   81 mg, Oral, Daily      atorvastatin 20 MG tablet  Commonly known as:  LIPITOR   20 mg, Oral, Daily      citalopram 20 MG tablet  Commonly known as:  CeleXA   20 mg, Oral, Daily      cyclobenzaprine 10 MG  tablet  Commonly known as:  FLEXERIL   10 mg, Oral, Nightly      finasteride 5 MG tablet  Commonly known as:  PROSCAR   5 mg, Daily      fluticasone 27.5 MCG/SPRAY nasal spray  Commonly known as:  VERAMYST   2 sprays, Nasal, As Needed      furosemide 40 MG tablet  Commonly known as:  LASIX   40 mg, Oral, 2 Times Daily PRN      hydrochlorothiazide 12.5 MG capsule  Commonly known as:  MICROZIDE   12.5 mg, Oral, Daily      metoprolol tartrate 25 MG tablet  Commonly known as:  LOPRESSOR   25 mg, Oral, 2 Times Daily      potassium chloride 10 MEQ CR tablet  Commonly known as:  K-DUR   10 mEq, Oral, Daily      tamsulosin 0.4 MG capsule 24 hr capsule  Commonly known as:  FLOMAX   1 capsule, Oral, Daily         Stop These Medications    NIFEdipine XL 90 MG 24 hr tablet  Commonly known as:  PROCARDIA XL     warfarin 5 MG tablet  Commonly known as:  COUMADIN            Discharge Diet:     Cardiac    Activity at Discharge:     As tolerated    Follow-up Appointments:    Follow-up Information     Richard Peralta MD Follow up on 9/23/2019.    Specialty:  Urology  Why:  @10:20am  Contact information:  [address] [address] [phone]             Ewelina Kumar MD Follow up on 9/11/2019.    Specialty:  Internal Medicine  Why:  @3:15pm  Contact information:  [address] [place], [place] [address] [phone]                   Future Appointments   Date Time Provider Department Center   9/23/2019 10:20 AM Richard Peralta MD [facility] None           Test Results Pending at Discharge:    None       Jhonathan Chowdhury DO  09/08/19  7:23 AM    Time spent: Greater than 30 minutes    Dictated utilizing Dragon dictation.

## 2019-09-09 ENCOUNTER — READMISSION MANAGEMENT (OUTPATIENT)
Dept: CALL CENTER | Facility: HOSPITAL | Age: 74
End: 2019-09-09

## 2019-09-09 NOTE — OUTREACH NOTE
Medical Week 1 Survey      Responses   Facility patient discharged from?  [facility]   Does the patient have one of the following disease processes/diagnoses(primary or secondary)?  Other   Is there a successful TCM telephone encounter documented?  No   Week 1 attempt successful?  No   Unsuccessful attempts  Attempt 1          Katie Aaron RN

## 2019-09-10 ENCOUNTER — READMISSION MANAGEMENT (OUTPATIENT)
Dept: CALL CENTER | Facility: HOSPITAL | Age: 74
End: 2019-09-10

## 2019-09-10 NOTE — OUTREACH NOTE
Medical Week 1 Survey      Responses   Facility patient discharged from?  [facility]   Does the patient have one of the following disease processes/diagnoses(primary or secondary)?  Other   Is there a successful TCM telephone encounter documented?  No   Week 1 attempt successful?  No   Unsuccessful attempts  Attempt 2          Jocelyn Ramirez RN

## 2019-09-11 ENCOUNTER — OFFICE VISIT (OUTPATIENT)
Dept: PRIMARY CARE CLINIC | Age: 74
End: 2019-09-11
Payer: MEDICARE

## 2019-09-11 VITALS
WEIGHT: 255 LBS | RESPIRATION RATE: 18 BRPM | DIASTOLIC BLOOD PRESSURE: 74 MMHG | SYSTOLIC BLOOD PRESSURE: 132 MMHG | BODY MASS INDEX: 34.58 KG/M2 | HEART RATE: 64 BPM | OXYGEN SATURATION: 98 %

## 2019-09-11 DIAGNOSIS — N32.0 BLADDER OUTLET OBSTRUCTION: ICD-10-CM

## 2019-09-11 DIAGNOSIS — I10 ESSENTIAL HYPERTENSION: ICD-10-CM

## 2019-09-11 DIAGNOSIS — I48.91 ATRIAL FIBRILLATION, UNSPECIFIED TYPE (HCC): Primary | ICD-10-CM

## 2019-09-11 PROCEDURE — G8417 CALC BMI ABV UP PARAM F/U: HCPCS | Performed by: INTERNAL MEDICINE

## 2019-09-11 PROCEDURE — 1123F ACP DISCUSS/DSCN MKR DOCD: CPT | Performed by: INTERNAL MEDICINE

## 2019-09-11 PROCEDURE — 99213 OFFICE O/P EST LOW 20 MIN: CPT | Performed by: INTERNAL MEDICINE

## 2019-09-11 PROCEDURE — 1036F TOBACCO NON-USER: CPT | Performed by: INTERNAL MEDICINE

## 2019-09-11 PROCEDURE — 4040F PNEUMOC VAC/ADMIN/RCVD: CPT | Performed by: INTERNAL MEDICINE

## 2019-09-11 PROCEDURE — G8598 ASA/ANTIPLAT THER USED: HCPCS | Performed by: INTERNAL MEDICINE

## 2019-09-11 PROCEDURE — G8427 DOCREV CUR MEDS BY ELIG CLIN: HCPCS | Performed by: INTERNAL MEDICINE

## 2019-09-11 RX ORDER — VALSARTAN 160 MG/1
160 TABLET ORAL DAILY
COMMUNITY
Start: 2019-09-06 | End: 2019-11-13 | Stop reason: SDUPTHER

## 2019-09-11 RX ORDER — SPIRONOLACTONE 25 MG/1
25 TABLET ORAL DAILY
COMMUNITY
Start: 2019-09-06 | End: 2019-11-13 | Stop reason: SDUPTHER

## 2019-09-11 RX ORDER — CLOPIDOGREL BISULFATE 75 MG/1
75 TABLET ORAL DAILY
COMMUNITY
Start: 2019-09-07 | End: 2019-10-02

## 2019-09-11 ASSESSMENT — ENCOUNTER SYMPTOMS
ABDOMINAL PAIN: 0
SHORTNESS OF BREATH: 0
BACK PAIN: 1
SINUS PRESSURE: 0
COUGH: 0
VOMITING: 0
NAUSEA: 0
WHEEZING: 0
EYE DISCHARGE: 0

## 2019-09-16 ENCOUNTER — READMISSION MANAGEMENT (OUTPATIENT)
Dept: CALL CENTER | Facility: HOSPITAL | Age: 74
End: 2019-09-16

## 2019-09-16 NOTE — OUTREACH NOTE
Medical Week 2 Survey      Responses   Facility patient discharged from?  [facility]   Does the patient have one of the following disease processes/diagnoses(primary or secondary)?  Other   Week 2 attempt successful?  No   Unsuccessful attempts  Attempt 1          Katie Aaron RN

## 2019-09-17 ENCOUNTER — READMISSION MANAGEMENT (OUTPATIENT)
Dept: CALL CENTER | Facility: HOSPITAL | Age: 74
End: 2019-09-17

## 2019-09-17 NOTE — OUTREACH NOTE
Medical Week 2 Survey      Responses   Facility patient discharged from?  [facility]   Does the patient have one of the following disease processes/diagnoses(primary or secondary)?  Other   Week 2 attempt successful?  No   Unsuccessful attempts  Attempt 2          Mojgan Kinney RN

## 2019-09-19 ENCOUNTER — TELEPHONE (OUTPATIENT)
Dept: PRIMARY CARE CLINIC | Age: 74
End: 2019-09-19

## 2019-09-19 DIAGNOSIS — R09.02 HYPOXIA: Primary | ICD-10-CM

## 2019-09-19 NOTE — TELEPHONE ENCOUNTER
Patient came into the office to leave you a note, \"Please call [facility] [phone] I don't need the oxygen anymore & you have to release it. \" But the patient did say if you really wanted him to use it to call and let him know.

## 2019-09-23 ENCOUNTER — OFFICE VISIT (OUTPATIENT)
Dept: UROLOGY | Facility: CLINIC | Age: 74
End: 2019-09-23

## 2019-09-23 VITALS
HEART RATE: 138 BPM | SYSTOLIC BLOOD PRESSURE: 130 MMHG | BODY MASS INDEX: 40.02 KG/M2 | OXYGEN SATURATION: 96 % | HEIGHT: 67 IN | RESPIRATION RATE: 18 BRPM | WEIGHT: 255 LBS | DIASTOLIC BLOOD PRESSURE: 85 MMHG

## 2019-09-23 DIAGNOSIS — R39.9 LOWER URINARY TRACT SYMPTOMS: ICD-10-CM

## 2019-09-23 PROCEDURE — 51798 US URINE CAPACITY MEASURE: CPT | Performed by: UROLOGY

## 2019-09-23 PROCEDURE — 99213 OFFICE O/P EST LOW 20 MIN: CPT | Performed by: UROLOGY

## 2019-09-23 NOTE — PROGRESS NOTES
Chief Complaint  Phimosis/urinary retention    HPI  Mr. Palencia is a 74 y.o. male with history of atrial fibrillation, cardiomyopathy, CHF, DVT, hypertension, mitral regurgitation, DILAN, poor historian who presents for follow-up after emergent dorsal slit due to severe phimosis and urinary retention in the ER.    He presents today for follow-up.  He states it is much easier to urinate, though he still does sit down to urinate.  He is overall happy with his dorsal slit, is not sexually active, and does not desire further surgery.    Past Medical History  Past Medical History:   Diagnosis Date   • Atrial fibrillation (CMS/HCC)    • Atrial flutter (CMS/HCC)    • Cardiomyopathy (CMS/HCC)    • Congestive heart failure (CMS/HCC)    • DVT (deep venous thrombosis) (CMS/HCC)    • Edema    • Esophageal reflux    • History of hiatal hernia    • History of mitral valve insufficiency    • History of MRSA infection    • Hypertension    • Impaired functional mobility, balance, gait, and endurance 09/04/2019   • Mitral regurgitation    • Obesity    • Obstructive sleep apnea    • Traumatic fracture of femur with routine healing 2015   • Tricuspid regurgitation        Past Surgical History  Past Surgical History:   Procedure Laterality Date   • ANKLE SURGERY     • BACK SURGERY     • CHOLECYSTECTOMY     • FEMUR SURGERY Left    • KNEE SURGERY      Dr. Lundy    • NOSE SURGERY     • OTHER SURGICAL HISTORY  1974    ear   • TOTAL HIP ARTHROPLASTY Left      [facility] Dr. Juan Franklin       Medications    Current Outpatient Medications:   •  apixaban (ELIQUIS) 5 MG tablet tablet, Take 1 tablet by mouth Every 12 (Twelve) Hours., Disp: 60 tablet, Rfl: 1  •  aspirin 81 MG EC tablet, Take 81 mg by mouth Daily., Disp: , Rfl:   •  atorvastatin (LIPITOR) 20 MG tablet, Take 20 mg by mouth Daily., Disp: , Rfl:   •  citalopram (CeleXA) 20 MG tablet, Take 20 mg by mouth Daily., Disp: , Rfl:   •  clopidogrel (PLAVIX) 75 MG tablet, Take 1  tablet by mouth Daily., Disp: 30 tablet, Rfl: 1  •  cyclobenzaprine (FLEXERIL) 10 MG tablet, Take 10 mg by mouth Every Night., Disp: , Rfl:   •  finasteride (PROSCAR) 5 MG tablet, 5 mg Daily., Disp: , Rfl: 3  •  fluticasone (VERAMYST) 27.5 MCG/SPRAY nasal spray, 2 sprays into the nostril(s) as directed by provider As Needed for Rhinitis., Disp: , Rfl:   •  furosemide (LASIX) 40 MG tablet, Take 40 mg by mouth 2 (Two) Times a Day As Needed., Disp: , Rfl:   •  hydrochlorothiazide (MICROZIDE) 12.5 MG capsule, Take 12.5 mg by mouth Daily., Disp: , Rfl:   •  metoprolol tartrate (LOPRESSOR) 25 MG tablet, Take 25 mg by mouth 2 (Two) Times a Day., Disp: , Rfl:   •  potassium chloride (K-DUR) 10 MEQ CR tablet, Take 10 mEq by mouth Daily., Disp: , Rfl:   •  spironolactone (ALDACTONE) 25 MG tablet, Take 1 tablet by mouth Daily., Disp: 30 tablet, Rfl: 1  •  tamsulosin (FLOMAX) 0.4 MG capsule 24 hr capsule, Take 1 capsule by mouth Daily., Disp: , Rfl:   •  valsartan (DIOVAN) 160 MG tablet, Take 1 tablet by mouth Daily., Disp: 30 tablet, Rfl: 1    Allergies  Allergies   Allergen Reactions   • Codeine Unknown (See Comments)     Pt/family can't recall     • Keflex [Cephalexin] Unknown (See Comments)     Pt/family unsure     • Morphine And Related Unknown (See Comments)     Pt/family can't recall   • Penicillins Unknown (See Comments)     Pt/family can't recall.       Social History  Social History     Socioeconomic History   • Marital status:      Spouse name: Not on file   • Number of children: Not on file   • Years of education: Not on file   • Highest education level: Not on file   Tobacco Use   • Smoking status: Former Smoker   • Smokeless tobacco: Never Used   Substance and Sexual Activity   • Alcohol use: No   • Drug use: No   • Sexual activity: Defer       Family History  Family History   Problem Relation Age of Onset   • Heart attack Mother    • Heart attack Sister 50       Review of Systems  Constitutional: No fevers  "or chills  Skin: Negative for rash  Endocrine: No heat/cold intolerance   Cardiovascular: Negative for chest pain or dyspnea on exertion  Respiratory: Negative for shortness of breath or wheezing  Gastrointestinal: No constipation, nausea or vomiting  Genitourinary: Negative for new lower urinary tract symptoms, current gross hematuria or dysuria.  Musculoskeletal: No flank pain  Neurological:  Negative for frequent headaches or dizziness  Lymph/Heme: Negative for leg swelling or calf pain.    Physical Exam  Visit Vitals  /85   Pulse (!) 138   Resp 18   Ht 170.2 cm (67.01\")   Wt 116 kg (255 lb)   SpO2 96%   BMI 39.93 kg/m²     Constitutional: NAD, WDWN.   HEENT: NCAT. Conjunctivae normal.  MMM.    Cardiovascular: Regular rate.  Pulmonary/Chest: Respirations are even and non-labored bilaterally.  Abdominal: Soft. No distension, tenderness, masses or guarding. No CVA tenderness.  Neurological: A + O x 3.  Cranial Nerves II-XII grossly intact. Normal gait.  Extremities: CLARISSA x 4, Warm. No clubbing.  No cyanosis.    Skin: Pink, warm and dry.  No rashes noted.  Psychiatric:  Normal mood and affect  Genitourinary  Penis: Healing dorsal slit incision.  No penile masses.  Some whitish discoloration along inside of foreskin, possibly consistent with lichen sclerosis.  Testes: descended bilaterally, no masses, nontender to palpation. Remainder of scrotal contents normal. No hernia appreciated.      Labs  No results found for: PSA    Lab Results   Component Value Date    GLUCOSE 118 (H) 09/04/2019    CALCIUM 8.7 09/04/2019     09/04/2019    K 3.5 09/04/2019    CO2 24.5 09/04/2019     09/04/2019    BUN 17 09/04/2019    CREATININE 0.97 09/04/2019    EGFRIFNONA 75 09/04/2019    BCR 17.5 09/04/2019    ANIONGAP 10.5 09/04/2019       Lab Results   Component Value Date    WBC 11.51 (H) 09/04/2019    HGB 13.1 09/04/2019    HCT 41.3 09/04/2019    MCV 87.5 09/04/2019     09/04/2019           Radiologic " Studies  Xr Chest 1 View    Result Date: 9/2/2019  Impression: No acute cardiopulmonary process.  Continued followup is recommended.  This report was finalized on 9/2/2019 1:42 PM by David Amaral M.D..    PVR  Post-void residual performed with ultrasound scanner by staff and interpreted by me - 37  This was performed to make sure that he is still emptying his bladder well.      Assessment  Mr. Palencia is a 74 y.o. male who presents with episode of urinary retention and phimosis, requiring dorsal slit in ER during last hospitalization.  He has an extensive cardiac history as above and is currently on Eliquis.  He is urinating much better after his dorsal slit incision, is happy with it at this time, and does not desire any further surgical procedures.    Plan  1.  Follow-up as needed      Richard Peralta MD

## 2019-10-01 ENCOUNTER — HOSPITAL ENCOUNTER (OUTPATIENT)
Facility: HOSPITAL | Age: 74
Discharge: HOME | End: 2019-10-01
Payer: MEDICARE

## 2019-10-01 DIAGNOSIS — I48.91 ATRIAL FIBRILLATION, UNSPECIFIED TYPE (HCC): ICD-10-CM

## 2019-10-01 PROCEDURE — 36415 COLL VENOUS BLD VENIPUNCTURE: CPT

## 2019-10-02 ENCOUNTER — OFFICE VISIT (OUTPATIENT)
Dept: CARDIOLOGY | Facility: CLINIC | Age: 74
End: 2019-10-02

## 2019-10-02 ENCOUNTER — OFFICE VISIT (OUTPATIENT)
Dept: PRIMARY CARE CLINIC | Age: 74
End: 2019-10-02
Payer: MEDICARE

## 2019-10-02 VITALS
HEART RATE: 59 BPM | DIASTOLIC BLOOD PRESSURE: 68 MMHG | HEIGHT: 72 IN | WEIGHT: 252 LBS | BODY MASS INDEX: 34.13 KG/M2 | SYSTOLIC BLOOD PRESSURE: 126 MMHG | OXYGEN SATURATION: 98 %

## 2019-10-02 VITALS
WEIGHT: 252 LBS | HEIGHT: 72 IN | SYSTOLIC BLOOD PRESSURE: 120 MMHG | DIASTOLIC BLOOD PRESSURE: 65 MMHG | OXYGEN SATURATION: 97 % | BODY MASS INDEX: 34.13 KG/M2 | HEART RATE: 91 BPM

## 2019-10-02 DIAGNOSIS — R41.3 IMPAIRED MEMORY: ICD-10-CM

## 2019-10-02 DIAGNOSIS — I10 ESSENTIAL HYPERTENSION: ICD-10-CM

## 2019-10-02 DIAGNOSIS — E78.2 MIXED HYPERLIPIDEMIA: ICD-10-CM

## 2019-10-02 DIAGNOSIS — I48.0 PAROXYSMAL ATRIAL FIBRILLATION (HCC): Primary | ICD-10-CM

## 2019-10-02 DIAGNOSIS — Z12.5 SCREENING FOR PROSTATE CANCER: ICD-10-CM

## 2019-10-02 DIAGNOSIS — R73.02 GLUCOSE INTOLERANCE (IMPAIRED GLUCOSE TOLERANCE): ICD-10-CM

## 2019-10-02 DIAGNOSIS — Z23 NEED FOR INFLUENZA VACCINATION: ICD-10-CM

## 2019-10-02 DIAGNOSIS — I48.91 ATRIAL FIBRILLATION, UNSPECIFIED TYPE (HCC): Primary | ICD-10-CM

## 2019-10-02 PROCEDURE — 99214 OFFICE O/P EST MOD 30 MIN: CPT | Performed by: INTERNAL MEDICINE

## 2019-10-02 PROCEDURE — G0008 ADMIN INFLUENZA VIRUS VAC: HCPCS | Performed by: INTERNAL MEDICINE

## 2019-10-02 PROCEDURE — 4040F PNEUMOC VAC/ADMIN/RCVD: CPT | Performed by: INTERNAL MEDICINE

## 2019-10-02 PROCEDURE — G8417 CALC BMI ABV UP PARAM F/U: HCPCS | Performed by: INTERNAL MEDICINE

## 2019-10-02 PROCEDURE — 99213 OFFICE O/P EST LOW 20 MIN: CPT | Performed by: INTERNAL MEDICINE

## 2019-10-02 PROCEDURE — G8598 ASA/ANTIPLAT THER USED: HCPCS | Performed by: INTERNAL MEDICINE

## 2019-10-02 PROCEDURE — 93000 ELECTROCARDIOGRAM COMPLETE: CPT | Performed by: INTERNAL MEDICINE

## 2019-10-02 PROCEDURE — 1123F ACP DISCUSS/DSCN MKR DOCD: CPT | Performed by: INTERNAL MEDICINE

## 2019-10-02 PROCEDURE — 90688 IIV4 VACCINE SPLT 0.5 ML IM: CPT | Performed by: INTERNAL MEDICINE

## 2019-10-02 PROCEDURE — G8427 DOCREV CUR MEDS BY ELIG CLIN: HCPCS | Performed by: INTERNAL MEDICINE

## 2019-10-02 PROCEDURE — 1036F TOBACCO NON-USER: CPT | Performed by: INTERNAL MEDICINE

## 2019-10-02 PROCEDURE — G8482 FLU IMMUNIZE ORDER/ADMIN: HCPCS | Performed by: INTERNAL MEDICINE

## 2019-10-02 ASSESSMENT — ENCOUNTER SYMPTOMS
EYE DISCHARGE: 0
ABDOMINAL PAIN: 0
SINUS PRESSURE: 0
WHEEZING: 0
SHORTNESS OF BREATH: 0
NAUSEA: 0
VOMITING: 0
COUGH: 0
BACK PAIN: 1

## 2019-10-02 NOTE — PROGRESS NOTES
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 A-fib/A-flutter  1. s/p ECV x 2, last 9/19 [facility]- ERAF  2. 9/1 9 Echo [facility] EF 45% AV sclerosis  2. DILAN-compliant with CPAP  3. HTN  4. HL  1. 7/15 144/172/29/81  5. Normal coronary arteries per remote C  6. DVt s/p IVC filter  7. 2015 L traumatic femur fx- [facility]    Chief Complaint   Patient presents with   • Atrial Fibrillation       Allergies  Allergies   Allergen Reactions   • Codeine Unknown (See Comments)     Pt/family can't recall     • Keflex [Cephalexin] Unknown (See Comments)     Pt/family unsure     • Morphine And Related Unknown (See Comments)     Pt/family can't recall   • Penicillins Unknown (See Comments)     Pt/family can't recall.       Current Medications    Current Outpatient Medications:   •  apixaban (ELIQUIS) 5 MG tablet tablet, Take 1 tablet by mouth Every 12 (Twelve) Hours., Disp: 60 tablet, Rfl: 1  •  aspirin 81 MG EC tablet, Take 81 mg by mouth Daily., Disp: , Rfl:   •  atorvastatin (LIPITOR) 20 MG tablet, Take 20 mg by mouth Daily., Disp: , Rfl:   •  citalopram (CeleXA) 20 MG tablet, Take 20 mg by mouth Daily., Disp: , Rfl:   •  cyclobenzaprine (FLEXERIL) 10 MG tablet, Take 10 mg by mouth Every Night., Disp: , Rfl:   •  finasteride (PROSCAR) 5 MG tablet, 5 mg Daily., Disp: , Rfl: 3  •  fluticasone (VERAMYST) 27.5 MCG/SPRAY nasal spray, 2 sprays into the nostril(s) as directed by provider As Needed for Rhinitis., Disp: , Rfl:   •  furosemide (LASIX) 40 MG tablet, Take 40 mg by mouth 2 (Two) Times a Day As Needed., Disp: , Rfl:   •  hydrochlorothiazide (MICROZIDE) 12.5 MG capsule, Take 12.5 mg by mouth Daily., Disp: , Rfl:   •  metoprolol tartrate (LOPRESSOR) 25 MG tablet, Take 25 mg by mouth 2 (Two) Times a Day., Disp: , Rfl:   •   "potassium chloride (K-DUR) 10 MEQ CR tablet, Take 10 mEq by mouth Daily., Disp: , Rfl:   •  spironolactone (ALDACTONE) 25 MG tablet, Take 1 tablet by mouth Daily., Disp: 30 tablet, Rfl: 1  •  tamsulosin (FLOMAX) 0.4 MG capsule 24 hr capsule, Take 1 capsule by mouth Daily., Disp: , Rfl:   •  valsartan (DIOVAN) 160 MG tablet, Take 1 tablet by mouth Daily., Disp: 30 tablet, Rfl: 1      History of Present Illness   Angela Palencia is a 74 y.o. year old male here for follow up.  Recently hospitalized [facility] secondary to urosepsis with E. coli noted difficult to control atrial fib flutter.  He was cardioverted 9/6/19 and transition from warfarin to Eliquis.  He cannot state that he can tell when he is in or out of rhythm.  He has been compliant with his medication and his wife arranges medications for him.        OBJECTIVE:  Vitals:    10/02/19 1031   BP: 126/68   BP Location: Right arm   Patient Position: Sitting   Pulse: 59   SpO2: 98%   Weight: 114 kg (252 lb)   Height: 182.9 cm (72\")     Physical Exam   Constitutional: He appears well-developed and well-nourished.   Neck: Normal range of motion. Neck supple. No hepatojugular reflux and no JVD present. Carotid bruit is not present. No tracheal deviation present. No thyromegaly present.   Cardiovascular: Normal rate, S1 normal, S2 normal, intact distal pulses and normal pulses. An irregularly irregular rhythm present. PMI is not displaced. Exam reveals no gallop, no distant heart sounds, no friction rub, no midsystolic click and no opening snap.   No murmur heard.  Pulses:       Radial pulses are 2+ on the right side, and 2+ on the left side.        Dorsalis pedis pulses are 2+ on the right side, and 2+ on the left side.        Posterior tibial pulses are 2+ on the right side, and 2+ on the left side.   Pulmonary/Chest: Effort normal and breath sounds normal. He has no wheezes. He has no rales.   Abdominal: Soft. Bowel sounds are normal. He exhibits no " mass. There is no tenderness. There is no guarding.       Diagnostic Data:    ECG 12 Lead  Date/Time: 10/2/2019 10:48 AM  Performed by: Zack Almanza MD  Authorized by: Zack Almanza MD   Comparison: compared with previous ECG from 9/6/2019  Comparison to previous ECG: Aflutter   Rhythm: atrial flutter  BPM: 100    Clinical impression: abnormal EKG              ASSESSMENT:   Diagnosis Plan   1. Paroxysmal atrial fibrillation (CMS/HCC)     2. Essential hypertension     3. Mixed hyperlipidemia         PLAN:  Atrial fib flutter status post remote cardioversion most recently 9/6/2019 with return to atrial flutter.  This time we will continue rate control anticoagulation.  I discussed with he and his wife that anytime he has systemic stress such as urosepsis he will have tachycardia and treatment of underlying disorder would remain the primary target.    Hypertension controlled tamsulosin valsartan HCTZ    Dyslipidemia on statin therapy    Coronary disease with no obstructive symptoms LVEF 45 to 50% and atrial flutter with recent hospitalization    Polypharmacy he states that he is not sure that he is taking Plavix have asked him to only take low-dose aspirin and Eliquis at this time    Ewelina Kumar MD, thank you for referring Mr. Palencia for evaluation.  I have forwarded my electronically generated recommendations to you for review.  Please do not hesitate to call with any questions.      Zack Almanza MD, FACC

## 2019-10-04 RX ORDER — FUROSEMIDE 40 MG/1
TABLET ORAL
Qty: 180 TABLET | Refills: 1 | Status: SHIPPED | OUTPATIENT
Start: 2019-10-04 | End: 2020-06-30 | Stop reason: ALTCHOICE

## 2019-10-04 RX ORDER — CYCLOBENZAPRINE HCL 10 MG
TABLET ORAL
Qty: 30 TABLET | Refills: 0 | Status: SHIPPED | OUTPATIENT
Start: 2019-10-04 | End: 2020-06-30 | Stop reason: ALTCHOICE

## 2019-10-31 RX ORDER — POTASSIUM CHLORIDE 750 MG/1
TABLET, FILM COATED, EXTENDED RELEASE ORAL
Qty: 90 TABLET | Refills: 0 | Status: SHIPPED | OUTPATIENT
Start: 2019-10-31 | End: 2019-11-13 | Stop reason: SDUPTHER

## 2019-10-31 RX ORDER — HYDROCHLOROTHIAZIDE 12.5 MG/1
CAPSULE, GELATIN COATED ORAL
Qty: 90 CAPSULE | Refills: 3 | Status: SHIPPED | OUTPATIENT
Start: 2019-10-31 | End: 2020-06-30 | Stop reason: SDUPTHER

## 2019-11-13 RX ORDER — VALSARTAN 160 MG/1
160 TABLET ORAL DAILY
Qty: 30 TABLET | Refills: 3 | Status: SHIPPED | OUTPATIENT
Start: 2019-11-13 | End: 2020-02-18

## 2019-11-13 RX ORDER — POTASSIUM CHLORIDE 750 MG/1
TABLET, FILM COATED, EXTENDED RELEASE ORAL
Qty: 90 TABLET | Refills: 1 | Status: SHIPPED | OUTPATIENT
Start: 2019-11-13 | End: 2020-06-29

## 2019-11-13 RX ORDER — SPIRONOLACTONE 25 MG/1
25 TABLET ORAL DAILY
Qty: 30 TABLET | Refills: 3 | Status: SHIPPED | OUTPATIENT
Start: 2019-11-13 | End: 2020-02-18

## 2019-11-19 RX ORDER — CITALOPRAM 20 MG/1
TABLET ORAL
Qty: 90 TABLET | Refills: 3 | Status: ON HOLD | OUTPATIENT
Start: 2019-11-19 | End: 2020-10-27

## 2019-11-19 RX ORDER — ATORVASTATIN CALCIUM 20 MG/1
TABLET, FILM COATED ORAL
Qty: 90 TABLET | Refills: 3 | Status: SHIPPED | OUTPATIENT
Start: 2019-11-19 | End: 2020-11-30

## 2019-12-03 ENCOUNTER — OFFICE VISIT (OUTPATIENT)
Dept: PRIMARY CARE CLINIC | Age: 74
End: 2019-12-03
Payer: MEDICARE

## 2019-12-03 ENCOUNTER — HOSPITAL ENCOUNTER (OUTPATIENT)
Facility: HOSPITAL | Age: 74
Discharge: HOME | End: 2019-12-03
Payer: MEDICARE

## 2019-12-03 VITALS
BODY MASS INDEX: 35.38 KG/M2 | DIASTOLIC BLOOD PRESSURE: 64 MMHG | SYSTOLIC BLOOD PRESSURE: 160 MMHG | WEIGHT: 261.2 LBS | HEART RATE: 91 BPM | RESPIRATION RATE: 20 BRPM | OXYGEN SATURATION: 98 % | HEIGHT: 72 IN

## 2019-12-03 DIAGNOSIS — R73.02 GLUCOSE INTOLERANCE (IMPAIRED GLUCOSE TOLERANCE): ICD-10-CM

## 2019-12-03 DIAGNOSIS — R41.3 IMPAIRED MEMORY: ICD-10-CM

## 2019-12-03 DIAGNOSIS — I48.91 ATRIAL FIBRILLATION, UNSPECIFIED TYPE (HCC): ICD-10-CM

## 2019-12-03 DIAGNOSIS — I10 ESSENTIAL HYPERTENSION: ICD-10-CM

## 2019-12-03 DIAGNOSIS — Z12.5 SCREENING FOR PROSTATE CANCER: ICD-10-CM

## 2019-12-03 PROCEDURE — 4040F PNEUMOC VAC/ADMIN/RCVD: CPT | Performed by: INTERNAL MEDICINE

## 2019-12-03 PROCEDURE — G8417 CALC BMI ABV UP PARAM F/U: HCPCS | Performed by: INTERNAL MEDICINE

## 2019-12-03 PROCEDURE — 99213 OFFICE O/P EST LOW 20 MIN: CPT | Performed by: INTERNAL MEDICINE

## 2019-12-03 PROCEDURE — 1036F TOBACCO NON-USER: CPT | Performed by: INTERNAL MEDICINE

## 2019-12-03 PROCEDURE — 36415 COLL VENOUS BLD VENIPUNCTURE: CPT

## 2019-12-03 PROCEDURE — G8598 ASA/ANTIPLAT THER USED: HCPCS | Performed by: INTERNAL MEDICINE

## 2019-12-03 PROCEDURE — 1123F ACP DISCUSS/DSCN MKR DOCD: CPT | Performed by: INTERNAL MEDICINE

## 2019-12-03 PROCEDURE — G8482 FLU IMMUNIZE ORDER/ADMIN: HCPCS | Performed by: INTERNAL MEDICINE

## 2019-12-03 PROCEDURE — G8427 DOCREV CUR MEDS BY ELIG CLIN: HCPCS | Performed by: INTERNAL MEDICINE

## 2019-12-03 RX ORDER — TRIAMCINOLONE ACETONIDE 1 MG/G
CREAM TOPICAL
Qty: 45 G | Refills: 1 | Status: ON HOLD | OUTPATIENT
Start: 2019-12-03 | End: 2020-10-27

## 2019-12-03 ASSESSMENT — ENCOUNTER SYMPTOMS
COUGH: 0
BACK PAIN: 1
VOMITING: 0
SINUS PRESSURE: 0
WHEEZING: 0
EYE DISCHARGE: 0
SHORTNESS OF BREATH: 0
NAUSEA: 0
ABDOMINAL PAIN: 0

## 2019-12-03 NOTE — PATIENT INSTRUCTIONS
· Keep a list of your medicines with you. List all of the prescription medicines, nonprescription medicines, supplements, natural remedies, and vitamins that you take. Tell your healthcare providers who treat you about all of the products you are taking. Your provider can provide you with a form to keep track of them. Just ask. · Follow the directions that come with your medicine, including information about food or alcohol. Make sure you know how and when to take your medicine. Do not take more or less than you are supposed to take. · Keep all medicines out of the reach of children. · Store medicines according to the directions on the label. · Monitor yourself. Learn to know how your body reacts to your new medicine and keep track of how it makes you feel before attempting (If your provider has allowed you to do so) to drive or go to work. · Seek emergency medical attention if you think you have used too much of this medicine. An overdose of any prescription medicine can be fatal. Overdose symptoms may include extreme drowsiness, muscle weakness, confusion, cold and clammy skin, pinpoint pupils, shallow breathing, slow heart rate, fainting, or coma. · Don't share prescription medicines with others, even when they seem to have the same symptoms. What may be good for you may be harmful to others. · If you are no longer taking a prescribed medication and you have pills left please take your pills out of their original containers. Mix crushed pills with an undesirable substance, such as cat litter or used coffee grounds. Put the mixture into a disposable container with a lid, such as an empty margarine tub, or into a sealable bag. Cover up or remove any of your personal information on the empty containers by covering it with black permanent marker or duct tape. Place the sealed container with the mixture, and the empty drug containers, in the trash.    · If you use a medication that is in the form of a patch, when an how to quit. Here are some techniques:   Switch Brands   Switch to a brand you find distasteful. Change to a brand that is low in tar and nicotine a couple of weeks before your target quit date. This will help change your smoking behavior. However, do not smoke more cigarettes, inhale them more often or more deeply, or place your fingertips over the holes in the filters. All of these actions will increase your nicotine intake, and the idea is to get your body used to functioning without nicotine. Cut Down the Number of Cigarettes You Smoke   Smoke only half of each cigarette. Each day, postpone the lighting of your first cigarette by one hour. Decide you'll only smoke during odd or even hours of the day. Decide beforehand how many cigarettes you'll smoke during the day. For each additional cigarette, give a dollar to your favorite prisca. Change your eating habits to help you cut down. For example, drink milk, which many people consider incompatible with smoking. End meals or snacks with something that won't lead to a cigarette. Reach for a glass of juice instead of a cigarette for a \"pick-me-up. \"   Remember: Cutting down can help you quit, but it's not a substitute for quitting. If you're down to about seven cigarettes a day, it's time to set your target quit date, and get ready to stick to it. Don't Smoke \"Automatically\"   Smoke only those cigarettes you really want. Catch yourself before you light up a cigarette out of pure habit. Don't empty your ashtrays. This will remind you of how many cigarettes you've smoked each day, and the sight and the smell of stale cigarettes butts will be very unpleasant. Make yourself aware of each cigarette by using the opposite hand or putting cigarettes in an unfamiliar location or a different pocket to break the automatic reach.    If you light up many times during the day without even thinking about it, try to look in a mirror each time you put a match to your cigarette. You may decide you don't need it. Make Smoking Inconvenient   Stop buying cigarettes by the carton. Wait until one pack is empty before you buy another. Stop carrying cigarettes with you at home or at work. Make them difficult to get to. Make Smoking Unpleasant   Smoke only under circumstances that aren't especially pleasurable for you. If you like to smoke with others, smoke alone. Turn your chair to an empty corner and focus only on the cigarette you are smoking and all its many negative effects. Collect all your cigarette butts in one large glass container as a visual reminder of the filth made by smoking. Just Before Quitting   Practice going without cigarettes. Don't think of never smoking again. Think of quitting in terms of one day at a time . Tell yourself you won't smoke today, and then don't. Clean your clothes to rid them of the cigarette smell, which can linger a long time. On the Day You Quit   Throw away all your cigarettes and matches. Hide your lighters and ashtrays. Visit the dentist and have your teeth cleaned to get rid of tobacco stains. Notice how nice they look and resolve to keep them that way. Make a list of things you'd like to buy for yourself or someone else. Estimate the cost in terms of packs of cigarettes, and put the money aside to buy these presents. Keep very busy on the big day. Go to the movies, exercise, take long walks, or go bike riding. Remind your family and friends that this is your quit date, and ask them to help you over the rough spots of the first couple of days and weeks. Buy yourself a treat or do something special to celebrate. Telephone and Internet Support   Telephone, web-, and computer-based programs can offer you the support that you need to quit and to stay smoke-free. You can find many programs online, like the [employer] Association's [place] from Smoking .    Immediately After Quitting   Develop a clean, fresh, nonsmoking environment around yourselfat work and at home. Buy yourself flowersyou may be surprised how much you can enjoy their scent now. The first few days after you quit, spend as much free time as possible in places where smoking isn't allowed, such as [address] [place] [address], museums, theaters, department stores, and churches. Drink large quantities of water and fruit juice (but avoid sodas that contain caffeine). Try to avoid alcohol, coffee, and other beverages that you associate with cigarette smoking. Strike up conversation instead of a match for a cigarette. If you miss the sensation of having a cigarette in your hand, play with something elsea pencil, a paper clip, a marble. If you miss having something in your mouth, try toothpicks or a fake cigarette. ·   We are committed to providing you with the best care possible. In order to help us achieve these goals please remember to bring all medications, herbal products, and over the counter supplements with you to each visit. If your provider has ordered testing for you, please be sure to follow up with our office if you have not received results within 7 days after the testing took place. *If you receive a survey after visiting one of our offices, please take time to share your experience concerning your physician office visit. These surveys are confidential and no health information about you is shared. We are eager to improve for you and we are counting on your feedback to help make that happen.

## 2019-12-03 NOTE — PROGRESS NOTES
SWELLING 180 tablet 1    tamsulosin (FLOMAX) 0.4 MG capsule TAKE ONE CAPSULE BY MOUTH ONCE DAILY 30 capsule 4    finasteride (PROSCAR) 5 MG tablet TAKE 1 TABLET BY MOUTH DAILY 30 tablet 3    metoprolol tartrate (LOPRESSOR) 25 MG tablet TAKE ONE TABLET BY MOUTH TWICE A  tablet 5    aspirin 81 MG tablet Take 81 mg by mouth daily. Review of Systems   Constitutional: Negative for chills, fatigue and fever. HENT: Negative for congestion, ear pain and sinus pressure. Eyes: Negative for discharge and visual disturbance. Respiratory: Negative for cough, shortness of breath and wheezing. Cardiovascular: Positive for leg swelling (occasional ). Negative for chest pain and palpitations. JOY   Gastrointestinal: Negative for abdominal pain, nausea and vomiting. Endocrine: Negative for cold intolerance and heat intolerance. Genitourinary: Negative for dysuria, frequency and urgency. Musculoskeletal: Positive for arthralgias and back pain. Skin: Negative for pallor and rash. Allergic/Immunologic: Negative for food allergies and immunocompromised state. Neurological: Negative for dizziness, numbness and headaches. Decreased memory   Hematological: Negative for adenopathy. Does not bruise/bleed easily. Psychiatric/Behavioral: Negative for agitation and sleep disturbance. The patient is not nervous/anxious.         Past Medical History:   Diagnosis Date    Atrial fibrillation ([address]     Cardiomyopathy [facility]     Cataract     GERD (gastroesophageal reflux disease)     History of DVT (deep vein thrombosis)     s/p IVC filter    Hyperlipidemia     Hypertension     LONG TERM ANTICOAGULENT USE     Osteoarthritis     Unspecified sleep apnea      Past Surgical History:   Procedure Laterality Date    ANKLE SURGERY      left    CHOLECYSTECTOMY      EYE SURGERY      left & right cataract    FRACTURE SURGERY Left     femure    HAND SURGERY      left thumb    HIP SURGERY that this condition could possibly progress into diabetes. I will monitor his A1c periodically. Orders Placed This Encounter   Medications    triamcinolone (KENALOG) 0.1 % cream     Sig: Apply topically 2 times daily. Dispense:  45 g     Refill:  1      Written by Kimmy Urbina, acting as a scribe for Dr. Scot Eisenmenger on 12/3/2019 at 3:48 PM.     I, Dr. Chasity Perez, personally performed the services described in the documentation as scribed by Jimmy Esquivel CMA, in my presence and it is both accurate and complete.

## 2019-12-26 RX ORDER — FINASTERIDE 5 MG/1
5 TABLET, FILM COATED ORAL DAILY
Qty: 30 TABLET | Refills: 3 | Status: SHIPPED | OUTPATIENT
Start: 2019-12-26 | End: 2020-03-16

## 2019-12-26 RX ORDER — TAMSULOSIN HYDROCHLORIDE 0.4 MG/1
CAPSULE ORAL
Qty: 30 CAPSULE | Refills: 4 | Status: SHIPPED | OUTPATIENT
Start: 2019-12-26 | End: 2020-04-30

## 2020-03-16 RX ORDER — FINASTERIDE 5 MG/1
5 TABLET, FILM COATED ORAL DAILY
Qty: 30 TABLET | Refills: 3 | Status: SHIPPED | OUTPATIENT
Start: 2020-03-16 | End: 2020-06-30 | Stop reason: SDUPTHER

## 2020-04-27 RX ORDER — TRIAMCINOLONE ACETONIDE 1 MG/G
CREAM TOPICAL
Qty: 45 G | Refills: 1 | OUTPATIENT
Start: 2020-04-27

## 2020-05-29 ENCOUNTER — TELEPHONE (OUTPATIENT)
Dept: PRIMARY CARE CLINIC | Age: 75
End: 2020-05-29

## 2020-05-29 RX ORDER — LOSARTAN POTASSIUM 100 MG/1
100 TABLET ORAL DAILY
Qty: 30 TABLET | Refills: 5 | Status: SHIPPED | OUTPATIENT
Start: 2020-05-29 | End: 2020-09-01

## 2020-06-01 RX ORDER — FINASTERIDE 5 MG/1
5 TABLET, FILM COATED ORAL DAILY
Qty: 30 TABLET | Refills: 3 | OUTPATIENT
Start: 2020-06-01

## 2020-06-29 RX ORDER — POTASSIUM CHLORIDE 750 MG/1
TABLET, FILM COATED, EXTENDED RELEASE ORAL
Qty: 90 TABLET | Refills: 1 | Status: SHIPPED | OUTPATIENT
Start: 2020-06-29 | End: 2020-10-29 | Stop reason: SDUPTHER

## 2020-06-29 NOTE — PROGRESS NOTES
No chief complaint on file.       Have you seen any other physician or provider since your last visit no    Have you had any other diagnostic tests since your last visit? no    Have you changed or stopped any medications since your last visit? no

## 2020-06-30 ENCOUNTER — OFFICE VISIT (OUTPATIENT)
Dept: PRIMARY CARE CLINIC | Age: 75
End: 2020-06-30
Payer: MEDICARE

## 2020-06-30 ENCOUNTER — HOSPITAL ENCOUNTER (OUTPATIENT)
Facility: HOSPITAL | Age: 75
Discharge: HOME | End: 2020-06-30
Payer: MEDICARE

## 2020-06-30 VITALS
DIASTOLIC BLOOD PRESSURE: 64 MMHG | BODY MASS INDEX: 35.56 KG/M2 | SYSTOLIC BLOOD PRESSURE: 126 MMHG | OXYGEN SATURATION: 98 % | HEART RATE: 96 BPM | RESPIRATION RATE: 18 BRPM | WEIGHT: 262.2 LBS | TEMPERATURE: 98 F

## 2020-06-30 PROCEDURE — G8417 CALC BMI ABV UP PARAM F/U: HCPCS | Performed by: INTERNAL MEDICINE

## 2020-06-30 PROCEDURE — 1123F ACP DISCUSS/DSCN MKR DOCD: CPT | Performed by: INTERNAL MEDICINE

## 2020-06-30 PROCEDURE — G8427 DOCREV CUR MEDS BY ELIG CLIN: HCPCS | Performed by: INTERNAL MEDICINE

## 2020-06-30 PROCEDURE — 4040F PNEUMOC VAC/ADMIN/RCVD: CPT | Performed by: INTERNAL MEDICINE

## 2020-06-30 PROCEDURE — 1036F TOBACCO NON-USER: CPT | Performed by: INTERNAL MEDICINE

## 2020-06-30 PROCEDURE — 99213 OFFICE O/P EST LOW 20 MIN: CPT | Performed by: INTERNAL MEDICINE

## 2020-06-30 PROCEDURE — 36415 COLL VENOUS BLD VENIPUNCTURE: CPT

## 2020-06-30 RX ORDER — FINASTERIDE 5 MG/1
5 TABLET, FILM COATED ORAL DAILY
Qty: 90 TABLET | Refills: 3 | Status: SHIPPED | OUTPATIENT
Start: 2020-06-30 | End: 2020-10-29 | Stop reason: SDUPTHER

## 2020-06-30 RX ORDER — HYDROCHLOROTHIAZIDE 12.5 MG/1
CAPSULE, GELATIN COATED ORAL
Qty: 90 CAPSULE | Refills: 3 | Status: SHIPPED | OUTPATIENT
Start: 2020-06-30 | End: 2020-09-28

## 2020-06-30 RX ORDER — TAMSULOSIN HYDROCHLORIDE 0.4 MG/1
CAPSULE ORAL
Qty: 90 CAPSULE | Refills: 3 | Status: SHIPPED | OUTPATIENT
Start: 2020-06-30 | End: 2021-01-01

## 2020-06-30 ASSESSMENT — ENCOUNTER SYMPTOMS
BACK PAIN: 1
COUGH: 0
WHEEZING: 0
SHORTNESS OF BREATH: 0
SINUS PRESSURE: 0
ABDOMINAL PAIN: 0
NAUSEA: 0
EYE DISCHARGE: 0
VOMITING: 0

## 2020-06-30 ASSESSMENT — PATIENT HEALTH QUESTIONNAIRE - PHQ9
1. LITTLE INTEREST OR PLEASURE IN DOING THINGS: 0
2. FEELING DOWN, DEPRESSED OR HOPELESS: 0
SUM OF ALL RESPONSES TO PHQ QUESTIONS 1-9: 0
SUM OF ALL RESPONSES TO PHQ9 QUESTIONS 1 & 2: 0
SUM OF ALL RESPONSES TO PHQ QUESTIONS 1-9: 0

## 2020-06-30 NOTE — PROGRESS NOTES
SUBJECTIVE:    Patient ID: Swathi Payne is a 66 y.o.male. Chief Complaint   Patient presents with    Hypertension    Hyperlipidemia    Atrial Fibrillation    Other     wan'ts sores in head to be checked      HPI:  Patient with history of Afib. He is compliant with taking Eliquis without side effects. No bleeding, melena or hematochezia. No easy bruising. He denies any CP or palpitations. Patient has had hypertension for several years. He has been compliant with taking medications, without side effects from it. He has been following a low-sodium, is  active and rarely exercises. Weight is stable, compared to last visit. His blood pressure is stable at this time. Patient was noted to have a slightly elevated glucose on fasting lab. He denies any personal but has a + family history of diabetes. He does not check his blood sugars. There are no symptoms of hyperglycemia. Patient has been having right shoulder pain that has gotten worse recently. He states that this is worse with activity and better with rest. He has not taken anything for this. Patient's medications, allergies, past medical, surgical, social and family histories were reviewed and updated as appropriate in the electronic medical record/chart.         Outpatient Medications Marked as Taking for the 6/30/20 encounter (Office Visit) with Nir Leyva MD   Medication Sig Dispense Refill    hydroCHLOROthiazide (MICROZIDE) 12.5 MG capsule TAKE ONE CAPSULE BY MOUTH ONCE DAILY 90 capsule 3    finasteride (PROSCAR) 5 MG tablet Take 1 tablet by mouth daily 90 tablet 3    tamsulosin (FLOMAX) 0.4 MG capsule TAKE ONE CAPSULE BY MOUTH ONCE DAILY 90 capsule 3    potassium chloride (KLOR-CON) 10 MEQ extended release tablet TAKE ONE TABLET BY MOUTH ONCE DAILY 90 tablet 1    losartan (COZAAR) 100 MG tablet Take 1 tablet by mouth daily 30 tablet 5    metoprolol tartrate (LOPRESSOR) 25 MG tablet TAKE ONE TABLET BY MOUTH TWICE A  tablet 5    ELIQUIS 5 MG TABS tablet TAKE 1 TABLET BY MOUTH EVERY 12 HOURS 60 tablet 3    spironolactone (ALDACTONE) 25 MG tablet TAKE 1 TABLET BY MOUTH DAILY 30 tablet 3    b complex vitamins tablet Take 1 tablet by mouth daily 30 tablet 3    triamcinolone (KENALOG) 0.1 % cream Apply topically 2 times daily. 45 g 1    citalopram (CELEXA) 20 MG tablet TAKE ONE TABLET BY MOUTH ONCE DAILY 90 tablet 3    atorvastatin (LIPITOR) 20 MG tablet TAKE ONE TABLET BY MOUTH ONCE DAILY 90 tablet 3    aspirin 81 MG tablet Take 81 mg by mouth daily. Review of Systems   Constitutional: Negative for chills, fatigue and fever. HENT: Negative for congestion, ear pain and sinus pressure. Eyes: Negative for discharge and visual disturbance. Respiratory: Negative for cough, shortness of breath and wheezing. Cardiovascular: Positive for leg swelling (occasional ). Negative for chest pain and palpitations. JOY   Gastrointestinal: Negative for abdominal pain, nausea and vomiting. Endocrine: Negative for cold intolerance and heat intolerance. Genitourinary: Negative for dysuria, frequency and urgency. Musculoskeletal: Positive for arthralgias and back pain. Skin: Negative for pallor and rash. Allergic/Immunologic: Negative for food allergies and immunocompromised state. Neurological: Negative for dizziness, numbness and headaches. Decreased memory   Hematological: Negative for adenopathy. Does not bruise/bleed easily. Psychiatric/Behavioral: Negative for agitation and sleep disturbance. The patient is not nervous/anxious.         Past Medical History:   Diagnosis Date    [facility]     Cardiomyopathy ([facility]     Cataract     GERD (gastroesophageal reflux disease)     History of DVT (deep vein thrombosis)     s/p IVC filter    Hyperlipidemia     Hypertension     LONG TERM ANTICOAGULENT USE     Osteoarthritis     Unspecified sleep apnea      Past Surgical History: Procedure Laterality Date    ANKLE SURGERY      left    CHOLECYSTECTOMY      EYE SURGERY      left & right cataract    FRACTURE SURGERY Left     femure    HAND SURGERY      left thumb    HIP SURGERY      left    INNER EAR SURGERY      left    KNEE ARTHROSCOPY      right    KNEE SURGERY  11    RIGHT    NOSE SURGERY      left    SPINE SURGERY      VENOUS CLOT SURGERY      right leg after hip replacement      Family History   Problem Relation Age of Onset    Heart Disease Mother     Heart Disease Father         chf    Heart Disease Sister     Cancer Brother         lungs    Cancer Brother     Heart Disease Sister     Heart Disease Brother       Social History     Tobacco Use   Smoking Status Former Smoker    Packs/day: 1.00    Years: 40.00    Pack years: 40.00    Types: Cigarettes    Last attempt to quit: 1988    Years since quittin.5   Smokeless Tobacco Never Used       OBJECTIVE:   Wt Readings from Last 3 Encounters:   20 262 lb 3.2 oz (118.9 kg)   19 261 lb 3.2 oz (118.5 kg)   10/02/19 252 lb (114.3 kg)     BP Readings from Last 3 Encounters:   20 126/64   19 (!) 160/64   10/02/19 120/65       /64 (Site: Right Upper Arm, Position: Sitting, Cuff Size: Large Adult)   Pulse 96   Temp 98 °F (36.7 °C) (Oral)   Resp 18   Wt 262 lb 3.2 oz (118.9 kg)   SpO2 98%   BMI 35.56 kg/m²      Physical Exam  Vitals signs and nursing note reviewed. Constitutional:       Appearance: Normal appearance. He is well-developed. He is obese. HENT:      Head: Normocephalic and atraumatic. Right Ear: External ear normal.      Left Ear: External ear normal.      Nose: Nose normal.   Eyes:      Conjunctiva/sclera: Conjunctivae normal.      Pupils: Pupils are equal, round, and reactive to light. Neck:      Musculoskeletal: Neck supple. No neck rigidity or muscular tenderness. Thyroid: No thyromegaly. Vascular: No JVD.    Cardiovascular:      Rate stable. I have advised him on low-sodium diet, exercise and weight control. I am going to continue current medication. Will monitor his renal function every few months, have advised him to check blood pressure frequently and to keep a record of this. - CBC Auto Differential; Future  - Comprehensive Metabolic Panel; Future  - TSH without Reflex; Future  - Vitamin B12; Future  - Magnesium; Future    3. Glucose intolerance (impaired glucose tolerance)   I have advised him on diet, exercise and weight control. I have also, advised him that this condition could possibly progress into diabetes. I will monitor his A1c periodically. - Hemoglobin A1C; Future    4. Chronic right shoulder pain  I am going to get an Xray of this area. Further recommendation based on test results. - XR SHOULDER RIGHT (MIN 2 VIEWS); Future      Orders Placed This Encounter   Medications    hydroCHLOROthiazide (MICROZIDE) 12.5 MG capsule     Sig: TAKE ONE CAPSULE BY MOUTH ONCE DAILY     Dispense:  90 capsule     Refill:  3    finasteride (PROSCAR) 5 MG tablet     Sig: Take 1 tablet by mouth daily     Dispense:  90 tablet     Refill:  3    tamsulosin (FLOMAX) 0.4 MG capsule     Sig: TAKE ONE CAPSULE BY MOUTH ONCE DAILY     Dispense:  90 capsule     Refill:  3      Written by Christy Colón, acting as a scribe for Dr. Millie Huber on 6/30/2020 at 3:40 PM.     I, Dr. Ke Oneal, personally performed the services described in the documentation as scribed by Herbert Agudelo CMA, in my presence and it is both accurate and complete.

## 2020-07-01 ENCOUNTER — HOSPITAL ENCOUNTER (OUTPATIENT)
Dept: GENERAL RADIOLOGY | Facility: HOSPITAL | Age: 75
Discharge: HOME | End: 2020-07-01
Payer: MEDICARE

## 2020-07-01 ENCOUNTER — HOSPITAL ENCOUNTER (OUTPATIENT)
Facility: HOSPITAL | Age: 75
Discharge: HOME | End: 2020-07-01
Payer: MEDICARE

## 2020-07-01 PROCEDURE — 73030 X-RAY EXAM OF SHOULDER: CPT

## 2020-07-08 ENCOUNTER — PROCEDURE VISIT (OUTPATIENT)
Dept: PRIMARY CARE CLINIC | Age: 75
End: 2020-07-08
Payer: MEDICARE

## 2020-07-08 VITALS
WEIGHT: 257 LBS | DIASTOLIC BLOOD PRESSURE: 62 MMHG | TEMPERATURE: 97.3 F | RESPIRATION RATE: 18 BRPM | SYSTOLIC BLOOD PRESSURE: 126 MMHG | BODY MASS INDEX: 34.86 KG/M2 | HEART RATE: 55 BPM | OXYGEN SATURATION: 98 %

## 2020-07-08 PROCEDURE — G8427 DOCREV CUR MEDS BY ELIG CLIN: HCPCS | Performed by: INTERNAL MEDICINE

## 2020-07-08 PROCEDURE — 1036F TOBACCO NON-USER: CPT | Performed by: INTERNAL MEDICINE

## 2020-07-08 PROCEDURE — 99213 OFFICE O/P EST LOW 20 MIN: CPT | Performed by: INTERNAL MEDICINE

## 2020-07-08 PROCEDURE — 1123F ACP DISCUSS/DSCN MKR DOCD: CPT | Performed by: INTERNAL MEDICINE

## 2020-07-08 PROCEDURE — 4040F PNEUMOC VAC/ADMIN/RCVD: CPT | Performed by: INTERNAL MEDICINE

## 2020-07-08 PROCEDURE — G8417 CALC BMI ABV UP PARAM F/U: HCPCS | Performed by: INTERNAL MEDICINE

## 2020-07-08 PROCEDURE — 20610 DRAIN/INJ JOINT/BURSA W/O US: CPT | Performed by: INTERNAL MEDICINE

## 2020-07-08 RX ORDER — LIDOCAINE HYDROCHLORIDE 10 MG/ML
1 INJECTION, SOLUTION INFILTRATION; PERINEURAL ONCE
Status: COMPLETED | OUTPATIENT
Start: 2020-07-08 | End: 2020-07-08

## 2020-07-08 RX ORDER — TRIAMCINOLONE ACETONIDE 40 MG/ML
40 INJECTION, SUSPENSION INTRA-ARTICULAR; INTRAMUSCULAR ONCE
Status: COMPLETED | OUTPATIENT
Start: 2020-07-08 | End: 2020-07-08

## 2020-07-08 RX ADMIN — LIDOCAINE HYDROCHLORIDE 1 ML: 10 INJECTION, SOLUTION INFILTRATION; PERINEURAL at 15:20

## 2020-07-08 RX ADMIN — TRIAMCINOLONE ACETONIDE 40 MG: 40 INJECTION, SUSPENSION INTRA-ARTICULAR; INTRAMUSCULAR at 15:21

## 2020-07-08 ASSESSMENT — ENCOUNTER SYMPTOMS
SINUS PRESSURE: 0
SHORTNESS OF BREATH: 0
COUGH: 0
BACK PAIN: 1
VOMITING: 0
NAUSEA: 0
ABDOMINAL PAIN: 0
EYE DISCHARGE: 0
WHEEZING: 0

## 2020-07-08 NOTE — PROGRESS NOTES
Chief Complaint   Patient presents with    Shoulder Pain     right        Have you seen any other physician or provider since your last visit no    Have you had any other diagnostic tests since your last visit? yes -    Have you changed or stopped any medications since your last visit? no     Pt  Here for procedure.

## 2020-07-13 RX ORDER — APIXABAN 5 MG/1
TABLET, FILM COATED ORAL
Qty: 60 TABLET | Refills: 3 | Status: SHIPPED | OUTPATIENT
Start: 2020-07-13 | End: 2020-11-25

## 2020-07-15 ENCOUNTER — OFFICE VISIT (OUTPATIENT)
Dept: CARDIOLOGY | Facility: CLINIC | Age: 75
End: 2020-07-15

## 2020-07-15 VITALS
BODY MASS INDEX: 35.35 KG/M2 | SYSTOLIC BLOOD PRESSURE: 116 MMHG | DIASTOLIC BLOOD PRESSURE: 64 MMHG | HEIGHT: 72 IN | HEART RATE: 59 BPM | WEIGHT: 261 LBS | OXYGEN SATURATION: 96 %

## 2020-07-15 DIAGNOSIS — I48.19 PERSISTENT ATRIAL FIBRILLATION (HCC): Primary | ICD-10-CM

## 2020-07-15 DIAGNOSIS — E78.2 MIXED HYPERLIPIDEMIA: ICD-10-CM

## 2020-07-15 DIAGNOSIS — I10 ESSENTIAL HYPERTENSION: ICD-10-CM

## 2020-07-15 PROCEDURE — 99214 OFFICE O/P EST MOD 30 MIN: CPT | Performed by: INTERNAL MEDICINE

## 2020-07-15 RX ORDER — SPIRONOLACTONE 25 MG/1
25 TABLET ORAL DAILY
Qty: 30 TABLET | Refills: 3 | Status: SHIPPED | OUTPATIENT
Start: 2020-07-15 | End: 2020-11-25

## 2020-07-15 NOTE — PROGRESS NOTES
[facility]  Office Progress Note  Angela Palencia  1945      Visit Date: 07/15/20    PCP: Ewelina Kumar MD  [address] 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 A-fib/A-flutter  1. s/p ECV x 2, last 9/19 [facility]- ERAF  2. 9/1 9 Echo [facility] EF 45% AV sclerosis  2. DILAN-compliant with CPAP  3. HTN  4. HL  1. 7/15 144/172/29/81  5. Normal coronary arteries per remote C  6. DVt s/p IVC filter  7. 2015 L traumatic femur fx- Sajadi    Chief Complaint   Patient presents with   • Atrial Fibrillation   • Shortness of Breath       Allergies  Allergies   Allergen Reactions   • Codeine Unknown (See Comments)     Pt/family can't recall     • Keflex [Cephalexin] Unknown (See Comments)     Pt/family unsure     • Morphine And Related Unknown (See Comments)     Pt/family can't recall   • Penicillins Unknown (See Comments)     Pt/family can't recall.       Current Medications    Current Outpatient Medications:   •  apixaban (ELIQUIS) 5 MG tablet tablet, Take 1 tablet by mouth Every 12 (Twelve) Hours., Disp: 60 tablet, Rfl: 1  •  atorvastatin (LIPITOR) 20 MG tablet, Take 20 mg by mouth Daily., Disp: , Rfl:   •  b complex vitamins tablet, Take 1 tablet by mouth Daily., Disp: , Rfl:   •  finasteride (PROSCAR) 5 MG tablet, 5 mg Daily., Disp: , Rfl: 3  •  metoprolol tartrate (LOPRESSOR) 25 MG tablet, Take 25 mg by mouth 2 (Two) Times a Day., Disp: , Rfl:   •  potassium chloride (K-DUR) 10 MEQ CR tablet, Take 10 mEq by mouth Daily., Disp: , Rfl:   •  spironolactone (ALDACTONE) 25 MG tablet, Take 1 tablet by mouth Daily., Disp: 30 tablet, Rfl: 1  •  aspirin 81 MG EC tablet, Take 81 mg by mouth Daily., Disp: , Rfl:   •  citalopram (CeleXA) 20 MG tablet, Take 20 mg by mouth Daily., Disp: , Rfl:   •  cyclobenzaprine (FLEXERIL) 10 MG tablet, Take 10 mg by mouth Every Night., Disp: , Rfl:   •  fluticasone (VERAMYST) 27.5 MCG/SPRAY nasal spray, 2 sprays  "into the nostril(s) as directed by provider As Needed for Rhinitis., Disp: , Rfl:   •  furosemide (LASIX) 40 MG tablet, Take 40 mg by mouth 2 (Two) Times a Day As Needed., Disp: , Rfl:   •  hydrochlorothiazide (MICROZIDE) 12.5 MG capsule, Take 12.5 mg by mouth Daily., Disp: , Rfl:   •  tamsulosin (FLOMAX) 0.4 MG capsule 24 hr capsule, Take 1 capsule by mouth Daily., Disp: , Rfl:   •  valsartan (DIOVAN) 160 MG tablet, Take 1 tablet by mouth Daily., Disp: 30 tablet, Rfl: 1      History of Present Illness   Angela Palencia is a 74 y.o. year old male here for follow up.  No further hospitalizations since last visit.  He is compliant with medication states he is largely been following the COVID rule staying at home.    OBJECTIVE:  Vitals:    07/15/20 1351   Weight: 118 kg (261 lb)   Height: 182.9 cm (72\")     Physical Exam   Constitutional: He appears well-developed and well-nourished.   Neck: Normal range of motion. Neck supple. No hepatojugular reflux and no JVD present. Carotid bruit is not present. No tracheal deviation present. No thyromegaly present.   Cardiovascular: Normal rate, S1 normal, S2 normal, intact distal pulses and normal pulses. An irregularly irregular rhythm present. PMI is not displaced. Exam reveals no gallop, no distant heart sounds, no friction rub, no midsystolic click and no opening snap.   No murmur heard.  Pulses:       Radial pulses are 2+ on the right side, and 2+ on the left side.        Dorsalis pedis pulses are 2+ on the right side, and 2+ on the left side.        Posterior tibial pulses are 2+ on the right side, and 2+ on the left side.   Pulmonary/Chest: Effort normal and breath sounds normal. He has no wheezes. He has no rales.   Abdominal: Soft. Bowel sounds are normal. He exhibits no mass. There is no tenderness. There is no guarding.   Musculoskeletal: He exhibits edema.       Diagnostic Data:  Procedures      ASSESSMENT:   Diagnosis Plan   1. Persistent atrial fibrillation " (CMS/[facility])     2. Essential hypertension     3. Mixed hyperlipidemia         PLAN:  Atrial fib flutter status post remote cardioversion most recently 9/6/2019 with return to atrial flutter.  This time we will continue rate control/ anticoagulation.      Hypertension controlled tamsulosin valsartan HCTZ    Dyslipidemia on statin therapy    Coronary disease with no obstructive symptoms LVEF 45 to 50% and atrial flutter with recent hospitalization        Ewelina Kumar MD, thank you for referring Mr. Palencia for evaluation.  I have forwarded my electronically generated recommendations to you for review.  Please do not hesitate to call with any questions.      Zack Almanza MD, FACC

## 2020-07-26 RX ORDER — TRIAMCINOLONE ACETONIDE 40 MG/ML
40 INJECTION, SUSPENSION INTRA-ARTICULAR; INTRAMUSCULAR ONCE
Status: DISCONTINUED | OUTPATIENT
Start: 2020-07-26 | End: 2021-01-01

## 2020-07-26 RX ORDER — LIDOCAINE HYDROCHLORIDE 10 MG/ML
1 INJECTION, SOLUTION INFILTRATION; PERINEURAL ONCE
Status: DISCONTINUED | OUTPATIENT
Start: 2020-07-26 | End: 2021-01-01

## 2020-09-01 RX ORDER — LOSARTAN POTASSIUM 100 MG/1
100 TABLET ORAL DAILY
Qty: 30 TABLET | Refills: 5 | Status: SHIPPED | OUTPATIENT
Start: 2020-09-01 | End: 2021-02-25

## 2020-09-28 RX ORDER — HYDROCHLOROTHIAZIDE 12.5 MG/1
CAPSULE, GELATIN COATED ORAL
Qty: 90 CAPSULE | Refills: 3 | Status: SHIPPED | OUTPATIENT
Start: 2020-09-28 | End: 2021-01-01

## 2020-10-27 ENCOUNTER — APPOINTMENT (OUTPATIENT)
Dept: GENERAL RADIOLOGY | Facility: HOSPITAL | Age: 75
DRG: 310 | End: 2020-10-27
Attending: INTERNAL MEDICINE
Payer: MEDICARE

## 2020-10-27 ENCOUNTER — HOSPITAL ENCOUNTER (INPATIENT)
Facility: HOSPITAL | Age: 75
LOS: 1 days | Discharge: HOME | DRG: 310 | End: 2020-10-28
Attending: INTERNAL MEDICINE | Admitting: INTERNAL MEDICINE
Payer: MEDICARE

## 2020-10-27 ENCOUNTER — OUTSIDE FACILITY SERVICE (OUTPATIENT)
Dept: CARDIOLOGY | Facility: CLINIC | Age: 75
End: 2020-10-27

## 2020-10-27 ENCOUNTER — OFFICE VISIT (OUTPATIENT)
Dept: PRIMARY CARE CLINIC | Age: 75
End: 2020-10-27
Payer: MEDICARE

## 2020-10-27 VITALS
DIASTOLIC BLOOD PRESSURE: 63 MMHG | TEMPERATURE: 97.7 F | BODY MASS INDEX: 36.35 KG/M2 | WEIGHT: 268 LBS | OXYGEN SATURATION: 97 % | SYSTOLIC BLOOD PRESSURE: 111 MMHG | HEART RATE: 161 BPM

## 2020-10-27 PROBLEM — R00.0 TACHYCARDIA: Status: ACTIVE | Noted: 2020-10-27

## 2020-10-27 LAB
ANION GAP SERPL CALCULATED.3IONS-SCNC: 10 MMOL/L (ref 3–16)
BASOPHILS ABSOLUTE: 0 K/UL (ref 0–0.1)
BASOPHILS RELATIVE PERCENT: 0.5 %
BUN BLDV-MCNC: 17 MG/DL (ref 6–20)
CALCIUM SERPL-MCNC: 9.2 MG/DL (ref 8.5–10.5)
CHLORIDE BLD-SCNC: 104 MMOL/L (ref 98–107)
CO2: 23 MMOL/L (ref 20–30)
CREAT SERPL-MCNC: 1.1 MG/DL (ref 0.4–1.2)
EOSINOPHILS ABSOLUTE: 0.1 K/UL (ref 0–0.4)
EOSINOPHILS RELATIVE PERCENT: 0.9 %
GFR AFRICAN AMERICAN: >59
GFR NON-AFRICAN AMERICAN: >59
GLUCOSE BLD-MCNC: 198 MG/DL (ref 74–106)
HCT VFR BLD CALC: 44.4 % (ref 40–54)
HEMOGLOBIN: 14.6 G/DL (ref 13–18)
IMMATURE GRANULOCYTES #: 0 K/UL
IMMATURE GRANULOCYTES %: 0.3 % (ref 0–5)
LYMPHOCYTES ABSOLUTE: 1.6 K/UL (ref 1.5–4)
LYMPHOCYTES RELATIVE PERCENT: 27.3 %
MAGNESIUM: 2 MG/DL (ref 1.7–2.4)
MCH RBC QN AUTO: 29.6 PG (ref 27–32)
MCHC RBC AUTO-ENTMCNC: 32.9 G/DL (ref 31–35)
MCV RBC AUTO: 89.9 FL (ref 80–100)
MONOCYTES ABSOLUTE: 0.4 K/UL (ref 0.2–0.8)
MONOCYTES RELATIVE PERCENT: 7 %
NEUTROPHILS ABSOLUTE: 3.7 K/UL (ref 2–7.5)
NEUTROPHILS RELATIVE PERCENT: 64 %
PDW BLD-RTO: 12.5 % (ref 11–16)
PLATELET # BLD: 187 K/UL (ref 150–400)
PMV BLD AUTO: 10.2 FL (ref 6–10)
POTASSIUM REFLEX MAGNESIUM: 3.7 MMOL/L (ref 3.4–5.1)
RBC # BLD: 4.94 M/UL (ref 4.5–6)
SODIUM BLD-SCNC: 137 MMOL/L (ref 136–145)
TROPONIN: <0.3 NG/ML
TSH REFLEX: 3.47 UIU/ML (ref 0.35–5.5)
WBC # BLD: 5.8 K/UL (ref 4–11)

## 2020-10-27 PROCEDURE — G8417 CALC BMI ABV UP PARAM F/U: HCPCS | Performed by: INTERNAL MEDICINE

## 2020-10-27 PROCEDURE — 6370000000 HC RX 637 (ALT 250 FOR IP): Performed by: INTERNAL MEDICINE

## 2020-10-27 PROCEDURE — G0008 ADMIN INFLUENZA VIRUS VAC: HCPCS | Performed by: INTERNAL MEDICINE

## 2020-10-27 PROCEDURE — 1036F TOBACCO NON-USER: CPT | Performed by: INTERNAL MEDICINE

## 2020-10-27 PROCEDURE — 6370000000 HC RX 637 (ALT 250 FOR IP): Performed by: PHYSICIAN ASSISTANT

## 2020-10-27 PROCEDURE — 2500000003 HC RX 250 WO HCPCS: Performed by: PHYSICIAN ASSISTANT

## 2020-10-27 PROCEDURE — 83735 ASSAY OF MAGNESIUM: CPT

## 2020-10-27 PROCEDURE — 93010 ELECTROCARDIOGRAM REPORT: CPT | Performed by: INTERNAL MEDICINE

## 2020-10-27 PROCEDURE — 85025 COMPLETE CBC W/AUTO DIFF WBC: CPT

## 2020-10-27 PROCEDURE — 4040F PNEUMOC VAC/ADMIN/RCVD: CPT | Performed by: INTERNAL MEDICINE

## 2020-10-27 PROCEDURE — 96376 TX/PRO/DX INJ SAME DRUG ADON: CPT

## 2020-10-27 PROCEDURE — 1123F ACP DISCUSS/DSCN MKR DOCD: CPT | Performed by: INTERNAL MEDICINE

## 2020-10-27 PROCEDURE — G8484 FLU IMMUNIZE NO ADMIN: HCPCS | Performed by: INTERNAL MEDICINE

## 2020-10-27 PROCEDURE — 36415 COLL VENOUS BLD VENIPUNCTURE: CPT

## 2020-10-27 PROCEDURE — 96374 THER/PROPH/DIAG INJ IV PUSH: CPT

## 2020-10-27 PROCEDURE — 2500000003 HC RX 250 WO HCPCS: Performed by: INTERNAL MEDICINE

## 2020-10-27 PROCEDURE — G8427 DOCREV CUR MEDS BY ELIG CLIN: HCPCS | Performed by: INTERNAL MEDICINE

## 2020-10-27 PROCEDURE — 99214 OFFICE O/P EST MOD 30 MIN: CPT | Performed by: INTERNAL MEDICINE

## 2020-10-27 PROCEDURE — 80048 BASIC METABOLIC PNL TOTAL CA: CPT

## 2020-10-27 PROCEDURE — 71045 X-RAY EXAM CHEST 1 VIEW: CPT

## 2020-10-27 PROCEDURE — 84484 ASSAY OF TROPONIN QUANT: CPT

## 2020-10-27 PROCEDURE — 84443 ASSAY THYROID STIM HORMONE: CPT

## 2020-10-27 PROCEDURE — 1200000000 HC SEMI PRIVATE

## 2020-10-27 PROCEDURE — 90694 VACC AIIV4 NO PRSRV 0.5ML IM: CPT | Performed by: INTERNAL MEDICINE

## 2020-10-27 PROCEDURE — 87040 BLOOD CULTURE FOR BACTERIA: CPT

## 2020-10-27 PROCEDURE — 93005 ELECTROCARDIOGRAM TRACING: CPT

## 2020-10-27 RX ORDER — ONDANSETRON 2 MG/ML
4 INJECTION INTRAMUSCULAR; INTRAVENOUS EVERY 6 HOURS PRN
Status: DISCONTINUED | OUTPATIENT
Start: 2020-10-27 | End: 2020-10-28 | Stop reason: HOSPADM

## 2020-10-27 RX ORDER — POTASSIUM CHLORIDE 750 MG/1
TABLET, FILM COATED, EXTENDED RELEASE ORAL
Qty: 90 TABLET | Refills: 1 | Status: CANCELLED | OUTPATIENT
Start: 2020-10-27

## 2020-10-27 RX ORDER — LOSARTAN POTASSIUM 50 MG/1
100 TABLET ORAL DAILY
Status: DISCONTINUED | OUTPATIENT
Start: 2020-10-27 | End: 2020-10-28 | Stop reason: HOSPADM

## 2020-10-27 RX ORDER — METOPROLOL TARTRATE 5 MG/5ML
5 INJECTION INTRAVENOUS ONCE
Status: COMPLETED | OUTPATIENT
Start: 2020-10-27 | End: 2020-10-27

## 2020-10-27 RX ORDER — HYDROCHLOROTHIAZIDE 12.5 MG/1
CAPSULE, GELATIN COATED ORAL
Qty: 90 CAPSULE | Refills: 3 | Status: CANCELLED | OUTPATIENT
Start: 2020-10-27

## 2020-10-27 RX ORDER — HYDROCHLOROTHIAZIDE 25 MG/1
12.5 TABLET ORAL DAILY
Status: DISCONTINUED | OUTPATIENT
Start: 2020-10-27 | End: 2020-10-27

## 2020-10-27 RX ORDER — POTASSIUM CHLORIDE 750 MG/1
10 TABLET, EXTENDED RELEASE ORAL DAILY
Status: DISCONTINUED | OUTPATIENT
Start: 2020-10-27 | End: 2020-10-28 | Stop reason: HOSPADM

## 2020-10-27 RX ORDER — ATORVASTATIN CALCIUM 20 MG/1
20 TABLET, FILM COATED ORAL DAILY
Status: DISCONTINUED | OUTPATIENT
Start: 2020-10-27 | End: 2020-10-28 | Stop reason: HOSPADM

## 2020-10-27 RX ORDER — METOPROLOL TARTRATE 50 MG/1
50 TABLET, FILM COATED ORAL 2 TIMES DAILY
Status: DISCONTINUED | OUTPATIENT
Start: 2020-10-27 | End: 2020-10-28 | Stop reason: HOSPADM

## 2020-10-27 RX ORDER — LOSARTAN POTASSIUM 100 MG/1
100 TABLET ORAL DAILY
Qty: 30 TABLET | Refills: 5 | Status: CANCELLED | OUTPATIENT
Start: 2020-10-27

## 2020-10-27 RX ORDER — SPIRONOLACTONE 25 MG/1
25 TABLET ORAL DAILY
Status: DISCONTINUED | OUTPATIENT
Start: 2020-10-27 | End: 2020-10-28 | Stop reason: HOSPADM

## 2020-10-27 RX ORDER — FINASTERIDE 5 MG/1
5 TABLET, FILM COATED ORAL DAILY
Qty: 90 TABLET | Refills: 3 | Status: CANCELLED | OUTPATIENT
Start: 2020-10-27

## 2020-10-27 RX ORDER — POLYETHYLENE GLYCOL 3350 17 G/17G
17 POWDER, FOR SOLUTION ORAL DAILY PRN
Status: DISCONTINUED | OUTPATIENT
Start: 2020-10-27 | End: 2020-10-28 | Stop reason: HOSPADM

## 2020-10-27 RX ORDER — FINASTERIDE 5 MG/1
5 TABLET, FILM COATED ORAL DAILY
Status: DISCONTINUED | OUTPATIENT
Start: 2020-10-27 | End: 2020-10-28 | Stop reason: HOSPADM

## 2020-10-27 RX ORDER — PROMETHAZINE HYDROCHLORIDE 25 MG/1
12.5 TABLET ORAL EVERY 6 HOURS PRN
Status: DISCONTINUED | OUTPATIENT
Start: 2020-10-27 | End: 2020-10-28 | Stop reason: HOSPADM

## 2020-10-27 RX ORDER — FAMOTIDINE 20 MG/1
20 TABLET, FILM COATED ORAL 2 TIMES DAILY
Status: DISCONTINUED | OUTPATIENT
Start: 2020-10-27 | End: 2020-10-28 | Stop reason: HOSPADM

## 2020-10-27 RX ORDER — TAMSULOSIN HYDROCHLORIDE 0.4 MG/1
0.4 CAPSULE ORAL DAILY
Status: DISCONTINUED | OUTPATIENT
Start: 2020-10-27 | End: 2020-10-28 | Stop reason: HOSPADM

## 2020-10-27 RX ORDER — TRIAMCINOLONE ACETONIDE 1 MG/G
CREAM TOPICAL 2 TIMES DAILY
Status: DISCONTINUED | OUTPATIENT
Start: 2020-10-27 | End: 2020-10-28 | Stop reason: HOSPADM

## 2020-10-27 RX ORDER — ACETAMINOPHEN 650 MG/1
650 SUPPOSITORY RECTAL EVERY 6 HOURS PRN
Status: DISCONTINUED | OUTPATIENT
Start: 2020-10-27 | End: 2020-10-28 | Stop reason: HOSPADM

## 2020-10-27 RX ORDER — ASPIRIN 81 MG/1
81 TABLET, CHEWABLE ORAL DAILY
Status: DISCONTINUED | OUTPATIENT
Start: 2020-10-27 | End: 2020-10-28 | Stop reason: HOSPADM

## 2020-10-27 RX ORDER — CITALOPRAM 20 MG/1
20 TABLET ORAL DAILY
Status: DISCONTINUED | OUTPATIENT
Start: 2020-10-27 | End: 2020-10-27

## 2020-10-27 RX ORDER — ACETAMINOPHEN 325 MG/1
650 TABLET ORAL EVERY 6 HOURS PRN
Status: DISCONTINUED | OUTPATIENT
Start: 2020-10-27 | End: 2020-10-28 | Stop reason: HOSPADM

## 2020-10-27 RX ADMIN — TRIAMCINOLONE ACETONIDE: 1 CREAM TOPICAL at 19:53

## 2020-10-27 RX ADMIN — POTASSIUM CHLORIDE 10 MEQ: 750 TABLET, EXTENDED RELEASE ORAL at 16:51

## 2020-10-27 RX ADMIN — FAMOTIDINE 20 MG: 20 TABLET ORAL at 19:49

## 2020-10-27 RX ADMIN — METOPROLOL TARTRATE 5 MG: 1 INJECTION, SOLUTION INTRAVENOUS at 19:50

## 2020-10-27 RX ADMIN — LIDOCAINE HYDROCHLORIDE: 20 SOLUTION ORAL; TOPICAL at 19:53

## 2020-10-27 RX ADMIN — METOPROLOL TARTRATE 50 MG: 50 TABLET, FILM COATED ORAL at 19:49

## 2020-10-27 RX ADMIN — ASPIRIN 81 MG: 81 TABLET, CHEWABLE ORAL at 16:51

## 2020-10-27 RX ADMIN — ATORVASTATIN CALCIUM 20 MG: 20 TABLET, FILM COATED ORAL at 16:51

## 2020-10-27 RX ADMIN — B-COMPLEX W/ C & FOLIC ACID TAB 1 TABLET: TAB at 16:50

## 2020-10-27 RX ADMIN — SPIRONOLACTONE 25 MG: 25 TABLET ORAL at 16:50

## 2020-10-27 RX ADMIN — APIXABAN 5 MG: 5 TABLET, FILM COATED ORAL at 19:49

## 2020-10-27 RX ADMIN — METOPROLOL TARTRATE 5 MG: 1 INJECTION, SOLUTION INTRAVENOUS at 15:35

## 2020-10-27 RX ADMIN — FINASTERIDE 5 MG: 5 TABLET, FILM COATED ORAL at 16:51

## 2020-10-27 RX ADMIN — TAMSULOSIN HYDROCHLORIDE 0.4 MG: 0.4 CAPSULE ORAL at 16:50

## 2020-10-27 NOTE — PATIENT INSTRUCTIONS
Patient Education        Influenza (Flu) Vaccine (Inactivated or Recombinant): What You Need to Know  Why get vaccinated? Influenza vaccine can prevent influenza (flu). Flu is a contagious disease that spreads around the [place] every year, usually between October and May. Anyone can get the flu, but it is more dangerous for some people. Infants and young children, people 72years of age and older, pregnant women, and people with certain health conditions or a weakened immune system are at greatest risk of flu complications. Pneumonia, bronchitis, sinus infections and ear infections are examples of flu-related complications. If you have a medical condition, such as heart disease, cancer or diabetes, flu can make it worse. Flu can cause fever and chills, sore throat, muscle aches, fatigue, cough, headache, and runny or stuffy nose. Some people may have vomiting and diarrhea, though this is more common in children than adults. Each year, thousands of people in the [facility] die from flu, and many more are hospitalized. Flu vaccine prevents millions of illnesses and flu-related visits to the doctor each year. Influenza vaccine  CDC recommends everyone 10months of age and older get vaccinated every flu season. Children 6 months through 6years of age may need 2 doses during a single flu season. Everyone else needs only 1 dose each flu season. It takes about 2 weeks for protection to develop after vaccination. There are many flu viruses, and they are always changing. Each year a new flu vaccine is made to protect against three or four viruses that are likely to cause disease in the upcoming flu season. Even when the vaccine doesn't exactly match these viruses, it may still provide some protection. Influenza vaccine does not cause flu. Influenza vaccine may be given at the same time as other vaccines.   Talk with your health care provider  Tell your vaccine provider if the person getting the vaccine:  · Has had an allergic reaction after a previous dose of influenza vaccine, or has any severe, life-threatening allergies. · Has ever had Guillain-Barré Syndrome (also called GBS). In some cases, your health care provider may decide to postpone influenza vaccination to a future visit. People with minor illnesses, such as a cold, may be vaccinated. People who are moderately or severely ill should usually wait until they recover before getting influenza vaccine. Your health care provider can give you more information. Risks of a vaccine reaction  · Soreness, redness, and swelling where shot is given, fever, muscle aches, and headache can happen after influenza vaccine. · There may be a very small increased risk of Guillain-Barré Syndrome (GBS) after inactivated influenza vaccine (the flu shot). [place] children who get the flu shot along with pneumococcal vaccine (PCV13), and/or DTaP vaccine at the same time might be slightly more likely to have a seizure caused by fever. Tell your health care provider if a child who is getting flu vaccine has ever had a seizure. People sometimes faint after medical procedures, including vaccination. Tell your provider if you feel dizzy or have vision changes or ringing in the ears. As with any medicine, there is a very remote chance of a vaccine causing a severe allergic reaction, other serious injury, or death. What if there is a serious problem? An allergic reaction could occur after the vaccinated person leaves the clinic. If you see signs of a severe allergic reaction (hives, swelling of the face and throat, difficulty breathing, a fast heartbeat, dizziness, or weakness), call 9-1-1 and get the person to the nearest hospital.  For other signs that concern you, call your health care provider. Adverse reactions should be reported to the Vaccine Adverse Event Reporting System (VAERS).  Your health care provider will usually file this report, or you can do it yourself. Visit the VAERS website at www.vaers. hhs.gov or call 3-728.934.9937. VAERS is only for reporting reactions, and VAERS staff do not give medical advice. The National Vaccine Injury Compensation Program  The National Vaccine Injury Compensation Program (VICP) is a federal program that was created to compensate people who may have been injured by certain vaccines. Visit the VICP website at www.hrsa.gov/vaccinecompensation or call 7-170.919.5918 to learn about the program and about filing a claim. There is a time limit to file a claim for compensation. How can I learn more? · Ask your healthcare provider. · Call your local or state health department. · Contact the Centers for Disease Control and Prevention (CDC):  ? Call 6-787.279.9978 (1-800-CDC-INFO) or  ? Visit CDC's website at www.cdc.gov/flu  Vaccine Information Statement (Interim)  Inactivated Influenza Vaccine  8/15/2019  42 . [id number]  Department of Health and Human Services  Centers for Disease Control and Prevention  Many Vaccine Information Statements are available in [place] and other languages. See www.immunize.org/vis. Muchas hojas de información sobre vacunas están disponibles en español y en otros idiomas. Visite www.immunize.org/vis. Care instructions adapted under license by [facility] ([facility]). If you have questions about a medical condition or this instruction, always ask your healthcare professional. [place] [address] any warranty or liability for your use of this information.

## 2020-10-27 NOTE — FLOWSHEET NOTE
10/27/20 1603   Assessment   Charting Type Admission   Neurological   Neuro (WDL) WDL   Bang Coma Scale   Eye Opening 4   Best Verbal Response 5   Best Motor Response 6   [place] Coma Scale Score 15   HEENT   HEENT (WDL) X   Right Eye Impaired vision   Left Eye Impaired vision   Teeth Partial plate upper   Respiratory   Respiratory (WDL) WDL   Cardiac   Cardiac (WDL) X   Cardiac Regularity Irregular   Heart Sounds S1, S2   Cardiac Rhythm ST   Rhythm Interpretation   Pulse 103   Cardiac Monitor   Telemetry Audible Yes   Telemetry Alarms Set Yes   Telemetry Box Number [id number]   Telemetry Monitor Alarm Parameters    Gastrointestinal   Abdominal (WDL) X   Abdomen Inspection Rounded   Last BM (including prior to admit) 10/27/20   Tenderness Nontender; Soft   RUQ Bowel Sounds Hypoactive   LUQ Bowel Sounds Hypoactive   RLQ Bowel Sounds Hypoactive   LLQ Bowel Sounds Hypoactive   Peripheral Vascular   Peripheral Vascular (WDL) WDL   Edema None   Skin Color/Condition   Skin Color/Condition (WDL) WDL   Skin Integrity   Skin Integrity (WDL) WDL   Musculoskeletal   Musculoskeletal (WDL) WDL   Genitourinary   Genitourinary (WDL) WDL   Flank Tenderness No   Suprapubic Tenderness No   Dysuria No   Psychosocial   Psychosocial (WDL) WDL       Nursing Admission Documentation    Patient: Sabine Mcarthur   75 y.o./1945/male    Allergies: Codeine; Morphine; Cephalexin; and Pcn [penicillins]    Isolation:No active isolations   Infection:No active infections    Cutler Fall Risk  History of Falling: No  Secondary Diagnosis and/or Comorbidity: Yes  Ambulatory Aids: None/Bedrest/Nurse assist  Intravenous Therapy/Heparin/Saline Lock: Yes  Gait/Transferring: Normal/Bedrest/Wheelchair  Mental Status: Oriented to Own Ability  Score: 35  Cutler Fall Risk: Medium (25-44)    Vital Signs  /61   Pulse 112   Temp 98 °F (36.7 °C) (Oral)   Resp 16   Ht 6' (1.829 m)   Wt 265 lb 14.4 oz (120.6 kg)   SpO2 97%   BMI 36.06 kg/m² Recent Travel Screening and Travel History documentation:   Travel Screening     Question   Response    In the last month, have you been in contact with someone who was confirmed or suspected to have Coronavirus / COVID-19? No / Unsure    Have you had a COVID-19 viral test in the last 14 days? No    Do you have any of the following new or worsening symptoms? None of these    Have you traveled internationally in the last month? No      Travel History   Travel since 09/27/20     No documented travel since 09/27/20        Family Recent Travel Screening and Travel History documentation:  In the last month, have any of your immediate family members been in contact with someone who was confirmed or suspected to have Coronavirus/COVID-19? no    Have you or your immediate family members traveled within the last month? no    Arin Hensley RN         Patient admitted direct admit from Dr Davidson Record office with HR of 153. Once pt arrived, no c/o sob or cp. Pt states he was there for his annual checkup. No complaints at this time. Pt placed on telemetry. Notified Pa of HR. See following notes.

## 2020-10-28 ENCOUNTER — OUTSIDE FACILITY SERVICE (OUTPATIENT)
Dept: CARDIOLOGY | Facility: CLINIC | Age: 75
End: 2020-10-28

## 2020-10-28 VITALS
DIASTOLIC BLOOD PRESSURE: 68 MMHG | RESPIRATION RATE: 18 BRPM | OXYGEN SATURATION: 95 % | HEIGHT: 72 IN | HEART RATE: 84 BPM | BODY MASS INDEX: 36.01 KG/M2 | SYSTOLIC BLOOD PRESSURE: 122 MMHG | TEMPERATURE: 97.4 F | WEIGHT: 265.9 LBS

## 2020-10-28 LAB
A/G RATIO: 1.8 (ref 0.8–2)
ALBUMIN SERPL-MCNC: 4.3 G/DL (ref 3.4–4.8)
ALP BLD-CCNC: 99 U/L (ref 25–100)
ALT SERPL-CCNC: 23 U/L (ref 4–36)
ANION GAP SERPL CALCULATED.3IONS-SCNC: 10 MMOL/L (ref 3–16)
AST SERPL-CCNC: 19 U/L (ref 8–33)
BILIRUB SERPL-MCNC: 0.8 MG/DL (ref 0.3–1.2)
BUN BLDV-MCNC: 17 MG/DL (ref 6–20)
CALCIUM SERPL-MCNC: 9.2 MG/DL (ref 8.5–10.5)
CHLORIDE BLD-SCNC: 106 MMOL/L (ref 98–107)
CO2: 22 MMOL/L (ref 20–30)
CREAT SERPL-MCNC: 1.1 MG/DL (ref 0.4–1.2)
GFR AFRICAN AMERICAN: >59
GFR NON-AFRICAN AMERICAN: >59
GLOBULIN: 2.4 G/DL
GLUCOSE BLD-MCNC: 148 MG/DL (ref 74–106)
HCT VFR BLD CALC: 45.2 % (ref 40–54)
HEMOGLOBIN: 14.9 G/DL (ref 13–18)
LV EF: 50 %
LVEF MODALITY: NORMAL
MCH RBC QN AUTO: 29.7 PG (ref 27–32)
MCHC RBC AUTO-ENTMCNC: 33 G/DL (ref 31–35)
MCV RBC AUTO: 90 FL (ref 80–100)
PDW BLD-RTO: 12.7 % (ref 11–16)
PLATELET # BLD: 203 K/UL (ref 150–400)
PMV BLD AUTO: 10.5 FL (ref 6–10)
POTASSIUM REFLEX MAGNESIUM: 4.5 MMOL/L (ref 3.4–5.1)
RBC # BLD: 5.02 M/UL (ref 4.5–6)
SODIUM BLD-SCNC: 138 MMOL/L (ref 136–145)
TOTAL PROTEIN: 6.7 G/DL (ref 6.4–8.3)
WBC # BLD: 6.4 K/UL (ref 4–11)

## 2020-10-28 PROCEDURE — 93306 TTE W/DOPPLER COMPLETE: CPT

## 2020-10-28 PROCEDURE — 6370000000 HC RX 637 (ALT 250 FOR IP)

## 2020-10-28 PROCEDURE — 6370000000 HC RX 637 (ALT 250 FOR IP): Performed by: PHYSICIAN ASSISTANT

## 2020-10-28 PROCEDURE — 93306 TTE W/DOPPLER COMPLETE: CPT | Performed by: INTERNAL MEDICINE

## 2020-10-28 PROCEDURE — 80053 COMPREHEN METABOLIC PANEL: CPT

## 2020-10-28 PROCEDURE — 6370000000 HC RX 637 (ALT 250 FOR IP): Performed by: INTERNAL MEDICINE

## 2020-10-28 PROCEDURE — 36415 COLL VENOUS BLD VENIPUNCTURE: CPT

## 2020-10-28 PROCEDURE — 99235 HOSP IP/OBS SAME DATE MOD 70: CPT | Performed by: INTERNAL MEDICINE

## 2020-10-28 PROCEDURE — 85027 COMPLETE CBC AUTOMATED: CPT

## 2020-10-28 RX ORDER — METOPROLOL TARTRATE 50 MG/1
50 TABLET, FILM COATED ORAL 2 TIMES DAILY
Qty: 60 TABLET | Refills: 3 | Status: SHIPPED | OUTPATIENT
Start: 2020-10-28 | End: 2021-01-28

## 2020-10-28 RX ORDER — METOPROLOL TARTRATE 5 MG/5ML
5 INJECTION INTRAVENOUS ONCE
Status: DISCONTINUED | OUTPATIENT
Start: 2020-10-28 | End: 2020-10-28

## 2020-10-28 RX ORDER — DILTIAZEM HYDROCHLORIDE 120 MG/1
120 CAPSULE, COATED, EXTENDED RELEASE ORAL DAILY
Qty: 30 CAPSULE | Refills: 3 | Status: SHIPPED | OUTPATIENT
Start: 2020-10-28 | End: 2021-01-28

## 2020-10-28 RX ORDER — DILTIAZEM HYDROCHLORIDE 120 MG/1
120 CAPSULE, COATED, EXTENDED RELEASE ORAL DAILY
Status: DISCONTINUED | OUTPATIENT
Start: 2020-10-28 | End: 2020-10-28 | Stop reason: HOSPADM

## 2020-10-28 RX ADMIN — LOSARTAN POTASSIUM 100 MG: 50 TABLET ORAL at 08:23

## 2020-10-28 RX ADMIN — APIXABAN 5 MG: 5 TABLET, FILM COATED ORAL at 08:24

## 2020-10-28 RX ADMIN — DILTIAZEM HYDROCHLORIDE 30 MG: 30 TABLET ORAL at 11:05

## 2020-10-28 RX ADMIN — DILTIAZEM HYDROCHLORIDE 120 MG: 120 CAPSULE, COATED, EXTENDED RELEASE ORAL at 13:15

## 2020-10-28 RX ADMIN — METOPROLOL TARTRATE 50 MG: 50 TABLET, FILM COATED ORAL at 08:24

## 2020-10-28 RX ADMIN — FAMOTIDINE 20 MG: 20 TABLET ORAL at 08:23

## 2020-10-28 RX ADMIN — B-COMPLEX W/ C & FOLIC ACID TAB 1 TABLET: TAB at 08:23

## 2020-10-28 RX ADMIN — ATORVASTATIN CALCIUM 20 MG: 20 TABLET, FILM COATED ORAL at 08:24

## 2020-10-28 RX ADMIN — ASPIRIN 81 MG: 81 TABLET, CHEWABLE ORAL at 08:23

## 2020-10-28 RX ADMIN — TAMSULOSIN HYDROCHLORIDE 0.4 MG: 0.4 CAPSULE ORAL at 08:23

## 2020-10-28 RX ADMIN — Medication 30 MG: at 11:05

## 2020-10-28 RX ADMIN — FINASTERIDE 5 MG: 5 TABLET, FILM COATED ORAL at 08:24

## 2020-10-28 RX ADMIN — POTASSIUM CHLORIDE 10 MEQ: 750 TABLET, EXTENDED RELEASE ORAL at 08:23

## 2020-10-28 RX ADMIN — SPIRONOLACTONE 25 MG: 25 TABLET ORAL at 08:24

## 2020-10-28 ASSESSMENT — ENCOUNTER SYMPTOMS
COUGH: 0
BACK PAIN: 0
NAUSEA: 0
SINUS PRESSURE: 0
SHORTNESS OF BREATH: 0
WHEEZING: 0
ABDOMINAL PAIN: 0
VOMITING: 0
EYE DISCHARGE: 0

## 2020-10-28 NOTE — PROGRESS NOTES
Patient back at nurses station asking how long until he's going to get to leave. Took patient back to room. Spoke with patient and wife again extensively at this time. Explained that patient cannot be discharged with HR of 120-140 and Dr Princess Gutiérrez is waiting to speak with cardiology. Still after he speaks with cardiology patient may not be discharged today especially if his HR is not maintained. Discussed the ability to sign out against medical advice and discussed the risks of signing out AMA. Pt verbalized understanding. Patient wishes to sign out AMA but wife has spoke to him and he has agreed to stay for now.

## 2020-10-28 NOTE — FLOWSHEET NOTE
10/27/20 1950   Assessment   Charting Type Shift assessment   Neurological   Neuro (WDL) WDL   [place] Coma Scale   Eye Opening 4   Best Verbal Response 5   Best Motor Response 6   Bang Coma Scale Score 15   HEENT   HEENT (WDL) X   Right Eye Impaired vision   Left Eye Impaired vision   Teeth Partial plate upper   Respiratory   Respiratory (WDL) WDL   Cardiac   Cardiac (WDL) X   Cardiac Regularity Irregular   Heart Sounds S1, S2   Cardiac Rhythm ST   Cardiac Monitor   Telemetry Audible Yes   Telemetry Alarms Set Yes   Telemetry Box Number [id number]   Telemetry Monitor Alarm Parameters    Gastrointestinal   Abdominal (WDL) X   Abdomen Inspection Rounded   Tenderness Nontender; Soft   RUQ Bowel Sounds Hypoactive   LUQ Bowel Sounds Hypoactive   RLQ Bowel Sounds Hypoactive   LLQ Bowel Sounds Hypoactive   Peripheral Vascular   Peripheral Vascular (WDL) WDL   Edema None   Skin Color/Condition   Skin Color/Condition (WDL) WDL   Skin Integrity   Skin Integrity (WDL) WDL   Musculoskeletal   Musculoskeletal (WDL) WDL   Genitourinary   Genitourinary (WDL) WDL   Flank Tenderness No   Suprapubic Tenderness No   Dysuria No   Psychosocial   Psychosocial (WDL) WDL

## 2020-10-28 NOTE — DISCHARGE SUMMARY
Discharge Summary        Patient ID: Yecenia Delatorre                 Patient's PCP: Griselda Flores MD     Admit Date:    10/27/2020      Discharge Date:   10/28/2020     Admitting Physician: Griselda Flores MD     Discharge Physician: SAMMIE Jack      Reason for this admission:   Atrial fibrillation/atrial flutter with 's     Discharge Diagnoses: Active Hospital Problems     Diagnosis Date Noted    Tachycardia [R00.0] 10/27/2020    Atrial fibrillation and flutter ([address] [I48.91, I48.92] 05/10/2012    B12 deficiency [E53.8] 03/14/2012    Essential hypertension [I10] 01/09/2012    Hyperlipidemia [E78.5] 01/09/2012         Procedures:  XR CHEST PORTABLE   Final Result   Impression: No acute abnormality.                Consults:   IP CONSULT TO CASE MANAGEMENT  IP CONSULT TO CARDIOLOGY        HISTORY OF PRESENT ILLNESS:   The patient is a 66 y.o. male with past medical history of DVT on Eliquis, hypertension, hyperlipidemia, B12 deficiency who was sent as a direct admission from PCPs office secondary to incidental finding of heart rate of 150. EKG revealed atrial fibrillation/atrial flutter with heart rate 155. Patient denied any chest pain, shortness of breath or palpitations. Patient stated he felt fine and initially did not want to be admitted but later became agreeable.     Patient was given IV Lopressor with improvement of heart rate to 100 bpm.  He informed the admitting staff he had not taken his morning medications which included a beta-blocker. Routine work-up unremarkable. Chest x-ray negative. Echocardiogram 10/28 with EF 50% and mild mitral regurgitation. Patient is an established patient of Dr. Alexa Juarez. Dr. Rebekah Duke spoke with on-call Cardiology who recommended additional of cardizem and increasing of metoprolol. HR better controlled. Pt eager to be discharged home today.   He will follow-up with cardiology as scheduled.     Review of system  Constitutional:  Denies fever or Mother      Heart Disease Father           chf    Heart Disease Sister      Cancer Brother           lungs    Cancer Brother      Heart Disease Sister      Heart Disease Brother             Allergies:  Codeine; Morphine; Cephalexin; and Pcn [penicillins]     Medications Prior to Admission:    Home Medications           Prior to Admission medications    Medication Sig Start Date End Date Taking? Authorizing Provider   metoprolol tartrate (LOPRESSOR) 50 MG tablet Take 1 tablet by mouth 2 times daily 10/28/20   Yes SAMMIE Cox   dilTIAZem (CARDIZEM CD) 120 MG extended release capsule Take 1 capsule by mouth daily 10/28/20   Yes SAMMIE Cox   hydroCHLOROthiazide (MICROZIDE) 12.5 MG capsule TAKE ONE CAPSULE BY MOUTH ONCE DAILY 9/28/20   Yes Ken Courtney MD   losartan (COZAAR) 100 MG tablet TAKE 1 TABLET BY MOUTH DAILY 9/1/20   Yes Ken Courtney MD   spironolactone (ALDACTONE) 25 MG tablet TAKE 1 TABLET BY MOUTH DAILY 7/15/20   Yes Ken Courtney MD   ELIQUIS 5 MG TABS tablet TAKE 1 TABLET BY MOUTH EVERY 12 HOURS 7/13/20   Yes Ken Courtney MD   finasteride (PROSCAR) 5 MG tablet Take 1 tablet by mouth daily 6/30/20   Yes Ken Courtney MD   tamsulosin (FLOMAX) 0.4 MG capsule TAKE ONE CAPSULE BY MOUTH ONCE DAILY 6/30/20   Yes Ken Courtney MD   potassium chloride (KLOR-CON) 10 MEQ extended release tablet TAKE ONE TABLET BY MOUTH ONCE DAILY 6/29/20   Yes Ken Courtney MD   b complex vitamins tablet Take 1 tablet by mouth daily 12/5/19   Yes Ken Courtney MD   atorvastatin (LIPITOR) 20 MG tablet TAKE ONE TABLET BY MOUTH ONCE DAILY 11/19/19   Yes Ken Courtney MD   aspirin 81 MG tablet Take 81 mg by mouth daily.     Yes Ken Courtney MD           Vital Signs  Temp: 97.4 °F (36.3 °C)  Pulse: 84  Resp: 18  BP: 122/68  SpO2: 95 %  O2 Device: None (Room air)     Vital signs reviewed in electronic chart.     Physical exam  Constitutional:  Well developed, well nourished, no acute distress.   Eyes:  PERRL, conjunctiva normal, EOMI. HENT:  Atraumatic, external ears normal, external nose/nares normal, oropharynx moist, no pharyngeal exudates. Neck:  Supple. No JVD or thyromegaly. Respiratory:  No respiratory distress, normal breath sounds, no rales, no wheezing. Cardiovascular:  Normal rate, normal rhythm, no murmurs, no gallops, no rubs. GI:  Soft, nondistended, normal bowel sounds, nontender, no organomegaly, no mass. :  No costovertebral angle tenderness. Musculoskeletal:  No edema, no tenderness, no obvious deformities. Patient is moving all extremities. Integument:  Well hydrated, no rash. Lymphatic:  No cervical or axillary lymphadenopathy noted. Neurologic:  Alert & oriented x 3,  no focal deficits noted. Strength is equal throughout.   Psychiatric:  Speech and behavior appropriate.              Lab Results   Component Value Date     WBC 6.4 10/28/2020     HGB 14.9 10/28/2020     HCT 45.2 10/28/2020     MCV 90.0 10/28/2020      10/28/2020               Lab Results   Component Value Date      10/28/2020     K 4.5 10/28/2020      10/28/2020     CO2 22 10/28/2020     BUN 17 10/28/2020     CREATININE 1.1 10/28/2020     GLUCOSE 148 (H) 10/28/2020     CALCIUM 9.2 10/28/2020     PROT 6.7 10/28/2020     LABALBU 4.3 10/28/2020     BILITOT 0.8 10/28/2020     ALKPHOS 99 10/28/2020     AST 19 10/28/2020     ALT 23 10/28/2020     LABGLOM >59 10/28/2020     GFRAA >59 10/28/2020     AGRATIO 1.8 10/28/2020     GLOB 2.4 10/28/2020               PA/lat CXR:   XR CHEST PORTABLE   Final Result   Impression: No acute abnormality.                EKG: Atrial fib/atrial flutter with heart rate 155 bpm     Assessment and Plan/Hospital course:           Active Hospital Problems     Diagnosis Date Noted    Atrial fibrillation and flutter ([facility] [address] [I48.91, I48.92]  -New diagnosis for patient  -EKG with atrial fib/atrial flutter with heart rate 155 bpm  -Heart rate improved with IV metoprolol and oral     aspirin 81 MG tablet Take 81 mg by mouth daily.                   Patient was seen and examined by Dr. Sheree Bobo and plan of care reviewed.        Signed:  Electronically signed by SAMMIE Workman on 10/28/2020 at 12:56 PM         Thank you Madelyn Bose MD for the opportunity to be involved in this patient's care. If you have any questions or concerns please feel free to contact me at [phone].

## 2020-10-28 NOTE — PROGRESS NOTES
Pt given discharge instructions at this time. Instructed on follow up appointments with Dr. Lisandro Jessica and Dr Milagros Ch. Instructed to  new medications at listed pharmacy and when next dose of medications are due. Voiced understanding. IV x 2 dc'd. Tele dc'd. Patient left at this time ambulatory accompanied by wife.

## 2020-10-28 NOTE — H&P
Short Stay Summary      Patient ID: Yecenia Delatorre      Patient's PCP: Griselda Flores MD    Admit Date: 10/27/2020     Discharge Date:   10/28/2020    Admitting Physician: Griselda Flores MD    Discharge Physician: SAMMIE Jack     Reason for this admission:   Atrial fibrillation/atrial flutter with 's    Discharge Diagnoses: Active Hospital Problems    Diagnosis Date Noted    Tachycardia [R00.0] 10/27/2020    Atrial fibrillation and flutter ([address] [I48.91, I48.92] 05/10/2012    B12 deficiency [E53.8] 03/14/2012    Essential hypertension [I10] 01/09/2012    Hyperlipidemia [E78.5] 01/09/2012       Procedures:  XR CHEST PORTABLE   Final Result   Impression: No acute abnormality. Consults:   IP CONSULT TO CASE MANAGEMENT  IP CONSULT TO CARDIOLOGY      HISTORY OF PRESENT ILLNESS:   The patient is a 66 y.o. male with past medical history of DVT on Eliquis, hypertension, hyperlipidemia, B12 deficiency who was sent as a direct admission from PCPs office secondary to incidental finding of heart rate of 150. EKG revealed atrial fibrillation/atrial flutter with heart rate 155. Patient denied any chest pain, shortness of breath or palpitations. Patient stated he felt fine and initially did not want to be admitted but later became agreeable. Patient was given IV Lopressor with improvement of heart rate to 100 bpm.  He informed the admitting staff he had not taken his morning medications which included a beta-blocker. Routine work-up unremarkable. Chest x-ray negative. Echocardiogram 10/28 with EF 50% and mild mitral regurgitation. Patient is an established patient of Dr. Alexa Juarez. Dr. Rebekah Duke spoke with on-call Cardiology who recommended additional of cardizem and increasing of metoprolol. HR better controlled. Pt eager to be discharged home today. He will follow-up with cardiology as scheduled. Review of system  Constitutional:  Denies fever or chills.    Eyes:  Denies change in visual acuity or discharge. HENT:  Denies nasal congestion or sore throat. Respiratory:  Denies cough or shortness of breath. Cardiovascular:  Denies chest pain, palpitation or swelling in LEs. Positive for elevated heart rate. GI:  Denies abdominal pain, nausea, vomiting, bloody stools or diarrhea. :  Denies dysuria or frequency. Musculoskeletal:  Denies back pain or joint pain. Integument:  Denies rash or itching. Neurologic:  Denies headache, focal weakness or sensory changes. Endocrine:  Denies polyuria or polydipsia. Lymphatic:  Denies swollen glands or night sweats. Psychiatric:  Denies depression or anxiety. Past Medical History:      Diagnosis Date    Atrial fibrillation ([facility] [address]     Cardiomyopathy ([facility] [address]     Cataract     GERD (gastroesophageal reflux disease)     History of DVT (deep vein thrombosis)     s/p IVC filter    Hyperlipidemia     Hypertension     LONG TERM ANTICOAGULENT USE     Osteoarthritis     Unspecified sleep apnea        Past Surgical History:      Procedure Laterality Date    ANKLE SURGERY      left    CHOLECYSTECTOMY      EYE SURGERY      left & right cataract    FRACTURE SURGERY Left     femure    HAND SURGERY      left thumb    HIP SURGERY      left    INNER EAR SURGERY      left    KNEE ARTHROSCOPY      right    KNEE SURGERY  12/07/11    RIGHT    NOSE SURGERY      left    SPINE SURGERY      VENOUS CLOT SURGERY  2008    right leg after hip replacement       Social History:   TOBACCO:   reports that he quit smoking about 32 years ago. His smoking use included cigarettes. He has a 40.00 pack-year smoking history. He has never used smokeless tobacco.  ETOH:   reports no history of alcohol use.   OCCUPATION:  None     Family History:       Problem Relation Age of Onset    Heart Disease Mother     Heart Disease Father         chf    Heart Disease Sister     Cancer Brother         lungs    Cancer Brother     Heart Disease Sister     Heart Disease Brother Allergies:  Codeine; Morphine; Cephalexin; and Pcn [penicillins]    Medications Prior to Admission:    Prior to Admission medications    Medication Sig Start Date End Date Taking? Authorizing Provider   metoprolol tartrate (LOPRESSOR) 50 MG tablet Take 1 tablet by mouth 2 times daily 10/28/20  Yes SAMMIE Cox   dilTIAZem (CARDIZEM CD) 120 MG extended release capsule Take 1 capsule by mouth daily 10/28/20  Yes SAMMIE Cox   hydroCHLOROthiazide (MICROZIDE) 12.5 MG capsule TAKE ONE CAPSULE BY MOUTH ONCE DAILY 9/28/20  Yes Rose Odell MD   losartan (COZAAR) 100 MG tablet TAKE 1 TABLET BY MOUTH DAILY 9/1/20  Yes Rose Odell MD   spironolactone (ALDACTONE) 25 MG tablet TAKE 1 TABLET BY MOUTH DAILY 7/15/20  Yes Rose Odell MD   ELIQUIS 5 MG TABS tablet TAKE 1 TABLET BY MOUTH EVERY 12 HOURS 7/13/20  Yes Rose Odell MD   finasteride (PROSCAR) 5 MG tablet Take 1 tablet by mouth daily 6/30/20  Yes Rose Odell MD   tamsulosin (FLOMAX) 0.4 MG capsule TAKE ONE CAPSULE BY MOUTH ONCE DAILY 6/30/20  Yes Rose Odell MD   potassium chloride (KLOR-CON) 10 MEQ extended release tablet TAKE ONE TABLET BY MOUTH ONCE DAILY 6/29/20  Yes Rose Odell MD   b complex vitamins tablet Take 1 tablet by mouth daily 12/5/19  Yes Rose Odell MD   atorvastatin (LIPITOR) 20 MG tablet TAKE ONE TABLET BY MOUTH ONCE DAILY 11/19/19  Yes Rose Odell MD   aspirin 81 MG tablet Take 81 mg by mouth daily. Yes Rose Odell MD       Vital Signs  Temp: 97.4 °F (36.3 °C)  Pulse: 84  Resp: 18  BP: 122/68  SpO2: 95 %  O2 Device: None (Room air)       Vital signs reviewed in electronic chart. Physical exam  Constitutional:  Well developed, well nourished, no acute distress. Eyes:  PERRL, conjunctiva normal, EOMI. HENT:  Atraumatic, external ears normal, external nose/nares normal, oropharynx moist, no pharyngeal exudates. Neck:  Supple. No JVD or thyromegaly.   Respiratory:  No respiratory distress, normal breath sounds, no free to contact me at [phone].

## 2020-10-28 NOTE — CARE COORDINATION
10/28/20 0944   Discharge Planning   [address] Family Members;Spouse/Significant Other;Children   Current Services Prior To Admission Durable Medical Equipment   DME Wheelchair;Walker;Cane; Shower Chair;Bedside Commode;Oxygen Therapy (Comment)   Potential Assistance Needed N/A   Potential Assistance Purchasing Medications No   Type of Home Care Services None   Patient expects to be discharged to: home   Expected Discharge Date 10/28/20   Pt denies any d/c needs at this time.   Pt wife assists pt as needed and children are available if they need more help

## 2020-10-28 NOTE — PROGRESS NOTES
SUBJECTIVE:    Patient ID: Yuan Marina is a 66 y. o.male. Chief Complaint   Patient presents with    Hypertension    Hyperlipidemia    Atrial Fibrillation         HPI:  Patient presented today for his regular follow-up regarding A. fib, hypertension and others. Patient was noted to be tachycardic with heart rate around 150. Patient denies any chest pain or palpitation. He reported being compliant with taking his medication but he reported that he has not taken his morning medicine so far. He has been compliant with using his anticoagulation. No easy bruising or bleeding reported. Some swelling in the lower extremities but at baseline. Blood pressure has been stable. Patient's medications, allergies, past medical, surgical, social and family histories were reviewed and updated as appropriate in electronic medical record. Outpatient Medications Marked as Taking for the 10/27/20 encounter (Office Visit) with Willow Hay MD   Medication Sig Dispense Refill    hydroCHLOROthiazide (MICROZIDE) 12.5 MG capsule TAKE ONE CAPSULE BY MOUTH ONCE DAILY 90 capsule 3    losartan (COZAAR) 100 MG tablet TAKE 1 TABLET BY MOUTH DAILY 30 tablet 5    spironolactone (ALDACTONE) 25 MG tablet TAKE 1 TABLET BY MOUTH DAILY 30 tablet 3    ELIQUIS 5 MG TABS tablet TAKE 1 TABLET BY MOUTH EVERY 12 HOURS 60 tablet 3    finasteride (PROSCAR) 5 MG tablet Take 1 tablet by mouth daily 90 tablet 3    tamsulosin (FLOMAX) 0.4 MG capsule TAKE ONE CAPSULE BY MOUTH ONCE DAILY 90 capsule 3    potassium chloride (KLOR-CON) 10 MEQ extended release tablet TAKE ONE TABLET BY MOUTH ONCE DAILY 90 tablet 1    metoprolol tartrate (LOPRESSOR) 25 MG tablet TAKE ONE TABLET BY MOUTH TWICE A  tablet 5    b complex vitamins tablet Take 1 tablet by mouth daily 30 tablet 3    [DISCONTINUED] triamcinolone (KENALOG) 0.1 % cream Apply topically 2 times daily.  45 g 1    atorvastatin (LIPITOR) 20 MG tablet TAKE ONE TABLET BY MOUTH ONCE DAILY 90 tablet 3    [DISCONTINUED] citalopram (CELEXA) 20 MG tablet TAKE ONE TABLET BY MOUTH ONCE DAILY 90 tablet 3    aspirin 81 MG tablet Take 81 mg by mouth daily. Review of Systems   Constitutional: Negative for chills, fatigue and fever. HENT: Negative for congestion, ear pain and sinus pressure. Eyes: Negative for discharge and visual disturbance. Respiratory: Negative for cough, shortness of breath and wheezing. Cardiovascular: Positive for leg swelling. Negative for chest pain and palpitations. JOY   Gastrointestinal: Negative for abdominal pain, nausea and vomiting. Endocrine: Negative for cold intolerance and heat intolerance. Genitourinary: Negative for dysuria, frequency and urgency. Musculoskeletal: Positive for arthralgias. Negative for back pain. Skin: Negative for pallor and rash. Allergic/Immunologic: Negative for food allergies and immunocompromised state. Neurological: Negative for dizziness, numbness and headaches. Hematological: Negative for adenopathy. Does not bruise/bleed easily. Psychiatric/Behavioral: Negative for agitation and sleep disturbance. The patient is not nervous/anxious.         Past Medical History:   Diagnosis Date    Atrial fibrillation ([address]     Cardiomyopathy ([facility]     Cataract     GERD (gastroesophageal reflux disease)     History of DVT (deep vein thrombosis)     s/p IVC filter    Hyperlipidemia     Hypertension     LONG TERM ANTICOAGULENT USE     Osteoarthritis     Unspecified sleep apnea      Past Surgical History:   Procedure Laterality Date    ANKLE SURGERY      left    CHOLECYSTECTOMY      EYE SURGERY      left & right cataract    FRACTURE SURGERY Left     femure    HAND SURGERY      left thumb    HIP SURGERY      left    INNER EAR SURGERY      left    KNEE ARTHROSCOPY      right    KNEE SURGERY  12/07/11    RIGHT    NOSE SURGERY      left    SPINE SURGERY      VENOUS CLOT SURGERY  2008    right leg after hip replacement     Family History   Problem Relation Age of Onset    Heart Disease Mother     Heart Disease Father         chf    Heart Disease Sister     Cancer Brother         lungs    Cancer Brother     Heart Disease Sister     Heart Disease Brother       Social History     Tobacco Use   Smoking Status Former Smoker    Packs/day: 1.00    Years: 40.00    Pack years: 40.00    Types: Cigarettes    Last attempt to quit: 1988    Years since quittin.8   Smokeless Tobacco Never Used       OBJECTIVE:   Wt Readings from Last 3 Encounters:   10/27/20 265 lb 14.4 oz (120.6 kg)   10/27/20 268 lb (121.6 kg)   20 257 lb (116.6 kg)     BP Readings from Last 3 Encounters:   10/27/20 123/67   10/27/20 111/63   20 126/62       /63 (Site: Right Upper Arm, Position: Sitting, Cuff Size: Large Adult)   Pulse 161   Temp 97.7 °F (36.5 °C) (Temporal)   Wt 268 lb (121.6 kg)   SpO2 97% Comment: room air  BMI 36.35 kg/m²      Physical Exam  Vitals signs and nursing note reviewed. Constitutional:       Appearance: Normal appearance. He is well-developed. He is obese. HENT:      Head: Normocephalic and atraumatic. Right Ear: External ear normal.      Left Ear: External ear normal.      Nose: Nose normal.      Mouth/Throat:      Mouth: Mucous membranes are moist.      Pharynx: Oropharynx is clear. Eyes:      Conjunctiva/sclera: Conjunctivae normal.      Pupils: Pupils are equal, round, and reactive to light. Neck:      Musculoskeletal: Neck supple. No neck rigidity or muscular tenderness. Thyroid: No thyromegaly. Vascular: No JVD. Cardiovascular:      Rate and Rhythm: Regular rhythm. Tachycardia present. Heart sounds: Normal heart sounds. No murmur. No friction rub. Pulmonary:      Effort: Pulmonary effort is normal.      Breath sounds: Normal breath sounds. No wheezing or rales.    Abdominal:      General: Bowel sounds are normal.      Palpations: Abdomen

## 2020-10-29 RX ORDER — POTASSIUM CHLORIDE 750 MG/1
TABLET, FILM COATED, EXTENDED RELEASE ORAL
Qty: 90 TABLET | Refills: 0 | Status: SHIPPED | OUTPATIENT
Start: 2020-10-29 | End: 2021-01-01

## 2020-10-29 RX ORDER — FINASTERIDE 5 MG/1
5 TABLET, FILM COATED ORAL DAILY
Qty: 90 TABLET | Refills: 0 | Status: SHIPPED | OUTPATIENT
Start: 2020-10-29 | End: 2021-01-01

## 2020-10-30 ENCOUNTER — CARE COORDINATION (OUTPATIENT)
Dept: CARE COORDINATION | Age: 75
End: 2020-10-30

## 2020-11-01 LAB — BLOOD CULTURE, ROUTINE: NORMAL

## 2020-11-04 LAB
EKG ATRIAL RATE: 153 BPM
EKG DIAGNOSIS: NORMAL
EKG Q-T INTERVAL: 252 MS
EKG QRS DURATION: 110 MS
EKG QTC CALCULATION (BAZETT): 402 MS
EKG R AXIS: -16 DEGREES
EKG T AXIS: 234 DEGREES
EKG VENTRICULAR RATE: 153 BPM

## 2020-11-11 ENCOUNTER — OFFICE VISIT (OUTPATIENT)
Dept: PRIMARY CARE CLINIC | Age: 75
End: 2020-11-11
Payer: MEDICARE

## 2020-11-11 VITALS
RESPIRATION RATE: 18 BRPM | WEIGHT: 262.2 LBS | BODY MASS INDEX: 35.56 KG/M2 | HEART RATE: 86 BPM | TEMPERATURE: 97.7 F | SYSTOLIC BLOOD PRESSURE: 136 MMHG | DIASTOLIC BLOOD PRESSURE: 70 MMHG | OXYGEN SATURATION: 98 %

## 2020-11-11 PROCEDURE — 1111F DSCHRG MED/CURRENT MED MERGE: CPT | Performed by: INTERNAL MEDICINE

## 2020-11-11 PROCEDURE — 99214 OFFICE O/P EST MOD 30 MIN: CPT | Performed by: INTERNAL MEDICINE

## 2020-11-11 ASSESSMENT — ENCOUNTER SYMPTOMS
WHEEZING: 0
COUGH: 0
VOMITING: 0
SHORTNESS OF BREATH: 0
ABDOMINAL PAIN: 0
NAUSEA: 0
BACK PAIN: 0
SINUS PRESSURE: 0
EYE DISCHARGE: 0

## 2020-11-11 NOTE — PROGRESS NOTES
DAILY             metoprolol tartrate (LOPRESSOR) 50 MG tablet  Take 1 tablet by mouth 2 times daily             potassium chloride (KLOR-CON) 10 MEQ extended release tablet  daily             spironolactone (ALDACTONE) 25 MG tablet  TAKE 1 TABLET BY MOUTH DAILY             tamsulosin (FLOMAX) 0.4 MG capsule  TAKE ONE CAPSULE BY MOUTH ONCE DAILY                   Medications marked \"taking\" at this time  Outpatient Medications Marked as Taking for the 11/11/20 encounter (Office Visit) with Alek Carmen MD   Medication Sig Dispense Refill    finasteride (PROSCAR) 5 MG tablet Take 1 tablet by mouth daily 90 tablet 0    potassium chloride (KLOR-CON) 10 MEQ extended release tablet daily 90 tablet 0    metoprolol tartrate (LOPRESSOR) 50 MG tablet Take 1 tablet by mouth 2 times daily 60 tablet 3    dilTIAZem (CARDIZEM CD) 120 MG extended release capsule Take 1 capsule by mouth daily 30 capsule 3    hydroCHLOROthiazide (MICROZIDE) 12.5 MG capsule TAKE ONE CAPSULE BY MOUTH ONCE DAILY 90 capsule 3    losartan (COZAAR) 100 MG tablet TAKE 1 TABLET BY MOUTH DAILY 30 tablet 5    spironolactone (ALDACTONE) 25 MG tablet TAKE 1 TABLET BY MOUTH DAILY 30 tablet 3    ELIQUIS 5 MG TABS tablet TAKE 1 TABLET BY MOUTH EVERY 12 HOURS 60 tablet 3    tamsulosin (FLOMAX) 0.4 MG capsule TAKE ONE CAPSULE BY MOUTH ONCE DAILY 90 capsule 3    b complex vitamins tablet Take 1 tablet by mouth daily 30 tablet 3    atorvastatin (LIPITOR) 20 MG tablet TAKE ONE TABLET BY MOUTH ONCE DAILY 90 tablet 3    aspirin 81 MG tablet Take 81 mg by mouth daily. Medications patient taking as of now reconciled against medications ordered at time of hospital discharge: Yes    Chief Complaint   Patient presents with    Follow-Up from Hospital     [facility]       HPI    Inpatient course: Discharge summary reviewed- see chart. Interval history/Current status:   Patient was admitted to [facility] from this office for Afib with RVR. Patient was asymptomatic. Medications were adjusted. He received IV beta-blocker while at the hospital. Patient with history of cardioversion that failed in the past. He has not followed up with Dr. Jaydon Ortega for some time. Patient reports that he has not felt any differently since being discharged and medication was adjusted. Beta blocker was increased and he was started on Cardizem. He was already on Eliquis without side effects. Patient has had hypertension for several years. He has been compliant with taking medications, without side effects from it. He has been following a low-sodium, is  active and rarely exercises. Weight is stable, compared to last visit. His blood pressure is stable at this time. Patient was noted to have a slightly elevated glucose on fasting lab. He denies any personal has a + family history of diabetes. He does not check his blood sugars. There are no symptoms of hyperglycemia. Review of Systems   Constitutional: Negative for chills, fatigue and fever. HENT: Negative for congestion, ear pain and sinus pressure. Eyes: Negative for discharge and visual disturbance. Respiratory: Negative for cough, shortness of breath and wheezing. Cardiovascular: Positive for leg swelling. Negative for chest pain and palpitations. JOY   Gastrointestinal: Negative for abdominal pain, nausea and vomiting. Endocrine: Negative for cold intolerance and heat intolerance. Genitourinary: Negative for dysuria, frequency and urgency. Musculoskeletal: Positive for arthralgias. Negative for back pain. Skin: Negative for pallor and rash. Allergic/Immunologic: Negative for food allergies and immunocompromised state. Neurological: Negative for dizziness, numbness and headaches. Hematological: Negative for adenopathy. Does not bruise/bleed easily. Psychiatric/Behavioral: Negative for agitation and sleep disturbance. The patient is not nervous/anxious.         Vitals:    11/11/20 1535   BP: 136/70   Pulse: 86   Resp: 18   Temp: 97.7 °F (36.5 °C)   SpO2: 98%   Weight: 262 lb 3.2 oz (118.9 kg)     Body mass index is 35.56 kg/m². Wt Readings from Last 3 Encounters:   11/11/20 262 lb 3.2 oz (118.9 kg)   10/27/20 265 lb 14.4 oz (120.6 kg)   10/27/20 268 lb (121.6 kg)     BP Readings from Last 3 Encounters:   11/11/20 136/70   10/28/20 122/68   10/27/20 111/63       Physical Exam  Vitals signs and nursing note reviewed. Constitutional:       Appearance: Normal appearance. He is well-developed. He is obese. HENT:      Head: Normocephalic and atraumatic. Right Ear: External ear normal.      Left Ear: External ear normal.      Nose: Nose normal.      Mouth/Throat:      Mouth: Mucous membranes are moist.      Pharynx: Oropharynx is clear. Eyes:      Conjunctiva/sclera: Conjunctivae normal.      Pupils: Pupils are equal, round, and reactive to light. Neck:      Musculoskeletal: Neck supple. No neck rigidity or muscular tenderness. Thyroid: No thyromegaly. Vascular: No JVD. Cardiovascular:      Rate and Rhythm: Normal rate and regular rhythm. Heart sounds: Normal heart sounds. No murmur. No friction rub. Pulmonary:      Effort: Pulmonary effort is normal.      Breath sounds: Normal breath sounds. No wheezing or rales. Abdominal:      General: Bowel sounds are normal.      Palpations: Abdomen is soft. Tenderness: There is no abdominal tenderness. There is no rebound. Musculoskeletal:         General: No tenderness. Right lower leg: Edema (trace) present. Left lower leg: Edema (trace) present. Skin:     Findings: No erythema or rash. Neurological:      General: No focal deficit present. Mental Status: He is alert and oriented to person, place, and time.    Psychiatric:         Behavior: Behavior normal.         Judgment: Judgment normal.         Lab Results   Component Value Date     10/28/2020    K 4.5 10/28/2020     10/28/2020    CO2 22 10/28/2020    GLUCOSE 148 10/28/2020    BUN 17 10/28/2020    CREATININE 1.1 10/28/2020    CALCIUM 9.2 10/28/2020    PROT 6.7 10/28/2020    LABALBU 4.3 10/28/2020    BILITOT 0.8 10/28/2020    ALT 23 10/28/2020    AST 19 10/28/2020       Hemoglobin A1C (%)   Date Value   07/01/2020 6.4 (H)     LDL Calculated (mg/dL)   Date Value   07/01/2019 85         Lab Results   Component Value Date    WBC 6.4 10/28/2020    NEUTROABS 3.7 10/27/2020    HGB 14.9 10/28/2020    HCT 45.2 10/28/2020    MCV 90.0 10/28/2020     10/28/2020       Lab Results   Component Value Date    TSH 5.070 (H) 07/01/2020       Assessment/Plan:  1. Atrial fibrillation, unspecified type ([address]  Sinus on exam. Will cont on meds. I had a long discussion with the patient regarding the risk/benefit from anticoagulation. Patient is aware of that. Patient is in the process to follow-up with cardiology in the near future. 2. Essential hypertension  BP is stable. I have advised him on low-sodium diet, exercise and weight control. I am going to continue current medication. Will monitor his renal function every few months, have advised him to check blood pressure frequently and to keep a record of this. 3. Glucose intolerance (impaired glucose tolerance)   I have advised him on diet, exercise and weight control. I have also, advised him that this condition could possibly progress into diabetes. I will monitor his A1c periodically. 4. Screening for prostate cancer  Check PSA on a yearly basis. MARYSE every year if agreable. He refused MARYSE. Medical Decision Making: moderate complexity    Written by Quentin Goldberg CMA acting as scribe for Dr. Beatriz Bautista on 11/11/2020 at 3:56 PM.    I, Dr. Jordan Cleary, personally performed the services described in the documentation as scribed by Quentin Goldberg CMA, in my presence and it is both accurate and complete.

## 2020-11-25 RX ORDER — SPIRONOLACTONE 25 MG/1
25 TABLET ORAL DAILY
Qty: 30 TABLET | Refills: 3 | Status: SHIPPED | OUTPATIENT
Start: 2020-11-25 | End: 2021-03-30

## 2020-11-25 RX ORDER — APIXABAN 5 MG/1
TABLET, FILM COATED ORAL
Qty: 60 TABLET | Refills: 3 | Status: SHIPPED | OUTPATIENT
Start: 2020-11-25 | End: 2021-03-30

## 2020-11-30 RX ORDER — ATORVASTATIN CALCIUM 20 MG/1
TABLET, FILM COATED ORAL
Qty: 90 TABLET | Refills: 3 | Status: SHIPPED | OUTPATIENT
Start: 2020-11-30 | End: 2021-01-01

## 2020-12-21 ENCOUNTER — OFFICE VISIT (OUTPATIENT)
Dept: CARDIOLOGY | Facility: CLINIC | Age: 75
End: 2020-12-21

## 2020-12-21 VITALS
DIASTOLIC BLOOD PRESSURE: 82 MMHG | HEIGHT: 72 IN | BODY MASS INDEX: 34.7 KG/M2 | OXYGEN SATURATION: 97 % | SYSTOLIC BLOOD PRESSURE: 136 MMHG | WEIGHT: 256.2 LBS | HEART RATE: 94 BPM

## 2020-12-21 DIAGNOSIS — I48.20 CHRONIC ATRIAL FIBRILLATION (HCC): Primary | ICD-10-CM

## 2020-12-21 DIAGNOSIS — E78.2 MIXED HYPERLIPIDEMIA: ICD-10-CM

## 2020-12-21 DIAGNOSIS — I10 ESSENTIAL HYPERTENSION: ICD-10-CM

## 2020-12-21 PROCEDURE — 99214 OFFICE O/P EST MOD 30 MIN: CPT | Performed by: INTERNAL MEDICINE

## 2020-12-21 PROCEDURE — 93000 ELECTROCARDIOGRAM COMPLETE: CPT | Performed by: INTERNAL MEDICINE

## 2020-12-21 RX ORDER — DILTIAZEM HYDROCHLORIDE 120 MG/1
120 TABLET, FILM COATED ORAL DAILY
COMMUNITY

## 2020-12-21 NOTE — PROGRESS NOTES
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 A-fib/A-flutter  1. s/p ECV x 2, last 9/19 [facility]- ERAF  2. 9/1 9 Echo [facility] EF 45% AV sclerosis  3. 10/20 readmission M/W (missed Rx dosing)  2. DILAN-compliant with CPAP  3. HTN  4. HL  1. 7/15 144/172/29/81  5. Normal coronary arteries per remote LHC  6. DVt s/p IVC filter  7. 2015 L traumatic femur fx- Sajadi    Chief Complaint   Patient presents with   • PAF       Allergies  Allergies   Allergen Reactions   • Codeine Unknown (See Comments)     Pt/family can't recall     • Keflex [Cephalexin] Unknown (See Comments)     Pt/family unsure     • Morphine And Related Unknown (See Comments)     Pt/family can't recall   • Penicillins Unknown (See Comments)     Pt/family can't recall.       Current Medications    Current Outpatient Medications:   •  apixaban (ELIQUIS) 5 MG tablet tablet, Take 1 tablet by mouth Every 12 (Twelve) Hours., Disp: 60 tablet, Rfl: 1  •  aspirin 81 MG EC tablet, Take 81 mg by mouth Daily., Disp: , Rfl:   •  atorvastatin (LIPITOR) 20 MG tablet, Take 20 mg by mouth Daily., Disp: , Rfl:   •  b complex vitamins tablet, Take 1 tablet by mouth Daily., Disp: , Rfl:   •  dilTIAZem (CARDIZEM) 120 MG tablet, Take 120 mg by mouth Daily., Disp: , Rfl:   •  finasteride (PROSCAR) 5 MG tablet, 5 mg Daily., Disp: , Rfl: 3  •  fluticasone (VERAMYST) 27.5 MCG/SPRAY nasal spray, 2 sprays into the nostril(s) as directed by provider As Needed for Rhinitis., Disp: , Rfl:   •  hydrochlorothiazide (MICROZIDE) 12.5 MG capsule, Take 12.5 mg by mouth Daily., Disp: , Rfl:   •  LOSARTAN POTASSIUM PO, Take 100 mg by mouth Daily., Disp: , Rfl:   •  metoprolol tartrate (LOPRESSOR) 25 MG tablet, Take 50 mg by mouth 2 (Two) Times a Day., Disp: , Rfl:   •  potassium chloride  "(K-DUR) 10 MEQ CR tablet, Take 10 mEq by mouth Daily., Disp: , Rfl:   •  spironolactone (ALDACTONE) 25 MG tablet, Take 1 tablet by mouth Daily., Disp: 30 tablet, Rfl: 1  •  tamsulosin (FLOMAX) 0.4 MG capsule 24 hr capsule, Take 1 capsule by mouth Daily., Disp: , Rfl:       History of Present Illness   Angela Palencia is a 75 y.o. year old male here for follow up.  He was admitted and observed in October at [facility] due to rapid ventricular response A. fib.  He states he has felt well since that time.  He was not taking his medication as per prescribed at that time      OBJECTIVE:  Vitals:    12/21/20 1404   BP: 136/82   BP Location: Right arm   Patient Position: Sitting   Pulse: 94   SpO2: 97%   Weight: 116 kg (256 lb 3.2 oz)   Height: 182.9 cm (72\")     Physical Exam   Constitutional: He appears well-developed and well-nourished.   Neck: Normal range of motion. Neck supple. No hepatojugular reflux and no JVD present. Carotid bruit is not present. No tracheal deviation present. No thyromegaly present.   Cardiovascular: Normal rate, S1 normal, S2 normal, intact distal pulses and normal pulses. An irregularly irregular rhythm present. PMI is not displaced. Exam reveals no gallop, no distant heart sounds, no friction rub, no midsystolic click and no opening snap.   No murmur heard.  Pulses:       Radial pulses are 2+ on the right side and 2+ on the left side.        Dorsalis pedis pulses are 2+ on the right side and 2+ on the left side.        Posterior tibial pulses are 2+ on the right side and 2+ on the left side.   Pulmonary/Chest: Effort normal and breath sounds normal. He has no wheezes. He has no rales.   Abdominal: Soft. Bowel sounds are normal. He exhibits no mass. There is no abdominal tenderness. There is no guarding.   Musculoskeletal:         General: Edema present.       Diagnostic Data:    ECG 12 Lead    Date/Time: 12/21/2020 2:36 PM  Performed by: Zack Almanza MD  Authorized by: Zack Almanza " MD LIZETTE   Comparison: compared with previous ECG from 10/2/2019  Rhythm: atrial flutter  BPM: 94    Clinical impression: abnormal EKG              ASSESSMENT:   Diagnosis Plan   1. Chronic atrial fibrillation (CMS/HCC)     2. Essential hypertension     3. Mixed hyperlipidemia         PLAN:  Atrial fib /flutter status post remote cardioversion most recently 9/6/2019 with return to atrial flutter.  This time we will continue rate control/ anticoagulation.      Hypertension controlled tamsulosin valsartan HCTZ    Dyslipidemia on statin therapy    Coronary disease with no obstructive symptoms LVEF 45 to 50% and atrial flutter with recent hospitalization        Ewelina Kumar MD, thank you for referring Mr. Palencia for evaluation.  I have forwarded my electronically generated recommendations to you for review.  Please do not hesitate to call with any questions.      Zack Almanza MD, FACC

## 2021-01-01 ENCOUNTER — HOSPITAL ENCOUNTER (INPATIENT)
Facility: HOSPITAL | Age: 76
LOS: 6 days | Discharge: NURSING, REHAB OR LONG-TERM CARE | DRG: 308 | End: 2022-01-05
Attending: EMERGENCY MEDICINE | Admitting: INTERNAL MEDICINE
Payer: MEDICARE

## 2021-01-01 ENCOUNTER — OFFICE VISIT (OUTPATIENT)
Dept: PRIMARY CARE CLINIC | Age: 76
End: 2021-01-01
Payer: MEDICARE

## 2021-01-01 ENCOUNTER — APPOINTMENT (OUTPATIENT)
Dept: CT IMAGING | Facility: HOSPITAL | Age: 76
DRG: 308 | End: 2021-01-01
Payer: MEDICARE

## 2021-01-01 ENCOUNTER — TELEPHONE (OUTPATIENT)
Dept: PRIMARY CARE CLINIC | Age: 76
End: 2021-01-01

## 2021-01-01 ENCOUNTER — APPOINTMENT (OUTPATIENT)
Dept: GENERAL RADIOLOGY | Facility: HOSPITAL | Age: 76
DRG: 308 | End: 2021-01-01
Payer: MEDICARE

## 2021-01-01 ENCOUNTER — HOSPITAL ENCOUNTER (OUTPATIENT)
Facility: HOSPITAL | Age: 76
Discharge: HOME | End: 2021-06-08
Payer: MEDICARE

## 2021-01-01 VITALS
SYSTOLIC BLOOD PRESSURE: 132 MMHG | WEIGHT: 258.8 LBS | OXYGEN SATURATION: 98 % | RESPIRATION RATE: 18 BRPM | HEART RATE: 91 BPM | DIASTOLIC BLOOD PRESSURE: 64 MMHG | BODY MASS INDEX: 35.1 KG/M2 | TEMPERATURE: 97.8 F

## 2021-01-01 DIAGNOSIS — I10 ESSENTIAL HYPERTENSION: ICD-10-CM

## 2021-01-01 DIAGNOSIS — R73.02 GLUCOSE INTOLERANCE (IMPAIRED GLUCOSE TOLERANCE): ICD-10-CM

## 2021-01-01 DIAGNOSIS — N39.0 ACUTE UTI: ICD-10-CM

## 2021-01-01 DIAGNOSIS — I48.91 ATRIAL FIBRILLATION, UNSPECIFIED TYPE (HCC): ICD-10-CM

## 2021-01-01 DIAGNOSIS — J18.9 PNEUMONIA OF BOTH LUNGS DUE TO INFECTIOUS ORGANISM, UNSPECIFIED PART OF LUNG: ICD-10-CM

## 2021-01-01 DIAGNOSIS — E53.8 B12 DEFICIENCY: ICD-10-CM

## 2021-01-01 DIAGNOSIS — I47.1 ECTOPIC ATRIAL TACHYCARDIA (HCC): Primary | ICD-10-CM

## 2021-01-01 DIAGNOSIS — B02.9 HERPES ZOSTER WITHOUT COMPLICATION: ICD-10-CM

## 2021-01-01 DIAGNOSIS — Z12.5 SCREENING FOR PROSTATE CANCER: ICD-10-CM

## 2021-01-01 DIAGNOSIS — I10 ESSENTIAL HYPERTENSION: Primary | ICD-10-CM

## 2021-01-01 LAB
A/G RATIO: 1.1 (ref 0.8–2)
A/G RATIO: 1.2 (ref 0.8–2)
A/G RATIO: 1.2 (ref 0.8–2)
A/G RATIO: 1.3 (ref 0.8–2)
ALBUMIN SERPL-MCNC: 2.9 G/DL (ref 3.4–4.8)
ALBUMIN SERPL-MCNC: 2.9 G/DL (ref 3.4–4.8)
ALBUMIN SERPL-MCNC: 3.1 G/DL (ref 3.4–4.8)
ALBUMIN SERPL-MCNC: 3.9 G/DL (ref 3.4–4.8)
ALP BLD-CCNC: 116 U/L (ref 25–100)
ALP BLD-CCNC: 122 U/L (ref 25–100)
ALP BLD-CCNC: 127 U/L (ref 25–100)
ALP BLD-CCNC: 163 U/L (ref 25–100)
ALT SERPL-CCNC: 28 U/L (ref 4–36)
ALT SERPL-CCNC: 29 U/L (ref 4–36)
ALT SERPL-CCNC: 29 U/L (ref 4–36)
ALT SERPL-CCNC: 38 U/L (ref 4–36)
AMMONIA: 36 MCG/DL (ref 27–102)
AMYLASE: 64 U/L (ref 20–104)
ANION GAP SERPL CALCULATED.3IONS-SCNC: 12 MMOL/L (ref 3–16)
ANION GAP SERPL CALCULATED.3IONS-SCNC: 15 MMOL/L (ref 3–16)
ANION GAP SERPL CALCULATED.3IONS-SCNC: 22 MMOL/L (ref 3–16)
ANION GAP SERPL CALCULATED.3IONS-SCNC: 9 MMOL/L (ref 3–16)
AST SERPL-CCNC: 28 U/L (ref 8–33)
AST SERPL-CCNC: 31 U/L (ref 8–33)
AST SERPL-CCNC: 32 U/L (ref 8–33)
AST SERPL-CCNC: 35 U/L (ref 8–33)
BACTERIA: ABNORMAL /HPF
BASE EXCESS ARTERIAL: -5.3 MMOL/L (ref -3–3)
BASOPHILS ABSOLUTE: 0 K/UL (ref 0–0.1)
BASOPHILS ABSOLUTE: 0.1 K/UL (ref 0–0.1)
BASOPHILS RELATIVE PERCENT: 0.5 %
BASOPHILS RELATIVE PERCENT: 0.7 %
BILIRUB SERPL-MCNC: 0.7 MG/DL (ref 0.3–1.2)
BILIRUB SERPL-MCNC: 0.8 MG/DL (ref 0.3–1.2)
BILIRUB SERPL-MCNC: 0.8 MG/DL (ref 0.3–1.2)
BILIRUB SERPL-MCNC: 1.2 MG/DL (ref 0.3–1.2)
BILIRUBIN URINE: NEGATIVE
BLOOD, URINE: ABNORMAL
BUN BLDV-MCNC: 14 MG/DL (ref 6–20)
BUN BLDV-MCNC: 21 MG/DL (ref 6–20)
BUN BLDV-MCNC: 45 MG/DL (ref 6–20)
BUN BLDV-MCNC: 61 MG/DL (ref 6–20)
CALCIUM SERPL-MCNC: 7.7 MG/DL (ref 8.5–10.5)
CALCIUM SERPL-MCNC: 7.9 MG/DL (ref 8.5–10.5)
CALCIUM SERPL-MCNC: 7.9 MG/DL (ref 8.5–10.5)
CALCIUM SERPL-MCNC: 9.3 MG/DL (ref 8.5–10.5)
CHLORIDE BLD-SCNC: 104 MMOL/L (ref 98–107)
CHLORIDE BLD-SCNC: 111 MMOL/L (ref 98–107)
CHLORIDE BLD-SCNC: 113 MMOL/L (ref 98–107)
CHLORIDE BLD-SCNC: 96 MMOL/L (ref 98–107)
CLARITY: ABNORMAL
CO2: 16 MMOL/L (ref 20–30)
CO2: 17 MMOL/L (ref 20–30)
CO2: 18 MMOL/L (ref 20–30)
CO2: 19 MMOL/L (ref 20–30)
COLOR: YELLOW
CREAT SERPL-MCNC: 1.1 MG/DL (ref 0.4–1.2)
CREAT SERPL-MCNC: 1.1 MG/DL (ref 0.4–1.2)
CREAT SERPL-MCNC: 1.4 MG/DL (ref 0.4–1.2)
CREAT SERPL-MCNC: 1.9 MG/DL (ref 0.4–1.2)
EOSINOPHILS ABSOLUTE: 0.1 K/UL (ref 0–0.4)
EOSINOPHILS ABSOLUTE: 0.1 K/UL (ref 0–0.4)
EOSINOPHILS RELATIVE PERCENT: 0.8 %
EOSINOPHILS RELATIVE PERCENT: 2.4 %
FIO2: 0.21 %
GFR AFRICAN AMERICAN: 42
GFR AFRICAN AMERICAN: >59
GFR NON-AFRICAN AMERICAN: 34
GFR NON-AFRICAN AMERICAN: 49
GFR NON-AFRICAN AMERICAN: >59
GFR NON-AFRICAN AMERICAN: >59
GLOBULIN: 2.3 G/DL
GLOBULIN: 2.5 G/DL
GLOBULIN: 2.5 G/DL
GLOBULIN: 3.5 G/DL
GLUCOSE BLD-MCNC: 143 MG/DL (ref 74–106)
GLUCOSE BLD-MCNC: 146 MG/DL (ref 74–106)
GLUCOSE BLD-MCNC: 88 MG/DL (ref 74–106)
GLUCOSE BLD-MCNC: 89 MG/DL (ref 74–106)
GLUCOSE URINE: NEGATIVE MG/DL
HCO3 ARTERIAL: 16.9 MMOL/L (ref 22–26)
HCT VFR BLD CALC: 31.9 % (ref 40–54)
HCT VFR BLD CALC: 34.1 % (ref 40–54)
HCT VFR BLD CALC: 45.3 % (ref 40–54)
HEMOGLOBIN: 10.7 G/DL (ref 13–18)
HEMOGLOBIN: 11.2 G/DL (ref 13–18)
HEMOGLOBIN: 15.7 G/DL (ref 13–18)
IMMATURE GRANULOCYTES #: 0 K/UL
IMMATURE GRANULOCYTES #: 0.1 K/UL
IMMATURE GRANULOCYTES %: 0.5 % (ref 0–5)
IMMATURE GRANULOCYTES %: 0.7 % (ref 0–5)
KETONES, URINE: 15 MG/DL
LACTIC ACID: 1.9 MMOL/L (ref 0.4–2)
LEUKOCYTE ESTERASE, URINE: ABNORMAL
LIPASE: 74 U/L (ref 5.6–51.3)
LV EF: 58 %
LVEF MODALITY: NORMAL
LYMPHOCYTES ABSOLUTE: 0.9 K/UL (ref 1.5–4)
LYMPHOCYTES ABSOLUTE: 1.7 K/UL (ref 1.5–4)
LYMPHOCYTES RELATIVE PERCENT: 16.4 %
LYMPHOCYTES RELATIVE PERCENT: 16.7 %
MAGNESIUM: 1.6 MG/DL (ref 1.7–2.4)
MCH RBC QN AUTO: 28.3 PG (ref 27–32)
MCH RBC QN AUTO: 28.4 PG (ref 27–32)
MCH RBC QN AUTO: 28.8 PG (ref 27–32)
MCHC RBC AUTO-ENTMCNC: 32.8 G/DL (ref 31–35)
MCHC RBC AUTO-ENTMCNC: 33.5 G/DL (ref 31–35)
MCHC RBC AUTO-ENTMCNC: 34.7 G/DL (ref 31–35)
MCV RBC AUTO: 83.1 FL (ref 80–100)
MCV RBC AUTO: 84.6 FL (ref 80–100)
MCV RBC AUTO: 86.1 FL (ref 80–100)
MICROSCOPIC EXAMINATION: YES
MONOCYTES ABSOLUTE: 0.6 K/UL (ref 0.2–0.8)
MONOCYTES ABSOLUTE: 0.9 K/UL (ref 0.2–0.8)
MONOCYTES RELATIVE PERCENT: 10.8 %
MONOCYTES RELATIVE PERCENT: 8.9 %
NEUTROPHILS ABSOLUTE: 3.7 K/UL (ref 2–7.5)
NEUTROPHILS ABSOLUTE: 7.6 K/UL (ref 2–7.5)
NEUTROPHILS RELATIVE PERCENT: 69 %
NEUTROPHILS RELATIVE PERCENT: 72.6 %
NITRITE, URINE: POSITIVE
O2 SAT, ARTERIAL: 97.8 %
O2 THERAPY: ABNORMAL
ORGANISM: ABNORMAL
PCO2 ARTERIAL: 24.7 MMHG (ref 35–45)
PDW BLD-RTO: 13.2 % (ref 11–16)
PDW BLD-RTO: 13.6 % (ref 11–16)
PDW BLD-RTO: 13.6 % (ref 11–16)
PH ARTERIAL: 7.45 (ref 7.35–7.45)
PH UA: 5 (ref 5–8)
PLATELET # BLD: 153 K/UL (ref 150–400)
PLATELET # BLD: 161 K/UL (ref 150–400)
PLATELET # BLD: 276 K/UL (ref 150–400)
PMV BLD AUTO: 11.3 FL (ref 6–10)
PMV BLD AUTO: 11.5 FL (ref 6–10)
PMV BLD AUTO: 12.3 FL (ref 6–10)
PO2 ARTERIAL: 95 MMHG (ref 80–100)
POTASSIUM REFLEX MAGNESIUM: 3.3 MMOL/L (ref 3.4–5.1)
POTASSIUM REFLEX MAGNESIUM: 3.7 MMOL/L (ref 3.4–5.1)
POTASSIUM REFLEX MAGNESIUM: 3.9 MMOL/L (ref 3.4–5.1)
POTASSIUM REFLEX MAGNESIUM: 4.2 MMOL/L (ref 3.4–5.1)
PRO-BNP: 634 PG/ML (ref 0–1800)
PROTEIN UA: NEGATIVE MG/DL
RBC # BLD: 3.77 M/UL (ref 4.5–6)
RBC # BLD: 3.96 M/UL (ref 4.5–6)
RBC # BLD: 5.45 M/UL (ref 4.5–6)
RBC UA: ABNORMAL /HPF (ref 0–4)
SARS-COV-2, NAAT: NOT DETECTED
SODIUM BLD-SCNC: 137 MMOL/L (ref 136–145)
SODIUM BLD-SCNC: 137 MMOL/L (ref 136–145)
SODIUM BLD-SCNC: 139 MMOL/L (ref 136–145)
SODIUM BLD-SCNC: 139 MMOL/L (ref 136–145)
SPECIFIC GRAVITY UA: 1.02 (ref 1–1.03)
TCO2 ARTERIAL: 17.6 MMOL/L (ref 24–30)
TOTAL PROTEIN: 5.2 G/DL (ref 6.4–8.3)
TOTAL PROTEIN: 5.4 G/DL (ref 6.4–8.3)
TOTAL PROTEIN: 5.6 G/DL (ref 6.4–8.3)
TOTAL PROTEIN: 7.4 G/DL (ref 6.4–8.3)
TSH SERPL DL<=0.05 MIU/L-ACNC: 2.24 UIU/ML (ref 0.27–4.2)
URINE CULTURE, ROUTINE: ABNORMAL
URINE REFLEX TO CULTURE: YES
URINE TYPE: ABNORMAL
UROBILINOGEN, URINE: 0.2 E.U./DL
WBC # BLD: 10.4 K/UL (ref 4–11)
WBC # BLD: 5.4 K/UL (ref 4–11)
WBC # BLD: 5.4 K/UL (ref 4–11)
WBC UA: >100 /HPF (ref 0–5)

## 2021-01-01 PROCEDURE — 1200000000 HC SEMI PRIVATE

## 2021-01-01 PROCEDURE — 70450 CT HEAD/BRAIN W/O DYE: CPT

## 2021-01-01 PROCEDURE — 6360000002 HC RX W HCPCS: Performed by: EMERGENCY MEDICINE

## 2021-01-01 PROCEDURE — 83880 ASSAY OF NATRIURETIC PEPTIDE: CPT

## 2021-01-01 PROCEDURE — 36600 WITHDRAWAL OF ARTERIAL BLOOD: CPT

## 2021-01-01 PROCEDURE — 80053 COMPREHEN METABOLIC PANEL: CPT

## 2021-01-01 PROCEDURE — G8427 DOCREV CUR MEDS BY ELIG CLIN: HCPCS | Performed by: INTERNAL MEDICINE

## 2021-01-01 PROCEDURE — 83605 ASSAY OF LACTIC ACID: CPT

## 2021-01-01 PROCEDURE — 4040F PNEUMOC VAC/ADMIN/RCVD: CPT | Performed by: INTERNAL MEDICINE

## 2021-01-01 PROCEDURE — 6370000000 HC RX 637 (ALT 250 FOR IP): Performed by: INTERNAL MEDICINE

## 2021-01-01 PROCEDURE — 96365 THER/PROPH/DIAG IV INF INIT: CPT

## 2021-01-01 PROCEDURE — 2580000003 HC RX 258: Performed by: INTERNAL MEDICINE

## 2021-01-01 PROCEDURE — 2500000003 HC RX 250 WO HCPCS: Performed by: EMERGENCY MEDICINE

## 2021-01-01 PROCEDURE — 96376 TX/PRO/DX INJ SAME DRUG ADON: CPT

## 2021-01-01 PROCEDURE — 74176 CT ABD & PELVIS W/O CONTRAST: CPT

## 2021-01-01 PROCEDURE — 96360 HYDRATION IV INFUSION INIT: CPT

## 2021-01-01 PROCEDURE — 81001 URINALYSIS AUTO W/SCOPE: CPT

## 2021-01-01 PROCEDURE — 96361 HYDRATE IV INFUSION ADD-ON: CPT

## 2021-01-01 PROCEDURE — 96366 THER/PROPH/DIAG IV INF ADDON: CPT

## 2021-01-01 PROCEDURE — 96374 THER/PROPH/DIAG INJ IV PUSH: CPT

## 2021-01-01 PROCEDURE — 36415 COLL VENOUS BLD VENIPUNCTURE: CPT

## 2021-01-01 PROCEDURE — 2580000003 HC RX 258: Performed by: EMERGENCY MEDICINE

## 2021-01-01 PROCEDURE — 1123F ACP DISCUSS/DSCN MKR DOCD: CPT | Performed by: INTERNAL MEDICINE

## 2021-01-01 PROCEDURE — G8417 CALC BMI ABV UP PARAM F/U: HCPCS | Performed by: INTERNAL MEDICINE

## 2021-01-01 PROCEDURE — 6370000000 HC RX 637 (ALT 250 FOR IP): Performed by: EMERGENCY MEDICINE

## 2021-01-01 PROCEDURE — 93306 TTE W/DOPPLER COMPLETE: CPT

## 2021-01-01 PROCEDURE — 99233 SBSQ HOSP IP/OBS HIGH 50: CPT | Performed by: INTERNAL MEDICINE

## 2021-01-01 PROCEDURE — 85027 COMPLETE CBC AUTOMATED: CPT

## 2021-01-01 PROCEDURE — 6360000002 HC RX W HCPCS: Performed by: SPECIALIST

## 2021-01-01 PROCEDURE — 93005 ELECTROCARDIOGRAM TRACING: CPT

## 2021-01-01 PROCEDURE — 84443 ASSAY THYROID STIM HORMONE: CPT

## 2021-01-01 PROCEDURE — 2000000000 HC ICU R&B

## 2021-01-01 PROCEDURE — 87086 URINE CULTURE/COLONY COUNT: CPT

## 2021-01-01 PROCEDURE — 99213 OFFICE O/P EST LOW 20 MIN: CPT | Performed by: INTERNAL MEDICINE

## 2021-01-01 PROCEDURE — 87186 SC STD MICRODIL/AGAR DIL: CPT

## 2021-01-01 PROCEDURE — 96375 TX/PRO/DX INJ NEW DRUG ADDON: CPT

## 2021-01-01 PROCEDURE — 85025 COMPLETE CBC W/AUTO DIFF WBC: CPT

## 2021-01-01 PROCEDURE — 99285 EMERGENCY DEPT VISIT HI MDM: CPT

## 2021-01-01 PROCEDURE — 87635 SARS-COV-2 COVID-19 AMP PRB: CPT

## 2021-01-01 PROCEDURE — 82150 ASSAY OF AMYLASE: CPT

## 2021-01-01 PROCEDURE — 82140 ASSAY OF AMMONIA: CPT

## 2021-01-01 PROCEDURE — 96372 THER/PROPH/DIAG INJ SC/IM: CPT | Performed by: INTERNAL MEDICINE

## 2021-01-01 PROCEDURE — 87077 CULTURE AEROBIC IDENTIFY: CPT

## 2021-01-01 PROCEDURE — 2500000003 HC RX 250 WO HCPCS

## 2021-01-01 PROCEDURE — 83690 ASSAY OF LIPASE: CPT

## 2021-01-01 PROCEDURE — 1036F TOBACCO NON-USER: CPT | Performed by: INTERNAL MEDICINE

## 2021-01-01 PROCEDURE — 6360000002 HC RX W HCPCS: Performed by: INTERNAL MEDICINE

## 2021-01-01 PROCEDURE — 82803 BLOOD GASES ANY COMBINATION: CPT

## 2021-01-01 PROCEDURE — 2500000003 HC RX 250 WO HCPCS: Performed by: INTERNAL MEDICINE

## 2021-01-01 PROCEDURE — 96367 TX/PROPH/DG ADDL SEQ IV INF: CPT

## 2021-01-01 PROCEDURE — 83735 ASSAY OF MAGNESIUM: CPT

## 2021-01-01 PROCEDURE — 87040 BLOOD CULTURE FOR BACTERIA: CPT

## 2021-01-01 RX ORDER — METOPROLOL TARTRATE 50 MG/1
50 TABLET, FILM COATED ORAL 2 TIMES DAILY
Qty: 60 TABLET | Refills: 3 | Status: SHIPPED | OUTPATIENT
Start: 2021-01-01 | End: 2021-01-01

## 2021-01-01 RX ORDER — LEVOFLOXACIN 5 MG/ML
500 INJECTION, SOLUTION INTRAVENOUS EVERY 24 HOURS
Status: DISCONTINUED | OUTPATIENT
Start: 2021-01-01 | End: 2021-01-01 | Stop reason: ALTCHOICE

## 2021-01-01 RX ORDER — SODIUM CHLORIDE 9 MG/ML
1000 INJECTION, SOLUTION INTRAVENOUS CONTINUOUS
Status: DISCONTINUED | OUTPATIENT
Start: 2021-01-01 | End: 2022-01-01 | Stop reason: HOSPADM

## 2021-01-01 RX ORDER — ACETAMINOPHEN 325 MG/1
650 TABLET ORAL ONCE
Status: DISCONTINUED | OUTPATIENT
Start: 2021-01-01 | End: 2021-01-01

## 2021-01-01 RX ORDER — MAGNESIUM SULFATE 1 G/100ML
1000 INJECTION INTRAVENOUS ONCE
Status: COMPLETED | OUTPATIENT
Start: 2021-01-01 | End: 2021-01-01

## 2021-01-01 RX ORDER — ACETAMINOPHEN 650 MG/1
650 SUPPOSITORY RECTAL EVERY 6 HOURS PRN
Status: DISCONTINUED | OUTPATIENT
Start: 2021-01-01 | End: 2022-01-01 | Stop reason: HOSPADM

## 2021-01-01 RX ORDER — SPIRONOLACTONE 25 MG/1
25 TABLET ORAL DAILY
Qty: 30 TABLET | Refills: 3 | Status: SHIPPED | OUTPATIENT
Start: 2021-01-01 | End: 2021-01-01

## 2021-01-01 RX ORDER — ZIPRASIDONE MESYLATE 20 MG/ML
20 INJECTION, POWDER, LYOPHILIZED, FOR SOLUTION INTRAMUSCULAR ONCE
Status: COMPLETED | OUTPATIENT
Start: 2021-01-01 | End: 2021-01-01

## 2021-01-01 RX ORDER — TAMSULOSIN HYDROCHLORIDE 0.4 MG/1
0.4 CAPSULE ORAL DAILY
Status: DISCONTINUED | OUTPATIENT
Start: 2021-01-01 | End: 2022-01-01 | Stop reason: HOSPADM

## 2021-01-01 RX ORDER — POTASSIUM CHLORIDE 750 MG/1
TABLET, FILM COATED, EXTENDED RELEASE ORAL
Qty: 90 TABLET | Refills: 1 | Status: SHIPPED | OUTPATIENT
Start: 2021-01-01 | End: 2022-01-01

## 2021-01-01 RX ORDER — LORATADINE 10 MG/1
10 TABLET ORAL DAILY
Status: ON HOLD | COMMUNITY
Start: 2021-01-01 | End: 2022-01-01

## 2021-01-01 RX ORDER — HYDROCHLOROTHIAZIDE 12.5 MG/1
CAPSULE, GELATIN COATED ORAL
Qty: 90 CAPSULE | Refills: 3 | Status: ON HOLD | OUTPATIENT
Start: 2021-01-01 | End: 2022-01-01 | Stop reason: HOSPADM

## 2021-01-01 RX ORDER — DILTIAZEM HYDROCHLORIDE 5 MG/ML
20 INJECTION INTRAVENOUS ONCE
Status: COMPLETED | OUTPATIENT
Start: 2021-01-01 | End: 2021-01-01

## 2021-01-01 RX ORDER — POTASSIUM CHLORIDE 20 MEQ/1
40 TABLET, EXTENDED RELEASE ORAL ONCE
Status: COMPLETED | OUTPATIENT
Start: 2021-01-01 | End: 2021-01-01

## 2021-01-01 RX ORDER — 0.9 % SODIUM CHLORIDE 0.9 %
2000 INTRAVENOUS SOLUTION INTRAVENOUS ONCE
Status: COMPLETED | OUTPATIENT
Start: 2021-01-01 | End: 2021-01-01

## 2021-01-01 RX ORDER — DILTIAZEM HYDROCHLORIDE 120 MG/1
120 CAPSULE, COATED, EXTENDED RELEASE ORAL DAILY
Qty: 30 CAPSULE | Refills: 3 | Status: ON HOLD | OUTPATIENT
Start: 2021-01-01 | End: 2022-01-01

## 2021-01-01 RX ORDER — POTASSIUM CHLORIDE 20 MEQ/1
10 TABLET, EXTENDED RELEASE ORAL DAILY
Status: DISCONTINUED | OUTPATIENT
Start: 2021-01-01 | End: 2022-01-01 | Stop reason: HOSPADM

## 2021-01-01 RX ORDER — LORAZEPAM 2 MG/ML
1 INJECTION INTRAMUSCULAR ONCE
Status: COMPLETED | OUTPATIENT
Start: 2021-01-01 | End: 2021-01-01

## 2021-01-01 RX ORDER — DILTIAZEM HYDROCHLORIDE 5 MG/ML
10 INJECTION INTRAVENOUS ONCE
Status: DISCONTINUED | OUTPATIENT
Start: 2021-01-01 | End: 2021-01-01 | Stop reason: SDUPTHER

## 2021-01-01 RX ORDER — ASPIRIN 81 MG/1
81 TABLET ORAL DAILY
Status: DISCONTINUED | OUTPATIENT
Start: 2021-01-01 | End: 2022-01-01 | Stop reason: HOSPADM

## 2021-01-01 RX ORDER — 0.9 % SODIUM CHLORIDE 0.9 %
1000 INTRAVENOUS SOLUTION INTRAVENOUS ONCE
Status: COMPLETED | OUTPATIENT
Start: 2021-01-01 | End: 2021-01-01

## 2021-01-01 RX ORDER — PANTOPRAZOLE SODIUM 40 MG/1
40 TABLET, DELAYED RELEASE ORAL
Status: DISCONTINUED | OUTPATIENT
Start: 2021-01-01 | End: 2022-01-01 | Stop reason: HOSPADM

## 2021-01-01 RX ORDER — ONDANSETRON 2 MG/ML
4 INJECTION INTRAMUSCULAR; INTRAVENOUS ONCE
Status: COMPLETED | OUTPATIENT
Start: 2021-01-01 | End: 2021-01-01

## 2021-01-01 RX ORDER — POTASSIUM CHLORIDE 7.45 MG/ML
10 INJECTION INTRAVENOUS
Status: COMPLETED | OUTPATIENT
Start: 2021-01-01 | End: 2021-01-01

## 2021-01-01 RX ORDER — POLYETHYLENE GLYCOL 3350 17 G/17G
17 POWDER, FOR SOLUTION ORAL DAILY PRN
Status: DISCONTINUED | OUTPATIENT
Start: 2021-01-01 | End: 2022-01-01 | Stop reason: HOSPADM

## 2021-01-01 RX ORDER — DILTIAZEM HCL IN NACL,ISO-OSM 125 MG/125
5 PLASTIC BAG, INJECTION (ML) INTRAVENOUS CONTINUOUS
Status: DISCONTINUED | OUTPATIENT
Start: 2021-01-01 | End: 2021-01-01

## 2021-01-01 RX ORDER — DILTIAZEM HYDROCHLORIDE 5 MG/ML
10 INJECTION INTRAVENOUS ONCE
Status: DISCONTINUED | OUTPATIENT
Start: 2021-01-01 | End: 2021-01-01

## 2021-01-01 RX ORDER — SODIUM CHLORIDE 9 MG/ML
INJECTION, SOLUTION INTRAVENOUS CONTINUOUS
Status: DISCONTINUED | OUTPATIENT
Start: 2021-01-01 | End: 2022-01-01 | Stop reason: HOSPADM

## 2021-01-01 RX ORDER — ACETAMINOPHEN 325 MG/1
650 TABLET ORAL EVERY 6 HOURS PRN
Status: DISCONTINUED | OUTPATIENT
Start: 2021-01-01 | End: 2022-01-01 | Stop reason: HOSPADM

## 2021-01-01 RX ORDER — DILTIAZEM HYDROCHLORIDE 60 MG/1
30 TABLET, FILM COATED ORAL EVERY 6 HOURS SCHEDULED
Status: DISCONTINUED | OUTPATIENT
Start: 2021-01-01 | End: 2022-01-01

## 2021-01-01 RX ORDER — DILTIAZEM HYDROCHLORIDE 5 MG/ML
10 INJECTION INTRAVENOUS ONCE
Status: COMPLETED | OUTPATIENT
Start: 2021-01-01 | End: 2021-01-01

## 2021-01-01 RX ORDER — ATORVASTATIN CALCIUM 20 MG/1
20 TABLET, FILM COATED ORAL NIGHTLY
Status: DISCONTINUED | OUTPATIENT
Start: 2021-01-01 | End: 2022-01-01 | Stop reason: HOSPADM

## 2021-01-01 RX ORDER — TAMSULOSIN HYDROCHLORIDE 0.4 MG/1
CAPSULE ORAL
Qty: 90 CAPSULE | Refills: 3 | Status: SHIPPED | OUTPATIENT
Start: 2021-01-01

## 2021-01-01 RX ORDER — ONDANSETRON 2 MG/ML
4 INJECTION INTRAMUSCULAR; INTRAVENOUS EVERY 6 HOURS PRN
Status: DISCONTINUED | OUTPATIENT
Start: 2021-01-01 | End: 2022-01-01 | Stop reason: HOSPADM

## 2021-01-01 RX ORDER — DIGOXIN 0.25 MG/ML
500 INJECTION INTRAMUSCULAR; INTRAVENOUS ONCE
Status: COMPLETED | OUTPATIENT
Start: 2021-01-01 | End: 2021-01-01

## 2021-01-01 RX ORDER — FINASTERIDE 5 MG/1
5 TABLET, FILM COATED ORAL DAILY
Status: DISCONTINUED | OUTPATIENT
Start: 2021-01-01 | End: 2022-01-01 | Stop reason: HOSPADM

## 2021-01-01 RX ORDER — DIGOXIN 0.25 MG/ML
250 INJECTION INTRAMUSCULAR; INTRAVENOUS DAILY
Status: DISCONTINUED | OUTPATIENT
Start: 2021-01-01 | End: 2022-01-01

## 2021-01-01 RX ORDER — SPIRONOLACTONE 25 MG/1
25 TABLET ORAL DAILY
Qty: 30 TABLET | Refills: 3 | Status: ON HOLD | OUTPATIENT
Start: 2021-01-01 | End: 2022-01-01 | Stop reason: HOSPADM

## 2021-01-01 RX ORDER — LORAZEPAM 2 MG/ML
0.5 INJECTION INTRAMUSCULAR ONCE
Status: DISCONTINUED | OUTPATIENT
Start: 2021-01-01 | End: 2021-01-01

## 2021-01-01 RX ORDER — ONDANSETRON 4 MG/1
4 TABLET, ORALLY DISINTEGRATING ORAL EVERY 8 HOURS PRN
Status: DISCONTINUED | OUTPATIENT
Start: 2021-01-01 | End: 2022-01-01 | Stop reason: HOSPADM

## 2021-01-01 RX ORDER — DIGOXIN 0.25 MG/ML
125 INJECTION INTRAMUSCULAR; INTRAVENOUS DAILY
Status: DISCONTINUED | OUTPATIENT
Start: 2021-01-01 | End: 2021-01-01

## 2021-01-01 RX ORDER — DIGOXIN 0.25 MG/ML
250 INJECTION INTRAMUSCULAR; INTRAVENOUS ONCE
Status: COMPLETED | OUTPATIENT
Start: 2021-01-01 | End: 2021-01-01

## 2021-01-01 RX ORDER — FINASTERIDE 5 MG/1
5 TABLET, FILM COATED ORAL DAILY
Qty: 90 TABLET | Refills: 3 | Status: SHIPPED | OUTPATIENT
Start: 2021-01-01

## 2021-01-01 RX ORDER — ATORVASTATIN CALCIUM 20 MG/1
1 TABLET, FILM COATED ORAL DAILY
Status: DISCONTINUED | OUTPATIENT
Start: 2021-01-01 | End: 2021-01-01 | Stop reason: SDUPTHER

## 2021-01-01 RX ORDER — VALACYCLOVIR HYDROCHLORIDE 500 MG/1
1000 TABLET, FILM COATED ORAL 3 TIMES DAILY
Status: DISCONTINUED | OUTPATIENT
Start: 2021-01-01 | End: 2022-01-01 | Stop reason: HOSPADM

## 2021-01-01 RX ORDER — DROPERIDOL 2.5 MG/ML
0.62 INJECTION, SOLUTION INTRAMUSCULAR; INTRAVENOUS ONCE
Status: COMPLETED | OUTPATIENT
Start: 2021-01-01 | End: 2021-01-01

## 2021-01-01 RX ORDER — METOPROLOL TARTRATE 50 MG/1
50 TABLET, FILM COATED ORAL 2 TIMES DAILY
Qty: 60 TABLET | Refills: 3 | Status: ON HOLD | OUTPATIENT
Start: 2021-01-01 | End: 2022-01-01 | Stop reason: HOSPADM

## 2021-01-01 RX ORDER — DIGOXIN 0.25 MG/ML
125 INJECTION INTRAMUSCULAR; INTRAVENOUS ONCE
Status: COMPLETED | OUTPATIENT
Start: 2021-01-01 | End: 2021-01-01

## 2021-01-01 RX ORDER — SODIUM CHLORIDE 9 MG/ML
INJECTION, SOLUTION INTRAVENOUS CONTINUOUS
Status: ACTIVE | OUTPATIENT
Start: 2021-01-01 | End: 2021-01-01

## 2021-01-01 RX ORDER — DILTIAZEM HYDROCHLORIDE 5 MG/ML
INJECTION INTRAVENOUS
Status: COMPLETED
Start: 2021-01-01 | End: 2021-01-01

## 2021-01-01 RX ORDER — LEVOFLOXACIN 5 MG/ML
500 INJECTION, SOLUTION INTRAVENOUS EVERY 24 HOURS
Status: DISCONTINUED | OUTPATIENT
Start: 2021-01-01 | End: 2021-01-01

## 2021-01-01 RX ORDER — DILTIAZEM HYDROCHLORIDE 120 MG/1
120 CAPSULE, COATED, EXTENDED RELEASE ORAL DAILY
Qty: 30 CAPSULE | Refills: 3 | Status: SHIPPED | OUTPATIENT
Start: 2021-01-01 | End: 2021-01-01

## 2021-01-01 RX ORDER — METOPROLOL TARTRATE 50 MG/1
50 TABLET, FILM COATED ORAL 2 TIMES DAILY
Status: DISCONTINUED | OUTPATIENT
Start: 2021-01-01 | End: 2022-01-01 | Stop reason: HOSPADM

## 2021-01-01 RX ORDER — DILTIAZEM HCL IN NACL,ISO-OSM 125 MG/125
5 PLASTIC BAG, INJECTION (ML) INTRAVENOUS CONTINUOUS
Status: DISCONTINUED | OUTPATIENT
Start: 2021-01-01 | End: 2021-01-01 | Stop reason: SDUPTHER

## 2021-01-01 RX ORDER — CYANOCOBALAMIN 1000 UG/ML
1000 INJECTION INTRAMUSCULAR; SUBCUTANEOUS ONCE
Status: COMPLETED | OUTPATIENT
Start: 2021-01-01 | End: 2021-01-01

## 2021-01-01 RX ORDER — TAMSULOSIN HYDROCHLORIDE 0.4 MG/1
0.4 CAPSULE ORAL DAILY
Status: DISCONTINUED | OUTPATIENT
Start: 2021-01-01 | End: 2021-01-01 | Stop reason: SDUPTHER

## 2021-01-01 RX ORDER — CETIRIZINE HYDROCHLORIDE 10 MG/1
10 TABLET ORAL DAILY
Status: DISCONTINUED | OUTPATIENT
Start: 2021-01-01 | End: 2022-01-01 | Stop reason: HOSPADM

## 2021-01-01 RX ORDER — ATORVASTATIN CALCIUM 20 MG/1
TABLET, FILM COATED ORAL
Qty: 90 TABLET | Refills: 3 | Status: SHIPPED | OUTPATIENT
Start: 2021-01-01

## 2021-01-01 RX ORDER — ASPIRIN 81 MG/1
324 TABLET, CHEWABLE ORAL ONCE
Status: COMPLETED | OUTPATIENT
Start: 2021-01-01 | End: 2021-01-01

## 2021-01-01 RX ORDER — GABAPENTIN 300 MG/1
300 CAPSULE ORAL 3 TIMES DAILY
Status: DISCONTINUED | OUTPATIENT
Start: 2021-01-01 | End: 2022-01-01 | Stop reason: HOSPADM

## 2021-01-01 RX ADMIN — DIGOXIN 250 MCG: 0.25 INJECTION INTRAMUSCULAR; INTRAVENOUS at 12:44

## 2021-01-01 RX ADMIN — VALACYCLOVIR HYDROCHLORIDE 1000 MG: 500 TABLET, FILM COATED ORAL at 19:50

## 2021-01-01 RX ADMIN — METOPROLOL TARTRATE 50 MG: 50 TABLET, FILM COATED ORAL at 19:50

## 2021-01-01 RX ADMIN — DILTIAZEM HYDROCHLORIDE 20 MG: 5 INJECTION INTRAVENOUS at 17:13

## 2021-01-01 RX ADMIN — MAGNESIUM SULFATE 1000 MG: 1 INJECTION INTRAVENOUS at 17:47

## 2021-01-01 RX ADMIN — ASPIRIN 81 MG: 81 TABLET, COATED ORAL at 08:39

## 2021-01-01 RX ADMIN — GABAPENTIN 300 MG: 300 CAPSULE ORAL at 21:16

## 2021-01-01 RX ADMIN — VALACYCLOVIR HYDROCHLORIDE 1000 MG: 500 TABLET, FILM COATED ORAL at 08:39

## 2021-01-01 RX ADMIN — CETIRIZINE HYDROCHLORIDE 10 MG: 10 TABLET, FILM COATED ORAL at 18:36

## 2021-01-01 RX ADMIN — CYANOCOBALAMIN 1000 MCG: 1000 INJECTION INTRAMUSCULAR; SUBCUTANEOUS at 16:13

## 2021-01-01 RX ADMIN — GABAPENTIN 300 MG: 300 CAPSULE ORAL at 18:36

## 2021-01-01 RX ADMIN — POTASSIUM CHLORIDE 10 MEQ: 10 INJECTION, SOLUTION INTRAVENOUS at 11:45

## 2021-01-01 RX ADMIN — DIGOXIN 250 MCG: 0.25 INJECTION INTRAMUSCULAR; INTRAVENOUS at 08:46

## 2021-01-01 RX ADMIN — GABAPENTIN 300 MG: 300 CAPSULE ORAL at 15:10

## 2021-01-01 RX ADMIN — Medication 15 MG/HR: at 22:44

## 2021-01-01 RX ADMIN — Medication 10 MG/HR: at 14:58

## 2021-01-01 RX ADMIN — POTASSIUM CHLORIDE 10 MEQ: 10 INJECTION, SOLUTION INTRAVENOUS at 12:30

## 2021-01-01 RX ADMIN — TAMSULOSIN HYDROCHLORIDE 0.4 MG: 0.4 CAPSULE ORAL at 08:46

## 2021-01-01 RX ADMIN — FINASTERIDE 5 MG: 5 TABLET, FILM COATED ORAL at 08:45

## 2021-01-01 RX ADMIN — PANTOPRAZOLE SODIUM 40 MG: 40 TABLET, DELAYED RELEASE ORAL at 08:38

## 2021-01-01 RX ADMIN — ZIPRASIDONE MESYLATE 20 MG: 20 INJECTION, POWDER, LYOPHILIZED, FOR SOLUTION INTRAMUSCULAR at 21:00

## 2021-01-01 RX ADMIN — WATER 1.2 ML: 1 INJECTION INTRAMUSCULAR; INTRAVENOUS; SUBCUTANEOUS at 21:00

## 2021-01-01 RX ADMIN — GABAPENTIN 300 MG: 300 CAPSULE ORAL at 08:46

## 2021-01-01 RX ADMIN — METOPROLOL TARTRATE 50 MG: 50 TABLET, FILM COATED ORAL at 21:16

## 2021-01-01 RX ADMIN — POTASSIUM CHLORIDE 10 MEQ: 1500 TABLET, EXTENDED RELEASE ORAL at 08:46

## 2021-01-01 RX ADMIN — POTASSIUM CHLORIDE 40 MEQ: 1500 TABLET, EXTENDED RELEASE ORAL at 09:16

## 2021-01-01 RX ADMIN — APIXABAN 5 MG: 5 TABLET, FILM COATED ORAL at 21:16

## 2021-01-01 RX ADMIN — SODIUM CHLORIDE 1000 ML: 9 INJECTION, SOLUTION INTRAVENOUS at 14:35

## 2021-01-01 RX ADMIN — APIXABAN 5 MG: 5 TABLET, FILM COATED ORAL at 21:19

## 2021-01-01 RX ADMIN — VALACYCLOVIR HYDROCHLORIDE 1000 MG: 500 TABLET, FILM COATED ORAL at 14:15

## 2021-01-01 RX ADMIN — POTASSIUM CHLORIDE 40 MEQ: 1500 TABLET, EXTENDED RELEASE ORAL at 08:38

## 2021-01-01 RX ADMIN — ATORVASTATIN CALCIUM 20 MG: 20 TABLET, FILM COATED ORAL at 21:19

## 2021-01-01 RX ADMIN — ASPIRIN 81 MG 324 MG: 81 TABLET ORAL at 19:38

## 2021-01-01 RX ADMIN — ATORVASTATIN CALCIUM 20 MG: 20 TABLET, FILM COATED ORAL at 21:16

## 2021-01-01 RX ADMIN — VALACYCLOVIR HYDROCHLORIDE 1000 MG: 500 TABLET, FILM COATED ORAL at 08:46

## 2021-01-01 RX ADMIN — SODIUM CHLORIDE 1000 ML: 9 INJECTION, SOLUTION INTRAVENOUS at 00:39

## 2021-01-01 RX ADMIN — FINASTERIDE 5 MG: 5 TABLET, FILM COATED ORAL at 08:39

## 2021-01-01 RX ADMIN — METOPROLOL TARTRATE 50 MG: 50 TABLET, FILM COATED ORAL at 08:46

## 2021-01-01 RX ADMIN — APIXABAN 5 MG: 5 TABLET, FILM COATED ORAL at 19:50

## 2021-01-01 RX ADMIN — DROPERIDOL 0.62 MG: 2.5 INJECTION, SOLUTION INTRAMUSCULAR; INTRAVENOUS at 20:03

## 2021-01-01 RX ADMIN — DIGOXIN 125 MCG: 0.25 INJECTION INTRAMUSCULAR; INTRAVENOUS at 07:55

## 2021-01-01 RX ADMIN — Medication 5 MG/HR: at 19:36

## 2021-01-01 RX ADMIN — APIXABAN 5 MG: 5 TABLET, FILM COATED ORAL at 08:39

## 2021-01-01 RX ADMIN — DILTIAZEM HYDROCHLORIDE 10 MG: 5 INJECTION INTRAVENOUS at 16:22

## 2021-01-01 RX ADMIN — LEVOFLOXACIN 500 MG: 5 INJECTION, SOLUTION INTRAVENOUS at 21:41

## 2021-01-01 RX ADMIN — Medication 10 MG/HR: at 07:05

## 2021-01-01 RX ADMIN — ATORVASTATIN CALCIUM 20 MG: 20 TABLET, FILM COATED ORAL at 19:50

## 2021-01-01 RX ADMIN — DILTIAZEM HYDROCHLORIDE 10 MG: 5 INJECTION INTRAVENOUS at 15:41

## 2021-01-01 RX ADMIN — GABAPENTIN 300 MG: 300 CAPSULE ORAL at 19:50

## 2021-01-01 RX ADMIN — VALACYCLOVIR HYDROCHLORIDE 1000 MG: 500 TABLET, FILM COATED ORAL at 21:15

## 2021-01-01 RX ADMIN — VALACYCLOVIR HYDROCHLORIDE 1000 MG: 500 TABLET, FILM COATED ORAL at 16:46

## 2021-01-01 RX ADMIN — TAMSULOSIN HYDROCHLORIDE 0.4 MG: 0.4 CAPSULE ORAL at 18:20

## 2021-01-01 RX ADMIN — SODIUM CHLORIDE: 9 INJECTION, SOLUTION INTRAVENOUS at 07:54

## 2021-01-01 RX ADMIN — ASPIRIN 81 MG: 81 TABLET, COATED ORAL at 08:45

## 2021-01-01 RX ADMIN — LORAZEPAM 1 MG: 2 INJECTION INTRAMUSCULAR; INTRAVENOUS at 00:32

## 2021-01-01 RX ADMIN — SODIUM CHLORIDE: 9 INJECTION, SOLUTION INTRAVENOUS at 02:23

## 2021-01-01 RX ADMIN — VALACYCLOVIR HYDROCHLORIDE 1000 MG: 500 TABLET, FILM COATED ORAL at 21:18

## 2021-01-01 RX ADMIN — APIXABAN 5 MG: 5 TABLET, FILM COATED ORAL at 08:46

## 2021-01-01 RX ADMIN — VALACYCLOVIR HYDROCHLORIDE 1000 MG: 500 TABLET, FILM COATED ORAL at 07:07

## 2021-01-01 RX ADMIN — DIGOXIN 500 MCG: 0.25 INJECTION INTRAMUSCULAR; INTRAVENOUS at 10:50

## 2021-01-01 RX ADMIN — LORAZEPAM 1 MG: 2 INJECTION INTRAMUSCULAR; INTRAVENOUS at 04:30

## 2021-01-01 RX ADMIN — PANTOPRAZOLE SODIUM 40 MG: 40 TABLET, DELAYED RELEASE ORAL at 06:04

## 2021-01-01 RX ADMIN — CETIRIZINE HYDROCHLORIDE 10 MG: 10 TABLET, FILM COATED ORAL at 08:46

## 2021-01-01 RX ADMIN — DILTIAZEM HYDROCHLORIDE 30 MG: 60 TABLET, FILM COATED ORAL at 15:10

## 2021-01-01 RX ADMIN — VALACYCLOVIR HYDROCHLORIDE 1000 MG: 500 TABLET, FILM COATED ORAL at 15:10

## 2021-01-01 RX ADMIN — POTASSIUM CHLORIDE 10 MEQ: 10 INJECTION, SOLUTION INTRAVENOUS at 09:18

## 2021-01-01 RX ADMIN — SODIUM CHLORIDE 1000 ML: 9 INJECTION, SOLUTION INTRAVENOUS at 11:19

## 2021-01-01 RX ADMIN — TAMSULOSIN HYDROCHLORIDE 0.4 MG: 0.4 CAPSULE ORAL at 08:38

## 2021-01-01 RX ADMIN — LEVOFLOXACIN 500 MG: 5 INJECTION, SOLUTION INTRAVENOUS at 18:40

## 2021-01-01 RX ADMIN — PIPERACILLIN AND TAZOBACTAM 3375 MG: 3; .375 INJECTION, POWDER, FOR SOLUTION INTRAVENOUS at 17:10

## 2021-01-01 RX ADMIN — ONDANSETRON HYDROCHLORIDE 4 MG: 2 INJECTION, SOLUTION INTRAMUSCULAR; INTRAVENOUS at 14:42

## 2021-01-01 RX ADMIN — SODIUM CHLORIDE 1000 ML: 9 INJECTION, SOLUTION INTRAVENOUS at 17:30

## 2021-01-01 RX ADMIN — Medication 15 MG/HR: at 18:36

## 2021-01-01 RX ADMIN — FINASTERIDE 5 MG: 5 TABLET, FILM COATED ORAL at 18:20

## 2021-01-01 RX ADMIN — SODIUM CHLORIDE: 9 INJECTION, SOLUTION INTRAVENOUS at 16:15

## 2021-01-01 RX ADMIN — POTASSIUM CHLORIDE 10 MEQ: 10 INJECTION, SOLUTION INTRAVENOUS at 10:41

## 2021-01-01 RX ADMIN — SODIUM CHLORIDE 1000 ML: 9 INJECTION, SOLUTION INTRAVENOUS at 10:00

## 2021-01-01 RX ADMIN — LORAZEPAM 1 MG: 2 INJECTION INTRAMUSCULAR; INTRAVENOUS at 22:35

## 2021-01-01 RX ADMIN — DIGOXIN 125 MCG: 0.25 INJECTION INTRAMUSCULAR; INTRAVENOUS at 16:12

## 2021-01-01 SDOH — ECONOMIC STABILITY: FOOD INSECURITY: WITHIN THE PAST 12 MONTHS, THE FOOD YOU BOUGHT JUST DIDN'T LAST AND YOU DIDN'T HAVE MONEY TO GET MORE.: PATIENT DECLINED

## 2021-01-01 SDOH — ECONOMIC STABILITY: FOOD INSECURITY: WITHIN THE PAST 12 MONTHS, YOU WORRIED THAT YOUR FOOD WOULD RUN OUT BEFORE YOU GOT MONEY TO BUY MORE.: PATIENT DECLINED

## 2021-01-01 ASSESSMENT — PATIENT HEALTH QUESTIONNAIRE - PHQ9
SUM OF ALL RESPONSES TO PHQ QUESTIONS 1-9: 0
SUM OF ALL RESPONSES TO PHQ9 QUESTIONS 1 & 2: 0
SUM OF ALL RESPONSES TO PHQ QUESTIONS 1-9: 0
2. FEELING DOWN, DEPRESSED OR HOPELESS: 0
1. LITTLE INTEREST OR PLEASURE IN DOING THINGS: 0
SUM OF ALL RESPONSES TO PHQ QUESTIONS 1-9: 0

## 2021-01-01 ASSESSMENT — SOCIAL DETERMINANTS OF HEALTH (SDOH): HOW HARD IS IT FOR YOU TO PAY FOR THE VERY BASICS LIKE FOOD, HOUSING, MEDICAL CARE, AND HEATING?: PATIENT DECLINED

## 2021-01-01 ASSESSMENT — ENCOUNTER SYMPTOMS
SHORTNESS OF BREATH: 0
WHEEZING: 0
SINUS PRESSURE: 0
VOMITING: 0
COUGH: 0
ABDOMINAL PAIN: 0
BACK PAIN: 0
EYE DISCHARGE: 0
NAUSEA: 0

## 2021-02-25 RX ORDER — LOSARTAN POTASSIUM 100 MG/1
100 TABLET ORAL DAILY
Qty: 90 TABLET | Refills: 5 | Status: ON HOLD | OUTPATIENT
Start: 2021-02-25 | End: 2022-01-01 | Stop reason: HOSPADM

## 2021-03-30 RX ORDER — APIXABAN 5 MG/1
TABLET, FILM COATED ORAL
Qty: 60 TABLET | Refills: 3 | Status: SHIPPED | OUTPATIENT
Start: 2021-03-30 | End: 2021-01-01 | Stop reason: SDUPTHER

## 2021-03-30 RX ORDER — SPIRONOLACTONE 25 MG/1
25 TABLET ORAL DAILY
Qty: 30 TABLET | Refills: 3 | Status: SHIPPED | OUTPATIENT
Start: 2021-03-30 | End: 2021-01-01

## 2021-06-08 NOTE — PATIENT INSTRUCTIONS
· Keep a list of your medicines with you. List all of the prescription medicines, nonprescription medicines, supplements, natural remedies, and vitamins that you take. Tell your healthcare providers who treat you about all of the products you are taking. Your provider can provide you with a form to keep track of them. Just ask. · Follow the directions that come with your medicine, including information about food or alcohol. Make sure you know how and when to take your medicine. Do not take more or less than you are supposed to take. · Keep all medicines out of the reach of children. · Store medicines according to the directions on the label. · Monitor yourself. Learn to know how your body reacts to your new medicine and keep track of how it makes you feel before attempting (If your provider has allowed you to do so) to drive or go to work. · Seek emergency medical attention if you think you have used too much of this medicine. An overdose of any prescription medicine can be fatal. Overdose symptoms may include extreme drowsiness, muscle weakness, confusion, cold and clammy skin, pinpoint pupils, shallow breathing, slow heart rate, fainting, or coma. · Don't share prescription medicines with others, even when they seem to have the same symptoms. What may be good for you may be harmful to others. · If you are no longer taking a prescribed medication and you have pills left please take your pills out of their original containers. Mix crushed pills with an undesirable substance, such as cat litter or used coffee grounds. Put the mixture into a disposable container with a lid, such as an empty margarine tub, or into a sealable bag. Cover up or remove any of your personal information on the empty containers by covering it with black permanent marker or duct tape. Place the sealed container with the mixture, and the empty drug containers, in the trash.    · If you use a medication that is in the form of a patch, dispose of used patches by folding them in half so that the sticky sides meet, and then flushing them down a toilet. They should not be placed in the household trash where children or pets can find them. · If you have any questions, ask your provider or pharmacist for more information. · Be sure to keep all appointments for provider visits or tests. Transportation and [address]    [address] [phone]  Help with food, clothing, transportation, utilities, prescription assistance. [facility] [phone]  [facility] [employer] [facility]  [address] [facility]  Kavya Bears, [address] [facility] Ali [address]    [address] [facility]ging and Independent Living Program.  They handle meals on wheels and senior centers.    Sylvia Chawla [phone] [facility] senior citizens center     [facility]   [phone] or [address] [facility]             [phone]    [facility]             [phone]    Helping [address] ([address]aine Kelly)            [phone]    [facility] [phone]    [facility] senior citizens center             [address] [facility]/ [address] [place] [address]              [phone]   [employer] [employer]     [phone]   [employer] and [address] (may visit once monthly      8-4:30)              [phone]    [facility] [address], food and utility assistance ([place])    Energy Assistance  [address]     [phone]     [address]   [phone]    [address]       [address] with applying for insurance coverage with Medicaid and Medicare including part D  Help with medication cost, eyeglasses or exams, hearing aides  [address]    [address] [phone] Tigist Jeong

## 2021-06-08 NOTE — PROGRESS NOTES
SUBJECTIVE:    Patient ID: Anh Perry is a 66 y. o.male. Chief Complaint   Patient presents with    Hypertension    Hyperlipidemia    Atrial Fibrillation         HPI:  Patient has had hypertension for several years. He has been compliant with taking medications, without side effects from it. He has been following a low-sodium, is active and never exercises. Weight is decreasing steadily, compared to last visit. His blood pressure is stable at this time. Patient was diagnosed with Afib few years ago. He has been compliant with taking meds w/o SE. He is taking Eliquis as well . Patient denies any side effect including easy bruising, bleeding. ..ect. No CP or palpitation. Patient's medications, allergies, past medical, surgical, social and family histories were reviewed and updated as appropriate in electronic medical record.         Outpatient Medications Marked as Taking for the 6/8/21 encounter (Office Visit) with Rand Hewitt MD   Medication Sig Dispense Refill    ALLERGY RELIEF 10 MG tablet       dilTIAZem (CARDIZEM CD) 120 MG extended release capsule TAKE 1 CAPSULE BY MOUTH DAILY 30 capsule 3    metoprolol tartrate (LOPRESSOR) 50 MG tablet TAKE 1 TABLET BY MOUTH 2 TIMES DAILY 60 tablet 3    tamsulosin (FLOMAX) 0.4 MG capsule TAKE ONE CAPSULE BY MOUTH ONCE DAILY 90 capsule 3    apixaban (ELIQUIS) 5 MG TABS tablet TAKE 1 TABLET BY MOUTH EVERY 12 HOURS 60 tablet 0    potassium chloride (KLOR-CON) 10 MEQ extended release tablet TAKE ONE TABLET BY MOUTH ONCE DAILY 90 tablet 1    spironolactone (ALDACTONE) 25 MG tablet TAKE 1 TABLET BY MOUTH DAILY 30 tablet 3    losartan (COZAAR) 100 MG tablet TAKE 1 TABLET BY MOUTH DAILY 90 tablet 5    atorvastatin (LIPITOR) 20 MG tablet TAKE ONE TABLET BY MOUTH ONCE DAILY 90 tablet 3    finasteride (PROSCAR) 5 MG tablet Take 1 tablet by mouth daily 90 tablet 0    hydroCHLOROthiazide (MICROZIDE) 12.5 MG capsule TAKE ONE CAPSULE BY MOUTH ONCE DAILY 90 capsule 3    aspirin 81 MG tablet Take 81 mg by mouth daily. Review of Systems   Constitutional: Negative for chills, fatigue and fever. HENT: Negative for congestion, ear pain and sinus pressure. Eyes: Negative for discharge and visual disturbance. Respiratory: Negative for cough, shortness of breath and wheezing. Cardiovascular: Positive for leg swelling (Occasional). Negative for chest pain and palpitations. JOY   Gastrointestinal: Negative for abdominal pain, nausea and vomiting. Endocrine: Negative for cold intolerance and heat intolerance. Genitourinary: Negative for dysuria, frequency and urgency. Musculoskeletal: Positive for arthralgias. Negative for back pain. Skin: Negative for pallor and rash. Allergic/Immunologic: Negative for food allergies and immunocompromised state. Neurological: Negative for dizziness, numbness and headaches. Hematological: Negative for adenopathy. Does not bruise/bleed easily. Psychiatric/Behavioral: Negative for agitation and sleep disturbance. The patient is not nervous/anxious.         Past Medical History:   Diagnosis Date    Atrial fibrillation ([address]     Cardiomyopathy ([address]     Cataract     GERD (gastroesophageal reflux disease)     History of DVT (deep vein thrombosis)     s/p IVC filter    Hyperlipidemia     Hypertension     LONG TERM ANTICOAGULENT USE     Osteoarthritis     Unspecified sleep apnea      Past Surgical History:   Procedure Laterality Date    ANKLE SURGERY      left    CHOLECYSTECTOMY      EYE SURGERY      left & right cataract    FRACTURE SURGERY Left     femure    HAND SURGERY      left thumb    HIP SURGERY      left    INNER EAR SURGERY      left    KNEE ARTHROSCOPY      right    KNEE SURGERY  12/07/11    RIGHT    NOSE SURGERY      left    SPINE SURGERY      VENOUS CLOT SURGERY  2008    right leg after hip replacement     Family History   Problem Relation Age of Onset    Heart Disease Mother    Mari Cornell Heart Disease Father         chf    Heart Disease Sister     Cancer Brother         lungs    Cancer Brother     Heart Disease Sister     Heart Disease Brother       Social History     Tobacco Use   Smoking Status Former Smoker    Packs/day: 1.00    Years: 40.00    Pack years: 40.00    Types: Cigarettes    Quit date: 1988    Years since quittin.4   Smokeless Tobacco Never Used       OBJECTIVE:   Wt Readings from Last 3 Encounters:   21 258 lb 12.8 oz (117.4 kg)   20 262 lb 3.2 oz (118.9 kg)   10/27/20 265 lb 14.4 oz (120.6 kg)     BP Readings from Last 3 Encounters:   21 132/64   20 136/70   10/28/20 122/68       /64   Pulse 91   Temp 97.8 °F (36.6 °C)   Resp 18   Wt 258 lb 12.8 oz (117.4 kg)   SpO2 98%   BMI 35.10 kg/m²      Physical Exam  Vitals and nursing note reviewed. Constitutional:       Appearance: Normal appearance. He is well-developed. He is obese. HENT:      Head: Normocephalic and atraumatic. Right Ear: External ear normal.      Left Ear: External ear normal.      Nose: Nose normal.      Mouth/Throat:      Mouth: Mucous membranes are moist.      Pharynx: Oropharynx is clear. Eyes:      Conjunctiva/sclera: Conjunctivae normal.      Pupils: Pupils are equal, round, and reactive to light. Neck:      Thyroid: No thyromegaly. Vascular: No JVD. Cardiovascular:      Rate and Rhythm: Normal rate and regular rhythm. Heart sounds: Normal heart sounds. No murmur heard. No friction rub. Pulmonary:      Effort: Pulmonary effort is normal.      Breath sounds: Normal breath sounds. No wheezing or rales. Abdominal:      General: Bowel sounds are normal.      Palpations: Abdomen is soft. Tenderness: There is no abdominal tenderness. There is no rebound. Musculoskeletal:         General: No tenderness. Cervical back: Neck supple. No rigidity. No muscular tenderness. Right lower leg: Edema (trace) present.       Left lower leg: Edema (trace) present. Skin:     Findings: No erythema or rash. Neurological:      General: No focal deficit present. Mental Status: He is alert and oriented to person, place, and time. Psychiatric:         Behavior: Behavior normal.         Judgment: Judgment normal.         Lab Results   Component Value Date     10/28/2020    K 4.5 10/28/2020     10/28/2020    CO2 22 10/28/2020    GLUCOSE 148 10/28/2020    BUN 17 10/28/2020    CREATININE 1.1 10/28/2020    CALCIUM 9.2 10/28/2020    PROT 6.7 10/28/2020    LABALBU 4.3 10/28/2020    BILITOT 0.8 10/28/2020    ALT 23 10/28/2020    AST 19 10/28/2020       Hemoglobin A1C (%)   Date Value   07/01/2020 6.4 (H)     LDL Calculated (mg/dL)   Date Value   07/01/2019 85         Lab Results   Component Value Date    WBC 6.4 10/28/2020    NEUTROABS 3.7 10/27/2020    HGB 14.9 10/28/2020    HCT 45.2 10/28/2020    MCV 90.0 10/28/2020     10/28/2020       Lab Results   Component Value Date    TSH 5.070 (H) 07/01/2020         ASSESSMENT/PLAN:     1. Essential hypertension  BP is stable. I have advised him on low-sodium diet, exercise and weight control. I am going to continue current medication. Will monitor his renal function every few months, have advised him to check blood pressure frequently and to keep a record of this. 2. Atrial fibrillation, unspecified type ([address]  Sinus rhythm on exam. Will cont on meds. I had a long discussion with the patient regarding the risk/benefit from anticoagulation. Patient is aware of that. 3. Glucose intolerance (impaired glucose tolerance)   I have advised him on diet, exercise and weight control. I have also, advised him that this condition could possibly progress into diabetes. I will monitor his A1c periodically. 4. B12 deficiency  Will cont on B12 injection on a monthly basis.         Orders Placed This Encounter   Medications    cyanocobalamin injection 1,000 mcg

## 2021-09-20 NOTE — TELEPHONE ENCOUNTER
----- Message from Adonis Riedel sent at 9/17/2021  3:41 PM EDT -----  Subject: Appointment Request    Reason for Call: Routine (Patient Request) No Script    QUESTIONS  Type of Appointment? Established Patient  Reason for appointment request? No appointments available during search  Additional Information for Provider? Spouse is calling to get an appt to   have the patient checked out. He seems to always want to be on the go,   during the day and at night. Last night about midnight he went out for a   drive. Spouse is concerned. ---------------------------------------------------------------------------  --------------  Anish Dialoggy INFO  What is the best way for the office to contact you? OK to leave message on   voicemail  Preferred Call Back Phone Number? [phone]  ---------------------------------------------------------------------------  --------------  SCRIPT ANSWERS  Relationship to Patient? Third Party  Representative Name? Noy Phelps - spouse   (Is the patient requesting to see the provider for a procedure?)? No  (Is the patient requesting to see the provider urgently  today or   tomorrow. )? No  Have you been diagnosed with, awaiting test results for, or told that you   are suspected of having COVID-19 (Coronavirus)? (If patient has tested   negative or was tested as a requirement for work, school, or travel and   not based on symptoms, answer no)? No  Within the past two weeks have you developed any of the following symptoms   (answer no if symptoms have been present longer than 2 weeks or began   more than 2 weeks ago)? Fever or Chills, Cough, Shortness of breath or   difficulty breathing, Loss of taste or smell, Sore throat, Nasal   congestion, Sneezing or runny nose, Fatigue or generalized body aches   (answer no if pain is specific to a body part e.g. back pain), Diarrhea,   Headache? No  Have you had close contact with someone with COVID-19 in the last 14 days?    No  (Service Expert  click yes below to proceed with [employer] As Usual   Scheduling)?  Yes

## 2021-11-01 ENCOUNTER — OFFICE VISIT (OUTPATIENT)
Dept: CARDIOLOGY | Facility: CLINIC | Age: 76
End: 2021-11-01

## 2021-11-01 VITALS
DIASTOLIC BLOOD PRESSURE: 56 MMHG | SYSTOLIC BLOOD PRESSURE: 108 MMHG | OXYGEN SATURATION: 98 % | WEIGHT: 256 LBS | BODY MASS INDEX: 34.67 KG/M2 | HEART RATE: 85 BPM | HEIGHT: 72 IN

## 2021-11-01 DIAGNOSIS — I10 PRIMARY HYPERTENSION: ICD-10-CM

## 2021-11-01 DIAGNOSIS — I48.20 CHRONIC ATRIAL FIBRILLATION (HCC): Primary | ICD-10-CM

## 2021-11-01 DIAGNOSIS — E78.2 MIXED HYPERLIPIDEMIA: ICD-10-CM

## 2021-11-01 PROCEDURE — 99214 OFFICE O/P EST MOD 30 MIN: CPT | Performed by: INTERNAL MEDICINE

## 2021-11-01 RX ORDER — MELATONIN
5000 DAILY
COMMUNITY

## 2021-11-01 RX ORDER — TRIAMCINOLONE ACETONIDE 1 MG/G
1 CREAM TOPICAL 2 TIMES DAILY
COMMUNITY

## 2021-11-01 RX ORDER — BETAMETHASONE DIPROPIONATE 0.05 %
1 OINTMENT (GRAM) TOPICAL 2 TIMES DAILY
COMMUNITY

## 2021-11-01 RX ORDER — LANOLIN ALCOHOL/MO/W.PET/CERES
1000 CREAM (GRAM) TOPICAL DAILY
COMMUNITY

## 2021-11-01 NOTE — PROGRESS NOTES
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 A-fib/A-flutter  1. s/p ECV x 2, last 9/19 [facility]- ERAF  2. 9/1 9 Echo [facility] EF 45% AV sclerosis  3. 10/20 readmission M/W (missed Rx dosing)  2. DILAN-compliant with CPAP  3. HTN  4. HL  1. 7/15 144/172/29/81  5. Normal coronary arteries per remote LHC  6. DVt s/p IVC filter  7. 2015 L traumatic femur fx- [facility]    Chief Complaint   Patient presents with   • Chronic atrial fibrillation (CMS/HCC)       Allergies  Allergies   Allergen Reactions   • Codeine Unknown (See Comments)     Pt/family can't recall     • Keflex [Cephalexin] Unknown (See Comments)     Pt/family unsure     • Morphine And Related Unknown (See Comments)     Pt/family can't recall   • Penicillins Unknown (See Comments)     Pt/family can't recall.       Current Medications    Current Outpatient Medications:   •  apixaban (ELIQUIS) 5 MG tablet tablet, Take 1 tablet by mouth Every 12 (Twelve) Hours., Disp: 60 tablet, Rfl: 1  •  atorvastatin (LIPITOR) 20 MG tablet, Take 20 mg by mouth Daily., Disp: , Rfl:   •  betamethasone dipropionate (DIPROLENE) 0.05 % ointment, Apply 1 application topically to the appropriate area as directed 2 (Two) Times a Day., Disp: , Rfl:   •  cholecalciferol (VITAMIN D3) 25 MCG (1000 UT) tablet, Take 5,000 Units by mouth Daily., Disp: , Rfl:   •  dilTIAZem (CARDIZEM) 120 MG tablet, Take 120 mg by mouth Daily., Disp: , Rfl:   •  finasteride (PROSCAR) 5 MG tablet, 5 mg Daily., Disp: , Rfl: 3  •  hydrochlorothiazide (MICROZIDE) 12.5 MG capsule, Take 12.5 mg by mouth Daily., Disp: , Rfl:   •  LOSARTAN POTASSIUM PO, Take 100 mg by mouth Daily., Disp: , Rfl:   •  metoprolol tartrate (LOPRESSOR) 25 MG tablet, Take 50 mg by mouth 2 (Two) Times a Day., Disp: , Rfl:   •  potassium  "chloride (K-DUR) 10 MEQ CR tablet, Take 10 mEq by mouth Daily., Disp: , Rfl:   •  spironolactone (ALDACTONE) 25 MG tablet, Take 1 tablet by mouth Daily., Disp: 30 tablet, Rfl: 1  •  tamsulosin (FLOMAX) 0.4 MG capsule 24 hr capsule, Take 1 capsule by mouth Daily., Disp: , Rfl:   •  triamcinolone (KENALOG) 0.1 % cream, Apply 1 application topically to the appropriate area as directed 2 (Two) Times a Day., Disp: , Rfl:   •  vitamin B-12 (CYANOCOBALAMIN) 1000 MCG tablet, Take 1,000 mcg by mouth Daily., Disp: , Rfl:       History of Present Illness   Angela Palencia is a 76 y.o. year old male here for follow up.  As of breath is not following his heart rate and blood pressure at home regularly.      OBJECTIVE:  Vitals:    11/01/21 1153   BP: 108/56   BP Location: Right arm   Patient Position: Sitting   Pulse: 85   SpO2: 98%   Weight: 116 kg (256 lb)   Height: 182.9 cm (72\")     Physical Exam  Constitutional:       Appearance: He is well-developed.   Neck:      Thyroid: No thyromegaly.      Vascular: No carotid bruit, hepatojugular reflux or JVD.      Trachea: No tracheal deviation.   Cardiovascular:      Rate and Rhythm: Normal rate. Rhythm irregularly irregular.      Chest Wall: PMI is not displaced.      Pulses: Normal pulses and intact distal pulses. No midsystolic click and no opening snap.           Radial pulses are 2+ on the right side and 2+ on the left side.        Dorsalis pedis pulses are 2+ on the right side and 2+ on the left side.        Posterior tibial pulses are 2+ on the right side and 2+ on the left side.      Heart sounds: S1 normal and S2 normal. Heart sounds not distant. No murmur heard.  No friction rub. No gallop.    Pulmonary:      Effort: Pulmonary effort is normal.      Breath sounds: Normal breath sounds. No wheezing or rales.   Abdominal:      General: Bowel sounds are normal.      Palpations: Abdomen is soft. There is no mass.      Tenderness: There is no abdominal tenderness. There is no " guarding.   Musculoskeletal:      Cervical back: Normal range of motion and neck supple.         Diagnostic Data:  Procedures      ASSESSMENT:   Diagnosis Plan   1. Chronic atrial fibrillation (HCC)     2. Primary hypertension     3. Mixed hyperlipidemia         PLAN:  Atrial fib /flutter status post remote cardioversion most recently 9/6/2019 with return to atrial flutter.  This time we will continue rate control/ anticoagulation.      Hypertension controlled tamsulosin valsartan HCTZ metoprolol diltiazem    Dyslipidemia on statin therapy LDL 91 6/ 21    Coronary disease with no obstructive symptoms LVEF 45 to 50% and atrial flutter         Ewelina Kumar MD, thank you for referring Mr. Palencia for evaluation.  I have forwarded my electronically generated recommendations to you for review.  Please do not hesitate to call with any questions.      Zack Almanza MD, FACC

## 2021-12-09 ENCOUNTER — HOSPITAL ENCOUNTER (EMERGENCY)
Facility: HOSPITAL | Age: 76
Discharge: HOME | End: 2021-12-09
Attending: EMERGENCY MEDICINE | Admitting: EMERGENCY MEDICINE

## 2021-12-09 ENCOUNTER — APPOINTMENT (OUTPATIENT)
Dept: GENERAL RADIOLOGY | Facility: HOSPITAL | Age: 76
End: 2021-12-09

## 2021-12-09 VITALS
WEIGHT: 290 LBS | OXYGEN SATURATION: 97 % | SYSTOLIC BLOOD PRESSURE: 104 MMHG | HEART RATE: 95 BPM | BODY MASS INDEX: 38.43 KG/M2 | TEMPERATURE: 98.3 F | RESPIRATION RATE: 16 BRPM | DIASTOLIC BLOOD PRESSURE: 50 MMHG | HEIGHT: 73 IN

## 2021-12-09 DIAGNOSIS — I48.92 ATRIAL FLUTTER, UNSPECIFIED TYPE (HCC): Primary | ICD-10-CM

## 2021-12-09 LAB
ALBUMIN SERPL-MCNC: 4 G/DL (ref 3.5–5.2)
ALBUMIN/GLOB SERPL: 1.4 G/DL
ALP SERPL-CCNC: 103 U/L (ref 39–117)
ALT SERPL W P-5'-P-CCNC: 18 U/L (ref 1–41)
ANION GAP SERPL CALCULATED.3IONS-SCNC: 20.3 MMOL/L (ref 5–15)
AST SERPL-CCNC: 29 U/L (ref 1–40)
BASOPHILS # BLD AUTO: 0.03 10*3/MM3 (ref 0–0.2)
BASOPHILS NFR BLD AUTO: 0.2 % (ref 0–1.5)
BILIRUB SERPL-MCNC: 1.2 MG/DL (ref 0–1.2)
BUN SERPL-MCNC: 43 MG/DL (ref 8–23)
BUN/CREAT SERPL: 20.1 (ref 7–25)
CALCIUM SPEC-SCNC: 8.6 MG/DL (ref 8.6–10.5)
CHLORIDE SERPL-SCNC: 100 MMOL/L (ref 98–107)
CO2 SERPL-SCNC: 16.7 MMOL/L (ref 22–29)
CREAT SERPL-MCNC: 2.14 MG/DL (ref 0.76–1.27)
DEPRECATED RDW RBC AUTO: 41.3 FL (ref 37–54)
EOSINOPHIL # BLD AUTO: 0.02 10*3/MM3 (ref 0–0.4)
EOSINOPHIL NFR BLD AUTO: 0.2 % (ref 0.3–6.2)
ERYTHROCYTE [DISTWIDTH] IN BLOOD BY AUTOMATED COUNT: 13.4 % (ref 12.3–15.4)
GFR SERPL CREATININE-BSD FRML MDRD: 30 ML/MIN/1.73
GLOBULIN UR ELPH-MCNC: 2.8 GM/DL
GLUCOSE SERPL-MCNC: 191 MG/DL (ref 65–99)
HCT VFR BLD AUTO: 48.2 % (ref 37.5–51)
HGB BLD-MCNC: 16.2 G/DL (ref 13–17.7)
HOLD SPECIMEN: NORMAL
HOLD SPECIMEN: NORMAL
IMM GRANULOCYTES # BLD AUTO: 0.09 10*3/MM3 (ref 0–0.05)
IMM GRANULOCYTES NFR BLD AUTO: 0.7 % (ref 0–0.5)
LYMPHOCYTES # BLD AUTO: 1.52 10*3/MM3 (ref 0.7–3.1)
LYMPHOCYTES NFR BLD AUTO: 12.1 % (ref 19.6–45.3)
MCH RBC QN AUTO: 28.2 PG (ref 26.6–33)
MCHC RBC AUTO-ENTMCNC: 33.6 G/DL (ref 31.5–35.7)
MCV RBC AUTO: 84 FL (ref 79–97)
MONOCYTES # BLD AUTO: 0.75 10*3/MM3 (ref 0.1–0.9)
MONOCYTES NFR BLD AUTO: 6 % (ref 5–12)
NEUTROPHILS NFR BLD AUTO: 10.15 10*3/MM3 (ref 1.7–7)
NEUTROPHILS NFR BLD AUTO: 80.8 % (ref 42.7–76)
NRBC BLD AUTO-RTO: 0 /100 WBC (ref 0–0.2)
NT-PROBNP SERPL-MCNC: 1434 PG/ML (ref 0–1800)
PLATELET # BLD AUTO: 237 10*3/MM3 (ref 140–450)
PMV BLD AUTO: 10.9 FL (ref 6–12)
POTASSIUM SERPL-SCNC: 4.4 MMOL/L (ref 3.5–5.2)
PROT SERPL-MCNC: 6.8 G/DL (ref 6–8.5)
RBC # BLD AUTO: 5.74 10*6/MM3 (ref 4.14–5.8)
SODIUM SERPL-SCNC: 137 MMOL/L (ref 136–145)
TROPONIN T SERPL-MCNC: 0.03 NG/ML (ref 0–0.03)
WBC NRBC COR # BLD: 12.56 10*3/MM3 (ref 3.4–10.8)
WHOLE BLOOD HOLD SPECIMEN: NORMAL
WHOLE BLOOD HOLD SPECIMEN: NORMAL

## 2021-12-09 PROCEDURE — 85025 COMPLETE CBC W/AUTO DIFF WBC: CPT | Performed by: EMERGENCY MEDICINE

## 2021-12-09 PROCEDURE — 83880 ASSAY OF NATRIURETIC PEPTIDE: CPT | Performed by: EMERGENCY MEDICINE

## 2021-12-09 PROCEDURE — 80053 COMPREHEN METABOLIC PANEL: CPT | Performed by: EMERGENCY MEDICINE

## 2021-12-09 PROCEDURE — 96374 THER/PROPH/DIAG INJ IV PUSH: CPT

## 2021-12-09 PROCEDURE — 25010000002 FENTANYL CITRATE (PF) 50 MCG/ML SOLUTION: Performed by: EMERGENCY MEDICINE

## 2021-12-09 PROCEDURE — 93005 ELECTROCARDIOGRAM TRACING: CPT | Performed by: EMERGENCY MEDICINE

## 2021-12-09 PROCEDURE — 92960 CARDIOVERSION ELECTRIC EXT: CPT

## 2021-12-09 PROCEDURE — 99284 EMERGENCY DEPT VISIT MOD MDM: CPT

## 2021-12-09 PROCEDURE — 71045 X-RAY EXAM CHEST 1 VIEW: CPT

## 2021-12-09 PROCEDURE — 84484 ASSAY OF TROPONIN QUANT: CPT | Performed by: EMERGENCY MEDICINE

## 2021-12-09 RX ORDER — FENTANYL CITRATE 50 UG/ML
50 INJECTION, SOLUTION INTRAMUSCULAR; INTRAVENOUS ONCE
Status: COMPLETED | OUTPATIENT
Start: 2021-12-09 | End: 2021-12-09

## 2021-12-09 RX ORDER — SODIUM CHLORIDE 0.9 % (FLUSH) 0.9 %
10 SYRINGE (ML) INJECTION AS NEEDED
Status: DISCONTINUED | OUTPATIENT
Start: 2021-12-09 | End: 2021-12-09 | Stop reason: HOSPADM

## 2021-12-09 RX ADMIN — SODIUM CHLORIDE 1000 ML: 9 INJECTION, SOLUTION INTRAVENOUS at 12:32

## 2021-12-09 RX ADMIN — FENTANYL CITRATE 50 MCG: 50 INJECTION INTRAMUSCULAR; INTRAVENOUS at 10:56

## 2021-12-09 NOTE — ED NOTES
MD, PRIMARY RN, CHARGE RN AND RYANN GOFF RN @[facility]     Coleen aGlvan, RN  12/09/21 5525

## 2021-12-16 NOTE — ED PROVIDER NOTES
"Subjective   76-year-old male brought to the ED via EMS for chief complaint of fall.  The patient states that he has been slightly weak and dizzy upon standing.  He did fall but has no traumatic injury related to the fall.  EMS found that the patient was in \"SVT\".  Patient complains of some palpitations generalized weakness.  Mild shortness of breath.  No cough or wheeze.  No nausea vomiting diarrhea or abdominal pain.  No prior treatments or limiting factors.  No prior evaluations.  No other complaints at this time.          Review of Systems   Constitutional: Positive for fatigue. Negative for fever.   Respiratory: Positive for shortness of breath.    Cardiovascular: Positive for palpitations. Negative for chest pain and leg swelling.   All other systems reviewed and are negative.      Past Medical History:   Diagnosis Date   • Atrial fibrillation (HCC)    • Atrial flutter (HCC)    • Cardiomyopathy (HCC)    • Congestive heart failure (HCC)    • DVT (deep venous thrombosis) (HCC)    • Edema    • Esophageal reflux    • History of hiatal hernia    • History of mitral valve insufficiency    • History of MRSA infection    • Hypertension    • Impaired functional mobility, balance, gait, and endurance 09/04/2019   • Mitral regurgitation    • Obesity    • Obstructive sleep apnea    • Traumatic fracture of femur with routine healing 2015   • Tricuspid regurgitation        Allergies   Allergen Reactions   • Codeine Unknown (See Comments)     Pt/family can't recall     • Keflex [Cephalexin] Unknown (See Comments)     Pt/family unsure     • Morphine And Related Unknown (See Comments)     Pt/family can't recall   • Penicillins Unknown (See Comments)     Pt/family can't recall.       Past Surgical History:   Procedure Laterality Date   • ANKLE SURGERY     • BACK SURGERY     • CHOLECYSTECTOMY     • FEMUR SURGERY Left    • KNEE SURGERY      Dr. Lundy    • NOSE SURGERY     • OTHER SURGICAL HISTORY  1974    ear   • TOTAL HIP " ARTHROPLASTY Left      [facility] Dr. Juan Franklin       Family History   Problem Relation Age of Onset   • Heart attack Mother    • Heart attack Sister 50       Social History     Socioeconomic History   • Marital status:    Tobacco Use   • Smoking status: Former Smoker     Types: Cigarettes     Quit date: 10/2/1997     Years since quittin.2   • Smokeless tobacco: Never Used   Vaping Use   • Vaping Use: Never used   Substance and Sexual Activity   • Alcohol use: No   • Drug use: No   • Sexual activity: Defer           Objective   Physical Exam  Vitals and nursing note reviewed.   Constitutional:       General: He is not in acute distress.     Appearance: He is well-developed. He is not diaphoretic.      Comments: Chronically ill appearing    HENT:      Head: Normocephalic and atraumatic.      Nose: Nose normal.   Eyes:      Conjunctiva/sclera: Conjunctivae normal.      Pupils: Pupils are equal, round, and reactive to light.   Cardiovascular:      Rate and Rhythm: Tachycardia present. Rhythm irregular.   Pulmonary:      Effort: Pulmonary effort is normal. No respiratory distress.      Breath sounds: Normal breath sounds.   Abdominal:      General: There is no distension.      Palpations: Abdomen is soft.      Tenderness: There is no abdominal tenderness.   Musculoskeletal:         General: No deformity.      Right lower leg: Edema present.      Left lower leg: Edema present.   Neurological:      Mental Status: He is alert and oriented to person, place, and time.      Cranial Nerves: No cranial nerve deficit.      Coordination: Coordination normal.         Electrical Cardioversion    Date/Time: 2021 7:52 AM  Performed by: Dylon Cooper DO  Authorized by: Dylon Cooper DO     Consent:     Consent obtained:  Verbal    Consent given by:  Patient    Risks discussed:  Cutaneous burn, death, induced arrhythmia and pain    Alternatives discussed:  No treatment, rate-control medication,  alternative treatment, referral, observation, delayed treatment and anti-coagulation medication  Pre-procedure details:     Cardioversion basis:  Emergent    Rhythm:  Atrial flutter    Electrode placement:  Anterior-posterior  Attempt one:     Cardioversion mode:  Synchronous    Waveform:  Monophasic    Shock (Joules):  120    Shock outcome:  Conversion to normal sinus rhythm  Post-procedure details:     Patient status:  Awake    Patient tolerance of procedure:  Tolerated well, no immediate complications               ED Course  ED Course as of 12/16/21 0743   Thu Dec 09, 2021   1138 EKG interpreted by me.  Supraventricular rhythm.  Suspect atrial fibrillation versus atrial flutter.  Rate of 156.  Nonspecific ST segment changes.  Abnormal EKG [CG]   1138 Repeat EKG interpreted by me.  Sinus rhythm.  Rate of 95.  Right bundle branch block.  Nonspecific Q wave.  No obvious ST changes.  Abnormal EKG  This EKG is status post electrical cardioversion. [CG]      ED Course User Index  [CG] Dylon Cooper DO                                                 [facility]  Critical Care  Performed by: Dylon Cooper DO  Authorized by: Dylon Cooper DO     Critical care provider statement:     Critical care time (minutes): 35    Critical care time was exclusive of:  Separately billable procedures and treating other patients    Critical care was necessary to treat or prevent imminent or life-threatening deterioration of the following conditions: Acute unstable cardiac arrhythmia, atrial flutter, hypertension, other    Critical care was time spent personally by me on the following activities:  Ordering and performing treatments and interventions, development of treatment plan with patient or surrogate, discussions with consultants, evaluation of patient's response to treatment, examination of patient, ordering and review of laboratory studies, ordering and review of radiographic studies, pulse oximetry, re-evaluation of patient's  condition and review of old charts              76-year-old male presents to the ED with a chief complaint of weakness fatigue.  He had a fall but no traumatic injuries from the fall.  No pain related to the fall.  Patient was found to be in what appeared to be atrial flutter.  He was significantly hypotensive.  Shared decision making with the patient.  He was agreeable to electrical cardioversion.  Electrical cardioversion was performed and the patient tolerated it well.  He converted to normal sinus rhythm.  His blood pressure improved.  He was given mild IV fluid rehydration.  Labs are reassuring.  No significant elevation in his BNP or troponin.  Patient was resting comfortably and requesting to go home.  Presho that he was appropriate for discharge given his return to normal sinus rhythm and normalization of his vital signs.  Follow-up outpatient as needed.  He is agreeable to this plan.      Final diagnoses:   Atrial flutter, unspecified type (HCC)       ED Disposition  ED Disposition     ED Disposition Condition Comment    Discharge Stable           Ewelina Kumar MD  [address] [place], [place] [id number] [phone]               Medication List      No changes were made to your prescriptions during this visit.          Dylon Cooper, DO  12/16/21 0756

## 2021-12-28 NOTE — ED NOTES
Pt is anxious, jittery and restless in bed, pt keeps apologizing states he doesn't know what is wrong but he cant calm down.          Kayla Gaitan RN  12/28/21 8717

## 2021-12-28 NOTE — ED PROVIDER NOTES
Emergency Department Encounter  Location: [address] [place] [address] Court    Patient: Crissy Crowder  MRN: [id number]  : 1945  Date of evaluation:   ED Provider: Humberto Marshall MD    [address] was checked out to me by Dr. Patrick Monroe. Please see his/her initial documentation for details of the patient's initial ED presentation, physical exam and completed studies. In brief, Crissy Jeffersonr is a 64 y.o. male that presented to the emergency department for a fast heart rate and feeling \"jittery\". Patient has dementia and is unable to provide much of a history but told me that he was feeling nauseated. COVID negative    CBC normal  BUN/Cr of 61/1.9  LFT's are slightly elevated at ALT/AST  38/35  Lipase 74  Lactic acid 1.9    CT head negative  CT abdomen/pelvis-shows multifocal patchy pneumonia in the lingula and right lower lobe with pneumonia in the right middle lobe and lateral basal segment of the left lower lobe. This indicates multi focal pneumonia.     I have reviewed and interpreted all of the currently available lab results and diagnostics from this visit:  Results for orders placed or performed during the hospital encounter of 21   COVID-19, Rapid    Specimen: Nasopharyngeal Swab   Result Value Ref Range    SARS-CoV-2, NAAT Not Detected Not Detected   CBC Auto Differential   Result Value Ref Range    WBC 10.4 4.0 - 11.0 K/uL    RBC 5.45 4.50 - 6.00 M/uL    Hemoglobin 15.7 13.0 - 18.0 g/dL    Hematocrit 45.3 40.0 - 54.0 %    MCV 83.1 80.0 - 100.0 fL    MCH 28.8 27.0 - 32.0 pg    MCHC 34.7 31.0 - 35.0 g/dL    RDW 13.2 11.0 - 16.0 %    Platelets 069 564 - 495 K/uL    MPV 12.3 (H) 6.0 - 10.0 fL    Neutrophils % 72.6 %    Immature Granulocytes % 0.5 0.0 - 5.0 %    Lymphocytes % 16.7 %    Monocytes % 8.9 %    Eosinophils % 0.8 %    Basophils % 0.5 %    Neutrophils Absolute 7.6 (H) 2.0 - 7.5 K/uL    Immature Granulocytes # 0.1 K/uL    Lymphocytes Absolute 1.7 1.5 - 4.0 K/uL    Monocytes Absolute 0.9 (H) 0.2 - 0.8 K/uL    Eosinophils Absolute 0.1 0.0 - 0.4 K/uL    Basophils Absolute 0.1 0.0 - 0.1 K/uL   Comprehensive Metabolic Panel w/ Reflex to MG   Result Value Ref Range    Sodium 137 136 - 145 mmol/L    Potassium reflex Magnesium 3.9 3.4 - 5.1 mmol/L    Chloride 96 (L) 98 - 107 mmol/L    CO2 19 (L) 20 - 30 mmol/L    Anion Gap 22 (H) 3 - 16    Glucose 146 (H) 74 - 106 mg/dL    BUN 61 (H) 6 - 20 mg/dL    CREATININE 1.9 (H) 0.4 - 1.2 mg/dL    GFR Non- 34 (L) >59    GFR  42 (L) >59    Calcium 9.3 8.5 - 10.5 mg/dL    Total Protein 7.4 6.4 - 8.3 g/dL    Albumin 3.9 3.4 - 4.8 g/dL    Albumin/Globulin Ratio 1.1 0.8 - 2.0    Total Bilirubin 1.2 0.3 - 1.2 mg/dL    Alkaline Phosphatase 163 (H) 25 - 100 U/L    ALT 38 (H) 4 - 36 U/L    AST 35 (H) 8 - 33 U/L    Globulin 3.5 Not Established g/dL   Lipase   Result Value Ref Range    Lipase 74.0 (H) 5.6 - 51.3 U/L   Amylase   Result Value Ref Range    Amylase 64 20 - 104 U/L   Lactic Acid, Plasma   Result Value Ref Range    Lactic Acid 1.9 0.4 - 2.0 mmol/L   Blood Gas, Arterial   Result Value Ref Range    pH, Arterial 7.452 (H) 7.350 - 7.450    pCO2, Arterial 24.7 (L) 35.0 - 45.0 mmHg    pO2, Arterial 95.0 80.0 - 100.0 mmHg    HCO3, Arterial 16.9 (L) 22.0 - 26.0 mmol/L    Base Excess, Arterial -5.3 (L) -3.0 - 3.0 mmol/L    O2 Sat, Arterial 97.8 >92 %    TCO2, Arterial 17.6 (L) 24.0 - 30.0 mmol/L    O2 Therapy Unknown     FIO2 0.21 Not Established %   Brain Natriuretic Peptide   Result Value Ref Range    Pro- 0 - 1,800 pg/mL   Urinalysis Reflex to Culture    Specimen: Urine, clean catch   Result Value Ref Range    Color, UA Yellow Straw/Yellow    Clarity, UA CLOUDY (A) Clear    Glucose, Ur Negative Negative mg/dL    Bilirubin Urine Negative Negative    Ketones, Urine 15 (A) Negative mg/dL    Specific Gravity, UA 1.020 1.005 - 1.030    Blood, Urine TRACE-INTACT (A) Negative    pH, UA 5.0 5.0 - 8.0 Protein, UA Negative Negative mg/dL    Urobilinogen, Urine 0.2 <2.0 E.U./dL    Nitrite, Urine POSITIVE (A) Negative    Leukocyte Esterase, Urine MODERATE (A) Negative    Microscopic Examination YES     Urine Type Voided     Urine Reflex to Culture Yes    Ammonia   Result Value Ref Range    Ammonia 36 27 - 102 mcg/dL   Microscopic Urinalysis   Result Value Ref Range    WBC, UA >100 (A) 0 - 5 /HPF    RBC, UA see below (A) 0 - 4 /HPF    Bacteria, UA 4+ (A) None Seen /HPF     CT ABDOMEN PELVIS WO CONTRAST Additional Contrast? None    Result Date: 12/28/2021  EXAM: CT ABDOMEN AND PELVIS WITHOUT CONTRAST INDICATION: pain, History of pulmonary airspace disease. COMPARISON: 7/21/2019 TECHNIQUE: Axial CT imaging obtained from lung bases through pelvis. Axial images and multiplanar reformatted images are provided for review. Individualized dose optimization technique was used in order to meet ALARA standards for radiation dose reduction. In addition to vendor specific dose reduction algorithms, the dose reduction techniques vary based on the specific scanner utilized but frequently include automated exposure control, adjustment of the mA and/or kV according to patient size, and use of iterative reconstruction technique. IV Contrast: None Oral Contrast: None FINDINGS: LUNG BASES: Patchy multifocal subpleural airspace disease is redemonstrated. Improvement in the region of the lingula along the major fissure. Subpleural region of consolidation, atelectasis and lateral basal segment of the left lower lobe. Right middle lobe nodular groundglass airspace disease image 1 series 3 No evidence of effusion. LIVER: Normal. GALLBLADDER AND BILIARY DUCTS: Cholecystectomy  No intra- or extrahepatic biliary dilatation. PANCREAS: Normal. SPLEEN: Normal. ADRENAL GLANDS: Normal. KIDNEYS AND URETERS: Left kidney: Exophytic medial lower pole cortical cyst, measuring 12 mm. No hydronephrosis  No renal stones.  Right kidney: Posterior lower pole benign cortical cyst. No nephrolithiasis. No evidence of obstruction. URINARY BLADDER: Normal. REPRODUCTIVE ORGANS: No mass BOWEL: Normal caliber. Normal appendix. Sigmoid diverticulosis. No evidence of acute diverticulitis. LYMPH NODES: No abnormally enlarged nodes. PERITONEUM/RETROPERITONEUM: No ascites or free air. VESSELS: Inferior vena cava filter in satisfactory alignment proximally 4.6 cm below the confluence of the renal veins, stable position with no evidence of migration. Filter remains intact. . ABDOMINAL WALL: Atrophic changes and fat replacement of the right rectus muscle in the midabdomen. Tali Gin BONES: No destructive process. Severe central canal stenosis L4-L5 Diffuse facet arthropathy     1. No acute intra-abdominopelvic abnormality. 2. Sigmoid diverticulosis without diverticulitis 3. Multifocal patchy peripheral pulmonary airspace disease with improvement noted in the region of the lingula and right lower lobe and new airspace disease in the right middle lobe and lateral basal segment left lower lobe 4. Spondylosis with severe facet arthropathy and severe central canal stenosis at L4-L5    CT Head WO Contrast    Result Date: 12/28/2021  HISTORY: Altered mental status. EXAM : CT OF THE HEAD WITHOUT CONTRAST TECHNIQUE: Multiple axial images were obtained of the brain without the use of contrast. Individualized dose optimization technique was used in order to meet ALARA standards for radiation dose reduction. In addition to vendor specific dose reduction algorithms, the dose reduction techniques vary based on the specific scanner utilized but frequently include automated exposure control, adjustment of the mA and/or kV according to patient size, and use of iterative reconstruction technique. COMPARISON: 7/12/2014. FINDINGS: The visualized paranasal sinuses, mastoid air cells and middle ears are clear to the extent visualized. The orbits and osseous structures are unremarkable.  Intracranially, there is no mass, midline shift or intracranial hematoma. The ventricles and cisternal spaces are appropriate. There are extensive periventricular and subcortical white matter hypodensities suggesting small vessel ischemic disease or age related degenerative changes. No focal hypodensity to suggest acute ischemia. 1.  No acute intracranial abnormality. 2. Extensive nonspecific periventricular and subcortical white matter hypodensity compatible with chronic small vessel ischemic disease and/or age related degenerative change. Final ED Course and MDM: In brief, Irving Guidry is a 64 y.o. male whose care was signed out to me by the outgoing provider. In brief, his heart rate was still significantly elevated when I took over his care. I started the patient on a diltiazem drip at 5 mg an hour. There was slight improvement in the patient's heart rate but he is in atrial flutter with a 2-1 block which is difficult to control in regards to right. When I assumed care of the patient, he had not been given antibiotics nor had blood cultures been drawn. Blood cultures were ordered and Rocephin with azithromycin was ordered as well. BNP was not ordered so this was added onto his labs. Patient was continuing to complain of nausea so he was given droperidol 0.625 mg IV. Patient has continued to pull off his leads making it difficult to constantly monitor him. I decided to give him a dose of Geodon so that he can get some rest and we could monitor him more easily. He continued to be restless so we gave him Ativan 1 mg IV but this did not help. Transfer center called at [phone]  Dr. Jez Alexander at [facility]. He was accepted and put on a list.      Patient's UA was positive for moderate leuks, nitrite positive, greater than 100 WBCs on microscopic with 4+ bacteria. 0010  Repeat EKG is showing an ectopic atrial tachycardia with RBBB; rate of 136. He is still on Diltiazem at 15mg/hour. IVF's is not improving the patient's heart rate. 0100 While turning the patient over, we noticed he had a rash in a dermatomal distribution to the right hip/buttock c/w shingles. Valtrex given. 0400 patient's heart rate has come down to the 90s. We decided to try to decrease his diltiazem to 10 mg from 15 mg an hour. His blood pressure is also on the low side but he is sleeping.       ED Medication Orders (From admission, onward)    Start Ordered     Status Ordering Provider    12/29/21 0030 12/29/21 0029  0.9 % sodium chloride bolus  ONCE         Last MAR action: New Bag - by ANDREAS Rich on 12/29/21 at [address] [facility] [address], [place]    93/00/78 6396 12/28/21 2227  LORazepam (ATIVAN) injection 1 mg  ONCE         Last MAR action: Given - by Evelyn Painting on 12/28/21 at [address] [facility] [facility] [place], [place]    00/53/51 2884 12/28/21 2046  ziprasidone (GEODON) injection 20 mg  ONCE        \"And\" Linked Group Details    Last MAR action: Given - by Evelyn Painting on 12/28/21 at [address] [facility] [place]    03/71/08 0513 12/28/21 2046  sterile water injection 1.2 mL  ONCE        \"And\" Linked Group Details    Last MAR action: Given - by Evelyn Painting on 12/28/21 at [address] [facility] [place]    61/57/54 5870 12/28/21 1949  droperidol (INAPSINE) injection 0.625 mg  ONCE         Last MAR action: Given - by Evelyn Painting on 12/28/21 at [address] [facility] [place]    60/69/91 4995 12/28/21 1902  levoFLOXacin (LEVAQUIN) 500 MG/100ML infusion 500 mg  EVERY 24 HOURS        Question:  Antimicrobial Indications  Answer:  Pneumonia (CAP)    Last MAR action: Stopped - by Evelyn Painting on 12/28/21 at [address] [facility] [place]    71/30/49 4780 12/28/21 1908  dilTIAZem injection 10 mg  ONCE        \"Followed by\" Linked Group Details    Last MAR action: Canceled Entry - by Evelyn Painting on 12/28/21 at [address], [place]    64/50/43 3767 12/28/21 1908  dilTIAZem HCl in sodium chloride 125 mg / 125 mL infusion  CONTINUOUS        \"Followed by\" Linked Group Details    Last MAR action: New Bag - by Chikis Damon [facility]    00/21/95 9164 12/28/21 1911  aspirin chewable tablet 324 mg  ONCE         Last MAR action: Given - by Chikis Damon on 12/28/21 at [address], [address]    20/65/85 8444 12/28/21 1709  dilTIAZem injection 20 mg  ONCE         Last MAR action: Given - by Rubin Jacobo on 12/28/21 at [address], [place]    12/28/21 1630 12/28/21 1615  0.9 % sodium chloride infusion  CONTINUOUS         Last MAR action: Stopped - by Chikis Damon on 12/28/21 at [address] [facility], [place]    12/28/21 1630 12/28/21 1621  dilTIAZem injection 10 mg  ONCE         Last MAR action: Given - by Rubin Jacobo on 12[place] [place]    12/28/21 1615 12/28/21 1609  digoxin (LANOXIN) injection 125 mcg  ONCE         Last MAR action: Given - by Rubin Jacobo on 12/28/21 at [address], [place]    12/28/21 1545 12/28/21 1536  dilTIAZem injection 10 mg  ONCE         Last MAR action: Given - by PERNELL GONZALES on 12/28/21 [facility] [address] [facility]    12/28/21 1430 12/28/21 1422  0.9 % sodium chloride bolus  ONCE         Last MAR action: Stopped - by PERNELL GONZALES on 12/28/21 at [address] [facility], [place]    12/28/21 1430 12/28/21 1423  ondansetron (ZOFRAN) injection 4 mg  ONCE         Last MAR action: Given - by Rubin Jacobo on 12/28/21 at [address] [facility]          Final Impression      1. Ectopic atrial tachycardia ([address]    2. Pneumonia of both lungs due to infectious organism, unspecified part of lung    3. Acute UTI    4.  Herpes zoster without complication        DISPOSITION Decision To Transfer 12/28/2021 07:11:42 PM     (Please note that portions of this note may have been completed with a voice recognition program. Efforts were made to edit the dictations but occasionally words are mis-transcribed.)    Marc Buchanan MD  [address] [facility] MD [place]  12/30/21 1911

## 2021-12-28 NOTE — ED NOTES
Dr. Francheska Lovell made aware of EKG and rhythm at this time.       Thaddeus Read RN  12/28/21 9397

## 2021-12-28 NOTE — ED PROVIDER NOTES
[address] [facility] ENCOUNTER      Pt Name: Payton Krueger  MRN: [id number]  YOB: 1945  Date of evaluation: 12/28/2021  Provider: Abimael Bañuelos, [facility]       Chief Complaint   Patient presents with    Nausea    Fall     2 weeks ago per family         HISTORY OF PRESENT ILLNESS  (Location/Symptom, Timing/Onset, Context/Setting, Quality, Duration, Modifying Factors, Severity.)   Payton Krueger is a 64 y.o. male who presents to the emergency department with a chief complaint of nausea. EMS states patient also fell couple weeks ago. Patient appears confused history is limited. Patient knows he is at the hospital and knows his name but he does not know year or date appears to be mildly confused. He denies any pain anywhere. He does admit nausea. Nursing notes were reviewed. REVIEW OFSYSTEMS    (2-9 systems for level 4, 10 or more for level 5)   ROS:  Unable to complete review of systems. Patient is confused has altered mental status. Patient is able to state that he does not hurt anywhere and is able to state that he has some nausea at times. Otherwise history is limited. Patient does denies shortness of breath and chest pain and pain anywhere.       PAST MEDICAL HISTORY     Past Medical History:   Diagnosis Date    Atrial fibrillation ([address]     Cardiomyopathy ([address]     Cataract     GERD (gastroesophageal reflux disease)     History of DVT (deep vein thrombosis)     s/p IVC filter    Hyperlipidemia     Hypertension     LONG TERM ANTICOAGULENT USE     Osteoarthritis     Unspecified sleep apnea          SURGICAL HISTORY       Past Surgical History:   Procedure Laterality Date    ANKLE SURGERY      left    CHOLECYSTECTOMY      EYE SURGERY      left & right cataract    FRACTURE SURGERY Left     femure    HAND SURGERY      left thumb    HIP SURGERY      left    INNER EAR SURGERY      left    KNEE ARTHROSCOPY      right  KNEE SURGERY  11    RIGHT    NOSE SURGERY      left    SPINE SURGERY      VENOUS CLOT SURGERY      right leg after hip replacement         CURRENT MEDICATIONS       Previous Medications    ALLERGY RELIEF 10 MG TABLET        APIXABAN (ELIQUIS) 5 MG TABS TABLET    TAKE 1 TABLET BY MOUTH EVERY 12 HOURS    ASPIRIN 81 MG TABLET    Take 81 mg by mouth daily.     ATORVASTATIN (LIPITOR) 20 MG TABLET    TAKE ONE TABLET BY MOUTH ONCE DAILY    B COMPLEX VITAMINS TABLET    Take 1 tablet by mouth daily    DILTIAZEM (CARDIZEM CD) 120 MG EXTENDED RELEASE CAPSULE    TAKE 1 CAPSULE BY MOUTH DAILY    FINASTERIDE (PROSCAR) 5 MG TABLET    TAKE 1 TABLET BY MOUTH DAILY    HYDROCHLOROTHIAZIDE (MICROZIDE) 12.5 MG CAPSULE    TAKE ONE CAPSULE BY MOUTH ONCE DAILY    LOSARTAN (COZAAR) 100 MG TABLET    TAKE 1 TABLET BY MOUTH DAILY    METOPROLOL TARTRATE (LOPRESSOR) 50 MG TABLET    TAKE 1 TABLET BY MOUTH 2 TIMES DAILY    POTASSIUM CHLORIDE (KLOR-CON) 10 MEQ EXTENDED RELEASE TABLET    TAKE ONE TABLET BY MOUTH ONCE DAILY    SPIRONOLACTONE (ALDACTONE) 25 MG TABLET    TAKE 1 TABLET BY MOUTH DAILY    TAMSULOSIN (FLOMAX) 0.4 MG CAPSULE    TAKE ONE CAPSULE BY MOUTH ONCE DAILY       ALLERGIES     Codeine, Morphine, Cephalexin, and Pcn [penicillins]    FAMILY HISTORY       Family History   Problem Relation Age of Onset    Heart Disease Mother     Heart Disease Father         chf    Heart Disease Sister     Cancer Brother         lungs    Cancer Brother     Heart Disease Sister     Heart Disease Brother           SOCIAL HISTORY       Social History     Socioeconomic History    Marital status:      Spouse name: None    Number of children: None    Years of education: None    Highest education level: None   Occupational History    None   Tobacco Use    Smoking status: Former Smoker     Packs/day: 1.00     Years: 40.00     Pack years: 40.00     Types: Cigarettes     Quit date: 1988     Years since quittin.0    Smokeless tobacco: Never Used   Vaping Use    Vaping Use: Never used   Substance and Sexual Activity    Alcohol use: No    Drug use: No    Sexual activity: None   Other Topics Concern    None   Social History Narrative    None     Social Determinants of Health     Financial Resource Strain: Unknown    Difficulty of Paying Living Expenses: Patient refused   Food Insecurity: Unknown    Worried About Running Out of Food in the Last Year: Patient refused    Ran Out of Food in the Last Year: Patient refused   Transportation Needs:     Lack of Transportation (Medical): Not on file    Lack of Transportation (Non-Medical):  Not on file   Physical Activity:     Days of Exercise per Week: Not on file    Minutes of Exercise per Session: Not on file   Stress:     Feeling of Stress : Not on file   Social Connections:     Frequency of Communication with Friends and Family: Not on file    Frequency of Social Gatherings with Friends and Family: Not on file    Attends [employer] Services: Not on file    Active Member of [address] [place] [address] or Organizations: Not on file    Attends Club or Organization Meetings: Not on file    Marital Status: Not on file   Intimate Partner Violence:     Fear of Current or Ex-Partner: Not on file    Emotionally Abused: Not on file    Physically Abused: Not on file    Sexually Abused: Not on file   Housing Stability:     Unable to Pay for Housing in the Last Year: Not on file    Number of [place] in the Last Year: Not on file    Unstable Housing in the Last Year: Not on file         PHYSICAL EXAM    (up to 7 for level 4, 8 or more for level 5)     ED Triage Vitals [12/28/21 1400]   BP Temp Temp Source Pulse Resp SpO2 Height Weight   96/66 97.7 °F (36.5 °C) Axillary 138 18 100 % -- 260 lb (117.9 kg)       Physical Exam  General :Patient is awake, alert, oriented to person and place only, in no acute distress, nontoxic appearing  HEENT: Pupils are equally round and reactive to light, EOMI, conjunctivae clear. Oral mucosa is moist, no exudate. Uvula is midline  Neck: Neck is supple, full range of motion, trachea midline  Cardiac: Tachycardia not regular rhythm no murmurs, rubs, or gallops  Lungs: Lungs are clear to auscultation, there is no wheezing, rhonchi, or rales. There is no use of accessory muscles. Abdomen: Abdomen is soft, nontender, nondistended. There is no firm or pulsatile masses, no rebound rigidity or guarding. Musculoskeletal: 5 out of 5 strength in all 4 extremities. No focal muscle deficits are appreciated  Neuro: Motor intact, sensory intact, level of consciousness is normal.  Dermatology: Skin is warm and dry  Psych: Mentation is grossly normal, cognition is grossly normal. Affect is appropriate. DIAGNOSTIC RESULTS     EKG: All EKG's are interpreted by the Emergency Department Physician who either signs or Co-signs this chart in the [address] a cardiologist.    The EKG interpreted by me shows atrial fibrillation rate of 137 normal QT interval left axis nonspecific ST depression likely due to heart rate    RADIOLOGY:   Non-plain film images such as CT, Ultrasound and MRI are read by the radiologist. Plain radiographic images are visualized and preliminarily interpreted by the emergency physician with the below findings:      ? Radiologist's Report Reviewed:  CT Head WO Contrast   Final Result      1. No acute intracranial abnormality. 2. Extensive nonspecific periventricular and subcortical white matter hypodensity compatible with chronic small vessel ischemic disease and/or age related degenerative change. CT ABDOMEN PELVIS WO CONTRAST Additional Contrast? None   Final Result   1. No acute intra-abdominopelvic abnormality. 2. Sigmoid diverticulosis without diverticulitis   3.  Multifocal patchy peripheral pulmonary airspace disease with improvement noted in the region of the lingula and right lower lobe and new airspace disease in the right middle lobe and lateral basal segment left lower lobe   4.  Spondylosis with severe facet arthropathy and severe central canal stenosis at L4-L5            ED BEDSIDE ULTRASOUND:   Performed by ED Physician - none    LABS:    I have reviewed and interpreted all of the currently available lab results from this visit (ifapplicable):  Results for orders placed or performed during the hospital encounter of 12/28/21   COVID-19, Rapid    Specimen: Nasopharyngeal Swab   Result Value Ref Range    SARS-CoV-2, NAAT Not Detected Not Detected   CBC Auto Differential   Result Value Ref Range    WBC 10.4 4.0 - 11.0 K/uL    RBC 5.45 4.50 - 6.00 M/uL    Hemoglobin 15.7 13.0 - 18.0 g/dL    Hematocrit 45.3 40.0 - 54.0 %    MCV 83.1 80.0 - 100.0 fL    MCH 28.8 27.0 - 32.0 pg    MCHC 34.7 31.0 - 35.0 g/dL    RDW 13.2 11.0 - 16.0 %    Platelets 503 411 - 949 K/uL    MPV 12.3 (H) 6.0 - 10.0 fL    Neutrophils % 72.6 %    Immature Granulocytes % 0.5 0.0 - 5.0 %    Lymphocytes % 16.7 %    Monocytes % 8.9 %    Eosinophils % 0.8 %    Basophils % 0.5 %    Neutrophils Absolute 7.6 (H) 2.0 - 7.5 K/uL    Immature Granulocytes # 0.1 K/uL    Lymphocytes Absolute 1.7 1.5 - 4.0 K/uL    Monocytes Absolute 0.9 (H) 0.2 - 0.8 K/uL    Eosinophils Absolute 0.1 0.0 - 0.4 K/uL    Basophils Absolute 0.1 0.0 - 0.1 K/uL   Comprehensive Metabolic Panel w/ Reflex to MG   Result Value Ref Range    Sodium 137 136 - 145 mmol/L    Potassium reflex Magnesium 3.9 3.4 - 5.1 mmol/L    Chloride 96 (L) 98 - 107 mmol/L    CO2 19 (L) 20 - 30 mmol/L    Anion Gap 22 (H) 3 - 16    Glucose 146 (H) 74 - 106 mg/dL    BUN 61 (H) 6 - 20 mg/dL    CREATININE 1.9 (H) 0.4 - 1.2 mg/dL    GFR Non- 34 (L) >59    GFR  42 (L) >59    Calcium 9.3 8.5 - 10.5 mg/dL    Total Protein 7.4 6.4 - 8.3 g/dL    Albumin 3.9 3.4 - 4.8 g/dL    Albumin/Globulin Ratio 1.1 0.8 - 2.0    Total Bilirubin 1.2 0.3 - 1.2 mg/dL    Alkaline Phosphatase 163 (H) 25 - 100 U/L    ALT 38 (H) 4 - 36 U/L AST 35 (H) 8 - 33 U/L    Globulin 3.5 Not Established g/dL   Lipase   Result Value Ref Range    Lipase 74.0 (H) 5.6 - 51.3 U/L   Amylase   Result Value Ref Range    Amylase 64 20 - 104 U/L   Lactic Acid, Plasma   Result Value Ref Range    Lactic Acid 1.9 0.4 - 2.0 mmol/L   Blood Gas, Arterial   Result Value Ref Range    pH, Arterial 7.452 (H) 7.350 - 7.450    pCO2, Arterial 24.7 (L) 35.0 - 45.0 mmHg    pO2, Arterial 95.0 80.0 - 100.0 mmHg    HCO3, Arterial 16.9 (L) 22.0 - 26.0 mmol/L    Base Excess, Arterial -5.3 (L) -3.0 - 3.0 mmol/L    O2 Sat, Arterial 97.8 >92 %    TCO2, Arterial 17.6 (L) 24.0 - 30.0 mmol/L    O2 Therapy Unknown     FIO2 0.21 Not Established %        All other labs were within normal range or not returned as of this dictation.     EMERGENCY DEPARTMENT COURSE and DIFFERENTIAL DIAGNOSIS/MDM:   Vitals:    Vitals:    12/28/21 1708 12/28/21 1800 12/28/21 1815 12/28/21 1820   BP:  (!) 90/48 118/64    Pulse: 141 112 140 105   Resp: 14 22 19 16   Temp:       TempSrc:       SpO2: 100%  100% 100%   Weight:           MEDICATIONS ADMINISTERED IN ED:  Medications   0.9 % sodium chloride infusion ( IntraVENous New Bag 12/28/21 1615)   levoFLOXacin (LEVAQUIN) 500 MG/100ML infusion 500 mg (has no administration in time range)   dilTIAZem injection 10 mg (has no administration in time range)     Followed by   dilTIAZem HCl in sodium chloride 125 mg / 125 mL infusion (has no administration in time range)   aspirin chewable tablet 324 mg (has no administration in time range)   0.9 % sodium chloride bolus (0 mLs IntraVENous Stopped 12/28/21 1534)   ondansetron (ZOFRAN) injection 4 mg (4 mg IntraVENous Given 12/28/21 1442)   dilTIAZem injection 10 mg (10 mg IntraVENous Given 12/28/21 1541)   digoxin (LANOXIN) injection 125 mcg (125 mcg IntraVENous Given 12/28/21 1612)   dilTIAZem injection 10 mg (10 mg IntraVENous Given 12/28/21 1622)   dilTIAZem injection 20 mg (20 mg IntraVENous Given 12/28/21 1713)       Spoke with

## 2021-12-28 NOTE — ED TRIAGE NOTES
Pt arrives to ED via [facility] at this time. Pt states he has been dry heaving, not sure when the dry heaving started. Pt states he fell approx 2 weeks ago. Denies any pain. Initial HR on telemetry monitor showing 150-170. Pt moved from scope 1 to T2 for further evaluation.

## 2021-12-29 NOTE — ED NOTES
[facility] called to update that they still do not have a bed at this time.      Peyton Garcia  12/29/21 1824

## 2021-12-29 NOTE — ED NOTES
[facility] access called at this time to state that there is still no bed available for patient at this time.      Martine Moreno RN  12/29/21 3831

## 2021-12-29 NOTE — ED PROVIDER NOTES
.  Emergency Department Encounter  Location: [address] [place] [address] [address]    Patient: Isaias Owen  MRN: [id number]  : 1945  Date of evaluation: 2021  ED Provider: Tammy Echavarria MD    0700  Isaias Owen was checked out to me by Smiley Jay. Please see his/her initial documentation for details of the patient's initial ED presentation, physical exam and completed studies. In brief, Isaias Owen is a 64 y.o. male that presented to the emergency department with an atrial tachycardia he was also found to have bilateral lower lobe pneumonia UTI and shingles on his right lower back he has been treated with diltiazem drip currently at 10 mg/h he has been very agitated during the night apparently he has some underlying dementia but his agitation and confusion is worse than normal he is on Rocephin and Zithromax for his infections and he is on a waiting list at [facility]. 07 37 patient was apparently sundowning through most of the night very confused wanting to leave talking about people taking over the place. Currently he is finally resting comfortably morning lab work looks good potassium is still little bit on the low side so I will go ahead and supplement that. We are still awaiting a bed at [facility]. 8:04 AM I just found out that the patient that was occupying our ICU bed here  this morning so we now have an open bed so I spoke with Dr. Rey Chen concerning admitting Mr. Damien Wellington to our ICU since he is still on the Cardizem drip but his heart rate has been under control blood pressure has been stable and his blood work is normalized. He has agreed to admit the patient here so we will proceed with this plan.   I have reviewed and interpreted all of the currently available lab results and diagnostics from this visit:  Results for orders placed or performed during the hospital encounter of 21   COVID-19, Rapid    Specimen: Nasopharyngeal Swab   Result Value Ref Range    SARS-CoV-2, NAAT Not Detected Not Detected   CBC Auto Differential   Result Value Ref Range    WBC 10.4 4.0 - 11.0 K/uL    RBC 5.45 4.50 - 6.00 M/uL    Hemoglobin 15.7 13.0 - 18.0 g/dL    Hematocrit 45.3 40.0 - 54.0 %    MCV 83.1 80.0 - 100.0 fL    MCH 28.8 27.0 - 32.0 pg    MCHC 34.7 31.0 - 35.0 g/dL    RDW 13.2 11.0 - 16.0 %    Platelets 848 807 - 620 K/uL    MPV 12.3 (H) 6.0 - 10.0 fL    Neutrophils % 72.6 %    Immature Granulocytes % 0.5 0.0 - 5.0 %    Lymphocytes % 16.7 %    Monocytes % 8.9 %    Eosinophils % 0.8 %    Basophils % 0.5 %    Neutrophils Absolute 7.6 (H) 2.0 - 7.5 K/uL    Immature Granulocytes # 0.1 K/uL    Lymphocytes Absolute 1.7 1.5 - 4.0 K/uL    Monocytes Absolute 0.9 (H) 0.2 - 0.8 K/uL    Eosinophils Absolute 0.1 0.0 - 0.4 K/uL    Basophils Absolute 0.1 0.0 - 0.1 K/uL   Comprehensive Metabolic Panel w/ Reflex to MG   Result Value Ref Range    Sodium 137 136 - 145 mmol/L    Potassium reflex Magnesium 3.9 3.4 - 5.1 mmol/L    Chloride 96 (L) 98 - 107 mmol/L    CO2 19 (L) 20 - 30 mmol/L    Anion Gap 22 (H) 3 - 16    Glucose 146 (H) 74 - 106 mg/dL    BUN 61 (H) 6 - 20 mg/dL    CREATININE 1.9 (H) 0.4 - 1.2 mg/dL    GFR Non- 34 (L) >59    GFR  42 (L) >59    Calcium 9.3 8.5 - 10.5 mg/dL    Total Protein 7.4 6.4 - 8.3 g/dL    Albumin 3.9 3.4 - 4.8 g/dL    Albumin/Globulin Ratio 1.1 0.8 - 2.0    Total Bilirubin 1.2 0.3 - 1.2 mg/dL    Alkaline Phosphatase 163 (H) 25 - 100 U/L    ALT 38 (H) 4 - 36 U/L    AST 35 (H) 8 - 33 U/L    Globulin 3.5 Not Established g/dL   Lipase   Result Value Ref Range    Lipase 74.0 (H) 5.6 - 51.3 U/L   Amylase   Result Value Ref Range    Amylase 64 20 - 104 U/L   Lactic Acid, Plasma   Result Value Ref Range    Lactic Acid 1.9 0.4 - 2.0 mmol/L   Blood Gas, Arterial   Result Value Ref Range    pH, Arterial 7.452 (H) 7.350 - 7.450    pCO2, Arterial 24.7 (L) 35.0 - 45.0 mmHg    pO2, Arterial 95.0 80.0 - 100.0 mmHg HCO3, Arterial 16.9 (L) 22.0 - 26.0 mmol/L    Base Excess, Arterial -5.3 (L) -3.0 - 3.0 mmol/L    O2 Sat, Arterial 97.8 >92 %    TCO2, Arterial 17.6 (L) 24.0 - 30.0 mmol/L    O2 Therapy Unknown     FIO2 0.21 Not Established %   Brain Natriuretic Peptide   Result Value Ref Range    Pro- 0 - 1,800 pg/mL   Urinalysis Reflex to Culture    Specimen: Urine, clean catch   Result Value Ref Range    Color, UA Yellow Straw/Yellow    Clarity, UA CLOUDY (A) Clear    Glucose, Ur Negative Negative mg/dL    Bilirubin Urine Negative Negative    Ketones, Urine 15 (A) Negative mg/dL    Specific Gravity, UA 1.020 1.005 - 1.030    Blood, Urine TRACE-INTACT (A) Negative    pH, UA 5.0 5.0 - 8.0    Protein, UA Negative Negative mg/dL    Urobilinogen, Urine 0.2 <2.0 E.U./dL    Nitrite, Urine POSITIVE (A) Negative    Leukocyte Esterase, Urine MODERATE (A) Negative    Microscopic Examination YES     Urine Type Voided     Urine Reflex to Culture Yes    Ammonia   Result Value Ref Range    Ammonia 36 27 - 102 mcg/dL   Microscopic Urinalysis   Result Value Ref Range    WBC, UA >100 (A) 0 - 5 /HPF    RBC, UA see below (A) 0 - 4 /HPF    Bacteria, UA 4+ (A) None Seen /HPF     CT ABDOMEN PELVIS WO CONTRAST Additional Contrast? None    Result Date: 12/28/2021  EXAM: CT ABDOMEN AND PELVIS WITHOUT CONTRAST INDICATION: pain, History of pulmonary airspace disease. COMPARISON: 7/21/2019 TECHNIQUE: Axial CT imaging obtained from lung bases through pelvis. Axial images and multiplanar reformatted images are provided for review. Individualized dose optimization technique was used in order to meet ALARA standards for radiation dose reduction. In addition to vendor specific dose reduction algorithms, the dose reduction techniques vary based on the specific scanner utilized but frequently include automated exposure control, adjustment of the mA and/or kV according to patient size, and use of iterative reconstruction technique.  IV Contrast: None Oral Contrast: None FINDINGS: LUNG BASES: Patchy multifocal subpleural airspace disease is redemonstrated. Improvement in the region of the lingula along the major fissure. Subpleural region of consolidation, atelectasis and lateral basal segment of the left lower lobe. Right middle lobe nodular groundglass airspace disease image 1 series 3 No evidence of effusion. LIVER: Normal. GALLBLADDER AND BILIARY DUCTS: Cholecystectomy  No intra- or extrahepatic biliary dilatation. PANCREAS: Normal. SPLEEN: Normal. ADRENAL GLANDS: Normal. KIDNEYS AND URETERS: Left kidney: Exophytic medial lower pole cortical cyst, measuring 12 mm. No hydronephrosis  No renal stones. Right kidney: Posterior lower pole benign cortical cyst. No nephrolithiasis. No evidence of obstruction. URINARY BLADDER: Normal. REPRODUCTIVE ORGANS: No mass BOWEL: Normal caliber. Normal appendix. Sigmoid diverticulosis. No evidence of acute diverticulitis. LYMPH NODES: No abnormally enlarged nodes. PERITONEUM/RETROPERITONEUM: No ascites or free air. VESSELS: Inferior vena cava filter in satisfactory alignment proximally 4.6 cm below the confluence of the renal veins, stable position with no evidence of migration. Filter remains intact. . ABDOMINAL WALL: Atrophic changes and fat replacement of the right rectus muscle in the midabdomen. Gera Rich BONES: No destructive process. Severe central canal stenosis L4-L5 Diffuse facet arthropathy     1. No acute intra-abdominopelvic abnormality. 2. Sigmoid diverticulosis without diverticulitis 3. Multifocal patchy peripheral pulmonary airspace disease with improvement noted in the region of the lingula and right lower lobe and new airspace disease in the right middle lobe and lateral basal segment left lower lobe 4. Spondylosis with severe facet arthropathy and severe central canal stenosis at L4-L5    CT Head WO Contrast    Result Date: 12/28/2021  HISTORY: Altered mental status.  EXAM : CT OF THE HEAD WITHOUT CONTRAST TECHNIQUE: Multiple axial images were obtained of the brain without the use of contrast. Individualized dose optimization technique was used in order to meet ALARA standards for radiation dose reduction. In addition to vendor specific dose reduction algorithms, the dose reduction techniques vary based on the specific scanner utilized but frequently include automated exposure control, adjustment of the mA and/or kV according to patient size, and use of iterative reconstruction technique. COMPARISON: 7/12/2014. FINDINGS: The visualized paranasal sinuses, mastoid air cells and middle ears are clear to the extent visualized. The orbits and osseous structures are unremarkable. Intracranially, there is no mass, midline shift or intracranial hematoma. The ventricles and cisternal spaces are appropriate. There are extensive periventricular and subcortical white matter hypodensities suggesting small vessel ischemic disease or age related degenerative changes. No focal hypodensity to suggest acute ischemia. 1.  No acute intracranial abnormality. 2. Extensive nonspecific periventricular and subcortical white matter hypodensity compatible with chronic small vessel ischemic disease and/or age related degenerative change. Final ED Course and MDM: In brief, Teresa Bay is a 64 y.o. male whose care was signed out to me by the outgoing provider. In brief, patient EKG this morning shows atrial flutter rate of 113 however his rate jumps up and down from the at 1 10-1 30 he is currently on 10 mg of Cardizem blood pressure 94/58 patient has continued to have heart rate in the 130s and borderline blood pressure in the 90s I have spoken to Dr. Jarett Nguyen cardiologist at [facility] who states that the patient is in chronic A. fib and suggested at this point that we should go ahead and give him digoxin 500 mcg IV followed by 125 daily for his rate control. .  1900 patient has been stable ever since receiving digoxin heart rate has been down to the 90s blood pressure over 919 systolic. He is still on the waiting list for a bed at [facility]. Case discussed with and signed over to Dr. Graciella Nyhan at 1900 hrs. ED Medication Orders (From admission, onward)    Start Ordered     Status Ordering Provider    12/29/21 0230 12/29/21 0223  0.9 % sodium chloride infusion  CONTINUOUS         Last MAR action: New Bag - by Terisa Rubinstein on 12/29/21 at [address], [place]    39/41/51 6878 12/29/21 0126  valACYclovir (VALTREX) tablet 1,000 mg  3 TIMES DAILY        Question:  Please select a reason the therapeutic interchange was not accepted:   Answer:  Complicated HSV Infection    Last MAR action: Given - by Terisa Rubinstein on 12/29/21 at [facility], [place]    44/21/16 2709 12/29/21 0029  0.9 % sodium chloride bolus  ONCE         Last MAR action: Stopped - by Terisa Rubinstein on 12/29/21 at [facility] [facility] [place]    68/87/81 7530 12/28/21 2227  LORazepam (ATIVAN) injection 1 mg  ONCE         Last MAR action: Given - by Terisa Rubinstein on 12/28/21 at [facility]    26/34/17 5064 12/28/21 2046  ziprasidone (GEODON) injection 20 mg  ONCE        \"And\" Linked Group Details    Last MAR action: Given - by Terisa Rubinstein on 12/28/21 at [address] [facility], [place]    33/84/99 3319 12/28/21 2046  sterile water injection 1.2 mL  ONCE        \"And\" Linked Group Details    Last MAR action: Given - by Terisa Rubinstein on 12/28/21 at [address] [facility], [place]    91/65/64 3750 12/28/21 1949  droperidol (INAPSINE) injection 0.625 mg  ONCE         Last MAR action: Given - by Terisa Rubinstein on 12/28/21 at [address] [facility] [place]    66/51/17 5163 12/28/21 1902  levoFLOXacin (LEVAQUIN) 500 MG/100ML infusion 500 mg  EVERY 24 HOURS        Question:  Antimicrobial Indications  Answer:  Pneumonia (CAP)    Last MAR action: Stopped - by Terisa Rubinstein on 12/28/21 at [address] [facility] [place]    31/74/12 0980 12/28/21 1908  dilTIAZem injection 10 mg  ONCE        \"Followed by\" Linked Group Details    Last MAR action: Canceled Entry - by Sera Dubois on 12/28/21 at [address], [place]    67/67/25 0135 12/28/21 1908  dilTIAZem HCl in sodium chloride 125 mg / 125 mL infusion  CONTINUOUS        \"Followed by\" Linked Group Details    Last MAR action: New Bag - by Sera Dubois on 12/29/21 at [address], [place]    08/22/47 1165 12/28/21 1911  aspirin chewable tablet 324 mg  ONCE         Last MAR action: Given - by Sera Dubois on 12/28/21 at [address], [place]    62/96/22 7177 12/28/21 1709  dilTIAZem injection 20 mg  ONCE         Last MAR action: Given - by Amber Izaguirre on 12/28/21 at [address], [place]    12/28/21 1630 12/28/21 1615  0.9 % sodium chloride infusion  CONTINUOUS         Last MAR action: Stopped - by Sera Dubois on 12/28/21 at [address] [facility], [place]    12/28/21 1630 12/28/21 1621  dilTIAZem injection 10 mg  ONCE         Last MAR action: Given - by Amber Izaguirre on 12/28/21 at [facility], [place]    12/28/21 1615 12/28/21 1609  digoxin (LANOXIN) injection 125 mcg  ONCE         Last MAR action: Given - by Amber Izaguirre on 12/28/21 at [address], [place]    12/28/21 1545 12/28/21 1536  dilTIAZem injection 10 mg  ONCE         Last MAR action: Given - by PERNELL GONZALES [facility]28/21 [facility]    12/28/21 1430 12/28/21 1422  0.9 % sodium chloride bolus  ONCE         Last MAR action: Stopped - by PERNELL GONZALES on 12/28/21 at [address] [facility]    12/28/21 1430 12/28/21 1423  ondansetron (ZOFRAN) injection 4 mg  ONCE         Last MAR action: Given - by Amber Izaguirre on 12/28/21 at [address] [facility]          Final Impression      1. Ectopic atrial tachycardia ([facility] [address]    2. Pneumonia of both lungs due to infectious organism, unspecified part of lung    3. Acute UTI    4.  Herpes zoster without complication        DISPOSITION Decision To Transfer 12/28/2021 07:11:42 PM     (Please note that portions of this note may have been completed with a voice recognition program. Efforts were made to edit the dictations but occasionally words are mis-transcribed.)  Patient's condition has greatly improved in the last 24 hours and we have had an ICU bed come open so we will admit the patient to the hospital.    Marizol Patel, [address] [facility] MD  12/29/21 7100 [address] MD  12/30/21 2252

## 2021-12-29 NOTE — ED NOTES
Called [facility] to initiate consult with [facility] group at request of Dr. Kala Don. Was on line for quite sometime without answer. Called [facility] direct to initiate consult with Cardiology.       Rachell Larsen  12/29/21 6194

## 2021-12-29 NOTE — ED NOTES
PT moved from ER stretcher to inpatient bed for comfort. PT placed in clean gown and clean linens placed on bed.       Josee Saldivar  12/29/21 1116

## 2021-12-29 NOTE — ED NOTES
Called access center to start pt transfer, starting with [facility], JORDYN, [facility], [address] [place] [address] , [place].       Cayla Thomas  12/28/21 1917

## 2021-12-29 NOTE — ED NOTES
Pt changed at this time times two staff assist. Pt noted to have large watery bowel movement.       Tadeo Mendoza RN  12/29/21 4446

## 2021-12-29 NOTE — ED NOTES
Dr. Sandra Chang speaking to Dr. Adi Grimaldo with [facility] regarding possible transfer at this time.       Ingrid Cox RN  12/28/21 7346

## 2021-12-30 PROBLEM — I47.1 ECTOPIC ATRIAL TACHYCARDIA (HCC): Status: ACTIVE | Noted: 2021-01-01

## 2021-12-30 PROBLEM — I47.19 ECTOPIC ATRIAL TACHYCARDIA: Status: ACTIVE | Noted: 2021-01-01

## 2021-12-30 NOTE — ED NOTES
Patient is going to remain in ED until Dr Rishabh Arvizu arrives to medical unit for inpatient orders.      Adelia Peraza RN  12/30/21 1000

## 2021-12-30 NOTE — ED NOTES
Patient resting with eyes closed, RR 18 regular and unlabored. Patient's spouse at bedside. Plan of care / admission reviewed.      Cristela Meadows RN  12/30/21 4433

## 2021-12-30 NOTE — ED NOTES
Dr. Olivia Arce MD speaking with Dr. Leah Escalera at this time      Lyudmila Parikh RN  12/30/21 [phone]

## 2021-12-30 NOTE — ED NOTES
Patient restless, moving around in the bed. Patient has removed his telemetry leads and pulse ox. Dr Jose M Nunez made aware. Kerlex placed around PIV site to left arm to help prevent patient from pulling his IV out.      Cristela Meadows RN  12/30/21 3947

## 2021-12-30 NOTE — ED NOTES
Patient to medical unit via inpatient bed with Phillip Magana RN approx [phone].      Nicholas Ríos RN  12/30/21 9083

## 2021-12-30 NOTE — PROGRESS NOTES
Medication Reconciliation  Med rec performed for pt utilizing fill history from [facility]. Also interviewed the pt's wife. See notes below:  -Confirmed the following meds via recent fills at pharmacy: HCTZ, Lipitor, Diltiazem, Metoprolol, Eliquis, Spironolactone, Losartan, Finasteride, and Claritin.  -Of note, pt's wife stated that she didn't think he was taking ASA recently, however it was on PCP list.  Also, wife stated that she thinks pt takes Vitamin B at home. Wife is bringing in pt's med list tomorrow to confirm if pt is taking these two meds at home.  -Also, could not confirm if pt is taking the following meds or not per fill history: Losartan (last fill 9/8/21 for 90DS); Potassium (last fill 813/21 for 90DS); and Flomax (no fill in last 6 months). Again, wife is bringing in pt's med list tomorrow and nurse can confirm at that time if pt is taking these meds at home or not. Left these meds on home med list but did not check them as taking.     Andrew Vital, PharmD

## 2021-12-30 NOTE — CONSULTS
**Physician Signature**  This document was electronically signed by: Jb Fitzpatrick MD  2021   12:41 PM    **Consult Cover Page**  FROM: Jessica [address]reg[address], [phone]  Call Back Number: [phone]  SUBJECT: Consult Recommendations  Date and Time of Report: 2021 12:41 PM ET  Items Contained in this Document: Critical Care Initial Consult    **Consult Information**  Member Facility: [facility] [address] MRN: [id number]  Visit/Encounter Number: [id number]  Consult ID: [id number]  Facility Time Zone: ET  Date and Time of Request: 2021 11:20 AM  ET  Requesting Clinician: DR. Jodie Banks  Patient Name: Martinez Simms  YOB: 1945  Gender: Male  Patient identity was confirmed at the beginning of the consult with the   patient/family/staff using two personal identifiers: Patient name and       **Reason for Consult**  Reason for Consult: Other    **Problem/Plan**    **This was a PHONE ONLY consult** activated software and may contain   inadvertent errors in spelling, grammar,   and/or syntax. outbreak, his \"pneumonia\", or what seems to be at the very   least urinary   infection have triggered the RVR. Heart rate is improved on what I consider   low dose digoxin. His \"load\" was 0.5 mg but he has normal renal function and   weighs 118 kg and I suspect he would do better with a higher dose of   digoxin. I am going to order an additional 0.25 mg now and change his daily   dose to 0.25 mg daily. I have ordered a digoxin level for . I would   continue with diltiazem but if blood pressure will tolerate I would resume   his outpatient metoprolol and try to wean him off of the IV diltiazem. He is   on Eliquis. Community acquired pneumonia: By report his chest x-ray shows pulmonary   infiltrates and by report Dr. Warren Denise reports they are not consistent with   pulmonary edema.  In fact he received several liters of IV fluid without a   change in his oxygen saturation. Without the ability to personally review   the films I will defer to the working diagnosis but I am skeptical that   there is a significant pulmonary infection given the absence of hypoxemia,   absence of fever, and absence of leukocytosis. Levaquin should be adequate   to treat community acquired pneumonia, and I would use a 5-day   course. Altered mental status: Initial ED notes and my discussion with Dr. Jennifer Hammond   suggest the patient has underlying cognitive impairment/dementia. Couple   that with the fact that he has been in the emergency department for 48 hours   due to lack of ICU beds and a component of sleep deprivation/hospital-related   delirium is likely. I would be judicious in use of benzodiazepines. I would   avoid antihistamines. I would suggest low-dose Zyprexa, e.g. 2.5 mg, if   agitation becomes more problematic. Urinary tract infection: This could represent prostatitis or possibly   pyelonephritis as the patient does have a history of benign prostatic   hyperplasia. Urine culture is pending. He is on Levaquin. Varicella-zoster outbreak: I am unable to view the rash because of inability   to access the camera, but he is on Valtrex. With renal function now normal   it would be reasonable to use (judiciously) nonsteroidal anti-inflammatory   such as oral ibuprofen or IV Toradol. **Miscellaneous**  Call Placed to / Case Discussed with: I spoke at length with Dr. Jennifer Hammond in the   emergency department regarding my proposed management of atrial fibrillation   and suggested management for agitation/delirium    **General**  Code Status: Full Code  History of Present Illness: Chief complaint: Rapid heartbeat in the setting   of \"pneumonia\". man who apparently has some behavioral/cognitive issues as an   outpatient and   probable dementia (per my discussion with Dr. Angella Anthony in the emergency   department) and carries a diagnosis of chronic atrial fibrillation.  He   presented to the emergency department on 12/28 with rapid heart rate, zoster   outbreak on his right lower back, and increase confusion. He apparently had   nausea and had fallen several weeks prior to admission. Workup included EKG   which showed rapid atrial \"tachycardia\" and chest x-ray apparently showed   pneumonia. Per my review of the EPIC chart today, there is no EKG nor chest   x-ray available for me to review. Nonetheless, he was diagnosed with atrial   fibrillation and infectious pneumonia, begun on broad-spectrum antibiotics. Urinalysis showed polyuria, bacteria, but he is had no fever, normal white   blood cell count, and has been placed on diltiazem. He did drop his blood   pressure on high dose diltiazem infusion and Dr. Seth Watkins reports heart rate   becomes elevated with agitation for which he has received PRN doses of   lorazepam. Initial plans were for the patient to be transferred to [facility] in [place] but there are no ICU beds available due to the high   number of Covid 19 patients due to their not being vaccinated. Blood   pressure is now reasonable. He is on room air, and he has been started on   digoxin. Plans are now for him to be admitted to the intensive care unit at   [facility] and [facility]. Review of Systems: Unable to obtain as this is a PHONE ONLY consult  Past Medical History: Benign prostatic hyperplasia, obesity/sleep apnea,   hypertension, hyperlipidemia, DJD osteoporosis, prior DVT, chronic atrial   fibrillation, cardiomyopathy NOS, GERD  Surgical History: Prior IVC filter placement, cataract surgery, prior from   back to me following hip surgery, multiple orthopedic procedures,   cholecystectomy,  Social History: The patient is a remote smoker, none for over 30 years, he   does not drink alcohol or use illicit drugs. He is   Family History: Parents and multiple siblings with cardiovascular disease. Two brothers also dead of cancer.     **Allergies and Medications**  Allergies: Coding and morphine cause dyspnea, Keflex and penicillin cause   rash. Current Medications: Eliquis five BID, aspirin 81, Lipitor 20, digoxin . 125,   diltiazem infusion, Proscar 5, Levaquin 500 mg daily, Protonix 40, potassium   10 mEq daily, Flomax 0.4, Valtrex 1 g TID. Home Medications: Eliquis, Proscar, potassium, aspirin, Aldactone,   hydrochlorothiazide, Lopressor, diltiazem, Lipitor, Flomax, multivitamins. **Vital Signs**  Temperature: N/A  Blood Pressure (mmHg): N/A  Heart Rate (bpm): N/A  Respiration Rate (bpm): N/A  O2 Sat (%): N/A  Oxygen Delivery Method: Room Air  Vital Signs Comments: See below for updated vital signs. Vitals Entry Time: 12/30/2021 11:45:40 AM  ET    **Exam**  Exam: Vital signs: Most recent charted vital signs include: temperature 98.1,   pulse 131, blood pressure 128/66, O2 sat 93% on room air, respiratory rate   28. However, per my discussion with Dr. Lurdes Key, current heart rate is in the   80s, current oxygen saturation is 97%. **Labs and Diagnostic Studies**  Labs: Sodium 139, potassium 3.7, chloride 111, bicarb 16, and I get 12, BUN   21, creatinine 1.1, glucose 88, calcium 7.7. LFTs are within normal limits   except albumin 2.9 and alkaline phosphatase 116. hemoglobin 10.7, platelet   count 179,387. trace ketones, positive nitrite, positive leukocyte esterase,   greater than   100 WBC, 4+ bacteria. on room air. DiagnosticStudies: CT scan of the brain from 12/28 showed findings consistent   with chronic small vessel ischemic disease but \"no acute intracranial   abnormality\". review. Dr. Lurdes Key assures me that both have been completed. I   have asked her   to check to see why they are not visible in EPIC.     **Core Metrics**  DVT Prophylaxis: Present  DVT Prophylaxis Comments: Eliquis  GI Prophylaxis: Present  Gi Prophylaxis Comments: Protonix  Are nutritional goals met?: Yes  Describe Nutritional Issues: By report patient is taking adequate   PO  Is there a problem with skin integrity?: Yes  Has wound care been consulted?: No  Skin Integrity Comments: Shingles on right lower back  Delirium: Present  Delirium Comments: The patient apparently has baseline cognitive   impairment. Ethical Issues: Not Present  Central Line: Not Applicable  Disposition Recommendations: Continue ICU level of care    **Order Documentation**  Orders entered in the facility System: General Orders    **ICD-10 Code**  Primary Diagnosis: Atrial fibrillation with rapid ventricular   response  ICD-10 Code (Primary): I48.91 : Unspecified atrial fibrillation  Diagnosis: Community acquired pneumonia, unspecified laterality  ICD-10 Code: J18.9 : Pneumonia, unspecified organism  Diagnosis: Urinary tract infection without hematuria, site unspecified  ICD-10 Code: N39.0 : Urinary tract infection, site not specified  Diagnosis: Altered mental status, unspecified altered mental status   type  ICD-10 Code: R41.82 : Altered mental status, unspecified  Diagnosis: Herpes zoster without complication  ADJ-42 Code: B02.9 : Zoster without complications    **Attestation**  Interaction Mode: Phone Only  Phone Duration (mins): 12  Time of Physician Call: 12/30/2021 12:00 PM  ET  Interaction Attestation: Interprofessional internet consultation was   delivered through telephonic and/or electronic communication upon the   request of the patient?s treating physician, while the requesting and the   rendering provider were not in the same physical location. Written report   was provided to the requesting provider.   Evaluation Duration (mins): 50    **Physician Signature**  This document was electronically signed by: Anastacia Soares MD  12/30/2021   12:41 PM

## 2021-12-30 NOTE — ED NOTES
Patient's wife called for an update.  She is told that patient is going to be admitted to medical unit at [address] RN  12/30/21 4038

## 2021-12-30 NOTE — ACP (ADVANCE CARE PLANNING)
Advance Care Planning     General Advance Care Planning (ACP) Conversation    Date of Conversation: 12/28/2021  Conducted with: Patient with Decision Making Capacity per admission RN    Healthcare Decision Maker:    Primary Decision Maker: Margaret Kessler - Spouse - [phone]    Secondary Decision Maker: Jenna Lazo - Child - [phone]  Click here to complete Healthcare Decision Makers including selection of the Healthcare Decision Maker Relationship (ie \"Primary\"). Today we documented Decision Maker(s) consistent with Legal Next of Kin hierarchy.     Content/Action Overview:  Has NO ACP documents/care preferences - information provided, considering goals and options  Reviewed DNR/DNI and patient elects Full Code (Attempt Resuscitation)    Length of Voluntary ACP Conversation in minutes:  <16 minutes (Non-Billable)    Rogerio Romberg, RN

## 2021-12-30 NOTE — FLOWSHEET NOTE
Pt admitted to icu with afib/aflutter with rapid rate, pt is on diltazem drip at this time. Pt is alert and oriented at this time, his wife is at bedside. Respirations are equal and unlabored. Assessment complete, dressing changed to bilateral hips. Blisters noted from shingles, telfa applied and covered with mepilex.

## 2021-12-30 NOTE — FLOWSHEET NOTE
12/30/21 1400   Assessment   Charting Type Admission   Neurological   Neuro (WDL) WDL   Level of Consciousness Alert (0)   Orientation Level Oriented X4   Cognition Poor safety awareness;Poor judgement;Poor attention/concentration   Language Clear   [place] Coma Scale   Eye Opening 4   Best Verbal Response 5   Best Motor Response 6   Bang Coma Scale Score 15   HEENT   HEENT (WDL) WDL   Respiratory   Respiratory (WDL) WDL   Respiratory Pattern Regular   Respiratory Depth Normal   Respiratory Quality/Effort Unlabored   Chest Assessment Chest expansion symmetrical   L Breath Sounds Clear;Diminished   R Breath Sounds Clear;Diminished   Breath Sounds   Right Upper Lobe Clear   Right Middle Lobe Clear   Right Lower Lobe Diminished   Left Upper Lobe Clear   Left Lower Lobe Diminished   Cardiac   Cardiac (WDL) X   Cardiac Regularity Regular   Cardiac Rhythm A flutter   Rhythm Interpretation   Pulse 98   Gastrointestinal   Abdominal (WDL) WDL   Peripheral Vascular   Peripheral Vascular (WDL) WDL   Edema None   Skin Color/Condition   Skin Color/Condition (WDL) X   Skin Color Pale;Pink   Skin Condition/Temp Warm;Dry   Skin Integrity   Skin Integrity (WDL) X   Skin Integrity Blister  (shingles)   Location right hip   Preventative Dressing Yes   Multiple Skin Integrity Sites Yes   Assessed this shift?  Yes   Skin Integrity Site 2   Skin Integrity Location 2 Blister  (shingles)   Location 2 left hip   Preventative Dressing Yes   Musculoskeletal   Musculoskeletal (WDL) WDL   Genitourinary   Genitourinary (WDL) WDL   Psychosocial   Psychosocial (WDL) WDL

## 2021-12-30 NOTE — ED PROVIDER NOTES
Emergency Department Encounter  Location: [address] [place] [address] Court    Patient: Luis Hendrickson  MRN: [id number]  : 1945  Date of evaluation: 2021  ED Provider: Rita Bauer MD    [address] was checked out to me by Dr. Dominguez Zhao awaiting transfer to [facility].  Please see initial history and physical intervention and treatment plan for Mr. Luis Hendrickson. Please see his/her initial documentation for details of the patient's initial ED presentation, physical exam and completed studies. In brief, Luis Hendrickson is a 64 y.o. male that presented to the emergency department presented to the emergency department with atrial tachycardia and was found to have bilateral pneumonia along with UTI. Patient at shift change was on a diltiazem drip at a rate of 10 mg IV an hour.   At shift change patient was still pending bed placement for transfer for further evaluation definitive care    I have reviewed and interpreted all of the currently available lab results and diagnostics from this visit:  Results for orders placed or performed during the hospital encounter of 21   COVID-19, Rapid    Specimen: Nasopharyngeal Swab   Result Value Ref Range    SARS-CoV-2, NAAT Not Detected Not Detected   CBC Auto Differential   Result Value Ref Range    WBC 10.4 4.0 - 11.0 K/uL    RBC 5.45 4.50 - 6.00 M/uL    Hemoglobin 15.7 13.0 - 18.0 g/dL    Hematocrit 45.3 40.0 - 54.0 %    MCV 83.1 80.0 - 100.0 fL    MCH 28.8 27.0 - 32.0 pg    MCHC 34.7 31.0 - 35.0 g/dL    RDW 13.2 11.0 - 16.0 %    Platelets 917 898 - 732 K/uL    MPV 12.3 (H) 6.0 - 10.0 fL    Neutrophils % 72.6 %    Immature Granulocytes % 0.5 0.0 - 5.0 %    Lymphocytes % 16.7 %    Monocytes % 8.9 %    Eosinophils % 0.8 %    Basophils % 0.5 %    Neutrophils Absolute 7.6 (H) 2.0 - 7.5 K/uL    Immature Granulocytes # 0.1 K/uL    Lymphocytes Absolute 1.7 1.5 - 4.0 K/uL    Monocytes Absolute 0.9 (H) 0.2 - 0.8 K/uL    Eosinophils Absolute 0.1 0.0 - 0.4 K/uL    Basophils Absolute 0.1 0.0 - 0.1 K/uL   Comprehensive Metabolic Panel w/ Reflex to MG   Result Value Ref Range    Sodium 137 136 - 145 mmol/L    Potassium reflex Magnesium 3.9 3.4 - 5.1 mmol/L    Chloride 96 (L) 98 - 107 mmol/L    CO2 19 (L) 20 - 30 mmol/L    Anion Gap 22 (H) 3 - 16    Glucose 146 (H) 74 - 106 mg/dL    BUN 61 (H) 6 - 20 mg/dL    CREATININE 1.9 (H) 0.4 - 1.2 mg/dL    GFR Non- 34 (L) >59    GFR  42 (L) >59    Calcium 9.3 8.5 - 10.5 mg/dL    Total Protein 7.4 6.4 - 8.3 g/dL    Albumin 3.9 3.4 - 4.8 g/dL    Albumin/Globulin Ratio 1.1 0.8 - 2.0    Total Bilirubin 1.2 0.3 - 1.2 mg/dL    Alkaline Phosphatase 163 (H) 25 - 100 U/L    ALT 38 (H) 4 - 36 U/L    AST 35 (H) 8 - 33 U/L    Globulin 3.5 Not Established g/dL   Lipase   Result Value Ref Range    Lipase 74.0 (H) 5.6 - 51.3 U/L   Amylase   Result Value Ref Range    Amylase 64 20 - 104 U/L   Lactic Acid, Plasma   Result Value Ref Range    Lactic Acid 1.9 0.4 - 2.0 mmol/L   Blood Gas, Arterial   Result Value Ref Range    pH, Arterial 7.452 (H) 7.350 - 7.450    pCO2, Arterial 24.7 (L) 35.0 - 45.0 mmHg    pO2, Arterial 95.0 80.0 - 100.0 mmHg    HCO3, Arterial 16.9 (L) 22.0 - 26.0 mmol/L    Base Excess, Arterial -5.3 (L) -3.0 - 3.0 mmol/L    O2 Sat, Arterial 97.8 >92 %    TCO2, Arterial 17.6 (L) 24.0 - 30.0 mmol/L    O2 Therapy Unknown     FIO2 0.21 Not Established %   Brain Natriuretic Peptide   Result Value Ref Range    Pro- 0 - 1,800 pg/mL   Urinalysis Reflex to Culture    Specimen: Urine, clean catch   Result Value Ref Range    Color, UA Yellow Straw/Yellow    Clarity, UA CLOUDY (A) Clear    Glucose, Ur Negative Negative mg/dL    Bilirubin Urine Negative Negative    Ketones, Urine 15 (A) Negative mg/dL    Specific Gravity, UA 1.020 1.005 - 1.030    Blood, Urine TRACE-INTACT (A) Negative    pH, UA 5.0 5.0 - 8.0    Protein, UA Negative Negative mg/dL    Urobilinogen, Urine 0.2 <2.0 E.U./dL Nitrite, Urine POSITIVE (A) Negative    Leukocyte Esterase, Urine MODERATE (A) Negative    Microscopic Examination YES     Urine Type Voided     Urine Reflex to Culture Yes    Ammonia   Result Value Ref Range    Ammonia 36 27 - 102 mcg/dL   Microscopic Urinalysis   Result Value Ref Range    WBC, UA >100 (A) 0 - 5 /HPF    RBC, UA see below (A) 0 - 4 /HPF    Bacteria, UA 4+ (A) None Seen /HPF   Comprehensive Metabolic Panel w/ Reflex to MG   Result Value Ref Range    Sodium 137 136 - 145 mmol/L    Potassium reflex Magnesium 3.3 (L) 3.4 - 5.1 mmol/L    Chloride 104 98 - 107 mmol/L    CO2 18 (L) 20 - 30 mmol/L    Anion Gap 15 3 - 16    Glucose 89 74 - 106 mg/dL    BUN 45 (H) 6 - 20 mg/dL    CREATININE 1.4 (H) 0.4 - 1.2 mg/dL    GFR Non- 49 (L) >59    GFR African American >59 >59    Calcium 7.9 (L) 8.5 - 10.5 mg/dL    Total Protein 5.6 (L) 6.4 - 8.3 g/dL    Albumin 3.1 (L) 3.4 - 4.8 g/dL    Albumin/Globulin Ratio 1.2 0.8 - 2.0    Total Bilirubin 0.8 0.3 - 1.2 mg/dL    Alkaline Phosphatase 122 (H) 25 - 100 U/L    ALT 29 4 - 36 U/L    AST 31 8 - 33 U/L    Globulin 2.5 Not Established g/dL   Magnesium   Result Value Ref Range    Magnesium 1.6 (L) 1.7 - 2.4 mg/dL     No results found. Final ED Course and MDM: In brief, Crissy Officer is a 64 y.o. male whose care was signed out to me by the outgoing provider. In brief, see initial history and physical, labs intervention and plan as noted above. Patient remained on a diltiazem drip through the night at 10 mg IV an hour. Patient had 2 episodes of agitation through the night and asked for something to sleep and was given 1 mg IV. Patient is requesting family to come visit with him in the morning. He is denying any chest pain shortness of breath or any focal signs or symptoms. Patient has bilateral pneumonia is being treated with IV Rocephin and Zithromax.   Patient remains on waiting list for [facility] and Dr. Evelia Desir as cardiologist. Patient's care was turned back over to Dr. Nayan Mandel at shift change pending bed placement and transfer for further evaluation definitive care    ED Medication Orders (From admission, onward)    Start Ordered     Status Ordering Provider    12/30/21 0900 12/29/21 1744  aspirin EC tablet 81 mg  DAILY         Acknowledged TRINY LINDA    12/30/21 0900 12/29/21 1744  potassium chloride (KLOR-CON M) extended release tablet 10 mEq  DAILY         Acknowledged TRINY LINDA    12/30/21 0700 12/29/21 1744  pantoprazole (PROTONIX) tablet 40 mg  DAILY BEFORE BREAKFAST         Acknowledged TRINY LINDA    12/30/21 0430 12/30/21 0418  LORazepam (ATIVAN) injection 1 mg  ONCE         Acknowledged Cecille Olszewski J    12/30/21 0030 12/30/21 0027  LORazepam (ATIVAN) injection 1 mg  ONCE         Last MAR action: Given - by Westley Pinedo on 12/30/21 at [address] [facility]    12/29/21 2100 12/29/21 1744  apixaban (ELIQUIS) tablet 5 mg  2 TIMES DAILY         Last MAR action: Given - by Westley Pinedo on 12/29/21 at [address] [place] [address]    12/29/21 2100 12/29/21 1744  atorvastatin (LIPITOR) tablet 20 mg  NIGHTLY         Last MAR action: Given - by Westley Pinedo on 12/29/21 at [address] [place] [address]    12/29/21 1900 12/29/21 1747  levoFLOXacin (LEVAQUIN) 500 MG/100ML infusion 500 mg  EVERY 24 HOURS        Question:  Antimicrobial Indications  Answer:  Pneumonia (CAP)    Last MAR action: Stopped - by Westley Pinedo on 12/29/21 at [address] [facility]    12/29/21 1745 12/29/21 1740  magnesium sulfate 1000 mg in dextrose 5% 100 mL IVPB  ONCE         Last MAR action: Stopped - by Paddy Perez on 12/29/21 at [address] [facility]    12/29/21 1745 12/29/21 1744  finasteride (PROSCAR) tablet 5 mg  DAILY         Last MAR action: Given - by Paddy Perez on 12/29/21 at [address]    12/29/21 1745 12/29/21 1744  tamsulosin (FLOMAX) capsule 0.4 mg  DAILY         Last MAR action: Given - by Paddy Perez on 12/29/21 at [address]    12/29/21 1130 12/29/21 1116  0.9 % sodium chloride infusion  CONTINUOUS         Last MAR action: Stopped - by Bigg Postal on 12/30/21 at [address] [facility]    12/29/21 1045 12/29/21 1033  digoxin (LANOXIN) injection 500 mcg  ONCE         Last MAR action: Given - by Kayleigh Villela on 12/29/21 at [address]    12/29/21 1030 12/29/21 1016  0.9 % sodium chloride bolus  ONCE         Last MAR action: Stopped - by Kayleigh Goyalkey on 12/29/21 at [address] [facility], [place]    12/29/21 0900 12/29/21 0837  potassium chloride 10 mEq/100 mL IVPB (Peripheral Line)  EVERY HOUR         Last MAR action: Stopped - by Kayleigh Goyalkey on 12/29/21 at [address] [facility], [address]    12/29/21 0845 12/29/21 0837  potassium chloride (KLOR-CON M) extended release tablet 40 mEq  ONCE         Last MAR action: Given - by Kayleigh Goyalkey on 12/29/21 at [facility] [facility]    12/29/21 0230 12/29/21 0223  0.9 % sodium chloride infusion  CONTINUOUS         Last MAR action: Stopped - by Kayleigh Villela on 12/29/21 at [address]    44/42/25 9236 12/29/21 0126  valACYclovir (VALTREX) tablet 1,000 mg  3 TIMES DAILY        Question:  Please select a reason the therapeutic interchange was not accepted:   Answer:  Complicated HSV Infection    Last MAR action: Given - by Bigg Postal [facility] 12[facility]29/21 [facility]    64/59/09 8957 12/29/21 0029  0.9 % sodium chloride bolus  ONCE         Last MAR action: Stopped - by Mardi Postal on 12/29/21 at [address] [facility], [address]    14/43/70 9912 12/28/21 2227  LORazepam (ATIVAN) injection 1 mg  ONCE         Last MAR action: Given - by Bigg Postal on 12/28/21 at [facility]    35/30/32 9274 12/28/21 2046  ziprasidone (GEODON) injection 20 mg  ONCE        \"And\" Linked Group Details    Last MAR action: Given - by Bigg Postal on 12/28/21 at [facility], [address] [facility]    58/85/75 9736 12/28/21 2046  sterile water injection 1.2 mL  ONCE        \"And\" Linked Group Details    Last MAR action: Given - by Chanda Samuel on 12/28/21 at [address] [facility]    57/81/95 8431 12/28/21 1949  droperidol (INAPSINE) injection 0.625 mg  ONCE         Last MAR action: Given - by Chanda Samuel on 12/28/21 at [address] [facility]    16/25/30 4275 12/28/21 1908  dilTIAZem injection 10 mg  ONCE        \"Followed by\" Linked Group Details    Last MAR action: Canceled Entry - by Chanda Samuel on 12/28/21 at [address] [facility]    17/13/49 7043 12/28/21 1908  dilTIAZem HCl in sodium chloride 125 mg / 125 mL infusion  CONTINUOUS        \"Followed by\" Linked Group Details    Last MAR action: New Bag - by Shayy Mathews on 12/29/21 at 57 [facility]    32/87/20 7373 12/28/21 1911  aspirin chewable tablet 324 mg  ONCE         Last MAR action: Given - by Chanda Samuel on 12/28/21 at [address] [facility]    61/67/04 1092 12/28/21 1709  dilTIAZem injection 20 mg  ONCE         Last MAR action: Given - by Rishi Gaona on 12/28/21 at [address], [place]    12/28/21 1630 12/28/21 1615  0.9 % sodium chloride infusion  CONTINUOUS         Last MAR action: Stopped - by Chanda Samuel on 12/28/21 at [facility]    12/28/21 1630 12/28/21 1621  dilTIAZem injection 10 mg  ONCE         Last MAR action: Given - by Rishi Gaona on 12/28/21 at [facility], [place]    12/28/21 1615 12/28/21 1609  digoxin (LANOXIN) injection 125 mcg  ONCE         Last MAR action: Given - by PERNELL GONZALES on 12/28/21 at [address], [place]    12/28/21 1545 12/28/21 1536  dilTIAZem injection 10 mg  ONCE         Last MAR action: Given - by SAMPLES, PERNELL on 12/28/21 at [address]    12/28/21 1430 12/28/21 1422  0.9 % sodium chloride bolus  ONCE         Last MAR action: Stopped - by SAMPLES, PERNELL on 12[facility]28/21 [facility]    12/28/21 1430 12/28/21 1423  ondansetron (ZOFRAN) injection 4 mg  ONCE         Last MAR action: Given - by Amber Izaguirre on 12/28/21 at [address] [place], [place] [address]JULIO          Final Impression      1. Ectopic atrial tachycardia ([address]    2. Pneumonia of both lungs due to infectious organism, unspecified part of lung    3. Acute UTI    4.  Herpes zoster without complication        DISPOSITION Decision To Transfer 12/28/2021 07:11:42 PM     (Please note that portions of this note may have been completed with a voice recognition program. Efforts were made to edit the dictations but occasionally words are mis-transcribed.)    Nahid Hunter MD  [address] [facility] MD Jayne  12/30/21 [address] [place] MD Italo  12/30/21 4839

## 2021-12-30 NOTE — ED NOTES
Call placed at this time for telemed [facility] consult.  [address], RN  12/30/21 1129

## 2021-12-30 NOTE — CONSULTS
FROM: Jessica Deepakmireille [address], [phone]  Call Back Number: [phone]  SUBJECT: Consult Recommendations  Date and Time of Report: 2021 12:41 PM ET  Items Contained in this Document: Critical Care Initial Consult    **Consult Information**  Member Facility: [place] [address] MRN: [id number]  Visit/Encounter Number: [id number]  Consult ID: [id number]  Facility Time Zone: ET  Date and Time of Request: 2021 11:20 AM  ET  Requesting Clinician: DR. Ki Pabon  Patient Name: Javier Huerta  YOB: 1945  Gender: Male  Patient identity was confirmed at the beginning of the consult with the patient/family/staff using two personal identifiers: Patient name and     **Reason for Consult**  Reason for Consult: Rapid Hear Rate    Impressions/ Recommendations:    **This was a PHONE ONLY consult**    This note was created using voice activated software and may contain inadvertent errors in spelling, grammar, and/or syntax. Atrial fibrillation with rapid ventricular response:   The patient is chronically on beta blockers. Perhaps his varicella outbreak, his \"pneumonia\", or what seems to be at the very least urinary infection have triggered the RVR. Heart rate is improved on what I consider low dose digoxin. His \"load\" was 0.5 mg but he has normal renal function and weighs 118 kg and I suspect he would do better with a higher dose of digoxin. I am going to order an additional 0.25 mg now and change his daily dose to 0.25 mg daily. I have ordered a digoxin level for . I would continue with diltiazem but if blood pressure will tolerate I would resume his outpatient metoprolol and try to wean him off of the IV diltiazem. He is on Eliquis. Community acquired pneumonia: By report his chest x-ray shows pulmonary infiltrates and by report Dr. Kaveh Pabon reports they are not consistent with pulmonary edema.  In fact he received several liters of IV fluid without a change in his oxygen saturation. Without the ability to personally review the films I will defer to the working diagnosis but I am skeptical that there is a significant pulmonary infection given the absence of hypoxemia, absence of fever, and absence of leukocytosis. Levaquin should be adequate to treat community acquired pneumonia, and I would use a 5-day course. Altered mental status: Initial ED notes and my discussion with Dr. Kai Alva suggest the patient has underlying cognitive impairment/dementia. Couple that with the fact that he has been in the emergency department for 48 hours due to lack of ICU beds and a component of sleep deprivation/hospital-related delirium is likely. I would be judicious in use of benzodiazepines. I would avoid antihistamines. I would suggest low-dose Zyprexa, e.g. 2.5 mg, if agitation becomes more problematic. Urinary tract infection: This could represent prostatitis or possibly pyelonephritis as the patient does have a history of benign prostatic hyperplasia. Urine culture is pending. He is on Levaquin. Varicella-zoster outbreak: I am unable to view the rash because of inability to access the camera, but he is on Valtrex. With renal function now normal it would be reasonable to use (judiciously) nonsteroidal anti-inflammatory such as oral ibuprofen or IV Toradol. **Miscellaneous**  Call Placed with: I spoke at length with Dr. Kai Alva in the emergency department regarding my proposed management of atrial fibrillation and suggested management for agitation/delirium    **General**  Code Status: Full Code    Chief complaint: Rapid heartbeat in the setting of \"pneumonia\". History of present illness: The patient is a 68-year-old man who apparently has some behavioral/cognitive issues as an outpatient and probable dementia (per my discussion with Dr. Ady Delgado in the emergency department) and carries a diagnosis of chronic atrial fibrillation.  He presented to the emergency department on 12/28 with rapid heart rate, zoster outbreak on his right lower back, and increase confusion. He apparently had nausea and had fallen several weeks prior to admission. Workup included EKG which showed rapid atrial \"tachycardia\" and chest x-ray apparently showed pneumonia. Per my review of the EPIC chart today, there is no EKG nor chest x-ray available for me to review. Nonetheless, he was diagnosed with atrial fibrillation and infectious pneumonia, begun on broad-spectrum antibiotics. Urinalysis showed polyuria, bacteria, but he is had no fever, normal white blood cell count, and has been placed on diltiazem. He did drop his blood pressure on high dose diltiazem infusion and Dr. Elaine Núñez reports heart rate becomes elevated with agitation for which he has received PRN doses of lorazepam. Initial plans were for the patient to be transferred to [facility] but there are no ICU beds available due to the high number of Covid 19 patients due to their not being vaccinated. Blood pressure is now reasonable. He is on room air, and he has been started on digoxin. Plans are now for him to be admitted to the intensive care unit at [facility]. Review of Systems: Unable to obtain as this is a PHONE ONLY consult. Him  Past Medical History: Benign prostatic hyperplasia, obesity/sleep apnea, hypertension, hyperlipidemia, DJD osteoporosis, prior DVT, chronic atrial fibrillation, cardiomyopathy NOS, GERD. Surgical History: Prior IVC filter placement, cataract surgery, prior from back to me following hip surgery, multiple orthopedic procedures, cholecystectomy. Social History: The patient is a remote smoker, none for over 30 years, he does not drink alcohol or use illicit drugs. He is . Him  Family History: Parents and multiple siblings with cardiovascular disease. Two brothers also dead of cancer.     **Allergies and Medications**  Allergies: Codeine and morphine cause dyspnea, Keflex and penicillin cause rash. Current Medications: Eliquis five BID, aspirin 81, Lipitor 20, digoxin 0.125, diltiazem infusion, Proscar 5, Levaquin 500 mg daily, Protonix 40, potassium 10 mEq daily, Flomax 0.4, Valtrex 1 g TID. Home Medications: Eliquis, Proscar, potassium, aspirin, Aldactone, hydrochlorothiazide, Lopressor, diltiazem, Lipitor, Flomax, multivitamins. **Exam**  Exam: Vital signs: Most recent charted vital signs include: temperature 98.1, pulse 131, blood pressure 128/66, O2 sat 93% on room air, respiratory rate 28. However, per my discussion with Dr. Patrick Wright, current heart rate is in the 80s, current oxygen saturation is 97%. Remainder of exam is not possible as this is a PHONE ONLY consult. **Labs and Diagnostic Studies**  Labs: Sodium 139, potassium 3.7, chloride 111, bicarb 16, and I get 12, BUN 21, creatinine 1.1, glucose 88, calcium 7.7. LFTs are within normal limits except albumin 2.9 and alkaline phosphatase 116. White blood cell count 5.4, hemoglobin 10.7, platelet count 817,593. Urinalysis: specific gravity 1.020, trace ketones, positive nitrite, positive leukocyte esterase, greater than 100 WBC, 4+ bacteria. arterial blood gas on 12/28: pH 7.45, PCO2 25, PO2 95 on room air. DiagnosticStudies: CT scan of the brain from 12/28 showed findings consistent with chronic small vessel ischemic disease but \"no acute intracranial abnormality\". There is no current EKG nor chest x-ray available for me to review. Dr. Patrick Wright assures me that both have been completed. I have asked her to check to see why they are not visible in EPIC.     **Core Metrics**  DVT Prophylaxis: Present  DVT Prophylaxis Comments: Eliquis  GI Prophylaxis: Present  Gi Prophylaxis Comments: Protonix  Are nutritional goals met?: Yes  Describe Nutritional Issues: By report patient is taking adequate PO  Is there a problem with skin integrity?: Yes  Has wound care been consulted?: No  Skin Integrity Comments: Shingles on right lower back  Delirium: Present  Delirium Comments: The patient apparently has baseline cognitive impairment. Ethical Issues: Not Present  Central Line: Not Applicable  Disposition Recommendations: Continue ICU level of care    **Order Documentation**  Orders entered in the facility System: General Orders    **ICD-10 Code**  Primary Diagnosis: Atrial fibrillation with rapid ventricular response  ICD-10 Code (Primary): I48.91 : Unspecified atrial fibrillation  Diagnosis: Community acquired pneumonia, unspecified laterality  ICD-10 Code: J18.9 : Pneumonia, unspecified organism  Diagnosis: Urinary tract infection without hematuria, site unspecified  ICD-10 Code: N39.0 : Urinary tract infection, site not specified  Diagnosis: Altered mental status, unspecified altered mental status type  ICD-10 Code: R41.82 : Altered mental status, unspecified  Diagnosis: Herpes zoster without complication  [facility]-94 Code: B02.9 : Zoster without complications    **Attestation**  Interaction Mode: Phone Only  Phone Duration (mins): 12  Time of Physician Call: 12/30/2021 12:00 PM  ET  Interaction Attestation: Interprofessional internet consultation was delivered through telephonic and/or electronic communication upon the request of the patients treating physician, while the requesting and the rendering provider were not in the same physical location. Written report was provided to the requesting provider.   Evaluation Duration (mins): 50    **Physician Signature**  This document was electronically signed by: Anastacia Soares MD  12/30/2021 12:41 PM

## 2021-12-31 PROBLEM — N39.0 UTI (URINARY TRACT INFECTION): Status: ACTIVE | Noted: 2021-01-01

## 2021-12-31 PROBLEM — B02.9 SHINGLES: Status: ACTIVE | Noted: 2021-01-01

## 2021-12-31 NOTE — PLAN OF CARE
Problem: Falls - Risk of:  Goal: Will remain free from falls  Description: Will remain free from falls  12/31/2021 1017 by Lata Austin RN  Outcome: Ongoing  12/30/2021 2114 by Tamia Ryder RN  Outcome: Ongoing     Problem: Falls - Risk of:  Goal: Absence of physical injury  Description: Absence of physical injury  12/31/2021 1017 by Lata Austin RN  Outcome: Ongoing  12/30/2021 2114 by Tamia Ryder RN  Outcome: Ongoing     Problem: Breathing Pattern - Ineffective:  Goal: Ability to achieve and maintain a regular respiratory rate will improve  Description: Ability to achieve and maintain a regular respiratory rate will improve  12/31/2021 1017 by Lata Austin RN  Outcome: Ongoing  12/30/2021 2114 by Tamia Ryder RN  Outcome: Ongoing     Problem: Skin Integrity:  Goal: Will show no infection signs and symptoms  Description: Will show no infection signs and symptoms  12/31/2021 1017 by Lata Austin RN  Outcome: Ongoing  12/30/2021 2114 by Tamia Ryder RN  Outcome: Ongoing     Problem: Skin Integrity:  Goal: Absence of new skin breakdown  Description: Absence of new skin breakdown  12/31/2021 1017 by Lata Austin RN  Outcome: Ongoing  12/30/2021 2114 by Tamia Ryder RN  Outcome: Ongoing

## 2021-12-31 NOTE — FLOWSHEET NOTE
12/31/21 1600   Assessment   Charting Type Reassessment   Neurological   Neuro (WDL) WDL   Level of Consciousness Alert (0)   Orientation Level Oriented X4   Cognition Follows commands   Language Clear   NIHSS Stroke Scale Assessed No   Bang Coma Scale   Eye Opening 4   Best Verbal Response 5   Best Motor Response 6   [place] Coma Scale Score 15   HEENT   HEENT (WDL) WDL   Respiratory   Respiratory (WDL) WDL   Respiratory Pattern Regular   Respiratory Depth Normal   Respiratory Quality/Effort Unlabored   Chest Assessment Chest expansion symmetrical   L Breath Sounds Clear;Diminished   R Breath Sounds Clear;Diminished   Breath Sounds   Right Upper Lobe Clear   Right Middle Lobe Clear   Right Lower Lobe Diminished   Left Upper Lobe Clear   Left Lower Lobe Diminished   Cardiac   Cardiac (WDL) X   Cardiac Regularity Regular   Heart Sounds S1, S2   Cardiac Rhythm A flutter   Rhythm Interpretation   Pulse 65   Cardiac Monitor   Telemetry Monitor On Yes   Telemetry Audible Yes   Telemetry Alarms Set Yes   Gastrointestinal   Abdominal (WDL) WDL   Peripheral Vascular   Peripheral Vascular (WDL) WDL   Edema None   Skin Color/Condition   Skin Color/Condition (WDL) X   Skin Integrity   Skin Integrity (WDL) X   Musculoskeletal   Musculoskeletal (WDL) WDL   Genitourinary   Genitourinary (WDL) WDL   Urine Assessment   Incontinence Yes   Psychosocial   Psychosocial (WDL) WDL   Pt reassessed att. Pt alert talking with spouse. Pt assessment unchanged. Will continue to monitor pt.

## 2021-12-31 NOTE — FLOWSHEET NOTE
12/31/21 0800   Assessment   Charting Type Shift assessment   Neurological   Neuro (WDL) WDL   Level of Consciousness Alert (0)   Orientation Level Oriented X4   Cognition Follows commands   Language Clear   NIHSS Stroke Scale Assessed No   Bang Coma Scale   Eye Opening 4   Best Verbal Response 5   Best Motor Response 6   [place]agn Coma Scale Score 15   HEENT   HEENT (WDL) WDL   Respiratory   Respiratory (WDL) WDL   Respiratory Pattern Regular   Respiratory Depth Normal   Respiratory Quality/Effort Unlabored   Chest Assessment Chest expansion symmetrical   L Breath Sounds Clear;Diminished   R Breath Sounds Clear;Diminished   Breath Sounds   Right Upper Lobe Clear   Right Middle Lobe Clear   Right Lower Lobe Diminished   Left Upper Lobe Clear   Left Lower Lobe Diminished   Cardiac   Cardiac (WDL) X   Cardiac Regularity Regular   Heart Sounds S1, S2   Cardiac Rhythm A flutter   Rhythm Interpretation   Pulse 99   Cardiac Monitor   Telemetry Monitor On Yes   Telemetry Audible Yes   Telemetry Alarms Set Yes   Gastrointestinal   Abdominal (WDL) WDL   Peripheral Vascular   Peripheral Vascular (WDL) WDL   Edema None   Skin Color/Condition   Skin Color/Condition (WDL) X   Skin Color Pale   Skin Integrity   Skin Integrity (WDL) X   Skin Integrity Abrasion; Redness;Blister   Location right hip   Preventative Dressing Yes   Multiple Skin Integrity Sites Yes  (CDI)   Assessed this shift? Yes   Skin Integrity Site 2   Skin Integrity Location 2 Redness   Location 2 left hip   Preventative Dressing Yes   Assessed this shift? Yes  (CDI)   Musculoskeletal   Musculoskeletal (WDL) WDL   Genitourinary   Genitourinary (WDL) WDL   Urine Assessment   Incontinence Yes   Psychosocial   Psychosocial (WDL) WDL   Pt awake in bed. Pt alert and oriented. Pt appears in no acute distress. Pt currently on RA. Pt currently on telemetry monitoring showing aflutter. Cardizem drip continued. Pt lung sounds clear with diminished sounds in bases mony.

## 2021-12-31 NOTE — H&P
HISTORY AND PHYSICAL             Date: 12/31/2021        Patient Name: Freedom Orr     YOB: 1945      Age:  64 y.o. Chief Complaint     Chief Complaint   Patient presents with    Nausea    Fall     2 weeks ago per family      General weakness    History Obtained From   patient    History of Present Illness   Pt is a 63 yo with a PMHx of atrial tachy, DVT, HTN who presented to the ER with a 2 week Hx f fatigue, no palps, no CP, no F/S/C, some mild AMS. In ER he was noted to be in [facility], he was held in ER from 12/28 to 12/30 when a bed did become available. He has had intermittant breakthrough on the cardizem GTT and cards was contacted who suggested adding Digoxin. Even with this he has had rates up to 140s with activity. He has remained fatigued, no CP and unable to sense palps with RVR. Past Medical History     Past Medical History:   Diagnosis Date    Atrial fibrillation ([address]     Cardiomyopathy ([address]     Cataract     GERD (gastroesophageal reflux disease)     History of DVT (deep vein thrombosis)     s/p IVC filter    Hyperlipidemia     Hypertension     LONG TERM ANTICOAGULENT USE     Osteoarthritis     Unspecified sleep apnea         Past Surgical History     Past Surgical History:   Procedure Laterality Date    ANKLE SURGERY      left    CHOLECYSTECTOMY      EYE SURGERY      left & right cataract    FRACTURE SURGERY Left     femure    HAND SURGERY      left thumb    HIP SURGERY      left    INNER EAR SURGERY      left    KNEE ARTHROSCOPY      right    KNEE SURGERY  12/07/11    RIGHT    NOSE SURGERY      left    SPINE SURGERY      VENOUS CLOT SURGERY  2008    right leg after hip replacement        Medications Prior to Admission     Prior to Admission medications    Medication Sig Start Date End Date Taking?  Authorizing Provider   spironolactone (ALDACTONE) 25 MG tablet TAKE 1 TABLET BY MOUTH DAILY 12/17/21  Yes Cecilia Muniz MD   hydroCHLOROthiazide (MICROZIDE) 12.5 MG capsule TAKE ONE CAPSULE BY MOUTH ONCE DAILY 12/17/21  Yes Ann Joseph MD   metoprolol tartrate (LOPRESSOR) 50 MG tablet TAKE 1 TABLET BY MOUTH 2 TIMES DAILY 10/1/21  Yes Ann Joseph MD   dilTIAZem (CARDIZEM CD) 120 MG extended release capsule TAKE 1 CAPSULE BY MOUTH DAILY 10/1/21  Yes Ann Joseph MD   apixaban (ELIQUIS) 5 MG TABS tablet TAKE 1 TABLET BY MOUTH EVERY 12 HOURS 9/7/21  Yes Ann Joseph MD   atorvastatin (LIPITOR) 20 MG tablet TAKE ONE TABLET BY MOUTH ONCE DAILY 9/7/21  Yes Ann Joseph MD   finasteride (PROSCAR) 5 MG tablet TAKE 1 TABLET BY MOUTH DAILY 8/13/21  Yes Ann Joseph MD   ALLERGY RELIEF 10 MG tablet Take 10 mg by mouth daily  5/24/21  Yes Teetee Peraza MD   tamsulosin (FLOMAX) 0.4 MG capsule TAKE ONE CAPSULE BY MOUTH ONCE DAILY 5/17/21   Ann Joseph MD   potassium chloride (KLOR-CON) 10 MEQ extended release tablet TAKE ONE TABLET BY MOUTH ONCE DAILY 4/26/21   Ann Joseph MD   losartan (COZAAR) 100 MG tablet TAKE 1 TABLET BY MOUTH DAILY 2/25/21   Ann Joseph MD   b complex vitamins tablet Take 1 tablet by mouth daily 12/5/19   Ann Joseph MD   aspirin 81 MG tablet Take 81 mg by mouth daily.     Ann Joseph MD        Allergies   Codeine, Morphine, Cephalexin, and Pcn [penicillins]    Social History     Social History     Tobacco History     Smoking Status  Former Smoker Quit date  1/1/1988 Smoking Frequency  1 pack/day for 40 years (36 pk yrs) Smoking Tobacco Type  Cigarettes    Smokeless Tobacco Use  Never Used          Alcohol History     Alcohol Use Status  No          Drug Use     Drug Use Status  No          Sexual Activity     Sexually Active  Not Asked                Family History     Family History   Problem Relation Age of Onset    Heart Disease Mother     Heart Disease Father         chf    Heart Disease Sister     Cancer Brother         lungs    Cancer Brother     Heart Disease Sister     Heart Disease Brother        Review of Systems   Review of Systems   Constitutional: Positive for fatigue. Negative for chills, diaphoresis and fever. Respiratory: Negative for cough, chest tightness and shortness of breath. Cardiovascular: Negative for chest pain and palpitations. Gastrointestinal: Negative. Neurological: Negative. Psychiatric/Behavioral: Positive for decreased concentration. Physical Exam   BP (!) 102/49   Pulse 59   Temp 98.4 °F (36.9 °C) (Axillary)   Resp 13   Ht 6' (1.829 m)   Wt 260 lb (117.9 kg)   SpO2 97%   BMI 35.26 kg/m²     Physical Exam  Vitals reviewed. Constitutional:       General: He is not in acute distress. Appearance: He is ill-appearing. HENT:      Head: Normocephalic and atraumatic. Nose: Nose normal.      Mouth/Throat:      Mouth: Mucous membranes are moist.   Eyes:      Pupils: Pupils are equal, round, and reactive to light. Neurological:      Mental Status: He is alert.          Labs      Recent Results (from the past 24 hour(s))   EKG 12 Lead    Collection Time: 12/31/21 10:18 AM   Result Value Ref Range    Ventricular Rate 81 BPM    Atrial Rate 277 BPM    QRS Duration 108 ms    Q-T Interval 104 ms    QTc Calculation (Bazett) 120 ms    P Axis 247 degrees    R Axis 13 degrees    T Axis 0 degrees    Diagnosis       Atrial flutter with variable AV block with premature ventricular or aberrantly conducted complexes and with ventricular escape complexesLow voltage QRSSeptal infarct (cited on or before 27-OCT-2020)Possible Lateral infarct (cited on or before 27-OCT-2020)Abnormal ECGWhen compared with ECG of 27-OCT-2020 14:57,Significant changes have occurred   TSH without Reflex    Collection Time: 12/31/21 11:00 AM   Result Value Ref Range    TSH 2.24 0.27 - 4.20 uIU/mL   CBC    Collection Time: 12/31/21 11:00 AM   Result Value Ref Range    WBC 5.4 4.0 - 11.0 K/uL    RBC 3.96 (L) 4.50 - 6.00 M/uL    Hemoglobin 11.2 (L) 13.0 - 18.0 g/dL    Hematocrit 34.1 (L) 40.0 - 54.0 %    MCV 86.1 80.0 - 100.0 fL    MCH 28.3 27.0 - 32.0 pg    MCHC 32.8 31.0 - 35.0 g/dL    RDW 13.6 11.0 - 16.0 %    Platelets 184 791 - 698 K/uL    MPV 11.5 (H) 6.0 - 10.0 fL   Comprehensive Metabolic Panel w/ Reflex to MG    Collection Time: 12/31/21 11:00 AM   Result Value Ref Range    Sodium 139 136 - 145 mmol/L    Potassium reflex Magnesium 4.2 3.4 - 5.1 mmol/L    Chloride 113 (H) 98 - 107 mmol/L    CO2 17 (L) 20 - 30 mmol/L    Anion Gap 9 3 - 16    Glucose 143 (H) 74 - 106 mg/dL    BUN 14 6 - 20 mg/dL    CREATININE 1.1 0.4 - 1.2 mg/dL    GFR Non-African American >59 >59    GFR African American >59 >59    Calcium 7.9 (L) 8.5 - 10.5 mg/dL    Total Protein 5.4 (L) 6.4 - 8.3 g/dL    Albumin 2.9 (L) 3.4 - 4.8 g/dL    Albumin/Globulin Ratio 1.2 0.8 - 2.0    Total Bilirubin 0.7 0.3 - 1.2 mg/dL    Alkaline Phosphatase 127 (H) 25 - 100 U/L    ALT 28 4 - 36 U/L    AST 28 8 - 33 U/L    Globulin 2.5 Not Established g/dL        Imaging/Diagnostics Last 24 Hours   No results found. Assessment      Hospital Problems           Last Modified POA    * (Principal) Atrial flutter ([facility] [address] 12/31/2021 Yes    Shingles 12/31/2021 Yes    UTI (urinary tract infection) 12/31/2021 Yes          Plan   1. A Fib/RVR - Dig and cardizem may not work, will convert to [facility] if it does fail him. 2. AMS - Improved nut still fatigued. 3. CAD on CT scan - Home meds, rate control, prefer to keep some beta-blockers on board  4. HTN - Clear off home meds to allow room for rate limiting meds. 5.  Hx DVT - On anti-coag    Consultations Ordered:  IP CONSULT TO CASE MANAGEMENT    Electronically signed by Zuleika Arroyo MD on 12/31/21 at 12:59 PM EST

## 2021-12-31 NOTE — FLOWSHEET NOTE
12/31/21 1200   Assessment   Charting Type Reassessment   Neurological   Neuro (WDL) WDL   Level of Consciousness Alert (0)   Orientation Level Oriented X4   Cognition Follows commands   Language Clear   Bang Coma Scale   Eye Opening 4   Best Verbal Response 5   Best Motor Response 6   [place] Coma Scale Score 15   HEENT   HEENT (WDL) WDL   Respiratory   Respiratory (WDL) WDL   Respiratory Pattern Regular   Respiratory Depth Normal   Respiratory Quality/Effort Unlabored   Chest Assessment Chest expansion symmetrical   L Breath Sounds Clear;Diminished   R Breath Sounds Clear;Diminished   Breath Sounds   Right Upper Lobe Clear   Right Middle Lobe Clear   Right Lower Lobe Diminished   Left Upper Lobe Clear   Left Lower Lobe Diminished   Cardiac   Cardiac (WDL) X   Cardiac Regularity Regular   Heart Sounds S1, S2   Cardiac Rhythm A flutter   Cardiac Monitor   Telemetry Monitor On Yes   Telemetry Audible Yes   Telemetry Alarms Set Yes   Gastrointestinal   Abdominal (WDL) WDL   Peripheral Vascular   Peripheral Vascular (WDL) WDL   Edema None   Skin Color/Condition   Skin Color/Condition (WDL) X   Skin Color Pale   Skin Condition/Temp Warm;Dry   Skin Integrity   Skin Integrity (WDL) X   Musculoskeletal   Musculoskeletal (WDL) WDL   Genitourinary   Genitourinary (WDL) WDL   Urine Assessment   Incontinence Yes   Psychosocial   Psychosocial (WDL) WDL   Pt resting in bed. Pt assessment unchanged att. Pt continues on Cardizem drip att. Pt continues to have atrial flutter on telemetry monitor.

## 2021-12-31 NOTE — PROGRESS NOTES
Progress Note  Date:2021       Room:[place] [address]  Patient Name:Iris Patricio     YOB: 1945     Age:76 y.o. Subjective    Subjective:  Symptoms:  Stable. He reports malaise. Diet:  Adequate intake. Activity level: Normal.    Pain:  He reports no pain. Pt still fatigued, urine culture has come back POS for Enterobacter relative, will start Zosyn, now in obvious A Flutter  Review of Systems   Constitutional: Positive for fatigue. HENT: Negative. Respiratory: Negative. Cardiovascular: Negative. Neurological: Negative. Stable  Objective         Vitals Last 24 Hours:  TEMPERATURE:  Temp  Av.6 °F (37 °C)  Min: 98.1 °F (36.7 °C)  Max: 99 °F (37.2 °C)  RESPIRATIONS RANGE: Resp  Av.4  Min: 12  Max: 23  PULSE OXIMETRY RANGE: SpO2  Av.2 %  Min: 91 %  Max: 99 %  PULSE RANGE: Pulse  Av.2  Min: 55  Max: 134  BLOOD PRESSURE RANGE: Systolic (27VNE), QII:682 , Min:99 , ROJ:984   ; Diastolic (54VUW), HDT:50, Min:48, Max:93    I/O (24Hr): Intake/Output Summary (Last 24 hours) at 2021 1317  Last data filed at 2021 1220  Gross per 24 hour   Intake 576 ml   Output 320 ml   Net 256 ml     Objective:  General Appearance:  Ill-appearing. Vital signs: (most recent): Blood pressure (!) 102/49, pulse 59, temperature 98.4 °F (36.9 °C), temperature source Axillary, resp. rate 13, height 6' (1.829 m), weight 260 lb (117.9 kg), SpO2 97 %. Vital signs are normal.    Output: Producing urine and producing stool. HEENT: Normal HEENT exam.    Lungs:  Normal effort and normal respiratory rate. Heart: Tachycardia. Irregular rhythm. Abdomen: Abdomen is soft. Bowel sounds are normal.   There is no abdominal tenderness. Extremities: Normal range of motion. Pulses: Distal pulses are intact. Neurological: Patient is oriented to person, place and time. Pupils:  Pupils are equal, round, and reactive to light.       Labs/Imaging/Diagnostics Labs:  CBC:  Recent Labs     12/28/21  1411 12/30/21  0620 12/31/21  1100   WBC 10.4 5.4 5.4   RBC 5.45 3.77* 3.96*   HGB 15.7 10.7* 11.2*   HCT 45.3 31.9* 34.1*   MCV 83.1 84.6 86.1   RDW 13.2 13.6 13.6    153 161     CHEMISTRIES:  Recent Labs     12/29/21  0808 12/30/21  0620 12/31/21  1100    139 139   K 3.3* 3.7 4.2    111* 113*   CO2 18* 16* 17*   BUN 45* 21* 14   CREATININE 1.4* 1.1 1.1   GLUCOSE 89 88 143*   MG 1.6*  --   --      PT/INR:No results for input(s): PROTIME, INR in the last 72 hours. APTT:No results for input(s): APTT in the last 72 hours. LIVER PROFILE:  Recent Labs     12/29/21  0808 12/30/21  0620 12/31/21  1100   AST 31 32 28   ALT 29 29 28   BILITOT 0.8 0.8 0.7   ALKPHOS 122* 116* 127*       Imaging Last 24 Hours:  No results found. Assessment//Plan           Hospital Problems           Last Modified POA    * (Principal) Atrial flutter ([facility] [address] 12/31/2021 Yes    Shingles 12/31/2021 Yes    UTI (urinary tract infection) 12/31/2021 Yes        Assessment:    Condition: In serious condition. Unchanged. (1. A Fib/RVR, now flutter - Dig and cardizem may not work, will convert to [facility] System if it does fail him. 2. AMS - Improved nut still fatigued. 3. CAD on CT scan - Home meds, rate control, prefer to keep some beta-blockers on board  4. HTN - Clear off home meds to allow room for rate limiting meds. 5. Hx DVT - On anti-coag  ).        Electronically signed by Margaret Lyle MD on 12/31/21 at 1:17 PM EST

## 2021-12-31 NOTE — CONSULTS
**Physician Signature**  This document was electronically signed by: Oh Kaur DO  2021 07:35   PM    **Consult Cover Page**  FROM: Jessica [address], [phone]  Call Back Number: [phone]  SUBJECT: Consult Recommendations  Date and Time of Report: 2021 07:35 PM ET  Items Contained in this Document: Critical Care Short Note    **Consult Information**  Member Facility: [place] [address] MRN: [id number]  Visit/Encounter Number: [id number]  Consult ID: [id number]  Facility Time Zone: ET  Date and Time of Request: 2021 07:05 PM  ET  Requesting Clinician: Dr. Lawton Dancer  Patient Name: Javier Huerta  YOB: 1945  Gender: Male  Patient identity was confirmed at the beginning of the consult with the   patient/family/staff using two personal identifiers: Patient name and       **Reason for Consult**  Reason for Consult: Follow Up    **Short Note**  Note: I have taken a follow-up call regarding this patient via telephone. have   reviewed Doctor Alexia's consultation from earlier today. reviewed his plan   of care and recommendations. staff they stated that the patient has   maintained a rather high heart rate. He is currently in a fib RVR with a heart rate of 140. He is on 15 mg of   diltiazem per hour and his heart rate varies in that it has gone down into   the 90s and popped back up into the 140 range. his oral metoprolol early. The   patient is on chronic beta-blockers and it is   unclear as to whether or not he got his dose this morning. The nursing staff   is going to give his nighttime dose now and wait and see if that helps with   his heart rate. He is hemodynamically intact with his current heart   rate. beta-blocker or start the patient on amiodarone. with the nursing staff   and they stated they understood the instructions and   the plan of care.   **ICD-10 Code**  Primary Diagnosis: Atrial fibrillation with RVR  ICD-10 Code (Primary): I48.91 : Unspecified atrial fibrillation    **Attestation**  Interaction Mode: Phone Only  Phone Duration (mins): 4  Time of Physician Call: 12/30/2021 07:22 PM  ET  Interaction Attestation: Interprofessional internet consultation was   delivered through telephonic and/or electronic communication upon the   request of the patient?s treating physician, while the requesting and the   rendering provider were not in the same physical location. Written report   was provided to the requesting provider.   Evaluation Duration (mins): 17  Interprofessional consultation Statement: Interprofessional telephone/internet   assessment and management service provided by a consultative physician   including a verbal and written report to the patient's treating/requesting   physician or other qualified health care professional.    **Physician Signature**  This document was electronically signed by: Narendra Kessler DO  12/30/2021 07:35   PM

## 2021-12-31 NOTE — PROGRESS NOTES
New order from [facility] SURGICAL [facility] AT [facility], to change to inpatient.

## 2021-12-31 NOTE — PROGRESS NOTES
Call placed at this time to [facility] r/t pts heart rate remains elevated in the 140's.

## 2021-12-31 NOTE — PROGRESS NOTES
Spoke with Dr. Gloria Willoughby at this time and rec'd order to give night dose of metoprolol now and if heart rate is still significantly elevated 1 hour after medication call back for further orders. Informed night shift RN of [facility] recommendations.

## 2021-12-31 NOTE — FLOWSHEET NOTE
12/30/21 2000   Assessment   Charting Type Shift assessment   Neurological   Neuro (WDL) WDL   Level of Consciousness Alert (0)   Orientation Level Oriented X4   Cognition Follows commands   Language Clear   Bang Coma Scale   Eye Opening 4   Best Verbal Response 5   Best Motor Response 6   [place] Coma Scale Score 15   HEENT   HEENT (WDL) WDL   Respiratory   Respiratory (WDL) WDL   Respiratory Pattern Regular   Respiratory Depth Normal   Respiratory Quality/Effort Unlabored   Chest Assessment Chest expansion symmetrical   L Breath Sounds Clear;Diminished   R Breath Sounds Clear;Diminished   Breath Sounds   Right Upper Lobe Clear   Right Middle Lobe Clear   Right Lower Lobe Diminished   Left Upper Lobe Clear   Left Lower Lobe Diminished   Cardiac   Cardiac (WDL) X   Cardiac Regularity Regular   Heart Sounds S1, S2   Cardiac Rhythm A flutter   Rhythm Interpretation   Pulse 93   Cardiac Monitor   Telemetry Monitor On Yes   Gastrointestinal   Abdominal (WDL) WDL   Peripheral Vascular   Peripheral Vascular (WDL) WDL   Edema None   Skin Color/Condition   Skin Color/Condition (WDL) X   Skin Color Pale;Red   Skin Condition/Temp Warm;Dry   Skin Integrity   Skin Integrity (WDL) X   Skin Integrity Blister   Location right hip   Preventative Dressing Yes   Assessed this shift? Yes   Skin Integrity Site 2   Skin Integrity Location 2 Blister   Location 2 left hip   Preventative Dressing Yes   Assessed this shift? Yes   Musculoskeletal   Musculoskeletal (WDL) WDL   Genitourinary   Genitourinary (WDL) WDL   Urine Assessment   Incontinence Yes   Psychosocial   Psychosocial (WDL) WDL   Pt's HR in 140s at the beginning of the shift. Dayshift RN spoke with [facility] MD;  Verbal order was given to administer 2100 metoprolol early and call back if HR did not decrease.

## 2022-01-01 ENCOUNTER — APPOINTMENT (OUTPATIENT)
Dept: CT IMAGING | Facility: HOSPITAL | Age: 77
End: 2022-01-01
Payer: MEDICARE

## 2022-01-01 ENCOUNTER — TELEPHONE (OUTPATIENT)
Dept: PRIMARY CARE CLINIC | Age: 77
End: 2022-01-01

## 2022-01-01 ENCOUNTER — APPOINTMENT (OUTPATIENT)
Dept: CT IMAGING | Facility: HOSPITAL | Age: 77
DRG: 309 | End: 2022-01-01
Attending: INTERNAL MEDICINE
Payer: MEDICARE

## 2022-01-01 ENCOUNTER — CARE COORDINATION (OUTPATIENT)
Dept: CARE COORDINATION | Age: 77
End: 2022-01-01

## 2022-01-01 ENCOUNTER — APPOINTMENT (OUTPATIENT)
Dept: GENERAL RADIOLOGY | Facility: HOSPITAL | Age: 77
DRG: 309 | End: 2022-01-01
Attending: INTERNAL MEDICINE
Payer: MEDICARE

## 2022-01-01 ENCOUNTER — HOSPITAL ENCOUNTER (INPATIENT)
Facility: HOSPITAL | Age: 77
LOS: 3 days | Discharge: HOME HEALTH | DRG: 948 | End: 2022-01-08
Attending: INTERNAL MEDICINE | Admitting: INTERNAL MEDICINE
Payer: MEDICARE

## 2022-01-01 ENCOUNTER — APPOINTMENT (OUTPATIENT)
Dept: GENERAL RADIOLOGY | Facility: HOSPITAL | Age: 77
End: 2022-01-01
Payer: MEDICARE

## 2022-01-01 ENCOUNTER — HOSPITAL ENCOUNTER (EMERGENCY)
Facility: HOSPITAL | Age: 77
Discharge: HOME | End: 2022-02-03
Attending: FAMILY MEDICINE
Payer: MEDICARE

## 2022-01-01 ENCOUNTER — HOSPITAL ENCOUNTER (EMERGENCY)
Facility: HOSPITAL | Age: 77
Discharge: HOME HEALTH | End: 2022-03-13
Attending: EMERGENCY MEDICINE
Payer: MEDICARE

## 2022-01-01 ENCOUNTER — TELEMEDICINE (OUTPATIENT)
Dept: PRIMARY CARE CLINIC | Age: 77
End: 2022-01-01
Payer: MEDICARE

## 2022-01-01 ENCOUNTER — HOSPITAL ENCOUNTER (EMERGENCY)
Facility: HOSPITAL | Age: 77
Discharge: HOME | End: 2022-02-01
Attending: EMERGENCY MEDICINE
Payer: MEDICARE

## 2022-01-01 ENCOUNTER — HOSPITAL ENCOUNTER (OUTPATIENT)
Facility: HOSPITAL | Age: 77
Setting detail: OBSERVATION
Discharge: HOME | End: 2022-03-02
Attending: EMERGENCY MEDICINE | Admitting: INTERNAL MEDICINE
Payer: MEDICARE

## 2022-01-01 ENCOUNTER — HOSPITAL ENCOUNTER (INPATIENT)
Facility: HOSPITAL | Age: 77
LOS: 1 days | Discharge: ANOTHER HOSPITAL | DRG: 309 | End: 2022-03-20
Attending: INTERNAL MEDICINE | Admitting: INTERNAL MEDICINE
Payer: MEDICARE

## 2022-01-01 VITALS
DIASTOLIC BLOOD PRESSURE: 87 MMHG | BODY MASS INDEX: 29.8 KG/M2 | TEMPERATURE: 98.7 F | HEIGHT: 72 IN | SYSTOLIC BLOOD PRESSURE: 141 MMHG | WEIGHT: 220 LBS | HEART RATE: 95 BPM | RESPIRATION RATE: 14 BRPM | OXYGEN SATURATION: 96 %

## 2022-01-01 VITALS
WEIGHT: 260 LBS | BODY MASS INDEX: 35.21 KG/M2 | HEIGHT: 72 IN | SYSTOLIC BLOOD PRESSURE: 106 MMHG | RESPIRATION RATE: 16 BRPM | HEART RATE: 50 BPM | DIASTOLIC BLOOD PRESSURE: 85 MMHG | TEMPERATURE: 98.2 F | OXYGEN SATURATION: 98 %

## 2022-01-01 VITALS
BODY MASS INDEX: 29.8 KG/M2 | TEMPERATURE: 98.3 F | OXYGEN SATURATION: 98 % | HEART RATE: 91 BPM | WEIGHT: 220 LBS | RESPIRATION RATE: 12 BRPM | DIASTOLIC BLOOD PRESSURE: 79 MMHG | SYSTOLIC BLOOD PRESSURE: 132 MMHG | HEIGHT: 72 IN

## 2022-01-01 VITALS
RESPIRATION RATE: 20 BRPM | HEIGHT: 72 IN | SYSTOLIC BLOOD PRESSURE: 123 MMHG | HEART RATE: 94 BPM | WEIGHT: 270 LBS | TEMPERATURE: 97.7 F | BODY MASS INDEX: 36.57 KG/M2 | OXYGEN SATURATION: 100 % | DIASTOLIC BLOOD PRESSURE: 69 MMHG

## 2022-01-01 VITALS
DIASTOLIC BLOOD PRESSURE: 88 MMHG | WEIGHT: 192.13 LBS | RESPIRATION RATE: 16 BRPM | SYSTOLIC BLOOD PRESSURE: 118 MMHG | HEART RATE: 136 BPM | TEMPERATURE: 98.1 F | OXYGEN SATURATION: 99 % | BODY MASS INDEX: 26.02 KG/M2 | HEIGHT: 72 IN

## 2022-01-01 VITALS
RESPIRATION RATE: 16 BRPM | DIASTOLIC BLOOD PRESSURE: 89 MMHG | TEMPERATURE: 98.1 F | HEIGHT: 72 IN | SYSTOLIC BLOOD PRESSURE: 128 MMHG | HEART RATE: 90 BPM | OXYGEN SATURATION: 99 % | BODY MASS INDEX: 29.8 KG/M2 | WEIGHT: 220 LBS

## 2022-01-01 VITALS
DIASTOLIC BLOOD PRESSURE: 58 MMHG | SYSTOLIC BLOOD PRESSURE: 126 MMHG | TEMPERATURE: 98.4 F | OXYGEN SATURATION: 96 % | HEART RATE: 93 BPM | RESPIRATION RATE: 18 BRPM

## 2022-01-01 DIAGNOSIS — I48.3 TYPICAL ATRIAL FLUTTER (HCC): ICD-10-CM

## 2022-01-01 DIAGNOSIS — R41.3 IMPAIRED MEMORY: ICD-10-CM

## 2022-01-01 DIAGNOSIS — R63.4 WEIGHT LOSS: Primary | ICD-10-CM

## 2022-01-01 DIAGNOSIS — E87.6 HYPOKALEMIA: ICD-10-CM

## 2022-01-01 DIAGNOSIS — R41.82 ALTERED MENTAL STATUS, UNSPECIFIED ALTERED MENTAL STATUS TYPE: Primary | ICD-10-CM

## 2022-01-01 DIAGNOSIS — I48.91 ATRIAL FIBRILLATION, UNSPECIFIED TYPE (HCC): ICD-10-CM

## 2022-01-01 DIAGNOSIS — R53.81 DECLINING FUNCTIONAL STATUS: ICD-10-CM

## 2022-01-01 DIAGNOSIS — I10 ESSENTIAL HYPERTENSION: ICD-10-CM

## 2022-01-01 DIAGNOSIS — J18.9 PNEUMONIA DUE TO INFECTIOUS ORGANISM, UNSPECIFIED LATERALITY, UNSPECIFIED PART OF LUNG: ICD-10-CM

## 2022-01-01 DIAGNOSIS — I48.3 TYPICAL ATRIAL FLUTTER (HCC): Primary | ICD-10-CM

## 2022-01-01 DIAGNOSIS — R11.0 NAUSEA: Primary | ICD-10-CM

## 2022-01-01 DIAGNOSIS — R53.81 DECLINING FUNCTIONAL STATUS: Primary | ICD-10-CM

## 2022-01-01 DIAGNOSIS — J18.9 PNEUMONIA OF BOTH LOWER LOBES DUE TO INFECTIOUS ORGANISM: ICD-10-CM

## 2022-01-01 DIAGNOSIS — I47.1 PAROXYSMAL SUPRAVENTRICULAR TACHYCARDIA (HCC): Primary | ICD-10-CM

## 2022-01-01 DIAGNOSIS — R73.02 GLUCOSE INTOLERANCE (IMPAIRED GLUCOSE TOLERANCE): ICD-10-CM

## 2022-01-01 LAB
A/G RATIO: 0.9 (ref 0.8–2)
A/G RATIO: 1 (ref 0.8–2)
A/G RATIO: 1.1 (ref 0.8–2)
A/G RATIO: 1.2 (ref 0.8–2)
A/G RATIO: 1.4 (ref 0.8–2)
A/G RATIO: 1.7 (ref 0.8–2)
A/G RATIO: 1.7 (ref 0.8–2)
ALBUMIN SERPL-MCNC: 2.5 G/DL (ref 3.4–4.8)
ALBUMIN SERPL-MCNC: 2.6 G/DL (ref 3.4–4.8)
ALBUMIN SERPL-MCNC: 2.7 G/DL (ref 3.4–4.8)
ALBUMIN SERPL-MCNC: 2.7 G/DL (ref 3.4–4.8)
ALBUMIN SERPL-MCNC: 2.8 G/DL (ref 3.4–4.8)
ALBUMIN SERPL-MCNC: 3 G/DL (ref 3.4–4.8)
ALBUMIN SERPL-MCNC: 3 G/DL (ref 3.4–4.8)
ALBUMIN SERPL-MCNC: 3.2 G/DL (ref 3.4–4.8)
ALBUMIN SERPL-MCNC: 3.3 G/DL (ref 3.4–4.8)
ALBUMIN SERPL-MCNC: 3.4 G/DL (ref 3.4–4.8)
ALBUMIN SERPL-MCNC: 3.7 G/DL (ref 3.4–4.8)
ALBUMIN SERPL-MCNC: 3.7 G/DL (ref 3.4–4.8)
ALP BLD-CCNC: 103 U/L (ref 25–100)
ALP BLD-CCNC: 106 U/L (ref 25–100)
ALP BLD-CCNC: 118 U/L (ref 25–100)
ALP BLD-CCNC: 122 U/L (ref 25–100)
ALP BLD-CCNC: 122 U/L (ref 25–100)
ALP BLD-CCNC: 124 U/L (ref 25–100)
ALP BLD-CCNC: 125 U/L (ref 25–100)
ALP BLD-CCNC: 134 U/L (ref 25–100)
ALP BLD-CCNC: 137 U/L (ref 25–100)
ALP BLD-CCNC: 137 U/L (ref 25–100)
ALP BLD-CCNC: 144 U/L (ref 25–100)
ALP BLD-CCNC: 96 U/L (ref 25–100)
ALP BLD-CCNC: 96 U/L (ref 25–100)
ALP BLD-CCNC: 98 U/L (ref 25–100)
ALT SERPL-CCNC: 10 U/L (ref 4–36)
ALT SERPL-CCNC: 10 U/L (ref 4–36)
ALT SERPL-CCNC: 11 U/L (ref 4–36)
ALT SERPL-CCNC: 12 U/L (ref 4–36)
ALT SERPL-CCNC: 15 U/L (ref 4–36)
ALT SERPL-CCNC: 16 U/L (ref 4–36)
ALT SERPL-CCNC: 17 U/L (ref 4–36)
ALT SERPL-CCNC: 18 U/L (ref 4–36)
ALT SERPL-CCNC: 20 U/L (ref 4–36)
ALT SERPL-CCNC: 20 U/L (ref 4–36)
ALT SERPL-CCNC: 21 U/L (ref 4–36)
ALT SERPL-CCNC: 9 U/L (ref 4–36)
ANION GAP SERPL CALCULATED.3IONS-SCNC: 10 MMOL/L (ref 3–16)
ANION GAP SERPL CALCULATED.3IONS-SCNC: 10 MMOL/L (ref 3–16)
ANION GAP SERPL CALCULATED.3IONS-SCNC: 11 MMOL/L (ref 3–16)
ANION GAP SERPL CALCULATED.3IONS-SCNC: 14 MMOL/L (ref 3–16)
ANION GAP SERPL CALCULATED.3IONS-SCNC: 17 MMOL/L (ref 3–16)
ANION GAP SERPL CALCULATED.3IONS-SCNC: 18 MMOL/L (ref 3–16)
ANION GAP SERPL CALCULATED.3IONS-SCNC: 18 MMOL/L (ref 3–16)
ANION GAP SERPL CALCULATED.3IONS-SCNC: 19 MMOL/L (ref 3–16)
ANION GAP SERPL CALCULATED.3IONS-SCNC: 20 MMOL/L (ref 3–16)
ANION GAP SERPL CALCULATED.3IONS-SCNC: 8 MMOL/L (ref 3–16)
ANION GAP SERPL CALCULATED.3IONS-SCNC: 9 MMOL/L (ref 3–16)
ANION GAP SERPL CALCULATED.3IONS-SCNC: 9 MMOL/L (ref 3–16)
APTT: 22.8 SEC (ref 22.5–34.9)
APTT: 28.3 SEC (ref 22.5–34.9)
AST SERPL-CCNC: 15 U/L (ref 8–33)
AST SERPL-CCNC: 16 U/L (ref 8–33)
AST SERPL-CCNC: 17 U/L (ref 8–33)
AST SERPL-CCNC: 18 U/L (ref 8–33)
AST SERPL-CCNC: 18 U/L (ref 8–33)
AST SERPL-CCNC: 19 U/L (ref 8–33)
AST SERPL-CCNC: 20 U/L (ref 8–33)
AST SERPL-CCNC: 20 U/L (ref 8–33)
AST SERPL-CCNC: 21 U/L (ref 8–33)
AST SERPL-CCNC: 22 U/L (ref 8–33)
BACTERIA: ABNORMAL /HPF
BASOPHILS ABSOLUTE: 0 K/UL (ref 0–0.1)
BASOPHILS ABSOLUTE: 0.1 K/UL (ref 0–0.1)
BASOPHILS ABSOLUTE: 0.1 K/UL (ref 0–0.1)
BASOPHILS RELATIVE PERCENT: 0.4 %
BASOPHILS RELATIVE PERCENT: 0.5 %
BASOPHILS RELATIVE PERCENT: 0.6 %
BASOPHILS RELATIVE PERCENT: 0.8 %
BASOPHILS RELATIVE PERCENT: 0.9 %
BILIRUB SERPL-MCNC: 0.5 MG/DL (ref 0.3–1.2)
BILIRUB SERPL-MCNC: 0.7 MG/DL (ref 0.3–1.2)
BILIRUB SERPL-MCNC: 0.7 MG/DL (ref 0.3–1.2)
BILIRUB SERPL-MCNC: 0.8 MG/DL (ref 0.3–1.2)
BILIRUB SERPL-MCNC: 1.2 MG/DL (ref 0.3–1.2)
BILIRUB SERPL-MCNC: 1.2 MG/DL (ref 0.3–1.2)
BILIRUB SERPL-MCNC: 1.6 MG/DL (ref 0.3–1.2)
BILIRUB SERPL-MCNC: 1.6 MG/DL (ref 0.3–1.2)
BILIRUB SERPL-MCNC: 2 MG/DL (ref 0.3–1.2)
BILIRUB SERPL-MCNC: 2.1 MG/DL (ref 0.3–1.2)
BILIRUB SERPL-MCNC: 2.1 MG/DL (ref 0.3–1.2)
BILIRUBIN URINE: ABNORMAL
BLOOD CULTURE, ROUTINE: NORMAL
BLOOD, URINE: ABNORMAL
BUN BLDV-MCNC: 10 MG/DL (ref 6–20)
BUN BLDV-MCNC: 11 MG/DL (ref 6–20)
BUN BLDV-MCNC: 12 MG/DL (ref 6–20)
BUN BLDV-MCNC: 12 MG/DL (ref 6–20)
BUN BLDV-MCNC: 16 MG/DL (ref 6–20)
BUN BLDV-MCNC: 16 MG/DL (ref 6–20)
BUN BLDV-MCNC: 17 MG/DL (ref 6–20)
BUN BLDV-MCNC: 6 MG/DL (ref 6–20)
BUN BLDV-MCNC: 7 MG/DL (ref 6–20)
BUN BLDV-MCNC: 8 MG/DL (ref 6–20)
CALCIUM SERPL-MCNC: 7.7 MG/DL (ref 8.5–10.5)
CALCIUM SERPL-MCNC: 7.7 MG/DL (ref 8.5–10.5)
CALCIUM SERPL-MCNC: 7.8 MG/DL (ref 8.5–10.5)
CALCIUM SERPL-MCNC: 7.8 MG/DL (ref 8.5–10.5)
CALCIUM SERPL-MCNC: 8 MG/DL (ref 8.5–10.5)
CALCIUM SERPL-MCNC: 8.4 MG/DL (ref 8.5–10.5)
CALCIUM SERPL-MCNC: 8.5 MG/DL (ref 8.5–10.5)
CALCIUM SERPL-MCNC: 8.5 MG/DL (ref 8.5–10.5)
CALCIUM SERPL-MCNC: 8.7 MG/DL (ref 8.5–10.5)
CALCIUM SERPL-MCNC: 8.7 MG/DL (ref 8.5–10.5)
CALCIUM SERPL-MCNC: 8.9 MG/DL (ref 8.5–10.5)
CALCIUM SERPL-MCNC: 8.9 MG/DL (ref 8.5–10.5)
CHLORIDE BLD-SCNC: 102 MMOL/L (ref 98–107)
CHLORIDE BLD-SCNC: 103 MMOL/L (ref 98–107)
CHLORIDE BLD-SCNC: 104 MMOL/L (ref 98–107)
CHLORIDE BLD-SCNC: 105 MMOL/L (ref 98–107)
CHLORIDE BLD-SCNC: 106 MMOL/L (ref 98–107)
CHLORIDE BLD-SCNC: 108 MMOL/L (ref 98–107)
CHLORIDE BLD-SCNC: 110 MMOL/L (ref 98–107)
CHLORIDE BLD-SCNC: 96 MMOL/L (ref 98–107)
CHLORIDE BLD-SCNC: 98 MMOL/L (ref 98–107)
CHLORIDE BLD-SCNC: 99 MMOL/L (ref 98–107)
CLARITY: CLEAR
CO2: 17 MMOL/L (ref 20–30)
CO2: 18 MMOL/L (ref 20–30)
CO2: 20 MMOL/L (ref 20–30)
CO2: 21 MMOL/L (ref 20–30)
CO2: 22 MMOL/L (ref 20–30)
CO2: 22 MMOL/L (ref 20–30)
CO2: 23 MMOL/L (ref 20–30)
CO2: 24 MMOL/L (ref 20–30)
CO2: 27 MMOL/L (ref 20–30)
COLOR: ABNORMAL
CREAT SERPL-MCNC: 0.7 MG/DL (ref 0.4–1.2)
CREAT SERPL-MCNC: 0.7 MG/DL (ref 0.4–1.2)
CREAT SERPL-MCNC: 0.8 MG/DL (ref 0.4–1.2)
CREAT SERPL-MCNC: 0.9 MG/DL (ref 0.4–1.2)
CREAT SERPL-MCNC: 1.1 MG/DL (ref 0.4–1.2)
D DIMER: 0.63 UG/ML FEU (ref 0–0.6)
DIGOXIN LEVEL: 1.4 NG/ML (ref 0.8–2)
EKG ATRIAL RATE: 142 BPM
EKG ATRIAL RATE: 277 BPM
EKG ATRIAL RATE: 291 BPM
EKG DIAGNOSIS: NORMAL
EKG P AXIS: 247 DEGREES
EKG P AXIS: 53 DEGREES
EKG P AXIS: 79 DEGREES
EKG P-R INTERVAL: 198 MS
EKG Q-T INTERVAL: 104 MS
EKG Q-T INTERVAL: 206 MS
EKG Q-T INTERVAL: 334 MS
EKG QRS DURATION: 102 MS
EKG QRS DURATION: 108 MS
EKG QRS DURATION: 108 MS
EKG QTC CALCULATION (BAZETT): 120 MS
EKG QTC CALCULATION (BAZETT): 261 MS
EKG QTC CALCULATION (BAZETT): 513 MS
EKG R AXIS: 1 DEGREES
EKG R AXIS: 13 DEGREES
EKG R AXIS: 15 DEGREES
EKG T AXIS: 0 DEGREES
EKG T AXIS: 134 DEGREES
EKG T AXIS: 229 DEGREES
EKG VENTRICULAR RATE: 142 BPM
EKG VENTRICULAR RATE: 81 BPM
EKG VENTRICULAR RATE: 97 BPM
EOSINOPHILS ABSOLUTE: 0 K/UL (ref 0–0.4)
EOSINOPHILS ABSOLUTE: 0.1 K/UL (ref 0–0.4)
EOSINOPHILS ABSOLUTE: 0.4 K/UL (ref 0–0.4)
EOSINOPHILS RELATIVE PERCENT: 0.3 %
EOSINOPHILS RELATIVE PERCENT: 0.3 %
EOSINOPHILS RELATIVE PERCENT: 0.4 %
EOSINOPHILS RELATIVE PERCENT: 1.2 %
EOSINOPHILS RELATIVE PERCENT: 7.1 %
EPITHELIAL CELLS, UA: ABNORMAL /HPF (ref 0–5)
GFR AFRICAN AMERICAN: >59
GFR NON-AFRICAN AMERICAN: >59
GLOBULIN: 1.9 G/DL
GLOBULIN: 1.9 G/DL
GLOBULIN: 2.3 G/DL
GLOBULIN: 2.4 G/DL
GLOBULIN: 2.5 G/DL
GLOBULIN: 2.5 G/DL
GLOBULIN: 2.7 G/DL
GLOBULIN: 2.8 G/DL
GLOBULIN: 2.8 G/DL
GLOBULIN: 2.9 G/DL
GLOBULIN: 3 G/DL
GLOBULIN: 3 G/DL
GLUCOSE BLD-MCNC: 100 MG/DL (ref 74–106)
GLUCOSE BLD-MCNC: 100 MG/DL (ref 74–106)
GLUCOSE BLD-MCNC: 101 MG/DL (ref 74–106)
GLUCOSE BLD-MCNC: 102 MG/DL (ref 74–106)
GLUCOSE BLD-MCNC: 104 MG/DL (ref 74–106)
GLUCOSE BLD-MCNC: 104 MG/DL (ref 74–106)
GLUCOSE BLD-MCNC: 112 MG/DL (ref 74–106)
GLUCOSE BLD-MCNC: 114 MG/DL (ref 74–106)
GLUCOSE BLD-MCNC: 62 MG/DL (ref 74–106)
GLUCOSE BLD-MCNC: 78 MG/DL (ref 74–106)
GLUCOSE BLD-MCNC: 78 MG/DL (ref 74–106)
GLUCOSE BLD-MCNC: 85 MG/DL (ref 74–106)
GLUCOSE BLD-MCNC: 91 MG/DL (ref 74–106)
GLUCOSE BLD-MCNC: 97 MG/DL (ref 74–106)
GLUCOSE URINE: NEGATIVE MG/DL
HCT VFR BLD CALC: 29.9 % (ref 40–54)
HCT VFR BLD CALC: 31.5 % (ref 40–54)
HCT VFR BLD CALC: 32 % (ref 40–54)
HCT VFR BLD CALC: 32.4 % (ref 40–54)
HCT VFR BLD CALC: 32.5 % (ref 40–54)
HCT VFR BLD CALC: 33.1 % (ref 40–54)
HCT VFR BLD CALC: 41.3 % (ref 40–54)
HCT VFR BLD CALC: 42.4 % (ref 40–54)
HCT VFR BLD CALC: 43.3 % (ref 40–54)
HCT VFR BLD CALC: 44.6 % (ref 40–54)
HCT VFR BLD CALC: 45 % (ref 40–54)
HCT VFR BLD CALC: 45.2 % (ref 40–54)
HCT VFR BLD CALC: 48.9 % (ref 40–54)
HCT VFR BLD CALC: 49 % (ref 40–54)
HEMOGLOBIN: 10.3 G/DL (ref 13–18)
HEMOGLOBIN: 10.4 G/DL (ref 13–18)
HEMOGLOBIN: 10.6 G/DL (ref 13–18)
HEMOGLOBIN: 10.7 G/DL (ref 13–18)
HEMOGLOBIN: 10.9 G/DL (ref 13–18)
HEMOGLOBIN: 13.2 G/DL (ref 13–18)
HEMOGLOBIN: 13.3 G/DL (ref 13–18)
HEMOGLOBIN: 14 G/DL (ref 13–18)
HEMOGLOBIN: 14.8 G/DL (ref 13–18)
HEMOGLOBIN: 15.3 G/DL (ref 13–18)
HEMOGLOBIN: 15.4 G/DL (ref 13–18)
HEMOGLOBIN: 16 G/DL (ref 13–18)
HEMOGLOBIN: 16.9 G/DL (ref 13–18)
HEMOGLOBIN: 9.8 G/DL (ref 13–18)
IMMATURE GRANULOCYTES #: 0 K/UL
IMMATURE GRANULOCYTES %: 0.2 % (ref 0–5)
IMMATURE GRANULOCYTES %: 0.3 % (ref 0–5)
IMMATURE GRANULOCYTES %: 0.4 % (ref 0–5)
IMMATURE GRANULOCYTES %: 0.5 % (ref 0–5)
IMMATURE GRANULOCYTES %: 0.6 % (ref 0–5)
INR BLD: 1.06 (ref 0.88–1.11)
INR BLD: 1.08 (ref 0.88–1.11)
KETONES, URINE: 40 MG/DL
LACTIC ACID: 1.8 MMOL/L (ref 0.4–2)
LEUKOCYTE ESTERASE, URINE: ABNORMAL
LIPASE: 92 U/L (ref 5.6–51.3)
LYMPHOCYTES ABSOLUTE: 1.1 K/UL (ref 1.5–4)
LYMPHOCYTES ABSOLUTE: 1.2 K/UL (ref 1.5–4)
LYMPHOCYTES ABSOLUTE: 1.4 K/UL (ref 1.5–4)
LYMPHOCYTES RELATIVE PERCENT: 14.5 %
LYMPHOCYTES RELATIVE PERCENT: 22.6 %
LYMPHOCYTES RELATIVE PERCENT: 24.6 %
LYMPHOCYTES RELATIVE PERCENT: 24.7 %
LYMPHOCYTES RELATIVE PERCENT: 27.9 %
MAGNESIUM: 1.3 MG/DL (ref 1.7–2.4)
MAGNESIUM: 1.6 MG/DL (ref 1.7–2.4)
MAGNESIUM: 1.6 MG/DL (ref 1.7–2.4)
MAGNESIUM: 1.7 MG/DL (ref 1.7–2.4)
MAGNESIUM: 1.7 MG/DL (ref 1.7–2.4)
MAGNESIUM: 1.9 MG/DL (ref 1.7–2.4)
MAGNESIUM: 1.9 MG/DL (ref 1.7–2.4)
MCH RBC QN AUTO: 28.3 PG (ref 27–32)
MCH RBC QN AUTO: 28.3 PG (ref 27–32)
MCH RBC QN AUTO: 28.5 PG (ref 27–32)
MCH RBC QN AUTO: 28.6 PG (ref 27–32)
MCH RBC QN AUTO: 28.7 PG (ref 27–32)
MCH RBC QN AUTO: 28.9 PG (ref 27–32)
MCH RBC QN AUTO: 29 PG (ref 27–32)
MCH RBC QN AUTO: 29 PG (ref 27–32)
MCH RBC QN AUTO: 29.1 PG (ref 27–32)
MCH RBC QN AUTO: 29.2 PG (ref 27–32)
MCH RBC QN AUTO: 29.4 PG (ref 27–32)
MCH RBC QN AUTO: 29.6 PG (ref 27–32)
MCHC RBC AUTO-ENTMCNC: 30.5 G/DL (ref 31–35)
MCHC RBC AUTO-ENTMCNC: 32.2 G/DL (ref 31–35)
MCHC RBC AUTO-ENTMCNC: 32.5 G/DL (ref 31–35)
MCHC RBC AUTO-ENTMCNC: 32.7 G/DL (ref 31–35)
MCHC RBC AUTO-ENTMCNC: 32.8 G/DL (ref 31–35)
MCHC RBC AUTO-ENTMCNC: 32.9 G/DL (ref 31–35)
MCHC RBC AUTO-ENTMCNC: 33 G/DL (ref 31–35)
MCHC RBC AUTO-ENTMCNC: 34.1 G/DL (ref 31–35)
MCHC RBC AUTO-ENTMCNC: 34.3 G/DL (ref 31–35)
MCHC RBC AUTO-ENTMCNC: 34.5 G/DL (ref 31–35)
MCV RBC AUTO: 84.8 FL (ref 80–100)
MCV RBC AUTO: 85.4 FL (ref 80–100)
MCV RBC AUTO: 86.8 FL (ref 80–100)
MCV RBC AUTO: 86.9 FL (ref 80–100)
MCV RBC AUTO: 86.9 FL (ref 80–100)
MCV RBC AUTO: 87 FL (ref 80–100)
MCV RBC AUTO: 87.1 FL (ref 80–100)
MCV RBC AUTO: 87.1 FL (ref 80–100)
MCV RBC AUTO: 87.2 FL (ref 80–100)
MCV RBC AUTO: 87.4 FL (ref 80–100)
MCV RBC AUTO: 88.1 FL (ref 80–100)
MCV RBC AUTO: 89.2 FL (ref 80–100)
MCV RBC AUTO: 90 FL (ref 80–100)
MCV RBC AUTO: 92.9 FL (ref 80–100)
MICROSCOPIC EXAMINATION: YES
MONOCYTES ABSOLUTE: 0.4 K/UL (ref 0.2–0.8)
MONOCYTES ABSOLUTE: 0.4 K/UL (ref 0.2–0.8)
MONOCYTES ABSOLUTE: 0.6 K/UL (ref 0.2–0.8)
MONOCYTES RELATIVE PERCENT: 11.9 %
MONOCYTES RELATIVE PERCENT: 6.3 %
MONOCYTES RELATIVE PERCENT: 7.2 %
MONOCYTES RELATIVE PERCENT: 8.2 %
MONOCYTES RELATIVE PERCENT: 9.5 %
MUCUS: ABNORMAL /LPF
NEUTROPHILS ABSOLUTE: 2.6 K/UL (ref 2–7.5)
NEUTROPHILS ABSOLUTE: 3.2 K/UL (ref 2–7.5)
NEUTROPHILS ABSOLUTE: 3.7 K/UL (ref 2–7.5)
NEUTROPHILS ABSOLUTE: 4.3 K/UL (ref 2–7.5)
NEUTROPHILS ABSOLUTE: 5.9 K/UL (ref 2–7.5)
NEUTROPHILS RELATIVE PERCENT: 51.7 %
NEUTROPHILS RELATIVE PERCENT: 63.4 %
NEUTROPHILS RELATIVE PERCENT: 67 %
NEUTROPHILS RELATIVE PERCENT: 70.1 %
NEUTROPHILS RELATIVE PERCENT: 75.9 %
NITRITE, URINE: NEGATIVE
PDW BLD-RTO: 13.6 % (ref 11–16)
PDW BLD-RTO: 13.7 % (ref 11–16)
PDW BLD-RTO: 13.9 % (ref 11–16)
PDW BLD-RTO: 13.9 % (ref 11–16)
PDW BLD-RTO: 14.1 % (ref 11–16)
PDW BLD-RTO: 14.3 % (ref 11–16)
PDW BLD-RTO: 14.4 % (ref 11–16)
PDW BLD-RTO: 14.5 % (ref 11–16)
PDW BLD-RTO: 14.6 % (ref 11–16)
PDW BLD-RTO: 15.2 % (ref 11–16)
PDW BLD-RTO: 15.2 % (ref 11–16)
PH UA: 5.5 (ref 5–8)
PHOSPHORUS: 2.3 MG/DL (ref 2.5–4.5)
PLATELET # BLD: 140 K/UL (ref 150–400)
PLATELET # BLD: 147 K/UL (ref 150–400)
PLATELET # BLD: 168 K/UL (ref 150–400)
PLATELET # BLD: 175 K/UL (ref 150–400)
PLATELET # BLD: 175 K/UL (ref 150–400)
PLATELET # BLD: 183 K/UL (ref 150–400)
PLATELET # BLD: 183 K/UL (ref 150–400)
PLATELET # BLD: 193 K/UL (ref 150–400)
PLATELET # BLD: 194 K/UL (ref 150–400)
PLATELET # BLD: 200 K/UL (ref 150–400)
PLATELET # BLD: 210 K/UL (ref 150–400)
PLATELET # BLD: 211 K/UL (ref 150–400)
PLATELET # BLD: 217 K/UL (ref 150–400)
PLATELET # BLD: 219 K/UL (ref 150–400)
PMV BLD AUTO: 10.3 FL (ref 6–10)
PMV BLD AUTO: 10.5 FL (ref 6–10)
PMV BLD AUTO: 10.7 FL (ref 6–10)
PMV BLD AUTO: 10.7 FL (ref 6–10)
PMV BLD AUTO: 11 FL (ref 6–10)
PMV BLD AUTO: 11.2 FL (ref 6–10)
PMV BLD AUTO: 11.4 FL (ref 6–10)
PMV BLD AUTO: 11.5 FL (ref 6–10)
PMV BLD AUTO: 11.6 FL (ref 6–10)
PMV BLD AUTO: 9.9 FL (ref 6–10)
POTASSIUM REFLEX MAGNESIUM: 3.3 MMOL/L (ref 3.4–5.1)
POTASSIUM REFLEX MAGNESIUM: 3.4 MMOL/L (ref 3.4–5.1)
POTASSIUM REFLEX MAGNESIUM: 3.4 MMOL/L (ref 3.4–5.1)
POTASSIUM REFLEX MAGNESIUM: 3.5 MMOL/L (ref 3.4–5.1)
POTASSIUM REFLEX MAGNESIUM: 3.6 MMOL/L (ref 3.4–5.1)
POTASSIUM REFLEX MAGNESIUM: 3.6 MMOL/L (ref 3.4–5.1)
POTASSIUM REFLEX MAGNESIUM: 3.7 MMOL/L (ref 3.4–5.1)
POTASSIUM REFLEX MAGNESIUM: 3.9 MMOL/L (ref 3.4–5.1)
POTASSIUM REFLEX MAGNESIUM: 4 MMOL/L (ref 3.4–5.1)
POTASSIUM REFLEX MAGNESIUM: 4 MMOL/L (ref 3.4–5.1)
POTASSIUM SERPL-SCNC: 3.1 MMOL/L (ref 3.4–5.1)
POTASSIUM SERPL-SCNC: 3.1 MMOL/L (ref 3.4–5.1)
POTASSIUM SERPL-SCNC: 3.7 MMOL/L (ref 3.4–5.1)
POTASSIUM SERPL-SCNC: 3.9 MMOL/L (ref 3.4–5.1)
PRO-BNP: 1218 PG/ML (ref 0–1800)
PRO-BNP: 1294 PG/ML (ref 0–1800)
PRO-BNP: 723 PG/ML (ref 0–1800)
PROTEIN UA: 100 MG/DL
PROTHROMBIN TIME: 13.3 SEC (ref 11.6–13.8)
PROTHROMBIN TIME: 13.5 SEC (ref 11.6–13.8)
RBC # BLD: 3.44 M/UL (ref 4.5–6)
RBC # BLD: 3.62 M/UL (ref 4.5–6)
RBC # BLD: 3.67 M/UL (ref 4.5–6)
RBC # BLD: 3.72 M/UL (ref 4.5–6)
RBC # BLD: 3.73 M/UL (ref 4.5–6)
RBC # BLD: 3.81 M/UL (ref 4.5–6)
RBC # BLD: 4.59 M/UL (ref 4.5–6)
RBC # BLD: 4.66 M/UL (ref 4.5–6)
RBC # BLD: 4.85 M/UL (ref 4.5–6)
RBC # BLD: 5.11 M/UL (ref 4.5–6)
RBC # BLD: 5.21 M/UL (ref 4.5–6)
RBC # BLD: 5.26 M/UL (ref 4.5–6)
RBC # BLD: 5.48 M/UL (ref 4.5–6)
RBC # BLD: 5.74 M/UL (ref 4.5–6)
RBC UA: ABNORMAL /HPF (ref 0–4)
SARS-COV-2, NAAT: NOT DETECTED
SARS-COV-2, NAAT: NOT DETECTED
SODIUM BLD-SCNC: 132 MMOL/L (ref 136–145)
SODIUM BLD-SCNC: 136 MMOL/L (ref 136–145)
SODIUM BLD-SCNC: 137 MMOL/L (ref 136–145)
SODIUM BLD-SCNC: 137 MMOL/L (ref 136–145)
SODIUM BLD-SCNC: 138 MMOL/L (ref 136–145)
SODIUM BLD-SCNC: 138 MMOL/L (ref 136–145)
SODIUM BLD-SCNC: 139 MMOL/L (ref 136–145)
SODIUM BLD-SCNC: 140 MMOL/L (ref 136–145)
SODIUM BLD-SCNC: 140 MMOL/L (ref 136–145)
SODIUM BLD-SCNC: 141 MMOL/L (ref 136–145)
SODIUM BLD-SCNC: 146 MMOL/L (ref 136–145)
SPECIFIC GRAVITY UA: 1.02 (ref 1–1.03)
TOTAL PROTEIN: 5 G/DL (ref 6.4–8.3)
TOTAL PROTEIN: 5 G/DL (ref 6.4–8.3)
TOTAL PROTEIN: 5.1 G/DL (ref 6.4–8.3)
TOTAL PROTEIN: 5.1 G/DL (ref 6.4–8.3)
TOTAL PROTEIN: 5.3 G/DL (ref 6.4–8.3)
TOTAL PROTEIN: 5.4 G/DL (ref 6.4–8.3)
TOTAL PROTEIN: 5.5 G/DL (ref 6.4–8.3)
TOTAL PROTEIN: 5.7 G/DL (ref 6.4–8.3)
TOTAL PROTEIN: 5.8 G/DL (ref 6.4–8.3)
TOTAL PROTEIN: 5.9 G/DL (ref 6.4–8.3)
TOTAL PROTEIN: 6.1 G/DL (ref 6.4–8.3)
TOTAL PROTEIN: 6.2 G/DL (ref 6.4–8.3)
TOTAL PROTEIN: 6.7 G/DL (ref 6.4–8.3)
TOTAL PROTEIN: 6.7 G/DL (ref 6.4–8.3)
TROPONIN: <0.3 NG/ML
URINE CULTURE, ROUTINE: NORMAL
URINE REFLEX TO CULTURE: YES
URINE TYPE: ABNORMAL
UROBILINOGEN, URINE: 4 E.U./DL
WBC # BLD: 4 K/UL (ref 4–11)
WBC # BLD: 4 K/UL (ref 4–11)
WBC # BLD: 4.2 K/UL (ref 4–11)
WBC # BLD: 4.5 K/UL (ref 4–11)
WBC # BLD: 4.8 K/UL (ref 4–11)
WBC # BLD: 4.8 K/UL (ref 4–11)
WBC # BLD: 5 K/UL (ref 4–11)
WBC # BLD: 5 K/UL (ref 4–11)
WBC # BLD: 5.2 K/UL (ref 4–11)
WBC # BLD: 5.8 K/UL (ref 4–11)
WBC # BLD: 5.9 K/UL (ref 4–11)
WBC # BLD: 6.1 K/UL (ref 4–11)
WBC # BLD: 6.7 K/UL (ref 4–11)
WBC # BLD: 7.8 K/UL (ref 4–11)
WBC UA: ABNORMAL /HPF (ref 0–5)

## 2022-01-01 PROCEDURE — 97110 THERAPEUTIC EXERCISES: CPT

## 2022-01-01 PROCEDURE — 85027 COMPLETE CBC AUTOMATED: CPT

## 2022-01-01 PROCEDURE — 6370000000 HC RX 637 (ALT 250 FOR IP): Performed by: INTERNAL MEDICINE

## 2022-01-01 PROCEDURE — 6360000002 HC RX W HCPCS: Performed by: INTERNAL MEDICINE

## 2022-01-01 PROCEDURE — 2580000003 HC RX 258: Performed by: PHYSICIAN ASSISTANT

## 2022-01-01 PROCEDURE — 96374 THER/PROPH/DIAG INJ IV PUSH: CPT

## 2022-01-01 PROCEDURE — 93005 ELECTROCARDIOGRAM TRACING: CPT

## 2022-01-01 PROCEDURE — 96376 TX/PRO/DX INJ SAME DRUG ADON: CPT

## 2022-01-01 PROCEDURE — 2580000003 HC RX 258: Performed by: NURSE PRACTITIONER

## 2022-01-01 PROCEDURE — 6360000002 HC RX W HCPCS: Performed by: PHYSICIAN ASSISTANT

## 2022-01-01 PROCEDURE — 6370000000 HC RX 637 (ALT 250 FOR IP): Performed by: NURSE PRACTITIONER

## 2022-01-01 PROCEDURE — 74176 CT ABD & PELVIS W/O CONTRAST: CPT

## 2022-01-01 PROCEDURE — 83735 ASSAY OF MAGNESIUM: CPT

## 2022-01-01 PROCEDURE — 36415 COLL VENOUS BLD VENIPUNCTURE: CPT

## 2022-01-01 PROCEDURE — 80053 COMPREHEN METABOLIC PANEL: CPT

## 2022-01-01 PROCEDURE — 71260 CT THORAX DX C+: CPT

## 2022-01-01 PROCEDURE — 96375 TX/PRO/DX INJ NEW DRUG ADDON: CPT

## 2022-01-01 PROCEDURE — 97530 THERAPEUTIC ACTIVITIES: CPT

## 2022-01-01 PROCEDURE — 96365 THER/PROPH/DIAG IV INF INIT: CPT

## 2022-01-01 PROCEDURE — 84484 ASSAY OF TROPONIN QUANT: CPT

## 2022-01-01 PROCEDURE — 85730 THROMBOPLASTIN TIME PARTIAL: CPT

## 2022-01-01 PROCEDURE — 6360000002 HC RX W HCPCS: Performed by: EMERGENCY MEDICINE

## 2022-01-01 PROCEDURE — 71045 X-RAY EXAM CHEST 1 VIEW: CPT

## 2022-01-01 PROCEDURE — 97165 OT EVAL LOW COMPLEX 30 MIN: CPT

## 2022-01-01 PROCEDURE — 6360000002 HC RX W HCPCS

## 2022-01-01 PROCEDURE — 97161 PT EVAL LOW COMPLEX 20 MIN: CPT

## 2022-01-01 PROCEDURE — 85610 PROTHROMBIN TIME: CPT

## 2022-01-01 PROCEDURE — 99283 EMERGENCY DEPT VISIT LOW MDM: CPT

## 2022-01-01 PROCEDURE — G0378 HOSPITAL OBSERVATION PER HR: HCPCS

## 2022-01-01 PROCEDURE — 6370000000 HC RX 637 (ALT 250 FOR IP): Performed by: PHYSICIAN ASSISTANT

## 2022-01-01 PROCEDURE — 1200000000 HC SEMI PRIVATE

## 2022-01-01 PROCEDURE — 99305 1ST NF CARE MODERATE MDM 35: CPT | Performed by: INTERNAL MEDICINE

## 2022-01-01 PROCEDURE — 97802 MEDICAL NUTRITION INDIV IN: CPT

## 2022-01-01 PROCEDURE — 99235 HOSP IP/OBS SAME DATE MOD 70: CPT | Performed by: INTERNAL MEDICINE

## 2022-01-01 PROCEDURE — 97116 GAIT TRAINING THERAPY: CPT

## 2022-01-01 PROCEDURE — 99233 SBSQ HOSP IP/OBS HIGH 50: CPT | Performed by: INTERNAL MEDICINE

## 2022-01-01 PROCEDURE — 96361 HYDRATE IV INFUSION ADD-ON: CPT

## 2022-01-01 PROCEDURE — 1200000002 HC SEMI PRIVATE SWING BED

## 2022-01-01 PROCEDURE — 83605 ASSAY OF LACTIC ACID: CPT

## 2022-01-01 PROCEDURE — 87040 BLOOD CULTURE FOR BACTERIA: CPT

## 2022-01-01 PROCEDURE — 99232 SBSQ HOSP IP/OBS MODERATE 35: CPT | Performed by: INTERNAL MEDICINE

## 2022-01-01 PROCEDURE — 85025 COMPLETE CBC W/AUTO DIFF WBC: CPT

## 2022-01-01 PROCEDURE — G0379 DIRECT REFER HOSPITAL OBSERV: HCPCS

## 2022-01-01 PROCEDURE — 84100 ASSAY OF PHOSPHORUS: CPT

## 2022-01-01 PROCEDURE — 1111F DSCHRG MED/CURRENT MED MERGE: CPT | Performed by: INTERNAL MEDICINE

## 2022-01-01 PROCEDURE — 99495 TRANSJ CARE MGMT MOD F2F 14D: CPT | Performed by: INTERNAL MEDICINE

## 2022-01-01 PROCEDURE — 2500000003 HC RX 250 WO HCPCS: Performed by: EMERGENCY MEDICINE

## 2022-01-01 PROCEDURE — 6360000002 HC RX W HCPCS: Performed by: NURSE PRACTITIONER

## 2022-01-01 PROCEDURE — 2580000003 HC RX 258: Performed by: INTERNAL MEDICINE

## 2022-01-01 PROCEDURE — 85379 FIBRIN DEGRADATION QUANT: CPT

## 2022-01-01 PROCEDURE — 87635 SARS-COV-2 COVID-19 AMP PRB: CPT

## 2022-01-01 PROCEDURE — C9113 INJ PANTOPRAZOLE SODIUM, VIA: HCPCS | Performed by: INTERNAL MEDICINE

## 2022-01-01 PROCEDURE — 2500000003 HC RX 250 WO HCPCS: Performed by: NURSE PRACTITIONER

## 2022-01-01 PROCEDURE — 70450 CT HEAD/BRAIN W/O DYE: CPT

## 2022-01-01 PROCEDURE — 99223 1ST HOSP IP/OBS HIGH 75: CPT | Performed by: INTERNAL MEDICINE

## 2022-01-01 PROCEDURE — 87086 URINE CULTURE/COLONY COUNT: CPT

## 2022-01-01 PROCEDURE — 2000000000 HC ICU R&B

## 2022-01-01 PROCEDURE — A4216 STERILE WATER/SALINE, 10 ML: HCPCS | Performed by: INTERNAL MEDICINE

## 2022-01-01 PROCEDURE — 96372 THER/PROPH/DIAG INJ SC/IM: CPT

## 2022-01-01 PROCEDURE — 83690 ASSAY OF LIPASE: CPT

## 2022-01-01 PROCEDURE — 51798 US URINE CAPACITY MEASURE: CPT

## 2022-01-01 PROCEDURE — 99233 SBSQ HOSP IP/OBS HIGH 50: CPT | Performed by: PHYSICIAN ASSISTANT

## 2022-01-01 PROCEDURE — 96360 HYDRATION IV INFUSION INIT: CPT

## 2022-01-01 PROCEDURE — 97535 SELF CARE MNGMENT TRAINING: CPT

## 2022-01-01 PROCEDURE — 83880 ASSAY OF NATRIURETIC PEPTIDE: CPT

## 2022-01-01 PROCEDURE — 2500000003 HC RX 250 WO HCPCS: Performed by: PHYSICIAN ASSISTANT

## 2022-01-01 PROCEDURE — 2500000003 HC RX 250 WO HCPCS: Performed by: INTERNAL MEDICINE

## 2022-01-01 PROCEDURE — 99315 NF DSCHRG MGMT 30 MIN/LESS: CPT | Performed by: INTERNAL MEDICINE

## 2022-01-01 PROCEDURE — 80162 ASSAY OF DIGOXIN TOTAL: CPT

## 2022-01-01 PROCEDURE — 2580000003 HC RX 258: Performed by: EMERGENCY MEDICINE

## 2022-01-01 PROCEDURE — 6360000004 HC RX CONTRAST MEDICATION: Performed by: EMERGENCY MEDICINE

## 2022-01-01 PROCEDURE — 81001 URINALYSIS AUTO W/SCOPE: CPT

## 2022-01-01 PROCEDURE — 6370000000 HC RX 637 (ALT 250 FOR IP): Performed by: EMERGENCY MEDICINE

## 2022-01-01 PROCEDURE — 99238 HOSP IP/OBS DSCHRG MGMT 30/<: CPT | Performed by: INTERNAL MEDICINE

## 2022-01-01 PROCEDURE — 2500000003 HC RX 250 WO HCPCS

## 2022-01-01 RX ORDER — LEVOFLOXACIN 500 MG/1
500 TABLET, FILM COATED ORAL DAILY
Qty: 10 TABLET | Refills: 0 | Status: SHIPPED | OUTPATIENT
Start: 2022-01-01 | End: 2022-01-01

## 2022-01-01 RX ORDER — METOPROLOL TARTRATE 5 MG/5ML
5 INJECTION INTRAVENOUS ONCE
Status: COMPLETED | OUTPATIENT
Start: 2022-01-01 | End: 2022-01-01

## 2022-01-01 RX ORDER — POTASSIUM CHLORIDE 750 MG/1
10 CAPSULE, EXTENDED RELEASE ORAL DAILY
Status: DISCONTINUED | OUTPATIENT
Start: 2022-01-01 | End: 2022-01-01

## 2022-01-01 RX ORDER — LANOLIN ALCOHOL/MO/W.PET/CERES
400 CREAM (GRAM) TOPICAL DAILY
Status: CANCELLED | OUTPATIENT
Start: 2022-01-01

## 2022-01-01 RX ORDER — METOPROLOL TARTRATE 5 MG/5ML
INJECTION INTRAVENOUS
Status: DISCONTINUED
Start: 2022-01-01 | End: 2022-01-01 | Stop reason: HOSPADM

## 2022-01-01 RX ORDER — METOPROLOL TARTRATE 50 MG/1
50 TABLET, FILM COATED ORAL 2 TIMES DAILY
Status: CANCELLED | OUTPATIENT
Start: 2022-01-01

## 2022-01-01 RX ORDER — ASPIRIN 81 MG/1
81 TABLET ORAL DAILY
Status: CANCELLED | OUTPATIENT
Start: 2022-01-01

## 2022-01-01 RX ORDER — ONDANSETRON 4 MG/1
4 TABLET, ORALLY DISINTEGRATING ORAL EVERY 8 HOURS PRN
Status: DISCONTINUED | OUTPATIENT
Start: 2022-01-01 | End: 2022-01-01 | Stop reason: HOSPADM

## 2022-01-01 RX ORDER — GABAPENTIN 300 MG/1
300 CAPSULE ORAL 3 TIMES DAILY
Qty: 42 CAPSULE | Refills: 0 | Status: ON HOLD | OUTPATIENT
Start: 2022-01-01 | End: 2022-01-01

## 2022-01-01 RX ORDER — FINASTERIDE 5 MG/1
5 TABLET, FILM COATED ORAL DAILY
Status: DISCONTINUED | OUTPATIENT
Start: 2022-01-01 | End: 2022-01-01 | Stop reason: HOSPADM

## 2022-01-01 RX ORDER — PANTOPRAZOLE SODIUM 40 MG/1
40 TABLET, DELAYED RELEASE ORAL DAILY
Qty: 30 TABLET | Refills: 3 | Status: SHIPPED | OUTPATIENT
Start: 2022-01-01

## 2022-01-01 RX ORDER — SODIUM CHLORIDE 9 MG/ML
INJECTION, SOLUTION INTRAVENOUS CONTINUOUS
Status: DISCONTINUED | OUTPATIENT
Start: 2022-01-01 | End: 2022-01-01 | Stop reason: ALTCHOICE

## 2022-01-01 RX ORDER — VALACYCLOVIR HYDROCHLORIDE 500 MG/1
1000 TABLET, FILM COATED ORAL 3 TIMES DAILY
Status: DISCONTINUED | OUTPATIENT
Start: 2022-01-01 | End: 2022-01-01

## 2022-01-01 RX ORDER — ATORVASTATIN CALCIUM 20 MG/1
20 TABLET, FILM COATED ORAL NIGHTLY
Status: DISCONTINUED | OUTPATIENT
Start: 2022-01-01 | End: 2022-01-01 | Stop reason: HOSPADM

## 2022-01-01 RX ORDER — PANTOPRAZOLE SODIUM 40 MG/1
40 TABLET, DELAYED RELEASE ORAL DAILY
Qty: 30 TABLET | Refills: 3 | Status: ON HOLD | OUTPATIENT
Start: 2022-01-01 | End: 2022-01-01

## 2022-01-01 RX ORDER — 0.9 % SODIUM CHLORIDE 0.9 %
250 INTRAVENOUS SOLUTION INTRAVENOUS ONCE
Status: COMPLETED | OUTPATIENT
Start: 2022-01-01 | End: 2022-01-01

## 2022-01-01 RX ORDER — MAGNESIUM SULFATE 1 G/100ML
1000 INJECTION INTRAVENOUS ONCE
Status: COMPLETED | OUTPATIENT
Start: 2022-01-01 | End: 2022-01-01

## 2022-01-01 RX ORDER — LANOLIN ALCOHOL/MO/W.PET/CERES
400 CREAM (GRAM) TOPICAL DAILY
Status: DISCONTINUED | OUTPATIENT
Start: 2022-01-01 | End: 2022-01-01

## 2022-01-01 RX ORDER — PANTOPRAZOLE SODIUM 40 MG/1
40 TABLET, DELAYED RELEASE ORAL DAILY
Status: DISCONTINUED | OUTPATIENT
Start: 2022-01-01 | End: 2022-01-01

## 2022-01-01 RX ORDER — TAMSULOSIN HYDROCHLORIDE 0.4 MG/1
0.4 CAPSULE ORAL DAILY
Status: CANCELLED | OUTPATIENT
Start: 2022-01-01

## 2022-01-01 RX ORDER — DILTIAZEM HYDROCHLORIDE 120 MG/1
120 CAPSULE, COATED, EXTENDED RELEASE ORAL DAILY
Status: DISCONTINUED | OUTPATIENT
Start: 2022-01-01 | End: 2022-01-01 | Stop reason: HOSPADM

## 2022-01-01 RX ORDER — METOPROLOL TARTRATE 5 MG/5ML
5 INJECTION INTRAVENOUS EVERY 6 HOURS
Status: DISCONTINUED | OUTPATIENT
Start: 2022-01-01 | End: 2022-01-01

## 2022-01-01 RX ORDER — ACETAMINOPHEN 325 MG/1
650 TABLET ORAL EVERY 6 HOURS PRN
Status: DISCONTINUED | OUTPATIENT
Start: 2022-01-01 | End: 2022-01-01 | Stop reason: HOSPADM

## 2022-01-01 RX ORDER — POLYETHYLENE GLYCOL 3350 17 G/17G
17 POWDER, FOR SOLUTION ORAL DAILY PRN
Status: CANCELLED | OUTPATIENT
Start: 2022-01-01

## 2022-01-01 RX ORDER — POTASSIUM CHLORIDE 7.45 MG/ML
40 INJECTION INTRAVENOUS ONCE
Status: DISCONTINUED | OUTPATIENT
Start: 2022-01-01 | End: 2022-01-01

## 2022-01-01 RX ORDER — ONDANSETRON 2 MG/ML
4 INJECTION INTRAMUSCULAR; INTRAVENOUS EVERY 6 HOURS PRN
Status: DISCONTINUED | OUTPATIENT
Start: 2022-01-01 | End: 2022-01-01 | Stop reason: HOSPADM

## 2022-01-01 RX ORDER — SERTRALINE HYDROCHLORIDE 25 MG/1
25 TABLET, FILM COATED ORAL DAILY
Qty: 30 TABLET | Refills: 3 | Status: SHIPPED | OUTPATIENT
Start: 2022-01-01

## 2022-01-01 RX ORDER — OXYMETAZOLINE HYDROCHLORIDE 0.05 G/100ML
2 SPRAY NASAL ONCE
Status: DISCONTINUED | OUTPATIENT
Start: 2022-01-01 | End: 2022-01-01

## 2022-01-01 RX ORDER — SODIUM CHLORIDE 9 MG/ML
INJECTION, SOLUTION INTRAVENOUS CONTINUOUS
Status: CANCELLED | OUTPATIENT
Start: 2022-01-01

## 2022-01-01 RX ORDER — CYANOCOBALAMIN 1000 UG/ML
1000 INJECTION INTRAMUSCULAR; SUBCUTANEOUS ONCE
Status: COMPLETED | OUTPATIENT
Start: 2022-01-01 | End: 2022-01-01

## 2022-01-01 RX ORDER — TAMSULOSIN HYDROCHLORIDE 0.4 MG/1
0.4 CAPSULE ORAL DAILY
Status: DISCONTINUED | OUTPATIENT
Start: 2022-01-01 | End: 2022-01-01 | Stop reason: HOSPADM

## 2022-01-01 RX ORDER — ACETAMINOPHEN 325 MG/1
650 TABLET ORAL EVERY 6 HOURS PRN
Status: CANCELLED | OUTPATIENT
Start: 2022-01-01

## 2022-01-01 RX ORDER — ASPIRIN 81 MG/1
81 TABLET, CHEWABLE ORAL DAILY
Status: DISCONTINUED | OUTPATIENT
Start: 2022-01-01 | End: 2022-01-01 | Stop reason: HOSPADM

## 2022-01-01 RX ORDER — MEMANTINE HYDROCHLORIDE 5 MG/1
5 TABLET ORAL 2 TIMES DAILY
Status: DISCONTINUED | OUTPATIENT
Start: 2022-01-01 | End: 2022-01-01 | Stop reason: HOSPADM

## 2022-01-01 RX ORDER — METOPROLOL TARTRATE 50 MG/1
50 TABLET, FILM COATED ORAL 2 TIMES DAILY
Status: DISCONTINUED | OUTPATIENT
Start: 2022-01-01 | End: 2022-01-01

## 2022-01-01 RX ORDER — LANOLIN ALCOHOL/MO/W.PET/CERES
400 CREAM (GRAM) TOPICAL 2 TIMES DAILY
Status: DISCONTINUED | OUTPATIENT
Start: 2022-01-01 | End: 2022-01-01 | Stop reason: HOSPADM

## 2022-01-01 RX ORDER — DILTIAZEM HCL IN NACL,ISO-OSM 125 MG/125
5 PLASTIC BAG, INJECTION (ML) INTRAVENOUS CONTINUOUS
Status: DISCONTINUED | OUTPATIENT
Start: 2022-01-01 | End: 2022-01-01 | Stop reason: HOSPADM

## 2022-01-01 RX ORDER — SODIUM CHLORIDE 9 MG/ML
1000 INJECTION, SOLUTION INTRAVENOUS CONTINUOUS
Status: DISCONTINUED | OUTPATIENT
Start: 2022-01-01 | End: 2022-01-01 | Stop reason: ALTCHOICE

## 2022-01-01 RX ORDER — DILTIAZEM HYDROCHLORIDE 120 MG/1
120 CAPSULE, COATED, EXTENDED RELEASE ORAL DAILY
Status: CANCELLED | OUTPATIENT
Start: 2022-01-01

## 2022-01-01 RX ORDER — LANOLIN ALCOHOL/MO/W.PET/CERES
400 CREAM (GRAM) TOPICAL 2 TIMES DAILY
Qty: 30 TABLET | Refills: 0 | Status: SHIPPED | OUTPATIENT
Start: 2022-01-01 | End: 2022-01-01

## 2022-01-01 RX ORDER — LORAZEPAM 2 MG/ML
INJECTION INTRAMUSCULAR
Status: COMPLETED
Start: 2022-01-01 | End: 2022-01-01

## 2022-01-01 RX ORDER — ACETAMINOPHEN 650 MG/1
650 SUPPOSITORY RECTAL EVERY 6 HOURS PRN
Status: DISCONTINUED | OUTPATIENT
Start: 2022-01-01 | End: 2022-01-01 | Stop reason: HOSPADM

## 2022-01-01 RX ORDER — CETIRIZINE HYDROCHLORIDE 10 MG/1
10 TABLET ORAL DAILY
Status: DISCONTINUED | OUTPATIENT
Start: 2022-01-01 | End: 2022-01-01 | Stop reason: HOSPADM

## 2022-01-01 RX ORDER — SODIUM CHLORIDE, SODIUM LACTATE, POTASSIUM CHLORIDE, AND CALCIUM CHLORIDE .6; .31; .03; .02 G/100ML; G/100ML; G/100ML; G/100ML
1000 INJECTION, SOLUTION INTRAVENOUS ONCE
Status: COMPLETED | OUTPATIENT
Start: 2022-01-01 | End: 2022-01-01

## 2022-01-01 RX ORDER — ONDANSETRON 4 MG/1
4 TABLET, ORALLY DISINTEGRATING ORAL EVERY 8 HOURS PRN
Status: CANCELLED | OUTPATIENT
Start: 2022-01-01

## 2022-01-01 RX ORDER — ATORVASTATIN CALCIUM 20 MG/1
20 TABLET, FILM COATED ORAL DAILY
Status: DISCONTINUED | OUTPATIENT
Start: 2022-01-01 | End: 2022-01-01 | Stop reason: HOSPADM

## 2022-01-01 RX ORDER — ONDANSETRON 2 MG/ML
4 INJECTION INTRAMUSCULAR; INTRAVENOUS EVERY 6 HOURS PRN
Status: CANCELLED | OUTPATIENT
Start: 2022-01-01

## 2022-01-01 RX ORDER — MAGNESIUM SULFATE IN WATER 40 MG/ML
2000 INJECTION, SOLUTION INTRAVENOUS ONCE
Status: COMPLETED | OUTPATIENT
Start: 2022-01-01 | End: 2022-01-01

## 2022-01-01 RX ORDER — METOPROLOL TARTRATE 5 MG/5ML
5 INJECTION INTRAVENOUS EVERY 6 HOURS PRN
Status: DISCONTINUED | OUTPATIENT
Start: 2022-01-01 | End: 2022-01-01

## 2022-01-01 RX ORDER — METOPROLOL TARTRATE 50 MG/1
50 TABLET, FILM COATED ORAL 2 TIMES DAILY
Status: DISCONTINUED | OUTPATIENT
Start: 2022-01-01 | End: 2022-01-01 | Stop reason: HOSPADM

## 2022-01-01 RX ORDER — DILTIAZEM HYDROCHLORIDE 60 MG/1
120 TABLET, FILM COATED ORAL DAILY
Status: DISCONTINUED | OUTPATIENT
Start: 2022-01-01 | End: 2022-01-01 | Stop reason: ALTCHOICE

## 2022-01-01 RX ORDER — FINASTERIDE 5 MG/1
5 TABLET, FILM COATED ORAL DAILY
Status: CANCELLED | OUTPATIENT
Start: 2022-01-01

## 2022-01-01 RX ORDER — DIGOXIN 0.25 MG/ML
125 INJECTION INTRAMUSCULAR; INTRAVENOUS DAILY
Status: DISCONTINUED | OUTPATIENT
Start: 2022-01-01 | End: 2022-01-01

## 2022-01-01 RX ORDER — METOPROLOL TARTRATE 100 MG/1
100 TABLET ORAL 2 TIMES DAILY
Qty: 60 TABLET | Refills: 3 | Status: ON HOLD | OUTPATIENT
Start: 2022-01-01 | End: 2022-01-01 | Stop reason: HOSPADM

## 2022-01-01 RX ORDER — LIDOCAINE HYDROCHLORIDE 20 MG/ML
JELLY TOPICAL ONCE
Status: DISCONTINUED | OUTPATIENT
Start: 2022-01-01 | End: 2022-01-01

## 2022-01-01 RX ORDER — DILTIAZEM HYDROCHLORIDE 120 MG/1
120 CAPSULE, COATED, EXTENDED RELEASE ORAL DAILY
Qty: 30 CAPSULE | Refills: 3 | Status: SHIPPED | OUTPATIENT
Start: 2022-01-01

## 2022-01-01 RX ORDER — POLYETHYLENE GLYCOL 3350 17 G/17G
17 POWDER, FOR SOLUTION ORAL DAILY PRN
Status: DISCONTINUED | OUTPATIENT
Start: 2022-01-01 | End: 2022-01-01 | Stop reason: HOSPADM

## 2022-01-01 RX ORDER — 0.9 % SODIUM CHLORIDE 0.9 %
250 INTRAVENOUS SOLUTION INTRAVENOUS ONCE
Status: DISCONTINUED | OUTPATIENT
Start: 2022-01-01 | End: 2022-01-01 | Stop reason: HOSPADM

## 2022-01-01 RX ORDER — SODIUM CHLORIDE 9 MG/ML
1000 INJECTION, SOLUTION INTRAVENOUS CONTINUOUS
Status: CANCELLED | OUTPATIENT
Start: 2022-01-01

## 2022-01-01 RX ORDER — METOPROLOL TARTRATE 50 MG/1
100 TABLET, FILM COATED ORAL 2 TIMES DAILY
Status: DISCONTINUED | OUTPATIENT
Start: 2022-01-01 | End: 2022-01-01 | Stop reason: HOSPADM

## 2022-01-01 RX ORDER — LANOLIN ALCOHOL/MO/W.PET/CERES
400 CREAM (GRAM) TOPICAL 2 TIMES DAILY
Status: DISCONTINUED | OUTPATIENT
Start: 2022-01-01 | End: 2022-01-01

## 2022-01-01 RX ORDER — DILTIAZEM HCL IN NACL,ISO-OSM 125 MG/125
2.5-15 PLASTIC BAG, INJECTION (ML) INTRAVENOUS CONTINUOUS
Status: DISCONTINUED | OUTPATIENT
Start: 2022-01-01 | End: 2022-01-01 | Stop reason: HOSPADM

## 2022-01-01 RX ORDER — MEMANTINE HYDROCHLORIDE 5 MG/1
5 TABLET ORAL 2 TIMES DAILY
Qty: 60 TABLET | Refills: 0 | Status: SHIPPED | OUTPATIENT
Start: 2022-01-01

## 2022-01-01 RX ORDER — POTASSIUM CHLORIDE 750 MG/1
10 TABLET, EXTENDED RELEASE ORAL DAILY
Status: CANCELLED | OUTPATIENT
Start: 2022-01-01

## 2022-01-01 RX ORDER — PANTOPRAZOLE SODIUM 40 MG/1
40 TABLET, DELAYED RELEASE ORAL
Status: CANCELLED | OUTPATIENT
Start: 2022-01-01

## 2022-01-01 RX ORDER — DEXTROSE MONOHYDRATE 50 MG/ML
INJECTION, SOLUTION INTRAVENOUS CONTINUOUS
Status: DISCONTINUED | OUTPATIENT
Start: 2022-01-01 | End: 2022-01-01

## 2022-01-01 RX ORDER — METOPROLOL TARTRATE 5 MG/5ML
INJECTION INTRAVENOUS
Status: COMPLETED
Start: 2022-01-01 | End: 2022-01-01

## 2022-01-01 RX ORDER — LEVOFLOXACIN 5 MG/ML
500 INJECTION, SOLUTION INTRAVENOUS ONCE
Status: COMPLETED | OUTPATIENT
Start: 2022-01-01 | End: 2022-01-01

## 2022-01-01 RX ORDER — POTASSIUM CHLORIDE 750 MG/1
20 CAPSULE, EXTENDED RELEASE ORAL ONCE
Status: COMPLETED | OUTPATIENT
Start: 2022-01-01 | End: 2022-01-01

## 2022-01-01 RX ORDER — PANTOPRAZOLE SODIUM 40 MG/1
40 TABLET, DELAYED RELEASE ORAL DAILY
Status: DISCONTINUED | OUTPATIENT
Start: 2022-01-01 | End: 2022-01-01 | Stop reason: HOSPADM

## 2022-01-01 RX ORDER — ATORVASTATIN CALCIUM 20 MG/1
20 TABLET, FILM COATED ORAL NIGHTLY
Status: CANCELLED | OUTPATIENT
Start: 2022-01-01

## 2022-01-01 RX ORDER — MEGESTROL ACETATE 40 MG/ML
400 SUSPENSION ORAL DAILY
Qty: 300 ML | Refills: 0 | Status: SHIPPED | OUTPATIENT
Start: 2022-01-01

## 2022-01-01 RX ORDER — MEGESTROL ACETATE 40 MG/ML
400 SUSPENSION ORAL DAILY
Status: DISCONTINUED | OUTPATIENT
Start: 2022-01-01 | End: 2022-01-01 | Stop reason: HOSPADM

## 2022-01-01 RX ORDER — CETIRIZINE HYDROCHLORIDE 10 MG/1
10 TABLET ORAL DAILY
Status: CANCELLED | OUTPATIENT
Start: 2022-01-01

## 2022-01-01 RX ORDER — VALACYCLOVIR HYDROCHLORIDE 500 MG/1
1000 TABLET, FILM COATED ORAL 3 TIMES DAILY
Status: CANCELLED | OUTPATIENT
Start: 2022-01-01

## 2022-01-01 RX ORDER — POTASSIUM CHLORIDE 7.45 MG/ML
10 INJECTION INTRAVENOUS
Status: DISCONTINUED | OUTPATIENT
Start: 2022-01-01 | End: 2022-01-01 | Stop reason: HOSPADM

## 2022-01-01 RX ORDER — LORAZEPAM 2 MG/ML
0.5 INJECTION INTRAMUSCULAR ONCE
Status: COMPLETED | OUTPATIENT
Start: 2022-01-01 | End: 2022-01-01

## 2022-01-01 RX ORDER — PANTOPRAZOLE SODIUM 40 MG/1
40 TABLET, DELAYED RELEASE ORAL DAILY
Status: ON HOLD | COMMUNITY
End: 2022-01-01 | Stop reason: SDUPTHER

## 2022-01-01 RX ORDER — LANOLIN ALCOHOL/MO/W.PET/CERES
400 CREAM (GRAM) TOPICAL DAILY
Status: DISCONTINUED | OUTPATIENT
Start: 2022-01-01 | End: 2022-01-01 | Stop reason: HOSPADM

## 2022-01-01 RX ORDER — BUMETANIDE 0.25 MG/ML
1 INJECTION, SOLUTION INTRAMUSCULAR; INTRAVENOUS ONCE
Status: COMPLETED | OUTPATIENT
Start: 2022-01-01 | End: 2022-01-01

## 2022-01-01 RX ORDER — POTASSIUM CHLORIDE 20 MEQ/1
40 TABLET, EXTENDED RELEASE ORAL ONCE
Status: COMPLETED | OUTPATIENT
Start: 2022-01-01 | End: 2022-01-01

## 2022-01-01 RX ORDER — METOPROLOL TARTRATE 50 MG/1
100 TABLET, FILM COATED ORAL ONCE
Status: DISCONTINUED | OUTPATIENT
Start: 2022-01-01 | End: 2022-01-01

## 2022-01-01 RX ORDER — ONDANSETRON 4 MG/1
4 TABLET, ORALLY DISINTEGRATING ORAL EVERY 8 HOURS PRN
Qty: 20 TABLET | Refills: 0 | Status: ON HOLD | OUTPATIENT
Start: 2022-01-01 | End: 2022-01-01

## 2022-01-01 RX ORDER — ASPIRIN 81 MG/1
81 TABLET ORAL DAILY
Status: DISCONTINUED | OUTPATIENT
Start: 2022-01-01 | End: 2022-01-01 | Stop reason: HOSPADM

## 2022-01-01 RX ORDER — LORATADINE 10 MG/1
10 CAPSULE, LIQUID FILLED ORAL 2 TIMES DAILY
COMMUNITY

## 2022-01-01 RX ORDER — GABAPENTIN 300 MG/1
300 CAPSULE ORAL 3 TIMES DAILY
Status: DISCONTINUED | OUTPATIENT
Start: 2022-01-01 | End: 2022-01-01 | Stop reason: HOSPADM

## 2022-01-01 RX ORDER — ONDANSETRON 2 MG/ML
4 INJECTION INTRAMUSCULAR; INTRAVENOUS ONCE
Status: COMPLETED | OUTPATIENT
Start: 2022-01-01 | End: 2022-01-01

## 2022-01-01 RX ORDER — ACETAMINOPHEN 650 MG/1
650 SUPPOSITORY RECTAL EVERY 6 HOURS PRN
Status: CANCELLED | OUTPATIENT
Start: 2022-01-01

## 2022-01-01 RX ORDER — GABAPENTIN 300 MG/1
300 CAPSULE ORAL 3 TIMES DAILY
Status: CANCELLED | OUTPATIENT
Start: 2022-01-01

## 2022-01-01 RX ORDER — POTASSIUM CHLORIDE 750 MG/1
10 CAPSULE, EXTENDED RELEASE ORAL DAILY
Status: DISCONTINUED | OUTPATIENT
Start: 2022-01-01 | End: 2022-01-01 | Stop reason: HOSPADM

## 2022-01-01 RX ORDER — DILTIAZEM HYDROCHLORIDE 5 MG/ML
20 INJECTION INTRAVENOUS ONCE
Status: COMPLETED | OUTPATIENT
Start: 2022-01-01 | End: 2022-01-01

## 2022-01-01 RX ORDER — POTASSIUM CHLORIDE 750 MG/1
TABLET, FILM COATED, EXTENDED RELEASE ORAL
Qty: 90 TABLET | Refills: 1 | Status: SHIPPED | OUTPATIENT
Start: 2022-01-01

## 2022-01-01 RX ORDER — SODIUM CHLORIDE 9 MG/ML
INJECTION, SOLUTION INTRAVENOUS CONTINUOUS
Status: DISCONTINUED | OUTPATIENT
Start: 2022-01-01 | End: 2022-01-01

## 2022-01-01 RX ORDER — POTASSIUM CHLORIDE 750 MG/1
10 TABLET, EXTENDED RELEASE ORAL DAILY
Status: DISCONTINUED | OUTPATIENT
Start: 2022-01-01 | End: 2022-01-01 | Stop reason: HOSPADM

## 2022-01-01 RX ADMIN — ASPIRIN 81 MG: 81 TABLET, COATED ORAL at 09:29

## 2022-01-01 RX ADMIN — APIXABAN 5 MG: 5 TABLET, FILM COATED ORAL at 08:50

## 2022-01-01 RX ADMIN — FINASTERIDE 5 MG: 5 TABLET, FILM COATED ORAL at 08:36

## 2022-01-01 RX ADMIN — CETIRIZINE HYDROCHLORIDE 10 MG: 10 TABLET, FILM COATED ORAL at 08:48

## 2022-01-01 RX ADMIN — ASPIRIN 81 MG: 81 TABLET, COATED ORAL at 08:50

## 2022-01-01 RX ADMIN — PANTOPRAZOLE SODIUM 40 MG: 40 TABLET, DELAYED RELEASE ORAL at 05:46

## 2022-01-01 RX ADMIN — MAGNESIUM SULFATE HEPTAHYDRATE 1000 MG: 1 INJECTION, SOLUTION INTRAVENOUS at 08:56

## 2022-01-01 RX ADMIN — METOPROLOL TARTRATE 50 MG: 50 TABLET, FILM COATED ORAL at 20:34

## 2022-01-01 RX ADMIN — POTASSIUM CHLORIDE 10 MEQ: 750 CAPSULE, EXTENDED RELEASE ORAL at 08:47

## 2022-01-01 RX ADMIN — DILTIAZEM HYDROCHLORIDE 120 MG: 120 CAPSULE, COATED, EXTENDED RELEASE ORAL at 08:35

## 2022-01-01 RX ADMIN — PANTOPRAZOLE SODIUM 40 MG: 40 TABLET, DELAYED RELEASE ORAL at 06:07

## 2022-01-01 RX ADMIN — POTASSIUM CHLORIDE 10 MEQ: 750 TABLET, EXTENDED RELEASE ORAL at 08:35

## 2022-01-01 RX ADMIN — METOPROLOL TARTRATE 50 MG: 50 TABLET, FILM COATED ORAL at 20:02

## 2022-01-01 RX ADMIN — DILTIAZEM HYDROCHLORIDE 30 MG: 60 TABLET, FILM COATED ORAL at 06:20

## 2022-01-01 RX ADMIN — DILTIAZEM HYDROCHLORIDE 120 MG: 120 CAPSULE, COATED, EXTENDED RELEASE ORAL at 08:58

## 2022-01-01 RX ADMIN — LEVOFLOXACIN 500 MG: 5 INJECTION, SOLUTION INTRAVENOUS at 00:29

## 2022-01-01 RX ADMIN — ASPIRIN 81 MG: 81 TABLET, COATED ORAL at 08:35

## 2022-01-01 RX ADMIN — APIXABAN 5 MG: 5 TABLET, FILM COATED ORAL at 08:49

## 2022-01-01 RX ADMIN — ASPIRIN 81 MG: 81 TABLET, COATED ORAL at 08:49

## 2022-01-01 RX ADMIN — TAMSULOSIN HYDROCHLORIDE 0.4 MG: 0.4 CAPSULE ORAL at 08:56

## 2022-01-01 RX ADMIN — ATORVASTATIN CALCIUM 20 MG: 20 TABLET, FILM COATED ORAL at 20:22

## 2022-01-01 RX ADMIN — CEFTRIAXONE 1000 MG: 1 INJECTION, POWDER, FOR SOLUTION INTRAMUSCULAR; INTRAVENOUS at 13:07

## 2022-01-01 RX ADMIN — CEFTRIAXONE 1000 MG: 1 INJECTION, POWDER, FOR SOLUTION INTRAMUSCULAR; INTRAVENOUS at 10:23

## 2022-01-01 RX ADMIN — FINASTERIDE 5 MG: 5 TABLET, FILM COATED ORAL at 08:56

## 2022-01-01 RX ADMIN — BUMETANIDE 1 MG: 0.25 INJECTION INTRAMUSCULAR; INTRAVENOUS at 16:51

## 2022-01-01 RX ADMIN — CETIRIZINE HYDROCHLORIDE 10 MG: 10 TABLET, FILM COATED ORAL at 08:11

## 2022-01-01 RX ADMIN — ATORVASTATIN CALCIUM 20 MG: 20 TABLET, FILM COATED ORAL at 08:46

## 2022-01-01 RX ADMIN — LORAZEPAM 0.5 MG: 2 INJECTION INTRAMUSCULAR; INTRAVENOUS at 22:25

## 2022-01-01 RX ADMIN — SERTRALINE HYDROCHLORIDE 25 MG: 50 TABLET ORAL at 14:15

## 2022-01-01 RX ADMIN — TAMSULOSIN HYDROCHLORIDE 0.4 MG: 0.4 CAPSULE ORAL at 08:48

## 2022-01-01 RX ADMIN — METOPROLOL TARTRATE 50 MG: 50 TABLET, FILM COATED ORAL at 08:36

## 2022-01-01 RX ADMIN — VALACYCLOVIR HYDROCHLORIDE 1000 MG: 500 TABLET, FILM COATED ORAL at 13:19

## 2022-01-01 RX ADMIN — APIXABAN 5 MG: 5 TABLET, FILM COATED ORAL at 21:31

## 2022-01-01 RX ADMIN — DILTIAZEM HYDROCHLORIDE 30 MG: 60 TABLET, FILM COATED ORAL at 11:40

## 2022-01-01 RX ADMIN — FINASTERIDE 5 MG: 5 TABLET, FILM COATED ORAL at 08:49

## 2022-01-01 RX ADMIN — POTASSIUM CHLORIDE 10 MEQ: 10 INJECTION, SOLUTION INTRAVENOUS at 15:40

## 2022-01-01 RX ADMIN — VALACYCLOVIR HYDROCHLORIDE 1000 MG: 500 TABLET, FILM COATED ORAL at 20:34

## 2022-01-01 RX ADMIN — ONDANSETRON 4 MG: 2 INJECTION INTRAMUSCULAR; INTRAVENOUS at 15:55

## 2022-01-01 RX ADMIN — TAMSULOSIN HYDROCHLORIDE 0.4 MG: 0.4 CAPSULE ORAL at 08:21

## 2022-01-01 RX ADMIN — POTASSIUM CHLORIDE 10 MEQ: 750 TABLET, EXTENDED RELEASE ORAL at 09:29

## 2022-01-01 RX ADMIN — MAGNESIUM SULFATE HEPTAHYDRATE 2000 MG: 40 INJECTION, SOLUTION INTRAVENOUS at 10:24

## 2022-01-01 RX ADMIN — METOPROLOL TARTRATE 100 MG: 50 TABLET, FILM COATED ORAL at 08:11

## 2022-01-01 RX ADMIN — FINASTERIDE 5 MG: 5 TABLET, FILM COATED ORAL at 08:22

## 2022-01-01 RX ADMIN — GABAPENTIN 300 MG: 300 CAPSULE ORAL at 08:56

## 2022-01-01 RX ADMIN — Medication 400 MG: at 08:50

## 2022-01-01 RX ADMIN — Medication 400 MG: at 08:36

## 2022-01-01 RX ADMIN — GABAPENTIN 300 MG: 300 CAPSULE ORAL at 20:03

## 2022-01-01 RX ADMIN — APIXABAN 5 MG: 5 TABLET, FILM COATED ORAL at 20:22

## 2022-01-01 RX ADMIN — CETIRIZINE HYDROCHLORIDE 10 MG: 10 TABLET, FILM COATED ORAL at 08:23

## 2022-01-01 RX ADMIN — ASPIRIN 81 MG: 81 TABLET, COATED ORAL at 08:38

## 2022-01-01 RX ADMIN — GABAPENTIN 300 MG: 300 CAPSULE ORAL at 08:35

## 2022-01-01 RX ADMIN — GABAPENTIN 300 MG: 300 CAPSULE ORAL at 13:52

## 2022-01-01 RX ADMIN — Medication 10 MG/HR: at 14:50

## 2022-01-01 RX ADMIN — Medication 400 MG: at 08:11

## 2022-01-01 RX ADMIN — DILTIAZEM HYDROCHLORIDE 30 MG: 60 TABLET, FILM COATED ORAL at 05:46

## 2022-01-01 RX ADMIN — Medication 5 MG/HR: at 13:36

## 2022-01-01 RX ADMIN — CEFTRIAXONE 1000 MG: 1 INJECTION, POWDER, FOR SOLUTION INTRAMUSCULAR; INTRAVENOUS at 13:38

## 2022-01-01 RX ADMIN — APIXABAN 5 MG: 5 TABLET, FILM COATED ORAL at 20:33

## 2022-01-01 RX ADMIN — DILTIAZEM HYDROCHLORIDE 120 MG: 120 CAPSULE, COATED, EXTENDED RELEASE ORAL at 08:38

## 2022-01-01 RX ADMIN — MEMANTINE 5 MG: 5 TABLET ORAL at 20:22

## 2022-01-01 RX ADMIN — GABAPENTIN 300 MG: 300 CAPSULE ORAL at 20:33

## 2022-01-01 RX ADMIN — APIXABAN 5 MG: 5 TABLET, FILM COATED ORAL at 08:47

## 2022-01-01 RX ADMIN — GABAPENTIN 300 MG: 300 CAPSULE ORAL at 14:00

## 2022-01-01 RX ADMIN — MAGNESIUM SULFATE 1000 MG: 1 INJECTION INTRAVENOUS at 18:02

## 2022-01-01 RX ADMIN — ASPIRIN 81 MG 81 MG: 81 TABLET ORAL at 08:12

## 2022-01-01 RX ADMIN — VALACYCLOVIR HYDROCHLORIDE 1000 MG: 500 TABLET, FILM COATED ORAL at 20:21

## 2022-01-01 RX ADMIN — GABAPENTIN 300 MG: 300 CAPSULE ORAL at 08:27

## 2022-01-01 RX ADMIN — ATORVASTATIN CALCIUM 20 MG: 20 TABLET, FILM COATED ORAL at 20:02

## 2022-01-01 RX ADMIN — GABAPENTIN 300 MG: 300 CAPSULE ORAL at 08:50

## 2022-01-01 RX ADMIN — APIXABAN 5 MG: 5 TABLET, FILM COATED ORAL at 08:36

## 2022-01-01 RX ADMIN — TAMSULOSIN HYDROCHLORIDE 0.4 MG: 0.4 CAPSULE ORAL at 08:36

## 2022-01-01 RX ADMIN — MEGESTROL ACETATE 400 MG: 40 SUSPENSION ORAL at 08:56

## 2022-01-01 RX ADMIN — METOPROLOL TARTRATE 50 MG: 50 TABLET, FILM COATED ORAL at 20:22

## 2022-01-01 RX ADMIN — METOPROLOL TARTRATE 50 MG: 50 TABLET, FILM COATED ORAL at 08:38

## 2022-01-01 RX ADMIN — CYANOCOBALAMIN 1000 MCG: 1000 INJECTION INTRAMUSCULAR; SUBCUTANEOUS at 08:57

## 2022-01-01 RX ADMIN — PANTOPRAZOLE SODIUM 40 MG: 40 TABLET, DELAYED RELEASE ORAL at 08:56

## 2022-01-01 RX ADMIN — ATORVASTATIN CALCIUM 20 MG: 20 TABLET, FILM COATED ORAL at 08:11

## 2022-01-01 RX ADMIN — PIPERACILLIN AND TAZOBACTAM 3375 MG: 3; .375 INJECTION, POWDER, FOR SOLUTION INTRAVENOUS at 08:23

## 2022-01-01 RX ADMIN — SODIUM CHLORIDE, POTASSIUM CHLORIDE, SODIUM LACTATE AND CALCIUM CHLORIDE 1000 ML: 600; 310; 30; 20 INJECTION, SOLUTION INTRAVENOUS at 16:00

## 2022-01-01 RX ADMIN — TAMSULOSIN HYDROCHLORIDE 0.4 MG: 0.4 CAPSULE ORAL at 08:55

## 2022-01-01 RX ADMIN — PANTOPRAZOLE SODIUM 40 MG: 40 TABLET, DELAYED RELEASE ORAL at 06:20

## 2022-01-01 RX ADMIN — METOPROLOL TARTRATE 50 MG: 50 TABLET, FILM COATED ORAL at 23:04

## 2022-01-01 RX ADMIN — APIXABAN 5 MG: 5 TABLET, FILM COATED ORAL at 21:38

## 2022-01-01 RX ADMIN — Medication 400 MG: at 13:08

## 2022-01-01 RX ADMIN — APIXABAN 5 MG: 5 TABLET, FILM COATED ORAL at 08:56

## 2022-01-01 RX ADMIN — DILTIAZEM HYDROCHLORIDE 30 MG: 60 TABLET, FILM COATED ORAL at 23:18

## 2022-01-01 RX ADMIN — DIGOXIN 125 MCG: 0.25 INJECTION INTRAMUSCULAR; INTRAVENOUS at 08:49

## 2022-01-01 RX ADMIN — METOPROLOL TARTRATE 50 MG: 50 TABLET, FILM COATED ORAL at 10:05

## 2022-01-01 RX ADMIN — PANTOPRAZOLE SODIUM 40 MG: 40 TABLET, DELAYED RELEASE ORAL at 08:46

## 2022-01-01 RX ADMIN — Medication 400 MG: at 20:02

## 2022-01-01 RX ADMIN — GABAPENTIN 300 MG: 300 CAPSULE ORAL at 20:22

## 2022-01-01 RX ADMIN — DILTIAZEM HYDROCHLORIDE 120 MG: 120 CAPSULE, COATED, EXTENDED RELEASE ORAL at 08:12

## 2022-01-01 RX ADMIN — APIXABAN 5 MG: 5 TABLET, FILM COATED ORAL at 08:38

## 2022-01-01 RX ADMIN — Medication 400 MG: at 08:57

## 2022-01-01 RX ADMIN — GABAPENTIN 300 MG: 300 CAPSULE ORAL at 08:36

## 2022-01-01 RX ADMIN — ATORVASTATIN CALCIUM 20 MG: 20 TABLET, FILM COATED ORAL at 08:56

## 2022-01-01 RX ADMIN — FINASTERIDE 5 MG: 5 TABLET, FILM COATED ORAL at 08:11

## 2022-01-01 RX ADMIN — TAMSULOSIN HYDROCHLORIDE 0.4 MG: 0.4 CAPSULE ORAL at 08:50

## 2022-01-01 RX ADMIN — APIXABAN 5 MG: 5 TABLET, FILM COATED ORAL at 20:34

## 2022-01-01 RX ADMIN — TAMSULOSIN HYDROCHLORIDE 0.4 MG: 0.4 CAPSULE ORAL at 08:47

## 2022-01-01 RX ADMIN — POTASSIUM CHLORIDE 10 MEQ: 750 TABLET, EXTENDED RELEASE ORAL at 08:55

## 2022-01-01 RX ADMIN — ONDANSETRON 4 MG: 2 INJECTION INTRAMUSCULAR; INTRAVENOUS at 05:54

## 2022-01-01 RX ADMIN — TAMSULOSIN HYDROCHLORIDE 0.4 MG: 0.4 CAPSULE ORAL at 09:29

## 2022-01-01 RX ADMIN — POTASSIUM CHLORIDE 20 MEQ: 10 CAPSULE, COATED, EXTENDED RELEASE ORAL at 09:09

## 2022-01-01 RX ADMIN — DILTIAZEM HYDROCHLORIDE 30 MG: 60 TABLET, FILM COATED ORAL at 00:16

## 2022-01-01 RX ADMIN — METOPROLOL TARTRATE 5 MG: 5 INJECTION INTRAVENOUS at 09:25

## 2022-01-01 RX ADMIN — GABAPENTIN 300 MG: 300 CAPSULE ORAL at 20:02

## 2022-01-01 RX ADMIN — GABAPENTIN 300 MG: 300 CAPSULE ORAL at 12:55

## 2022-01-01 RX ADMIN — CEFTRIAXONE 1000 MG: 1 INJECTION, POWDER, FOR SOLUTION INTRAMUSCULAR; INTRAVENOUS at 13:23

## 2022-01-01 RX ADMIN — POTASSIUM CHLORIDE 10 MEQ: 750 CAPSULE, EXTENDED RELEASE ORAL at 08:56

## 2022-01-01 RX ADMIN — VALACYCLOVIR HYDROCHLORIDE 1000 MG: 500 TABLET, FILM COATED ORAL at 08:21

## 2022-01-01 RX ADMIN — LORAZEPAM 0.5 MG: 2 INJECTION INTRAMUSCULAR; INTRAVENOUS at 22:45

## 2022-01-01 RX ADMIN — APIXABAN 5 MG: 5 TABLET, FILM COATED ORAL at 21:32

## 2022-01-01 RX ADMIN — ASPIRIN 81 MG: 81 TABLET, COATED ORAL at 08:22

## 2022-01-01 RX ADMIN — POTASSIUM CHLORIDE 40 MEQ: 1500 TABLET, EXTENDED RELEASE ORAL at 00:30

## 2022-01-01 RX ADMIN — POTASSIUM CHLORIDE 10 MEQ: 10 CAPSULE, COATED, EXTENDED RELEASE ORAL at 08:12

## 2022-01-01 RX ADMIN — Medication 400 MG: at 10:23

## 2022-01-01 RX ADMIN — METOPROLOL TARTRATE 5 MG: 1 INJECTION, SOLUTION INTRAVENOUS at 09:25

## 2022-01-01 RX ADMIN — ATORVASTATIN CALCIUM 20 MG: 20 TABLET, FILM COATED ORAL at 20:33

## 2022-01-01 RX ADMIN — CETIRIZINE HYDROCHLORIDE 10 MG: 10 TABLET, FILM COATED ORAL at 08:50

## 2022-01-01 RX ADMIN — ATORVASTATIN CALCIUM 20 MG: 20 TABLET, FILM COATED ORAL at 21:38

## 2022-01-01 RX ADMIN — METOPROLOL TARTRATE 100 MG: 50 TABLET, FILM COATED ORAL at 21:32

## 2022-01-01 RX ADMIN — Medication 400 MG: at 09:29

## 2022-01-01 RX ADMIN — APIXABAN 5 MG: 5 TABLET, FILM COATED ORAL at 08:57

## 2022-01-01 RX ADMIN — CEFTRIAXONE 1000 MG: 1 INJECTION, POWDER, FOR SOLUTION INTRAMUSCULAR; INTRAVENOUS at 11:39

## 2022-01-01 RX ADMIN — DILTIAZEM HYDROCHLORIDE 120 MG: 120 CAPSULE, COATED, EXTENDED RELEASE ORAL at 08:47

## 2022-01-01 RX ADMIN — GABAPENTIN 300 MG: 300 CAPSULE ORAL at 13:32

## 2022-01-01 RX ADMIN — PIPERACILLIN AND TAZOBACTAM 3375 MG: 3; .375 INJECTION, POWDER, FOR SOLUTION INTRAVENOUS at 00:16

## 2022-01-01 RX ADMIN — ONDANSETRON 4 MG: 2 INJECTION INTRAMUSCULAR; INTRAVENOUS at 09:06

## 2022-01-01 RX ADMIN — GABAPENTIN 300 MG: 300 CAPSULE ORAL at 20:35

## 2022-01-01 RX ADMIN — ATORVASTATIN CALCIUM 20 MG: 20 TABLET, FILM COATED ORAL at 20:03

## 2022-01-01 RX ADMIN — PANTOPRAZOLE SODIUM 40 MG: 40 TABLET, DELAYED RELEASE ORAL at 09:29

## 2022-01-01 RX ADMIN — ASPIRIN 81 MG: 81 TABLET, COATED ORAL at 08:57

## 2022-01-01 RX ADMIN — SODIUM CHLORIDE: 9 INJECTION, SOLUTION INTRAVENOUS at 03:33

## 2022-01-01 RX ADMIN — POTASSIUM CHLORIDE 20 MEQ: 750 CAPSULE, EXTENDED RELEASE ORAL at 09:29

## 2022-01-01 RX ADMIN — Medication 400 MG: at 21:32

## 2022-01-01 RX ADMIN — PANTOPRAZOLE SODIUM 40 MG: 40 TABLET, DELAYED RELEASE ORAL at 08:57

## 2022-01-01 RX ADMIN — SODIUM CHLORIDE, POTASSIUM CHLORIDE, SODIUM LACTATE AND CALCIUM CHLORIDE 1000 ML: 600; 310; 30; 20 INJECTION, SOLUTION INTRAVENOUS at 17:11

## 2022-01-01 RX ADMIN — FINASTERIDE 5 MG: 5 TABLET, FILM COATED ORAL at 08:38

## 2022-01-01 RX ADMIN — MEMANTINE 5 MG: 5 TABLET ORAL at 08:46

## 2022-01-01 RX ADMIN — VALACYCLOVIR HYDROCHLORIDE 1000 MG: 500 TABLET, FILM COATED ORAL at 21:32

## 2022-01-01 RX ADMIN — ATORVASTATIN CALCIUM 20 MG: 20 TABLET, FILM COATED ORAL at 21:31

## 2022-01-01 RX ADMIN — PANTOPRAZOLE SODIUM 40 MG: 40 TABLET, DELAYED RELEASE ORAL at 06:00

## 2022-01-01 RX ADMIN — CETIRIZINE HYDROCHLORIDE 10 MG: 10 TABLET, FILM COATED ORAL at 08:56

## 2022-01-01 RX ADMIN — DILTIAZEM HYDROCHLORIDE 120 MG: 120 CAPSULE, COATED, EXTENDED RELEASE ORAL at 09:30

## 2022-01-01 RX ADMIN — Medication 400 MG: at 23:04

## 2022-01-01 RX ADMIN — ENOXAPARIN SODIUM 80 MG: 100 INJECTION SUBCUTANEOUS at 13:44

## 2022-01-01 RX ADMIN — DILTIAZEM HYDROCHLORIDE 30 MG: 60 TABLET, FILM COATED ORAL at 17:30

## 2022-01-01 RX ADMIN — TAMSULOSIN HYDROCHLORIDE 0.4 MG: 0.4 CAPSULE ORAL at 08:35

## 2022-01-01 RX ADMIN — CEFTRIAXONE 1000 MG: 1 INJECTION, POWDER, FOR SOLUTION INTRAMUSCULAR; INTRAVENOUS at 13:56

## 2022-01-01 RX ADMIN — GABAPENTIN 300 MG: 300 CAPSULE ORAL at 13:51

## 2022-01-01 RX ADMIN — ATORVASTATIN CALCIUM 20 MG: 20 TABLET, FILM COATED ORAL at 22:54

## 2022-01-01 RX ADMIN — FINASTERIDE 5 MG: 5 TABLET, FILM COATED ORAL at 08:57

## 2022-01-01 RX ADMIN — CETIRIZINE HYDROCHLORIDE 10 MG: 10 TABLET, FILM COATED ORAL at 08:57

## 2022-01-01 RX ADMIN — APIXABAN 5 MG: 5 TABLET, FILM COATED ORAL at 08:22

## 2022-01-01 RX ADMIN — METOPROLOL TARTRATE 50 MG: 50 TABLET, FILM COATED ORAL at 20:03

## 2022-01-01 RX ADMIN — DILTIAZEM HYDROCHLORIDE 120 MG: 120 CAPSULE, COATED, EXTENDED RELEASE ORAL at 09:29

## 2022-01-01 RX ADMIN — DILTIAZEM HYDROCHLORIDE 30 MG: 60 TABLET, FILM COATED ORAL at 06:07

## 2022-01-01 RX ADMIN — GABAPENTIN 300 MG: 300 CAPSULE ORAL at 13:08

## 2022-01-01 RX ADMIN — CETIRIZINE HYDROCHLORIDE 10 MG: 10 TABLET, FILM COATED ORAL at 08:38

## 2022-01-01 RX ADMIN — DILTIAZEM HYDROCHLORIDE 20 MG: 5 INJECTION INTRAVENOUS at 23:05

## 2022-01-01 RX ADMIN — POTASSIUM CHLORIDE 10 MEQ: 1500 TABLET, EXTENDED RELEASE ORAL at 08:35

## 2022-01-01 RX ADMIN — MEMANTINE 5 MG: 5 TABLET ORAL at 21:01

## 2022-01-01 RX ADMIN — DILTIAZEM HYDROCHLORIDE 120 MG: 120 CAPSULE, COATED, EXTENDED RELEASE ORAL at 08:56

## 2022-01-01 RX ADMIN — APIXABAN 5 MG: 5 TABLET, FILM COATED ORAL at 20:21

## 2022-01-01 RX ADMIN — GABAPENTIN 300 MG: 300 CAPSULE ORAL at 09:30

## 2022-01-01 RX ADMIN — METOPROLOL TARTRATE 50 MG: 50 TABLET, FILM COATED ORAL at 21:32

## 2022-01-01 RX ADMIN — ASPIRIN 81 MG 81 MG: 81 TABLET ORAL at 08:46

## 2022-01-01 RX ADMIN — METOPROLOL TARTRATE 5 MG: 1 INJECTION, SOLUTION INTRAVENOUS at 06:24

## 2022-01-01 RX ADMIN — MEMANTINE 5 MG: 5 TABLET ORAL at 08:57

## 2022-01-01 RX ADMIN — PANTOPRAZOLE SODIUM 40 MG: 40 TABLET, DELAYED RELEASE ORAL at 05:29

## 2022-01-01 RX ADMIN — APIXABAN 5 MG: 5 TABLET, FILM COATED ORAL at 20:03

## 2022-01-01 RX ADMIN — PANTOPRAZOLE SODIUM 40 MG: 40 TABLET, DELAYED RELEASE ORAL at 08:11

## 2022-01-01 RX ADMIN — SODIUM CHLORIDE 40 MG: 9 INJECTION INTRAMUSCULAR; INTRAVENOUS; SUBCUTANEOUS at 13:44

## 2022-01-01 RX ADMIN — CETIRIZINE HYDROCHLORIDE 10 MG: 10 TABLET, FILM COATED ORAL at 08:46

## 2022-01-01 RX ADMIN — DIGOXIN 125 MCG: 0.25 INJECTION INTRAMUSCULAR; INTRAVENOUS at 08:36

## 2022-01-01 RX ADMIN — TAMSULOSIN HYDROCHLORIDE 0.4 MG: 0.4 CAPSULE ORAL at 08:38

## 2022-01-01 RX ADMIN — PANTOPRAZOLE SODIUM 40 MG: 40 TABLET, DELAYED RELEASE ORAL at 21:32

## 2022-01-01 RX ADMIN — CETIRIZINE HYDROCHLORIDE 10 MG: 10 TABLET, FILM COATED ORAL at 08:39

## 2022-01-01 RX ADMIN — VALACYCLOVIR HYDROCHLORIDE 1000 MG: 500 TABLET, FILM COATED ORAL at 13:37

## 2022-01-01 RX ADMIN — DILTIAZEM HYDROCHLORIDE 120 MG: 120 CAPSULE, COATED, EXTENDED RELEASE ORAL at 14:56

## 2022-01-01 RX ADMIN — VALACYCLOVIR HYDROCHLORIDE 1000 MG: 500 TABLET, FILM COATED ORAL at 12:56

## 2022-01-01 RX ADMIN — FINASTERIDE 5 MG: 5 TABLET, FILM COATED ORAL at 08:50

## 2022-01-01 RX ADMIN — POTASSIUM CHLORIDE 10 MEQ: 1500 TABLET, EXTENDED RELEASE ORAL at 08:22

## 2022-01-01 RX ADMIN — GABAPENTIN 300 MG: 300 CAPSULE ORAL at 08:38

## 2022-01-01 RX ADMIN — VALACYCLOVIR HYDROCHLORIDE 1000 MG: 500 TABLET, FILM COATED ORAL at 20:33

## 2022-01-01 RX ADMIN — CEFTRIAXONE 1000 MG: 1 INJECTION, POWDER, FOR SOLUTION INTRAMUSCULAR; INTRAVENOUS at 12:55

## 2022-01-01 RX ADMIN — CEFTRIAXONE 1000 MG: 1 INJECTION, POWDER, FOR SOLUTION INTRAMUSCULAR; INTRAVENOUS at 13:28

## 2022-01-01 RX ADMIN — VALACYCLOVIR HYDROCHLORIDE 1000 MG: 500 TABLET, FILM COATED ORAL at 08:38

## 2022-01-01 RX ADMIN — DIGOXIN 250 MCG: 0.25 INJECTION INTRAMUSCULAR; INTRAVENOUS at 08:22

## 2022-01-01 RX ADMIN — METOPROLOL TARTRATE 50 MG: 50 TABLET, FILM COATED ORAL at 08:51

## 2022-01-01 RX ADMIN — GABAPENTIN 300 MG: 300 CAPSULE ORAL at 08:54

## 2022-01-01 RX ADMIN — MEGESTROL ACETATE 400 MG: 40 SUSPENSION ORAL at 08:47

## 2022-01-01 RX ADMIN — POTASSIUM CHLORIDE 10 MEQ: 1500 TABLET, EXTENDED RELEASE ORAL at 08:49

## 2022-01-01 RX ADMIN — GABAPENTIN 300 MG: 300 CAPSULE ORAL at 21:37

## 2022-01-01 RX ADMIN — ASPIRIN 81 MG: 81 TABLET, COATED ORAL at 08:36

## 2022-01-01 RX ADMIN — APIXABAN 5 MG: 5 TABLET, FILM COATED ORAL at 09:29

## 2022-01-01 RX ADMIN — TAMSULOSIN HYDROCHLORIDE 0.4 MG: 0.4 CAPSULE ORAL at 08:11

## 2022-01-01 RX ADMIN — METOPROLOL TARTRATE 50 MG: 50 TABLET, FILM COATED ORAL at 21:01

## 2022-01-01 RX ADMIN — VALACYCLOVIR HYDROCHLORIDE 1000 MG: 500 TABLET, FILM COATED ORAL at 13:08

## 2022-01-01 RX ADMIN — METOPROLOL TARTRATE 100 MG: 50 TABLET, FILM COATED ORAL at 09:32

## 2022-01-01 RX ADMIN — METOPROLOL TARTRATE 50 MG: 50 TABLET, FILM COATED ORAL at 08:56

## 2022-01-01 RX ADMIN — SODIUM CHLORIDE 250 ML: 9 INJECTION, SOLUTION INTRAVENOUS at 06:01

## 2022-01-01 RX ADMIN — MEGESTROL ACETATE 400 MG: 40 SUSPENSION ORAL at 10:26

## 2022-01-01 RX ADMIN — POTASSIUM CHLORIDE 10 MEQ: 1500 TABLET, EXTENDED RELEASE ORAL at 08:50

## 2022-01-01 RX ADMIN — CETIRIZINE HYDROCHLORIDE 10 MG: 10 TABLET, FILM COATED ORAL at 09:29

## 2022-01-01 RX ADMIN — PANTOPRAZOLE SODIUM 40 MG: 40 TABLET, DELAYED RELEASE ORAL at 08:36

## 2022-01-01 RX ADMIN — GABAPENTIN 300 MG: 300 CAPSULE ORAL at 21:32

## 2022-01-01 RX ADMIN — METOPROLOL TARTRATE 50 MG: 50 TABLET, FILM COATED ORAL at 08:48

## 2022-01-01 RX ADMIN — POTASSIUM CHLORIDE 10 MEQ: 1500 TABLET, EXTENDED RELEASE ORAL at 08:38

## 2022-01-01 RX ADMIN — ASPIRIN 81 MG 81 MG: 81 TABLET ORAL at 08:56

## 2022-01-01 RX ADMIN — VALACYCLOVIR HYDROCHLORIDE 1000 MG: 500 TABLET, FILM COATED ORAL at 08:49

## 2022-01-01 RX ADMIN — GABAPENTIN 300 MG: 300 CAPSULE ORAL at 13:20

## 2022-01-01 RX ADMIN — POTASSIUM CHLORIDE 10 MEQ: 10 INJECTION, SOLUTION INTRAVENOUS at 13:54

## 2022-01-01 RX ADMIN — DEXTROSE MONOHYDRATE: 50 INJECTION, SOLUTION INTRAVENOUS at 06:28

## 2022-01-01 RX ADMIN — APIXABAN 5 MG: 5 TABLET, FILM COATED ORAL at 21:01

## 2022-01-01 RX ADMIN — METOPROLOL TARTRATE 50 MG: 50 TABLET, FILM COATED ORAL at 08:46

## 2022-01-01 RX ADMIN — FINASTERIDE 5 MG: 5 TABLET, FILM COATED ORAL at 08:47

## 2022-01-01 RX ADMIN — Medication 400 MG: at 20:03

## 2022-01-01 RX ADMIN — DILTIAZEM HYDROCHLORIDE 120 MG: 120 CAPSULE, COATED, EXTENDED RELEASE ORAL at 08:50

## 2022-01-01 RX ADMIN — IOPAMIDOL 100 ML: 755 INJECTION, SOLUTION INTRAVENOUS at 23:31

## 2022-01-01 RX ADMIN — VALACYCLOVIR HYDROCHLORIDE 1000 MG: 500 TABLET, FILM COATED ORAL at 08:50

## 2022-01-01 RX ADMIN — APIXABAN 5 MG: 5 TABLET, FILM COATED ORAL at 20:02

## 2022-01-01 RX ADMIN — GABAPENTIN 300 MG: 300 CAPSULE ORAL at 13:37

## 2022-01-01 RX ADMIN — CETIRIZINE HYDROCHLORIDE 10 MG: 10 TABLET, FILM COATED ORAL at 08:37

## 2022-01-01 RX ADMIN — DILTIAZEM HYDROCHLORIDE 30 MG: 60 TABLET, FILM COATED ORAL at 11:31

## 2022-01-01 RX ADMIN — DILTIAZEM HYDROCHLORIDE 30 MG: 60 TABLET, FILM COATED ORAL at 00:19

## 2022-01-01 RX ADMIN — DILTIAZEM HYDROCHLORIDE 30 MG: 60 TABLET, FILM COATED ORAL at 18:47

## 2022-01-01 RX ADMIN — SODIUM CHLORIDE: 9 INJECTION, SOLUTION INTRAVENOUS at 17:16

## 2022-01-01 RX ADMIN — FINASTERIDE 5 MG: 5 TABLET, FILM COATED ORAL at 09:30

## 2022-01-01 RX ADMIN — APIXABAN 5 MG: 5 TABLET, FILM COATED ORAL at 08:11

## 2022-01-01 RX ADMIN — Medication 400 MG: at 08:38

## 2022-01-01 ASSESSMENT — PAIN SCALES - GENERAL
PAINLEVEL_OUTOF10: 0

## 2022-01-01 ASSESSMENT — ENCOUNTER SYMPTOMS
WHEEZING: 0
COUGH: 0
SHORTNESS OF BREATH: 0
VOMITING: 0
RESPIRATORY NEGATIVE: 1
GASTROINTESTINAL NEGATIVE: 1
BACK PAIN: 0
SINUS PRESSURE: 0
SHORTNESS OF BREATH: 0
ABDOMINAL PAIN: 0
SHORTNESS OF BREATH: 0
EYE DISCHARGE: 0
NAUSEA: 0

## 2022-01-01 NOTE — FLOWSHEET NOTE
12/31/21 1332   Assessment   Charting Type Shift assessment   Neurological   Neuro (WDL) WDL   Level of Consciousness Alert (0)   Orientation Level Oriented X4   Cognition Follows commands   Language Clear   NIHSS Stroke Scale Assessed No   Bang Coma Scale   Eye Opening 4   Best Verbal Response 5   Best Motor Response 6   Bang Coma Scale Score 15   HEENT   HEENT (WDL) WDL   Respiratory   Respiratory (WDL) WDL   Respiratory Pattern Regular   Respiratory Depth Normal   Respiratory Quality/Effort Unlabored   Chest Assessment Chest expansion symmetrical   L Breath Sounds Clear;Diminished   R Breath Sounds Clear;Diminished   Breath Sounds   Right Upper Lobe Clear   Right Middle Lobe Clear   Right Lower Lobe Diminished   Left Upper Lobe Clear   Left Lower Lobe Diminished   Cardiac   Cardiac (WDL) X   Cardiac Regularity Regular   Heart Sounds S1, S2   Cardiac Monitor   Telemetry Monitor On Yes   Telemetry Audible Yes   Telemetry Alarms Set Yes   Gastrointestinal   Abdominal (WDL) WDL   Peripheral Vascular   Peripheral Vascular (WDL) WDL   Edema None   Skin Color/Condition   Skin Color/Condition (WDL) X   Skin Color Pale   Skin Condition/Temp Dry   Skin Integrity   Skin Integrity (WDL) X   Skin Integrity Abrasion; Redness   Location rt hip   Preventative Dressing Yes   Multiple Skin Integrity Sites Yes   Assessed this shift? Yes   Skin Integrity Site 2   Skin Integrity Location 2 Redness   Location 2 lt hip   Preventative Dressing Yes   Assessed this shift?  Yes   Musculoskeletal   Musculoskeletal (WDL) WDL   Genitourinary   Genitourinary (WDL) WDL   Urine Assessment   Incontinence Yes   Psychosocial   Psychosocial (WDL) WDL

## 2022-01-01 NOTE — PROGRESS NOTES
Patient got up to side of bed, patient dizzy and unable to walk for a far distance, had patient march in place at bedside for a few minutes, the highest patient heart rate reached during this activity was 101. Had patient place his feet back into bed by himself, patient o2 saturation level was 99% on RA .

## 2022-01-01 NOTE — PROGRESS NOTES
Progress Note  Date:2022       Room:[place] [address]  Patient Name:Iris Schulte     YOB: 1945     Age:76 y.o. Subjective    Subjective:  Symptoms:  Stable. No shortness of breath, chest pain or chest pressure. Diet:  Adequate intake. Activity level: Normal.    Pain:  He reports no pain. Review of Systems   Constitutional: Negative. HENT: Negative. Respiratory: Negative for shortness of breath. Cardiovascular: Negative for chest pain. Objective         Vitals Last 24 Hours:  TEMPERATURE:  Temp  Av.5 °F (36.9 °C)  Min: 98.1 °F (36.7 °C)  Max: 98.7 °F (37.1 °C)  RESPIRATIONS RANGE: Resp  Av.5  Min: 13  Max: 16  PULSE OXIMETRY RANGE: SpO2  Av.5 %  Min: 94 %  Max: 99 %  PULSE RANGE: Pulse  Av.4  Min: 56  Max: 67  BLOOD PRESSURE RANGE: Systolic (40CKC), PVL:514 , Min:102 , UCR:733   ; Diastolic (71QPR), YQE:90, Min:44, Max:89    I/O (24Hr): Intake/Output Summary (Last 24 hours) at 2022 1145  Last data filed at 2022 0840  Gross per 24 hour   Intake 578.15 ml   Output 270 ml   Net 308.15 ml     Objective:  General Appearance:  Well-appearing. Vital signs: (most recent): Blood pressure (!) 110/57, pulse 67, temperature 98.6 °F (37 °C), resp. rate 13, height 6' (1.829 m), weight 260 lb (117.9 kg), SpO2 94 %. Vital signs are normal.    Output: Producing urine and producing stool. HEENT: Normal HEENT exam.    Lungs:  Normal effort. Heart: Normal rate. Regular rhythm. Abdomen: Abdomen is soft. Bowel sounds are normal.   There is no abdominal tenderness. Extremities: Normal range of motion. Pulses: Distal pulses are intact. Neurological: Patient is alert and oriented to person, place and time. Pupils:  Pupils are equal, round, and reactive to light. Skin:  Warm and dry.       Labs/Imaging/Diagnostics    Labs:  CBC:  Recent Labs     21  0620 21  1100 22  0433   WBC 5.4 5.4 4.8   RBC 3.77* 3.96* 3.72* HGB 10.7* 11.2* 10.6*   HCT 31.9* 34.1* 32.4*   MCV 84.6 86.1 87.1   RDW 13.6 13.6 13.6    161 147*     CHEMISTRIES:  Recent Labs     12/30/21  0620 12/31/21  1100 01/01/22  0433    139 137   K 3.7 4.2 4.0   * 113* 110*   CO2 16* 17* 18*   BUN 21* 14 12   CREATININE 1.1 1.1 1.1   GLUCOSE 88 143* 114*     PT/INR:No results for input(s): PROTIME, INR in the last 72 hours. APTT:No results for input(s): APTT in the last 72 hours. LIVER PROFILE:  Recent Labs     12/30/21  0620 12/31/21  1100 01/01/22  0433   AST 32 28 21   ALT 29 28 21   BILITOT 0.8 0.7 0.7   ALKPHOS 116* 127* 122*       Imaging Last 24 Hours:  No results found. Assessment//Plan           Hospital Problems           Last Modified POA    * (Principal) Atrial flutter ([facility] [address] 12/31/2021 Yes    Shingles 12/31/2021 Yes    UTI (urinary tract infection) 12/31/2021 Yes        Assessment:  (1. A Fib/RVR - Dig and cardizem may not work, will convert to [place] System if it does fail him. - Decrease Dig, start to ambulate and monitor. 2.         Lelliottia amnigena POS urine culture - Zosyn, convert to Rocephin then PO OmniCef  3. AMS - Improved but still fatigued. 4. CAD on CT scan - Home meds, rate control, prefer to keep some beta-blockers on board  5. HTN - Clear off home meds to allow room for rate limiting meds. 6. Hx DVT - On anti-coag  ).        Electronically signed by Jameel Barrow MD on 1/1/22 at 11:45 AM EST

## 2022-01-02 NOTE — FLOWSHEET NOTE
01/01/22 2343   Assessment   Charting Type Shift assessment   Neurological   Neuro (WDL) WDL   Level of Consciousness Alert (0)   Orientation Level Oriented X4   Cognition Follows commands   Language Clear   NIHSS Stroke Scale Assessed No   Bang Coma Scale   Eye Opening 4   Best Verbal Response 5   Best Motor Response 6   Bang Coma Scale Score 15   HEENT   HEENT (WDL) WDL   Respiratory   Respiratory (WDL) WDL   Respiratory Pattern Regular   Respiratory Depth Normal   Respiratory Quality/Effort Unlabored   Chest Assessment Chest expansion symmetrical   L Breath Sounds Clear;Diminished   R Breath Sounds Clear;Diminished   Breath Sounds   Right Upper Lobe Clear   Right Middle Lobe Clear   Right Lower Lobe Diminished   Left Upper Lobe Clear   Left Lower Lobe Diminished   Cardiac   Cardiac (WDL) X   Cardiac Regularity Irregular   Heart Sounds S1, S2   Cardiac Rhythm A flutter   Rhythm Interpretation   Pulse 57   Cardiac Monitor   Telemetry Monitor On Yes   Telemetry Audible Yes   Telemetry Alarms Set Yes   Telemetry Box Number    Gastrointestinal   Abdominal (WDL) WDL   Peripheral Vascular   Peripheral Vascular (WDL) WDL   Edema None   Skin Color/Condition   Skin Color/Condition (WDL) X   Skin Color Pale   Skin Condition/Temp Warm;Dry   Skin Integrity   Skin Integrity (WDL) X   Skin Integrity Abrasion; Redness   Location rt hip   Preventative Dressing Yes   Multiple Skin Integrity Sites Yes   Assessed this shift? Yes   Skin Integrity Site 2   Skin Integrity Location 2 Redness   Location 2 lt hip   Preventative Dressing Yes   Assessed this shift?  Yes   Musculoskeletal   Musculoskeletal (WDL) WDL   Genitourinary   Genitourinary (WDL) WDL   Urine Assessment   Incontinence Yes   Psychosocial   Psychosocial (WDL) WDL

## 2022-01-02 NOTE — FLOWSHEET NOTE
01/02/22 0904   Assessment   Charting Type Shift assessment   Neurological   Neuro (WDL) WDL   Level of Consciousness Alert (0)   Orientation Level Oriented X4   Cognition Follows commands   Language Clear   Bang Coma Scale   Eye Opening 4   Best Verbal Response 5   Best Motor Response 6   [place] Coma Scale Score 15   HEENT   HEENT (WDL) WDL   Respiratory   Respiratory (WDL) WDL   Respiratory Pattern Regular   Respiratory Depth Normal   Respiratory Quality/Effort Unlabored   Chest Assessment Chest expansion symmetrical   L Breath Sounds Clear;Diminished   R Breath Sounds Clear;Diminished   Breath Sounds   Right Upper Lobe Clear   Right Middle Lobe Clear   Right Lower Lobe Diminished   Left Upper Lobe Clear   Left Lower Lobe Diminished   Cardiac   Cardiac (WDL) X   Cardiac Regularity Irregular   Heart Sounds S1, S2   Cardiac Rhythm A flutter   Cardiac Monitor   Telemetry Monitor On Yes   Telemetry Audible Yes   Telemetry Alarms Set Yes   Telemetry Box Number    Gastrointestinal   Abdominal (WDL) WDL   Peripheral Vascular   Peripheral Vascular (WDL) WDL   Edema None   Skin Color/Condition   Skin Color/Condition (WDL) X   Skin Color Pale   Skin Condition/Temp Warm;Dry   Skin Integrity   Skin Integrity (WDL) X   Skin Integrity Abrasion; Redness   Location rt hip   Skin Integrity Site 2   Skin Integrity Location 2 Redness   Location 2 lt hip   Musculoskeletal   Musculoskeletal (WDL) WDL   Genitourinary   Genitourinary (WDL) WDL   Urine Assessment   Incontinence Yes   Psychosocial   Psychosocial (WDL) WDL   Pt awake in bed. Pt alert and orietned. Pt appears in no acute distress. Pt currently on RA. Pt currently on telemetry monitoring showing aflutter. Pt lung sounds clear with diminished sounds in bases. Pt noted to have expiratory wheezing but cleared with cough. Pt encouraged to cough and deep breathe. Pt call bell and bedside table within reach. Will continue to monitor pt.

## 2022-01-02 NOTE — PROGRESS NOTES
to person, place and time. Pupils:  Pupils are equal, round, and reactive to light. Skin:  Warm and dry. Labs/Imaging/Diagnostics    Labs:  CBC:  Recent Labs     12/31/21  1100 01/01/22  0433   WBC 5.4 4.8   RBC 3.96* 3.72*   HGB 11.2* 10.6*   HCT 34.1* 32.4*   MCV 86.1 87.1   RDW 13.6 13.6    147*     CHEMISTRIES:  Recent Labs     12/31/21  1100 01/01/22  0433    137   K 4.2 4.0   * 110*   CO2 17* 18*   BUN 14 12   CREATININE 1.1 1.1   GLUCOSE 143* 114*     PT/INR:No results for input(s): PROTIME, INR in the last 72 hours. APTT:No results for input(s): APTT in the last 72 hours. LIVER PROFILE:  Recent Labs     12/31/21  1100 01/01/22 0433   AST 28 21   ALT 28 21   BILITOT 0.7 0.7   ALKPHOS 127* 122*       Imaging Last 24 Hours:  No results found. Assessment//Plan           Hospital Problems           Last Modified POA    * (Principal) Atrial flutter ([address] 12/31/2021 Yes    Shingles 12/31/2021 Yes    UTI (urinary tract infection) 12/31/2021 Yes        Assessment:  (1.        A Flutter/tachy - Dig and cardizem may not work, will convert to [place] System if it does fail him. Hold Dig prior to adding Amio if needed. Will have to see how he does with PT/OT as I suspect his pulse will ramp up with activity. - Decrease Dig, start to ambulate and monitor.     - Attempted to stand Pt yesterday, got lightheaded and needed to be seated. Suspect he will need swing bed prior to home as he has likely been in bed for a lot longer than he states. 2.         Lelliottia amnigena POS urine culture - Zosyn, convert to Rocephin with last dose 1/3 to complete 5 day course. 3.         AMS - Improved but still fatigued. 4.         CAD on CT scan - Home meds, rate control, prefer to keep some beta-blockers on board    5. HTN - Clear off home meds to allow room for rate limiting meds. 6. Hx DVT - On anti-coag).      Plan:   Other transfer (see comment) (Will need SNF or swing prior to D/C).  Encourage ambulation, per physical therapy and out of bed and up to chair.         Electronically signed by Severo Josephs, MD on 1/2/22 at 12:36 PM EST

## 2022-01-02 NOTE — PLAN OF CARE
Problem: Falls - Risk of:  Goal: Will remain free from falls  Description: Will remain free from falls  Outcome: Ongoing  Goal: Absence of physical injury  Description: Absence of physical injury  Outcome: Ongoing     Problem: Breathing Pattern - Ineffective:  Goal: Ability to achieve and maintain a regular respiratory rate will improve  Description: Ability to achieve and maintain a regular respiratory rate will improve  Outcome: Ongoing     Problem: Safety:  Goal: Ability to remain free from injury will improve  Description: Ability to remain free from injury will improve  Outcome: Ongoing

## 2022-01-03 PROBLEM — E83.42 HYPOMAGNESEMIA: Status: ACTIVE | Noted: 2022-01-01

## 2022-01-03 PROBLEM — I48.0 PAF (PAROXYSMAL ATRIAL FIBRILLATION) (HCC): Status: ACTIVE | Noted: 2022-01-01

## 2022-01-03 NOTE — FLOWSHEET NOTE
01/02/22 2059   Assessment   Charting Type Shift assessment   Neurological   Neuro (WDL) WDL   Level of Consciousness Alert (0)   Orientation Level Oriented X4   Cognition Follows commands   Language Clear   NIHSS Stroke Scale Assessed No   Bang Coma Scale   Eye Opening 4   Best Verbal Response 5   Best Motor Response 6   Bang Coma Scale Score 15   HEENT   HEENT (WDL) WDL   Respiratory   Respiratory (WDL) WDL   Respiratory Pattern Regular   Respiratory Depth Normal   Respiratory Quality/Effort Unlabored   Chest Assessment Chest expansion symmetrical   L Breath Sounds Clear;Diminished   R Breath Sounds Clear;Diminished   Breath Sounds   Right Upper Lobe Clear   Right Middle Lobe Clear   Right Lower Lobe Diminished   Left Upper Lobe Clear   Left Lower Lobe Diminished   Cardiac   Cardiac (WDL) X   Cardiac Regularity Irregular   Heart Sounds S1, S2   Cardiac Rhythm A flutter   Rhythm Interpretation   Pulse 87   Cardiac Monitor   Telemetry Monitor On Yes   Telemetry Audible Yes   Telemetry Alarms Set Yes   Telemetry Box Number    Gastrointestinal   Abdominal (WDL) WDL   Last BM (including prior to admit) 01/01/22   Peripheral Vascular   Peripheral Vascular (WDL) WDL   Edema None   Skin Color/Condition   Skin Color/Condition (WDL) X   Skin Color Pale   Skin Condition/Temp Warm;Dry   Skin Integrity   Skin Integrity (WDL) X   Skin Integrity Abrasion; Redness   Location rt hip   Preventative Dressing Yes   Multiple Skin Integrity Sites Yes   Assessed this shift? Yes   Skin Integrity Site 2   Skin Integrity Location 2 Redness   Location 2 left hip   Preventative Dressing Yes   Assessed this shift?  Yes   Musculoskeletal   Musculoskeletal (WDL) WDL   Genitourinary   Genitourinary (WDL) WDL   Urine Assessment   Incontinence Yes   Psychosocial   Psychosocial (WDL) WDL

## 2022-01-03 NOTE — PROGRESS NOTES
Physical Therapy    Facility/Department: [facility]  Initial Assessment    NAME: Michael Darnell  : 1945  MRN: [id number]    Date of Service: 1/3/2022    Discharge Recommendations:  Continue to assess pending progress        Assessment   Body structures, Functions, Activity limitations: Decreased high-level IADLs;Decreased functional mobility ; Decreased endurance  Assessment: Atrial flutter; general weakness, difficulty walking; will benefit from PT to help improve functional mobility, safety, and strength. Treatment Diagnosis: Atrial flutter; general weakness, difficulty walking  Prognosis: Excellent  Decision Making: Low Complexity  REQUIRES PT FOLLOW UP: Yes  Activity Tolerance  Activity Tolerance: Patient Tolerated treatment well       Patient Diagnosis(es): The primary encounter diagnosis was Ectopic atrial tachycardia ([address] Diagnoses of Pneumonia of both lungs due to infectious organism, unspecified part of lung, Acute UTI, and Herpes zoster without complication were also pertinent to this visit. has a past medical history of Atrial fibrillation ([address] Cardiomyopathy ([address] Cataract, GERD (gastroesophageal reflux disease), History of DVT (deep vein thrombosis), Hyperlipidemia, Hypertension, LONG TERM ANTICOAGULENT USE, Osteoarthritis, and Unspecified sleep apnea. has a past surgical history that includes Cholecystectomy; Spine surgery; Ankle surgery; Hand surgery; Inner ear surgery; Nose surgery; Knee arthroscopy; hip surgery; eye surgery; Venous clot surgery (); knee surgery (11); and fracture surgery (Left).     Restrictions  Restrictions/Precautions  Restrictions/Precautions: General Precautions,Fall Risk  Required Braces or Orthoses?: No  Vision/Hearing  Vision: Within Functional Limits  Hearing: Within functional limits     Subjective  General  Chart Reviewed: Yes  Patient assessed for rehabilitation services?: Yes  Response To Previous Treatment: Not applicable  Family / Caregiver Present: No  Referring Practitioner: Edis Mitchell MD  Diagnosis: Atrial flutter  Follows Commands: Within Functional Limits  Subjective  Subjective: Patient agreeable to consult; states he is normally independent with functional mobility; had a fall recently but is vague on details; does not use a cane or walker for ambulation  Pain Screening  Patient Currently in Pain: Denies  Vital Signs  Patient Currently in Pain: Denies       Orientation  Orientation  Overall Orientation Status: Within Normal Limits  Social/Functional History  Social/Functional History  Lives With: Spouse  Type of Home: House  Home Layout: Two level  Home Access: Stairs to enter with rails  ADL Assistance: Independent  Ambulation Assistance: Independent  Transfer Assistance: Independent  Active : Yes  Cognition        Objective     Observation/Palpation  Posture: Fair  Observation: patient lying in bed, inclined, NAD, pleasant and cooperative; long thin abrasion on back, dry, no errythema    AROM RLE (degrees)  RLE AROM: WFL  AROM LLE (degrees)  LLE AROM : WFL  AROM RUE (degrees)  RUE AROM : WFL  AROM LUE (degrees)  LUE AROM : WFL  Strength RLE  Strength RLE: WFL  Comment: MMG's grossly 4+/5  Strength LLE  Strength LLE: WFL  Comment: MMG's grossly 4+/5  Strength RUE  Strength RUE: WFL  Strength LUE  Strength LUE: WFL  Tone RLE  RLE Tone: Normotonic  Tone LLE  LLE Tone: Normotonic  Motor Control  Gross Motor?: WFL  Sensation  Overall Sensation Status: WFL  Bed mobility  Rolling to Left: Modified independent  Supine to Sit: Modified independent  Sit to Supine: Modified independent  Scooting: Modified independent  Transfers  Sit to Stand: Stand by assistance  Stand to sit: Stand by assistance  Ambulation  Ambulation?: Yes  Ambulation 1  Surface: level tile  Device: Rolling Walker  Assistance: Contact guard assistance  Gait Deviations: Slow Judy;Decreased step length;Decreased step height  Distance: 3 feet bedside  Comments: patient with c/o dizziness when standing, improved with sitting     Balance  Posture: Fair  Sitting - Static: Good  Sitting - Dynamic: Good  Standing - Static: Fair;+  Standing - Dynamic: [address]  Times per week: 3-4 days  Plan weeks: 3-4 days  Current Treatment Recommendations: Strengthening,Neuromuscular Re-education,Home Exercise Program,Safety Education & Training,Balance Training,Functional Mobility Training,Transfer Training,Gait Training,Patient/Caregiver Education & Training               Goals  Long term goals  Time Frame for Long term goals : 3-4 days  Long term goal 1: Achieve basic transfers with modified independence. Long term goal 2: Ambulate 50 feet with RW with SBA x 1 with good safety awareness.        Therapy Time   Individual Concurrent Group Co-treatment   Time In           Time Out           Minutes                   Stas Puente, PT

## 2022-01-03 NOTE — PROGRESS NOTES
Progress Note      Subjective:   Chief complaint: Nausea, fatigue    Interval History:   No acute events over night. Still in 1000 Replaced by [facility] on telemetry. Having urine output. Tolerating oral intake. Denies CP, palpitations, SOB, vomiting. Feeling little better but still feels fatigue, weakness. Followed by Dr Cuong Cruz cardiology for hx afib/flutter. During stay, required IV cardizem gtt for few days to control rate. Today, HR more controlled and now on oral cardizem, BB and IV dig. Review of systems:     Constitutional:  Denies fever or chills. +fatigue, generalized weakness  Eyes:  Denies change in visual acuity or discharge. HENT:  Denies nasal congestion or sore throat. Respiratory:  Denies cough or shortness of breath. Cardiovascular:  Denies chest pain, palpitation or swelling in LEs. GI:  Denies abdominal pain, nausea, vomiting, bloody stools or diarrhea. :  Denies dysuria or frequency. Musculoskeletal:  Denies acute back pain or joint pain. Integument:  Denies rash or itching. Neurologic:  Denies headache, focal weakness or sensory changes. Psychiatric:  Denies depression or anxiety. Past medical history, surgical history, family history and social history reviewed and unchanged compared to H&P earlier this admission.     Medications:   Scheduled Meds:   magnesium oxide  400 mg Oral BID    digoxin  125 mcg IntraVENous Daily    cefTRIAXone (ROCEPHIN) IV  1,000 mg IntraVENous Q24H    dilTIAZem  30 mg Oral 4 times per day    cetirizine  10 mg Oral Daily    metoprolol tartrate  50 mg Oral BID    gabapentin  300 mg Oral TID    valACYclovir  1,000 mg Oral TID    pantoprazole  40 mg Oral QAM AC    apixaban  5 mg Oral BID    aspirin  81 mg Oral Daily    atorvastatin  20 mg Oral Nightly    finasteride  5 mg Oral Daily    potassium chloride  10 mEq Oral Daily    tamsulosin  0.4 mg Oral Daily     Continuous Infusions:   sodium chloride Stopped (12/29/21 0900)    sodium chloride Stopped (12/30/21 0130)       Objective:     Vital Signs  Temp: 98.2 °F (36.8 °C)  Pulse: 85  Resp: 14  BP: (!) 117/48  SpO2: 96 %  O2 Device: None (Room air)      Vital signs reviewed in electronic charts. Physical exam    Constitutional:  Well developed, well nourished, no acute distress. Eyes:  PERRL, conjunctiva normal, EOMI. HENT:  Atraumatic, external ears normal, external nose/nares normal, oropharynx moist, no pharyngeal exudates. Neck:  Supple. No JVD. Respiratory: RA. No respiratory distress, normal breath sounds, no rales, no wheezing. Cardiovascular:  Normal rate, normal rhythm, no murmurs, no gallops, no rubs. GI:  Soft, nondistended, normal bowel sounds, nontender, no organomegaly, no mass. Musculoskeletal:  No edema, no tenderness, no obvious deformities. Patient is moving all extremities. Integument:  Well hydrated, no rash. Neurologic:  Alert & oriented x 3,  no focal deficits noted. Strength is equal throughout. Psychiatric:  Speech and behavior appropriate. Results:     Lab Results   Component Value Date    WBC 5.0 01/03/2022    HGB 10.9 (L) 01/03/2022    HCT 33.1 (L) 01/03/2022    MCV 86.9 01/03/2022     01/03/2022       Lab Results   Component Value Date     01/03/2022    K 3.9 01/03/2022    K 4.0 01/01/2022     01/03/2022    CO2 20 01/03/2022    BUN 7 01/03/2022    CREATININE 0.9 01/03/2022    GLUCOSE 104 01/03/2022    CALCIUM 7.8 01/03/2022        CT Head WO Contrast   Final Result      1. No acute intracranial abnormality. 2. Extensive nonspecific periventricular and subcortical white matter hypodensity compatible with chronic small vessel ischemic disease and/or age related degenerative change. CT ABDOMEN PELVIS WO CONTRAST Additional Contrast? None   Final Result   1. No acute intra-abdominopelvic abnormality. 2. Sigmoid diverticulosis without diverticulitis   3.  Multifocal patchy peripheral pulmonary airspace disease with improvement noted in the region of the lingula and right lower lobe and new airspace disease in the right middle lobe and lateral basal segment left lower lobe   4. Spondylosis with severe facet arthropathy and severe central canal stenosis at L4-L5         EKG: a flutter 70-80s    ECHO 10/2020:  Summary   The patient appears to be in atrial flutter throughout the procedure. Normal LV systolic function and wall motion. LVEF estimated at 50%. Abnormal   diastolic function. The mitral valve leaflets appear normal. No prolapse or stenosis. Trace   mitral regurgitation. The aortic valve is sclerotic and probably trileaflet. There is borderline   aortic stenosis and trace insufficiency. Mean gradient 9 mmHg. ALEAH calc 1.9   cm2. The tricuspid valve appears structurally normal. There is trace tricuspid   regurgitation. Assessment and Plan: Active Hospital Problems    Diagnosis Date Noted    PAF (paroxysmal atrial fibrillation) ([address] [I48.0]  -10/2020 ECHO EF 50-55% (see full report above). Followed by Dr Yana Luis cardiology. Last appt 11/2021.  -repeat ECHO 12/31/21 preliminary EF 50-55%. -Continue home medications cardizem, metoprolol, eliquis. -IV dig d/c today, can do PRN. -Inpatient consult to cardiology   -cont telemetry. -TSH WNL, dig level WNL. 01/03/2022    Hypomagnesemia [E83.42]  -1/3 mag 1.3. Replace 1000mg IV. Add mag ox daily. Repeat mag in AM lab.    01/03/2022    Shingles [B02.9]  -noted during hospital stay. Cont valtrex. 12/31/2021    UTI (urinary tract infection) [N39.0]  -UA indicative UTI. -Cont IV rocephin   12/31/2021    Atrial flutter ([address] [I48.92]  -see PAF   05/10/2012    Deep vein thrombosis ([facility]) [I82.409]  History. On eliquis. 05/10/2012    Essential hypertension [I10]  -stable. Cont cardizem, BB. Adjust as needed. 01/09/2012   GI prophylaxis      Patient was seen and examined by Dr. Robert Begum and plan of care reviewed.        Electronically signed by SANTIAGO Guevara - CNP on 1/3/2022 at 12:23 PM

## 2022-01-03 NOTE — PROGRESS NOTES
Med rec performed utilizing fill history form [facility]. See notes below.   -Removed the following med per fill history and patient wife: Vit B complex.   -Changed Allergy Relief 5mg to Loratadine 10mg. Per fill history.    -of notes, patients wife brought medication bottles to refer off of.     Ena Daugherty, CMA

## 2022-01-03 NOTE — PROGRESS NOTES
Occupational Therapy   Occupational Therapy Initial Assessment  Date: 1/3/2022   Patient Name: Mariely Wisdom  MRN: [id number]     : 1945    Date of Service: 1/3/2022    Discharge Recommendations:  Continue to assess pending progress  OT Equipment Recommendations  Equipment Needed: No    Assessment   Performance deficits / Impairments: Decreased functional mobility ; Decreased safe awareness;Decreased ADL status; Decreased endurance;Decreased strength;Decreased balance;Decreased high-level IADLs  Assessment: Pt agreeable to OT evaluation. Pt states he has had falls recently but does not remember how he fell. Pt spouse present in room and states she has been having falls as well at home. Pt come to sit at EOB with MOD I. Pt reports dizziness with change of position but resides. Pt able to don LB clothing seated at EOB wtih SBA. Pt come to stand at EOB with CGA. Pt mildly unsteady during attempts to side step at EOB. Pt reports fatigue with minimal activity. Pt declines to transfer to chair and requested to return to supine. Pt transfered to supine with MOD I. Pt left with call light in reach and spouse present in room. Pt will benefit from skilled OT services while IP to improve independence with mobility and ADL's. Prognosis: Good  Decision Making: Low Complexity  REQUIRES OT FOLLOW UP: Yes  Activity Tolerance  Activity Tolerance: Patient limited by fatigue           Patient Diagnosis(es): The primary encounter diagnosis was Ectopic atrial tachycardia ([address] Diagnoses of Pneumonia of both lungs due to infectious organism, unspecified part of lung, Acute UTI, and Herpes zoster without complication were also pertinent to this visit. has a past medical history of Atrial fibrillation ([address] Cardiomyopathy ([address] Cataract, GERD (gastroesophageal reflux disease), History of DVT (deep vein thrombosis), Hyperlipidemia, Hypertension, LONG TERM ANTICOAGULENT USE, Osteoarthritis, and Unspecified sleep apnea.    has a past surgical history that includes Cholecystectomy; Spine surgery; Ankle surgery; Hand surgery; Inner ear surgery; Nose surgery; Knee arthroscopy; hip surgery; eye surgery; Venous clot surgery (2008); knee surgery (12/07/11); and fracture surgery (Left). Restrictions  Restrictions/Precautions  Restrictions/Precautions: General Precautions,Fall Risk  Required Braces or Orthoses?: No    Subjective   General  Chart Reviewed: Yes  Patient assessed for rehabilitation services?: Yes  Family / Caregiver Present: Yes  Referring Practitioner: Chloe Oconnell MD  Diagnosis: Atrial Flutter  Subjective  Subjective: Pt states, \" I think I fell before I came to the hospital. I think thats why I came in\"  Patient Currently in Pain: Denies  Vital Signs  Patient Currently in Pain: Denies  Social/Functional History  Social/Functional History  Lives With: Spouse  Type of Home: House  Home Layout: Two level  Home Access: Stairs to enter with rails  Entrance Stairs - Number of Steps: 1  Bathroom Shower/Tub: Tub/Shower unit  Bathroom Toilet: Standard  Bathroom Equipment: 3-in-1 commode  Bathroom Accessibility: Walker accessible  Home Equipment: Rolling walker,Wheelchair-manual  ADL Assistance: Independent  Homemaking Responsibilities: No  Ambulation Assistance: Independent  Transfer Assistance: Independent  Active : Yes       Objective   Vision: Impaired  Vision Exceptions: Wears glasses for reading  Hearing: Exceptions to [facility]  Hearing Exceptions: Hard of hearing/hearing concerns;Right hearing aid (typically wears R hearing aid, is not wearing it upon time of evaluation.)    Orientation  Overall Orientation Status: Impaired  Orientation Level: Oriented to person;Oriented to situation;Disoriented to place; Disoriented to time  Observation/Palpation  Posture: Fair  Observation: patient lying in bed, inclined, NAD, pleasant and cooperative; long thin abrasion on back, dry, no errythema     ADL  LE Dressing: Stand by assistance        Bed mobility  Rolling to Left: Modified independent  Supine to Sit: Modified independent  Sit to Supine: Modified independent  Scooting: Modified independent  Transfers  Stand Step Transfers: Contact guard assistance  Sit to stand: Contact guard assistance  Transfer Comments: mildly unsteady during side stepping at EOB     Cognition  Cognition Comment: mild confusion, STM deficits, decreased safety awareness        Sensation  Overall Sensation Status: WFL        LUE AROM (degrees)  LUE AROM : WFL  RUE AROM (degrees)  RUE AROM : WFL  LUE Strength  L Shoulder Flex: 4-/5  L Elbow Flex: 4+/5  L Elbow Ext: 4/5  L Hand General: 4/5  RUE Strength  R Shoulder Flex: 4-/5  R Elbow Flex: 4+/5  R Elbow Ext: 4/5  R Hand General: 4/5                   Plan   Plan  Times per week: 3-5  Times per day: Daily  Current Treatment Recommendations: Strengthening,Functional Mobility Training,Endurance Training,Balance Training,Patient/Caregiver Education & Training,Self-Care / ADL,Neuromuscular Re-education    Goals  Short term goals  Time Frame for Short term goals: 1 week  Short term goal 1: Pt to complete ADL transfers with MOD I using RW as needed for support. Short term goal 2: Pt to complete UB/LB dressing with MOD I. Short term goal 3: Pt to increase functional activity tolerance x15 minutes with no s/s of fatigue. Short term goal 4: Pt to complete toileting with MOD I. Short term goal 5: Pt to complete bathing with MOD I. Therapy Time   Individual Concurrent Group Co-treatment   Time In 0245         Time Out 0316         Minutes 17              This note serves as a DC summary in the event of pt discharge.    Amanda Wyman, OTR/L

## 2022-01-03 NOTE — CARE COORDINATION
01/03/22 1413   Discharge Planning   [address] Spouse/Significant Other;Family Members   Current Services Prior To Admission None   Potential Assistance Needed Durable Medical Equipment;Home Care   Potential Assistance Purchasing Medications No   DME   (per therapy recs)   Type of Home Care Services OT;PT;Nursing Services   Patient expects to be discharged to: [facility]   Expected Discharge Date 01/05/22   Follow Up Appointment: Best Day/Time    (any)     Lives with SO. Independent at baseline. DME per therapy recs.   [facility] from [facility]

## 2022-01-04 NOTE — FLOWSHEET NOTE
01/03/22 2110   Assessment   Charting Type Shift assessment   Neurological   Neuro (WDL) WDL   Level of Consciousness Alert (0)   Orientation Level Oriented X4   Cognition Follows commands   Language Clear   Bang Coma Scale   Eye Opening 4   Best Verbal Response 5   Best Motor Response 6   [place] Coma Scale Score 15   HEENT   HEENT (WDL) WDL   Respiratory   Respiratory (WDL) WDL   Respiratory Pattern Regular   Respiratory Depth Normal   Respiratory Quality/Effort Unlabored   Chest Assessment Chest expansion symmetrical   L Breath Sounds Clear;Diminished   R Breath Sounds Clear;Diminished   Breath Sounds   Right Upper Lobe Clear   Right Middle Lobe Clear   Right Lower Lobe Diminished   Left Upper Lobe Clear   Left Lower Lobe Diminished   Cardiac   Cardiac (WDL) X   Cardiac Regularity Irregular   Heart Sounds S1, S2   Cardiac Rhythm A flutter   Cardiac Monitor   Telemetry Monitor On Yes   Telemetry Audible Yes   Telemetry Alarms Set Yes   Telemetry Box Number    Gastrointestinal   Abdominal (WDL) WDL   Peripheral Vascular   Peripheral Vascular (WDL) WDL   Edema None   Skin Color/Condition   Skin Color/Condition (WDL) X   Skin Color Pale   Skin Condition/Temp Warm;Dry;Flaky   Skin Integrity   Skin Integrity (WDL) X   Skin Integrity Abrasion   Location right hip   Preventative Dressing Yes   Skin Integrity Site 2   Skin Integrity Location 2 Redness   Location 2 left hip   Preventative Dressing Yes   Musculoskeletal   Musculoskeletal (WDL) WDL   Genitourinary   Genitourinary (WDL) WDL   Urine Assessment   Incontinence Yes   Urine Color Yellow/straw   Urine Appearance SIVAN   Urine Odor SIVAN   Psychosocial   Psychosocial (WDL) WDL

## 2022-01-04 NOTE — CONSULTS
Comprehensive Nutrition Assessment    Type and Reason for Visit:  RD Nutrition Re-Screen/LOS,Consult    Nutrition Recommendations/Plan: Recommend to continue with current diet and add ONS BID. Encourage intakes of meals and ONS as appropriate. Nutrition Assessment:  Pt admitted with atrial flutter and is at low-moderate risk for ongoing nutritional compromise related to fair intakes. Will recommend to add ONS BID and monitor intakes. Malnutrition Assessment:  Malnutrition Status:  No malnutrition      Estimated Daily Nutrient Needs:  Energy (kcal):  0681-2307 (15-16 kcal/kg cbw); Weight Used for Energy Requirements:  Current     Protein (g):   (1.2-1.25 gm/kg IBW); Weight Used for Protein Requirements:  Ideal        Fluid (ml/day):  2580-1974; Method Used for Fluid Requirements:  1 ml/kcal      Nutrition Related Findings:  Pt presents with class 2 obesity, no reported edema, abrasion on right hip and redness noted on left hip. PMH: hypertension, DVT, shingles, UTI, hyperlipidemia, b12 defic, osteo and A-flutter. Labs: Mg-1.6; Glu: 104-112; Ca-7.8; alb-2.8; Ald phos-134      Wounds:   (abrasion on right hip and redness noted on left hip)       Current Nutrition Therapies:    ADULT DIET; Regular  ADULT ORAL NUTRITION SUPPLEMENT; Breakfast, Dinner;  Wound Healing Oral Supplement    Anthropometric Measures:  · Height: 6' (182.9 cm)  · Current Body Weight: 260 lb (117.9 kg)   · Admission Body Weight:      · Usual Body Weight:       · Ideal Body Weight: 178 lbs; % Ideal Body Weight 146.1 %   · BMI: 35.3  · Adjusted Body Weight:  ; No Adjustment   · Adjusted BMI:      · BMI Categories: Obese Class 2 (BMI 35.0 -39.9)       Nutrition Diagnosis:   · Predicted inadequate energy intake related to increase demand for energy/nutrients as evidenced by intake 26-50%,intake 51-75%,BMI,lab values      Nutrition Interventions:   Food and/or Nutrient Delivery:  Continue Current Diet,Start Oral Nutrition Supplement  Nutrition Education/Counseling:  Education not indicated   Coordination of Nutrition Care:  Continue to monitor while inpatient    Goals:  to meet est needs for age/condition       Nutrition Monitoring and Evaluation:   Behavioral-Environmental Outcomes:      Food/Nutrient Intake Outcomes:  Food and Nutrient Intake,Supplement Intake  Physical Signs/Symptoms Outcomes:  Biochemical Data,Skin,Weight     Discharge Planning:    Continue current diet     Electronically signed by Brodie Lemos MS, RD, LD on 1/4/22 at 12:33 PM EST

## 2022-01-04 NOTE — PLAN OF CARE
Problem: Falls - Risk of:  Goal: Will remain free from falls  Description: Will remain free from falls  1/4/2022 1151 by Brandon Mendez RN  Outcome: Met This Shift  1/3/2022 2209 by Vinnie Tobar RN  Outcome: Ongoing  Goal: Absence of physical injury  Description: Absence of physical injury  1/4/2022 1151 by Brandon Mendez RN  Outcome: Met This Shift  1/3/2022 2209 by Vinnie Tobar RN  Outcome: Ongoing     Problem: Breathing Pattern - Ineffective:  Goal: Ability to achieve and maintain a regular respiratory rate will improve  Description: Ability to achieve and maintain a regular respiratory rate will improve  1/4/2022 1151 by Brandon Mendez RN  Outcome: Met This Shift  1/3/2022 2209 by Vinnie Tobar RN  Outcome: Ongoing     Problem: Coping:  Goal: Ability to remain calm will improve  Description: Ability to remain calm will improve  Outcome: Met This Shift     Problem: Safety:  Goal: Ability to remain free from injury will improve  Description: Ability to remain free from injury will improve  Outcome: Met This Shift     Problem: Pain:  Goal: Pain level will decrease  Description: Pain level will decrease  Outcome: Ongoing  Goal: Control of acute pain  Description: Control of acute pain  Outcome: Ongoing  Goal: Control of chronic pain  Description: Control of chronic pain  Outcome: Ongoing     Problem: Skin Integrity:  Goal: Will show no infection signs and symptoms  Description: Will show no infection signs and symptoms  Outcome: Ongoing  Goal: Absence of new skin breakdown  Description: Absence of new skin breakdown  Outcome: Ongoing     Problem: Self-Care:  Goal: Ability to participate in self-care as condition permits will improve  Description: Ability to participate in self-care as condition permits will improve  Outcome: Ongoing

## 2022-01-04 NOTE — PROGRESS NOTES
Physical Therapy  Facility/Department: [facility] MED SURG  Daily Treatment Note  NAME: Dennis Morales  : 1945  MRN: [id number]    Date of Service: 2022    Discharge Recommendations:  Continue to assess pending progress      Assessment   Assessment: Cotreated with OT. Patient completed bed mobility tasks with Mod I.  Stood from bedside with SBA and ambulated 50 feet x 3 with RW, CGA, and w/c follow. Patient declined sitting up in a chair and requested to go back to bed. Patient transferred back to bed and was left sitting up in bed. REQUIRES PT FOLLOW UP: Yes  Activity Tolerance  Activity Tolerance: Patient Tolerated treatment well     Patient Diagnosis(es): The primary encounter diagnosis was Ectopic atrial tachycardia ([address] Diagnoses of Pneumonia of both lungs due to infectious organism, unspecified part of lung, Acute UTI, and Herpes zoster without complication were also pertinent to this visit. has a past medical history of Atrial fibrillation ([address] Cardiomyopathy ([address] Cataract, GERD (gastroesophageal reflux disease), History of DVT (deep vein thrombosis), Hyperlipidemia, Hypertension, LONG TERM ANTICOAGULENT USE, Osteoarthritis, and Unspecified sleep apnea. has a past surgical history that includes Cholecystectomy; Spine surgery; Ankle surgery; Hand surgery; Inner ear surgery; Nose surgery; Knee arthroscopy; hip surgery; eye surgery; Venous clot surgery (); knee surgery (11); and fracture surgery (Left). Restrictions  Restrictions/Precautions  Restrictions/Precautions: General Precautions,Fall Risk  Required Braces or Orthoses?: No  Subjective   General  Chart Reviewed: Yes  Response To Previous Treatment: Patient with no complaints from previous session. Family / Caregiver Present: No  Subjective  Subjective: Patient reports feeling better today.   Pain Screening  Patient Currently in Pain: Denies  Vital Signs  Patient Currently in Pain: Denies       Orientation    Objective Bed mobility  Rolling to Left: Modified independent  Supine to Sit: Modified independent  Sit to Supine: Modified independent  Scooting: Modified independent  Transfers  Sit to Stand: Stand by assistance  Stand to sit: Stand by assistance  Ambulation  Ambulation?: Yes  Ambulation 1  Surface: level tile  Device: Rolling Walker  Other Apparatus: Wheelchair follow  Assistance: Contact guard assistance;2 Person assistance  Distance: 50 feet x 3     Balance  Posture: Fair  Sitting - Static: Good  Sitting - Dynamic: Good  Standing - Static: Fair;+  Standing - Dynamic: Fair      Goals  Long term goals  Time Frame for Long term goals : 3-4 days  Long term goal 1: Achieve basic transfers with modified independence. Long term goal 2: Ambulate 50 feet with RW with SBA x 1 with good safety awareness. Plan    Plan  Times per week: 3-4 days  Plan weeks: 3-4 days  Current Treatment Recommendations: Strengthening,Neuromuscular Re-education,Home Exercise Program,Safety Education & Training,Balance Training,Functional Mobility Training,Transfer Training,Gait Training,Patient/Caregiver Education & Training  Safety Devices  Type of devices: Left in bed,Call light within reach     Therapy Time   Individual Concurrent Group Co-treatment   Time In 1034         Time Out 1100         Minutes 26              This note serves as D/C summary if patient is discharged prior to next visit.   Mesha Robles, PTA

## 2022-01-04 NOTE — PROGRESS NOTES
Progress Note      Subjective:   Chief complaint: Nausea, fatigue    Interval History:   No acute events over night. Still in [address] on telemetry. Having urine output. Tolerating oral intake. Denies CP, palpitations, SOB, vomiting. Feeling little better but still feels fatigue, weakness. Agreeable to rehab/swing- following. Review of systems:     Constitutional:  Denies fever or chills. +fatigue, generalized weakness  Eyes:  Denies change in visual acuity or discharge. HENT:  Denies nasal congestion or sore throat. Respiratory:  Denies cough or shortness of breath. Cardiovascular:  Denies chest pain, palpitation or swelling in LEs. GI:  Denies abdominal pain, nausea, vomiting, bloody stools or diarrhea. :  Denies dysuria or frequency. Musculoskeletal:  Denies acute back pain or joint pain. Integument:  Denies rash or itching. Neurologic:  Denies headache, focal weakness or sensory changes. Psychiatric:  Denies depression or anxiety. Past medical history, surgical history, family history and social history reviewed and unchanged compared to H&P earlier this admission.     Medications:   Scheduled Meds:   dilTIAZem  120 mg Oral Daily    magnesium oxide  400 mg Oral Daily    cefTRIAXone (ROCEPHIN) IV  1,000 mg IntraVENous Q24H    cetirizine  10 mg Oral Daily    metoprolol tartrate  50 mg Oral BID    gabapentin  300 mg Oral TID    valACYclovir  1,000 mg Oral TID    pantoprazole  40 mg Oral QAM AC    apixaban  5 mg Oral BID    aspirin  81 mg Oral Daily    atorvastatin  20 mg Oral Nightly    finasteride  5 mg Oral Daily    potassium chloride  10 mEq Oral Daily    tamsulosin  0.4 mg Oral Daily     Continuous Infusions:   sodium chloride Stopped (12/29/21 0900)    sodium chloride Stopped (12/30/21 0130)       Objective:     Vital Signs  Temp: 98.6 °F (37 °C)  Pulse: 85  Resp: 18  BP: 120/61  SpO2: 96 %  O2 Device: None (Room air)      Vital signs reviewed in electronic charts. Physical exam    Constitutional:  Well developed, well nourished, no acute distress. Eyes:  PERRL, conjunctiva normal, EOMI. HENT:  Atraumatic, external ears normal, external nose/nares normal, oropharynx moist, no pharyngeal exudates. Neck:  Supple. No JVD. Respiratory: RA. No respiratory distress, normal breath sounds, no rales, no wheezing. Cardiovascular:  Normal rate, normal rhythm, no murmurs, no gallops, no rubs. GI:  Soft, nondistended, normal bowel sounds, nontender, no organomegaly, no mass. Musculoskeletal:  No edema, no tenderness, no obvious deformities. Patient is moving all extremities. Integument:  Well hydrated, no rash. Neurologic:  Alert & oriented x 3,  no focal deficits noted. Strength is equal throughout. Psychiatric:  Speech and behavior appropriate. Results:     Lab Results   Component Value Date    WBC 4.2 01/04/2022    HGB 10.7 (L) 01/04/2022    HCT 32.5 (L) 01/04/2022    MCV 87.1 01/04/2022     01/04/2022       Lab Results   Component Value Date     01/04/2022    K 3.7 01/04/2022    K 4.0 01/01/2022     01/04/2022    CO2 22 01/04/2022    BUN 6 01/04/2022    CREATININE 0.9 01/04/2022    GLUCOSE 112 01/04/2022    CALCIUM 7.8 01/04/2022        CT Head WO Contrast   Final Result      1. No acute intracranial abnormality. 2. Extensive nonspecific periventricular and subcortical white matter hypodensity compatible with chronic small vessel ischemic disease and/or age related degenerative change. CT ABDOMEN PELVIS WO CONTRAST Additional Contrast? None   Final Result   1. No acute intra-abdominopelvic abnormality. 2. Sigmoid diverticulosis without diverticulitis   3. Multifocal patchy peripheral pulmonary airspace disease with improvement noted in the region of the lingula and right lower lobe and new airspace disease in the right middle lobe and lateral basal segment left lower lobe   4.  Spondylosis with severe facet arthropathy and severe central canal stenosis at L4-L5         EKG: a flutter 70-80s    ECHO 10/2020:  Summary   The patient appears to be in atrial flutter throughout the procedure. Normal LV systolic function and wall motion. LVEF estimated at 50%. Abnormal   diastolic function. The mitral valve leaflets appear normal. No prolapse or stenosis. Trace   mitral regurgitation. The aortic valve is sclerotic and probably trileaflet. There is borderline   aortic stenosis and trace insufficiency. Mean gradient 9 mmHg. ALEAH calc 1.9   cm2. The tricuspid valve appears structurally normal. There is trace tricuspid   regurgitation. Assessment and Plan: Active Hospital Problems    Diagnosis Date Noted    PAF (paroxysmal atrial fibrillation) ([address] [I48.0]  -10/2020 ECHO EF 50-55% (see full report above). Followed by Dr Renard Tang cardiology. Last appt 11/2021.  -repeat ECHO 12/31/21 preliminary EF 50-55%. -Continue home medications cardizem, metoprolol, eliquis. -IV dig d/c 1/3, can do PRN. -Inpatient consult to cardiology   -cont telemetry. -TSH WNL, dig level WNL. 01/03/2022    Hypomagnesemia [E83.42]  -1/3 mag 1.3. Replace 1000mg IV. Add mag ox daily. Repeat mag in AM lab. -replaced and cont monitor. 01/03/2022    Shingles [B02.9]  -noted during hospital stay. Cont valtrex. 12/31/2021    UTI (urinary tract infection) [N39.0]  -UA indicative UTI. -Cont IV rocephin   12/31/2021    Atrial flutter ([facility] [I48.92]  -see PAF   05/10/2012    Deep vein thrombosis (HCC) [I82.409]  History. On eliquis. 05/10/2012    Essential hypertension [I10]  -stable. Cont cardizem, BB. Adjust as needed. 01/09/2012   GI prophylaxis      Patient was seen and examined by Dr. Renea Henao and plan of care reviewed.        Electronically signed by SANTIAGO Ramirez - CNP on 1/4/2022 at 12:12 PM

## 2022-01-04 NOTE — FLOWSHEET NOTE
01/04/22 0800   Assessment   Charting Type Shift assessment   Neurological   Neuro (WDL) WDL   Level of Consciousness Alert (0)   Orientation Level Oriented X4   Cognition Follows commands; Appropriate judgement; Appropriate safety awareness   Language Clear   R Hand  Strong   L Hand  Strong   [place] Coma Scale   Eye Opening 4   Best Verbal Response 5   Best Motor Response 6   Bang Coma Scale Score 15   HEENT   HEENT (WDL) WDL   Respiratory   Respiratory (WDL) WDL   Respiratory Pattern Regular   Respiratory Depth Normal   Respiratory Quality/Effort Unlabored   Chest Assessment Chest expansion symmetrical   L Breath Sounds Clear;Diminished   R Breath Sounds Clear;Diminished   Breath Sounds   Right Upper Lobe Clear   Right Middle Lobe Clear   Right Lower Lobe Diminished   Left Upper Lobe Clear   Left Lower Lobe Diminished   Cardiac   Cardiac (WDL) X   Cardiac Regularity Irregular   Heart Sounds S1, S2   Cardiac Rhythm A flutter   Cardiac Monitor   Telemetry Monitor On Yes   Telemetry Audible Yes   Telemetry Alarms Set Yes   Telemetry Box Number    Gastrointestinal   Abdominal (WDL) WDL   Peripheral Vascular   Peripheral Vascular (WDL) WDL   Edema None   Skin Color/Condition   Skin Color/Condition (WDL) X   Skin Color Pale   Skin Condition/Temp Warm;Dry;Flaky   Skin Integrity   Skin Integrity (WDL) X   Skin Integrity Abrasion   Location rt hip   Preventative Dressing Yes   Skin Integrity Site 2   Skin Integrity Location 2 Redness   Location 2 lt hip   Preventative Dressing Yes   Musculoskeletal   Musculoskeletal (WDL) X   RUE Full movement   LUE Full movement   RL Extremity Weakness; Unsteady   LL Extremity Weakness; Unsteady   Genitourinary   Genitourinary (WDL) WDL   Urine Assessment   Incontinence Yes   Urine Color Yellow/straw   Urine Appearance SIVAN   Urine Odor SIVAN   Psychosocial   Psychosocial (WDL) WDL

## 2022-01-04 NOTE — PROGRESS NOTES
Occupational Therapy  Facility/Department: [facility] MED SURG  Daily Treatment Note  NAME: Abby Stover  : 1945  MRN: [id number]    Date of Service: 2022    Discharge Recommendations:  Continue to assess pending progress       Assessment   Assessment: Pt agreeable to OT services. Co tx with PTA. Pt come to sit at EOB without difficulty. Pt brief soiled. Pt donned LB clothing seated at EOB with SBA. Pt transferred to standing with CGA. Pt applied deoderant and combed hair with CARMONA. pt ambulated 50 feet x3 trials. Pt declined to attempt to transfer to chair for lunch and requested to return to bed. Pt left with call light in reach. Patient Diagnosis(es): The primary encounter diagnosis was Ectopic atrial tachycardia ([address] Diagnoses of Pneumonia of both lungs due to infectious organism, unspecified part of lung, Acute UTI, and Herpes zoster without complication were also pertinent to this visit. has a past medical history of Atrial fibrillation ([address] Cardiomyopathy ([address] Cataract, GERD (gastroesophageal reflux disease), History of DVT (deep vein thrombosis), Hyperlipidemia, Hypertension, LONG TERM ANTICOAGULENT USE, Osteoarthritis, and Unspecified sleep apnea. has a past surgical history that includes Cholecystectomy; Spine surgery; Ankle surgery; Hand surgery; Inner ear surgery; Nose surgery; Knee arthroscopy; hip surgery; eye surgery; Venous clot surgery (); knee surgery (11); and fracture surgery (Left).     Restrictions  Restrictions/Precautions  Restrictions/Precautions: General Precautions,Fall Risk  Required Braces or Orthoses?: No  Subjective   General  Chart Reviewed: Yes  Patient assessed for rehabilitation services?: Yes  Family / Caregiver Present: Yes  Referring Practitioner: Kevin Dorsey MD  Diagnosis: Atrial Flutter  Subjective  Subjective: Pt states, \" I think I fell before I came to the hospital. I think thats why I came in\"      Orientation     Objective ADL  Grooming: Setup  LE Dressing: Stand by assistance        Functional Mobility  Functional - Mobility Device: Rolling Walker  Assist Level: Contact guard assistance  Functional Mobility Comments: 50 feet, 50 feet, 50 feet     Transfers  Stand Step Transfers: Contact guard assistance  Sit to stand: Contact guard assistance        Plan   Plan  Times per week: 3-5  Times per day: Daily  Current Treatment Recommendations: Strengthening,Functional Mobility Training,Endurance Training,Balance Training,Patient/Caregiver Education & Training,Self-Care / ADL,Neuromuscular Re-education    Goals  Short term goals  Time Frame for Short term goals: 1 week  Short term goal 1: Pt to complete ADL transfers with MOD I using RW as needed for support. Short term goal 2: Pt to complete UB/LB dressing with MOD I. Short term goal 3: Pt to increase functional activity tolerance x15 minutes with no s/s of fatigue. Short term goal 4: Pt to complete toileting with MOD I. Short term goal 5: Pt to complete bathing with MOD I. Therapy Time   Individual Concurrent Group Co-treatment   Time In 1034         Time Out 1100         Minutes 26              This note serves as a DC summary in the event of pt discharge.      Amanda Wyman OTR/L

## 2022-01-05 PROBLEM — R53.81 DECLINING FUNCTIONAL STATUS: Status: ACTIVE | Noted: 2022-01-01

## 2022-01-05 NOTE — PROGRESS NOTES
New iv to left hand, 22 ga, pt tolerated without complaint, ativan 0.5mg iv admin, pt stated he was giving up and going to just lay down, assisted pt with positioning, pt compliant  At this time

## 2022-01-05 NOTE — DISCHARGE SUMMARY
Discharge Summary      Patient ID: Shaan Plata      Patient's PCP: Temo Aleman MD    Admit Date: 12/28/2021     Discharge Date:  1/5/2022    Admitting Provider: Temo Aleman MD    Discharging Provider: SANTIAGO Hilliard CNP     Reason for this admission:   Nausea, fatigue, falls  Atrial Flutter    Discharge Diagnoses: Active Hospital Problems    Diagnosis Date Noted    PAF (paroxysmal atrial fibrillation) ([facility] [I48.0] 01/03/2022    Hypomagnesemia [E83.42] 01/03/2022    Shingles [B02.9] 12/31/2021    UTI (urinary tract infection) [N39.0] 12/31/2021    Atrial flutter (HCC) [I48.92] 05/10/2012    Deep vein thrombosis (HCC) [I82.409] 05/10/2012    Essential hypertension [I10] 01/09/2012       Procedures:  none    Consults:   IP CONSULT TO CASE MANAGEMENT  IP CONSULT TO CARDIOLOGY  IP CONSULT TO DIETITIAN      Briefly: This is 64year old male PMH PAF, HTN, HLD who presented to ED for c/o nausea, fatigue. Found to be in atrial flutter, afib rvr. Followed by Dr Scott Kelly cardiology for hx afib/flutter. During stay, required IV cardizem gtt for few days to control rate. Today, HR more controlled and now on oral cardizem, BB. Patient sundowning, dementia and declining functional status. Being transitioned to swing bed for further medical management and rehab. Hospital Course: Active Hospital Problems     Diagnosis Date Noted    PAF (paroxysmal atrial fibrillation) ([facility] [I48.0]  -10/2020 ECHO EF 50-55% (see full report above). Followed by Dr Batool Loco cardiology. Last appt 11/2021.  -repeat ECHO 12/31/21 preliminary EF 50-55%. -Continue home medications cardizem, metoprolol, eliquis. -IV dig d/c 1/3, can do PRN. -Inpatient consult to cardiology   -cont telemetry. -TSH WNL, dig level WNL.    01/03/2022    Hypomagnesemia [E83.42]  -1/3 mag 1.3. Replace 1000mg IV. Add mag ox daily. Repeat mag in AM lab.    -1/4 mag 1.6. replaced and cont monitor.     01/03/2022    Shingles [B02.9]  -noted during hospital stay. Resolved. Stop regimen.     12/31/2021    UTI (urinary tract infection) [N39.0]  -UA indicative UTI. -Cont IV rocephin    12/31/2021    Atrial flutter ([address] [I48.92]  -see PAF    05/10/2012    Essential hypertension [I10]  -stable. Cont cardizem, BB. Adjust as needed. 01/09/2012   · Declining functional status  Manage all aspects of care from chronic to acute. D/c to swing bed for further med management and rehab with his dementia, hx falls and remaining weak. Physical and occupational therapy following and working on improving overall strength and endurance. Continue PT/OT. Monitor lab work. -IV bumex 1 time today. Disposition: swing bed  Discharged Condition: Stable    Vital Signs  Temp: 98.2 °F (36.8 °C)  Pulse: 50  Resp: 16  BP: 106/85  SpO2: 98 %  O2 Device: None (Room air)      Vital signs reviewed in electronic chart. Physical exam    Constitutional:  Well developed, well nourished, no acute distress. Eyes:  PERRL, conjunctiva normal, EOMI. HENT:  Atraumatic, external ears normal, external nose/nares normal, oropharynx moist, no pharyngeal exudates. Neck:  Supple. No JVD. Respiratory: RA. No respiratory distress, normal breath sounds, no rales, no wheezing. Cardiovascular:  Normal rate, no murmurs, no gallops, no rubs. GI:  Soft, nondistended, normal bowel sounds, nontender, no organomegaly, no mass. Musculoskeletal:  Trace edema BLE. No tenderness, no obvious deformities. Patient is moving all extremities. Integument:  Well hydrated, no rash. Neurologic:  Alert & oriented x 3,  no focal deficits noted. Strength is equal throughout. Psychiatric:  Speech and behavior appropriate. Labs:  For convenience and continuity at follow-up the following most recent labs are provided:    CBC:   Lab Results   Component Value Date    WBC 4.0 01/05/2022    HGB 9.8 01/05/2022    HCT 29.9 01/05/2022     01/05/2022       RENAL:   Lab Results Component Value Date     01/05/2022    K 3.6 01/05/2022     01/05/2022    CO2 23 01/05/2022    BUN 6 01/05/2022    CREATININE 0.8 01/05/2022     CT Head WO Contrast   Final Result      1. No acute intracranial abnormality. 2. Extensive nonspecific periventricular and subcortical white matter hypodensity compatible with chronic small vessel ischemic disease and/or age related degenerative change. CT ABDOMEN PELVIS WO CONTRAST Additional Contrast? None   Final Result   1. No acute intra-abdominopelvic abnormality. 2. Sigmoid diverticulosis without diverticulitis   3. Multifocal patchy peripheral pulmonary airspace disease with improvement noted in the region of the lingula and right lower lobe and new airspace disease in the right middle lobe and lateral basal segment left lower lobe   4.  Spondylosis with severe facet arthropathy and severe central canal stenosis at L4-L5          Discharge Medications:     Discharge Medication List as of 1/5/2022  1:26 PM           Details   loratadine (CLARITIN) 10 MG capsule Take 10 mg by mouth dailyHistorical Med      spironolactone (ALDACTONE) 25 MG tablet TAKE 1 TABLET BY MOUTH DAILY, Disp-30 tablet, R-3Normal      hydroCHLOROthiazide (MICROZIDE) 12.5 MG capsule TAKE ONE CAPSULE BY MOUTH ONCE DAILY, Disp-90 capsule, R-3Normal      metoprolol tartrate (LOPRESSOR) 50 MG tablet TAKE 1 TABLET BY MOUTH 2 TIMES DAILY, Disp-60 tablet, R-3Normal      dilTIAZem (CARDIZEM CD) 120 MG extended release capsule TAKE 1 CAPSULE BY MOUTH DAILY, Disp-30 capsule, R-3Normal      apixaban (ELIQUIS) 5 MG TABS tablet TAKE 1 TABLET BY MOUTH EVERY 12 HOURS, Disp-60 tablet, R-3Normal      atorvastatin (LIPITOR) 20 MG tablet TAKE ONE TABLET BY MOUTH ONCE DAILY, Disp-90 tablet, R-3Normal      finasteride (PROSCAR) 5 MG tablet TAKE 1 TABLET BY MOUTH DAILY, Disp-90 tablet, R-3Normal      tamsulosin (FLOMAX) 0.4 MG capsule TAKE ONE CAPSULE BY MOUTH ONCE DAILY, Disp-90 capsule, R-3Normal      potassium chloride (KLOR-CON) 10 MEQ extended release tablet TAKE ONE TABLET BY MOUTH ONCE DAILY, Disp-90 tablet, R-1Normal      losartan (COZAAR) 100 MG tablet TAKE 1 TABLET BY MOUTH DAILY, Disp-90 tablet, R-5Normal      aspirin 81 MG tablet Take 81 mg by mouth daily. Patient was seen and examined by Dr. Mahmood Dates and plan of care reviewed. Signed:  Electronically signed by SANTIAGO Pagan CNP on 1/5/2022 at 12:22 PM       Thank you Kade Pope MD for the opportunity to be involved in this patient's care. If you have any questions or concerns please feel free to contact me at [phone].

## 2022-01-05 NOTE — FLOWSHEET NOTE
01/04/22 2338   Assessment   Charting Type Shift assessment   Neurological   Neuro (WDL) WDL   Level of Consciousness Alert (0)   Orientation Level Oriented X4   Cognition Follows commands; Appropriate judgement; Appropriate safety awareness   Language Clear   R Hand  Strong   NIHSS Stroke Scale Assessed No   Bang Coma Scale   Eye Opening 4   Best Verbal Response 5   Best Motor Response 6   [place] Coma Scale Score 15   HEENT   HEENT (WDL) WDL   Respiratory   Respiratory (WDL) WDL   Respiratory Pattern Regular   Respiratory Depth Normal   Respiratory Quality/Effort Unlabored   Chest Assessment Chest expansion symmetrical   L Breath Sounds Clear;Diminished   R Breath Sounds Clear;Diminished   Breath Sounds   Right Upper Lobe Clear   Right Middle Lobe Clear   Right Lower Lobe Diminished   Left Upper Lobe Clear   Left Lower Lobe Diminished   Cardiac   Cardiac (WDL) X   Cardiac Regularity Irregular   Heart Sounds S1, S2   Cardiac Rhythm A flutter   Cardiac Monitor   Telemetry Monitor On Yes   Telemetry Audible Yes   Telemetry Alarms Set Yes   Telemetry Box Number    Gastrointestinal   Abdominal (WDL) WDL   Last BM (including prior to admit) 01/04/22   Peripheral Vascular   Peripheral Vascular (WDL) WDL   Edema None   Skin Color/Condition   Skin Color/Condition (WDL) X   Skin Color Pale   Skin Condition/Temp Warm;Dry;Flaky   Skin Integrity   Skin Integrity (WDL) X   Skin Integrity Abrasion   Location rt hip   Preventative Dressing Yes   Multiple Skin Integrity Sites Yes   Assessed this shift? Yes   Skin Integrity Site 2   Skin Integrity Location 2 Redness   Location 2 lt hip   Preventative Dressing Yes   Assessed this shift? Yes   Musculoskeletal   Musculoskeletal (WDL) X   RUE Full movement   LUE Full movement   RL Extremity Weakness; Unsteady   LL Extremity Weakness; Unsteady   Genitourinary   Genitourinary (WDL) WDL   Urine Assessment   Incontinence Yes   Urine Color Yellow/straw   Psychosocial Psychosocial (WDL) WDL

## 2022-01-05 NOTE — CARE COORDINATION
Pt was approved for SWB. Auth# [id number]  [facility] reference # [id number]. NP notified. Will change to swigbed today. Update due on 01/07/22 to GARY Schneider by fax.

## 2022-01-05 NOTE — PROGRESS NOTES
Physical Therapy  Facility/Department: [facility] MED [facility]G  Daily Treatment Note  NAME: Abby Stover  : 1945  MRN: [id number]    Date of Service: 2022    Discharge Recommendations:      Assessment   Assessment: Patient was seen for BID/PT session. Mod I with bed mobility. Stood with SBA and ambulated 60 feet x 2 with RW and CGA of 2. Patient declined to sit up in a chair and returned to bed after ambulation. REQUIRES PT FOLLOW UP: Yes  Activity Tolerance  Activity Tolerance: Patient Tolerated treatment well     Patient Diagnosis(es): There were no encounter diagnoses. has a past medical history of Atrial fibrillation ([address] Cardiomyopathy ([address] Cataract, GERD (gastroesophageal reflux disease), History of DVT (deep vein thrombosis), Hyperlipidemia, Hypertension, LONG TERM ANTICOAGULENT USE, Osteoarthritis, and Unspecified sleep apnea. has a past surgical history that includes Cholecystectomy; Spine surgery; Ankle surgery; Hand surgery; Inner ear surgery; Nose surgery; Knee arthroscopy; hip surgery; eye surgery; Venous clot surgery (); knee surgery (11); and fracture surgery (Left). Restrictions  Restrictions/Precautions  Restrictions/Precautions: General Precautions,Fall Risk  Required Braces or Orthoses?: No  Subjective   General  Chart Reviewed: Yes  Response To Previous Treatment: Patient with no complaints from previous session. Family / Caregiver Present: No  Subjective  Subjective: Patient agreeable to ambulation. Offers no new c/o.         Objective   Bed mobility  Rolling to Left: Modified independent  Rolling to Right: Modified independent  Supine to Sit: Modified independent  Sit to Supine: Modified independent  Scooting: Modified independent  Transfers  Sit to Stand: Stand by assistance  Stand to sit: Stand by assistance  Ambulation  Ambulation?: Yes  Ambulation 1  Surface: level tile  Device: Rolling Walker  Assistance: Contact guard assistance;2 Person assistance  Distance: 60 feet x 2     Balance  Posture: Fair  Sitting - Static: Good  Sitting - Dynamic: Good  Standing - Static: Fair;+  Standing - Dynamic: Fair      Goals     Achieve basic transfers with modified independence. Ambulate 50 feet with RW with SBA x 1 with good safety awareness. Plan    Safety Devices  Type of devices: Left in bed,Call light within reach,Bed alarm in place     Therapy Time   Individual Concurrent Group Co-treatment   Time In 1432         Time Out 1500         Minutes 28              This note serves as D/C summary if patient is discharged prior to next visit.   Fatemeh Silverio, PTA

## 2022-01-05 NOTE — PROGRESS NOTES
Patient trying to get up out of bed, arguing to leave facility, 'I have somewhere I need to go to', im at the [facility]' and 'have something to do', staff attempted to talk with patient and explain it was night time and he was at the hospital, pt kept saying he had somewhere to go and he was not staying here, md notified of patient

## 2022-01-05 NOTE — PROGRESS NOTES
Noted.  Another alternative is if one our providers is willing to help pt by doing the ultrasound next week. Occupational Therapy   Occupational Therapy Initial Assessment  Date: 2022   Patient Name: Fauzia Ansari  MRN: [id number]     : 1945    Date of Service: 2022    Discharge Recommendations:  Continue to assess pending progress  OT Equipment Recommendations  Equipment Needed: No    Assessment   Performance deficits / Impairments: Decreased functional mobility ; Decreased safe awareness;Decreased ADL status; Decreased endurance;Decreased strength;Decreased balance;Decreased high-level IADLs  Assessment: Pt agreeable to OT services. Pt come to sit at EOB with MOD I. Pt transferred to standing with SBA. Pt ambulated 60 feet with seated rest break. pt fatigued upon reaching chair in hallway. Pt had rest break and ambulated 60 feet back to room. Pt declined to transfer to chair, completed shaving in bathroom or to complete BUE ther ex at this time. Pt left in bed with call light in reach and alarm on. Pt will benefit from skilled OT services while IP to improve independence with ADL;s.  Prognosis: Good  Decision Making: Low Complexity  REQUIRES OT FOLLOW UP: Yes  Activity Tolerance  Activity Tolerance: Patient limited by fatigue           Patient Diagnosis(es): There were no encounter diagnoses. has a past medical history of Atrial fibrillation ([facility] Cardiomyopathy ([address] Cataract, GERD (gastroesophageal reflux disease), History of DVT (deep vein thrombosis), Hyperlipidemia, Hypertension, LONG TERM ANTICOAGULENT USE, Osteoarthritis, and Unspecified sleep apnea. has a past surgical history that includes Cholecystectomy; Spine surgery; Ankle surgery; Hand surgery; Inner ear surgery; Nose surgery; Knee arthroscopy; hip surgery; eye surgery; Venous clot surgery (); knee surgery (11); and fracture surgery (Left).            Restrictions  Restrictions/Precautions  Restrictions/Precautions: General Precautions,Fall Risk  Required Braces or Orthoses?: No    Subjective   General  Chart Reviewed: Yes  Patient assessed for rehabilitation services?: Yes  Family / Caregiver Present: Yes  Referring Practitioner: Mia Schilder, CNP  Diagnosis: Atrial Flutter  Subjective  Subjective: I had a hard time last night. I woke up from a bad dream and got confused  Patient Currently in Pain: Denies  Vital Signs  Patient Currently in Pain: Denies  Social/Functional History  Social/Functional History  Lives With: Spouse  Type of Home: House  Home Layout: Two level  Home Access: Stairs to enter with rails  Entrance Stairs - Number of Steps: 1  Bathroom Shower/Tub: Tub/Shower unit  Bathroom Toilet: Standard  Bathroom Equipment: 3-in-1 commode  Bathroom Accessibility: Walker accessible  Home Equipment: Rolling walker,Wheelchair-manual  ADL Assistance: Independent  Homemaking Responsibilities: No  Ambulation Assistance: Independent  Transfer Assistance: Independent  Active : Yes       Objective   Vision: Impaired  Vision Exceptions: Wears glasses for reading  Hearing: Exceptions to [facility]  Hearing Exceptions: Hard of hearing/hearing concerns;Right hearing aid    Orientation  Orientation Level: Oriented to person;Oriented to situation;Disoriented to place; Disoriented to time  Observation/Palpation  Posture: Fair  Observation: Patient presents awake in bed, NAD  Balance  Sitting Balance: Stand by assistance  Standing Balance: Contact guard assistance  Functional Mobility  Functional - Mobility Device: Rolling Walker  Assist Level: Contact guard assistance  Functional Mobility Comments: 60 feet x2 with RW CGA x2     Tone RUE  RUE Tone: Normotonic  Tone LUE  LUE Tone: Normotonic  Coordination  Movements Are Fluid And Coordinated: Yes     Bed mobility  Supine to Sit: Modified independent  Sit to Supine: Modified independent  Scooting: Modified independent  Transfers  Stand Pivot Transfers: Contact guard assistance  Sit to stand: Contact guard assistance     Cognition  Cognition Comment: mild confusion, STM deficits, decreased safety awareness                 LUE AROM (degrees)  LUE AROM : WFL  RUE AROM (degrees)  RUE AROM : WFL  LUE Strength  L Shoulder Flex: 4-/5  L Elbow Flex: 4+/5  L Elbow Ext: 4/5  L Hand General: 4/5  RUE Strength  R Shoulder Flex: 4-/5  R Elbow Flex: 4+/5  R Elbow Ext: 4/5  R Hand General: 4/5                   Plan   Plan  Times per week: 3-5  Times per day: Daily  Plan weeks: 1-2  Current Treatment Recommendations: Strengthening,Functional Mobility Training,Endurance Training,Balance Training,Patient/Caregiver Education & Training,Self-Care / ADL,Neuromuscular Re-education    Goals  Short term goals  Time Frame for Short term goals: 2 weeks  Short term goal 1: Pt to complete ADL transfers with MOD I using RW as needed for support. Short term goal 2: Pt to complete UB/LB dressing with MOD I. Short term goal 3: Pt to increase functional activity tolerance x15 minutes with no s/s of fatigue. Short term goal 4: Pt to complete toileting with MOD I. Short term goal 5: Pt to complete bathing with MOD I. Therapy Time   Individual Concurrent Group Co-treatment   Time In 0232         Time Out 0300         Minutes 28              This note serves as a DC summary in the event of pt discharge.      Amanda Wyman, OTR/L

## 2022-01-05 NOTE — PROGRESS NOTES
1/6/5713    I certify that the skilled nursing and/or rehabilitation services are required to be given on a daily basis, which as a practical matter can only be provided in a skilled nursing unit on an inpatient basis.     Electronically signed by Song Bernal RN on 1/5/2022 at 2:11 PM

## 2022-01-05 NOTE — PROGRESS NOTES
Physical Therapy  Facility/Department: [facility] [place] [facility] MED [facility]G  Daily Treatment Note  NAME: Yoni Bruno  : 1945  MRN: [id number]    Date of Service: 2022    Discharge Recommendations:  Continue to assess pending progress      Assessment   Assessment: Patient awake in bed. Reports the cup on his table has urine in it. States nobody came and he couldn't find the urinal.  It was on the table beside the cup. Patient Mod I with bed mobility. Required SBA to change wet brief and gown. Bed pad replaced. Patient able to stand at bedside with SBA to pull up brief. Patient performed B LE therex in sitting. Patient declined gait and sitting up in a chair and requested to lie back down. REQUIRES PT FOLLOW UP: Yes  Activity Tolerance  Activity Tolerance: Patient Tolerated treatment well     Patient Diagnosis(es): The primary encounter diagnosis was Ectopic atrial tachycardia ([address] Diagnoses of Pneumonia of both lungs due to infectious organism, unspecified part of lung, Acute UTI, and Herpes zoster without complication were also pertinent to this visit. has a past medical history of Atrial fibrillation ([address] Cardiomyopathy ([address] Cataract, GERD (gastroesophageal reflux disease), History of DVT (deep vein thrombosis), Hyperlipidemia, Hypertension, LONG TERM ANTICOAGULENT USE, Osteoarthritis, and Unspecified sleep apnea. has a past surgical history that includes Cholecystectomy; Spine surgery; Ankle surgery; Hand surgery; Inner ear surgery; Nose surgery; Knee arthroscopy; hip surgery; eye surgery; Venous clot surgery (); knee surgery (11); and fracture surgery (Left). Restrictions  Restrictions/Precautions  Restrictions/Precautions: General Precautions,Fall Risk  Required Braces or Orthoses?: No  Subjective   General  Chart Reviewed: Yes  Response To Previous Treatment: Patient with no complaints from previous session.   Family / Caregiver Present: No  Subjective  Subjective: Patient offers no new c/o. Reports urinating in a cup because he couldn't find the urinal which was beside the cup. Pain Screening  Patient Currently in Pain: Denies  Vital Signs  Patient Currently in Pain: Denies       Objective   Bed mobility  Rolling to Left: Modified independent  Rolling to Right: Modified independent  Supine to Sit: Modified independent  Sit to Supine: Modified independent  Scooting: Modified independent  Transfers  Sit to Stand: Stand by assistance  Stand to sit: Stand by assistance        Balance  Posture: Fair  Sitting - Static: Good  Sitting - Dynamic: Good  Standing - Static: Fair;+  Exercises  Hip Flexion: 15  Hip Abduction: 15  Knee Long Arc Quad: 15  Ankle Pumps: 15      Goals  Long term goals  Time Frame for Long term goals : 3-4 days  Long term goal 1: Achieve basic transfers with modified independence. Long term goal 2: Ambulate 50 feet with RW with SBA x 1 with good safety awareness. Plan    Plan  Times per week: 3-4 days  Plan weeks: 3-4 days  Current Treatment Recommendations: Strengthening,Neuromuscular Re-education,Home Exercise Program,Safety Education & Training,Balance Training,Functional Mobility Training,Transfer Training,Gait Training,Patient/Caregiver Education & Training  Safety Devices  Type of devices: Left in bed,Call light within reach,Bed alarm in place     Therapy Time   Individual Concurrent Group Co-treatment   Time In 1027         Time Out 1058         Minutes 31              This note serves as D/C summary if patient is discharged prior to next visit.   Jocelyn Noel, PTA

## 2022-01-06 PROBLEM — I50.9 CHF (CONGESTIVE HEART FAILURE) (HCC): Status: ACTIVE | Noted: 2022-01-01

## 2022-01-06 NOTE — PROGRESS NOTES
Occupational Therapy  Daily Note  Date: 2022   Patient Name: Chase Hahn  MRN: [id number]     : 1945    Date of Service: 2022    Discharge Recommendations:  Continue to assess pending progress    Assessment  Performance deficits / Impairments: Decreased functional mobility ; Decreased safe awareness;Decreased ADL status; Decreased endurance;Decreased strength;Decreased balance;Decreased high-level IADLs    Pt seen for skilled OT services; no pain reported. Pt able to perform bed mobility & maintain seated balance EOB w Mod I. Pt ambulated 60ft 2x requiring CGA w RW w rest break. Pt able to perform BADLs of face washing & hair combing EOB w setup. Pt refused transfer to bedside chair; pt supine in bed, call bell in reach, bed alarm on, all needs met. Patient Diagnosis(es): There were no encounter diagnoses. has a past medical history of Atrial fibrillation ([address] Cardiomyopathy ([address] Cataract, GERD (gastroesophageal reflux disease), History of DVT (deep vein thrombosis), Hyperlipidemia, Hypertension, LONG TERM ANTICOAGULENT USE, Osteoarthritis, and Unspecified sleep apnea. has a past surgical history that includes Cholecystectomy; Spine surgery; Ankle surgery; Hand surgery; Inner ear surgery; Nose surgery; Knee arthroscopy; hip surgery; eye surgery; Venous clot surgery (); knee surgery (11); and fracture surgery (Left). Restrictions  Restrictions/Precautions: General Precautions,Fall Risk  Required Braces or Orthoses?: No    Subjective  Chart Reviewed: Yes  Patient assessed for rehabilitation services?: Yes  Family / Caregiver Present: Yes  Referring Practitioner: Darryl Almeida CNP  Diagnosis: Atrial Flutter  Subjective: I had a hard time last night.  I woke up from a bad dream and got confused  Patient Currently in Pain: Denies    Objective  Observation: Patient presents awake in bed, NAD, RA, pleasant and cooperative     Posture: Fair  Bed Mobility: Mod I  Sitting Balance: Good  Standing Balance: Fair  Functional Mobility: CGA w RW & w/c follow    BADLs: Setup<>SBA    Plan  Times per week: 3-5  Times per day: Daily  Plan weeks: 1-2  Current Treatment Recommendations: Strengthening,Functional Mobility Training,Endurance Training,Balance Training,Patient/Caregiver Education & Training,Self-Care / ADL,Neuromuscular Re-education    Goals  Short term goals  Time Frame for Short term goals: 2 weeks  Short term goal 1: Pt to complete ADL transfers with MOD I using RW as needed for support. Short term goal 2: Pt to complete UB/LB dressing with MOD I. Short term goal 3: Pt to increase functional activity tolerance x15 minutes with no s/s of fatigue. Short term goal 4: Pt to complete toileting with MOD I. Short term goal 5: Pt to complete bathing with MOD I. Therapy Time   Individual Co-treatment   Time In  851   Time Out  914   Minutes  23     This note srves as a DC summary in the event of pt discharge.    Sidney Boothe OTR/L

## 2022-01-06 NOTE — PROGRESS NOTES
Physical Therapy    Facility/Department: [facility] MED SURG  Initial Assessment - Swing Bed    NAME: Payton Krueger  : 1945  MRN: [id number]    Date of Service: 2022    Discharge Recommendations:  Continue to assess pending progress        Assessment   Assessment: Atrial flutter; general weakness, difficulty walking; patient will benefit from PT to help restore independent functional mobility. Treatment Diagnosis: Atrial flutter; general weakness, difficulty walking  Prognosis: Excellent  Decision Making: Low Complexity  REQUIRES PT FOLLOW UP: Yes  Activity Tolerance  Activity Tolerance: Patient Tolerated treatment well       Patient Diagnosis(es): There were no encounter diagnoses. has a past medical history of Atrial fibrillation ([address] Cardiomyopathy ([address] Cataract, GERD (gastroesophageal reflux disease), History of DVT (deep vein thrombosis), Hyperlipidemia, Hypertension, LONG TERM ANTICOAGULENT USE, Osteoarthritis, and Unspecified sleep apnea. has a past surgical history that includes Cholecystectomy; Spine surgery; Ankle surgery; Hand surgery; Inner ear surgery; Nose surgery; Knee arthroscopy; hip surgery; eye surgery; Venous clot surgery (); knee surgery (11); and fracture surgery (Left).     Restrictions  Restrictions/Precautions  Restrictions/Precautions: General Precautions,Fall Risk  Required Braces or Orthoses?: No  Vision/Hearing  Vision: Impaired  Vision Exceptions: Wears glasses for reading  Hearing: Exceptions to [facility]  Hearing Exceptions: Hard of hearing/hearing concerns;Right hearing aid     Subjective  General  Chart Reviewed: Yes  Patient assessed for rehabilitation services?: Yes  Response To Previous Treatment: Not applicable  Family / Caregiver Present: No  Referring Practitioner: Walter Oneil MD  Diagnosis: Atrial flutter; functional decline  Follows Commands: Within Functional Limits  Subjective  Subjective: Patient agreeable to consult; admitted to Swing Bed following Balance  Posture: Fair  Sitting - Static: Good  Sitting - Dynamic: Good  Standing - Static: Fair;+  Standing - Dynamic: [address]  Times per week: 4-5 x week  Plan weeks: 1-2 weeks  Current Treatment Recommendations: Strengthening,Neuromuscular Re-education,Home Exercise Program,Safety Education & Training,Balance Training,Functional Mobility Training,Transfer Training,Gait Training,Patient/Caregiver Education & Training  Safety Devices  Type of devices: Left in bed,Call light within reach,Bed alarm in place               Goals  Long term goals  Time Frame for Long term goals : 1-2 weeks  Long term goal 1: Achieve basic transfers with modified independence. Long term goal 2: Ambulate 150 feet x 2 with RW or standard cane with Supervision-modified independence, with good safety awareness. Long term goal 3: Provide patient-family education/training if indicated.        Therapy Time   Individual Concurrent Group Co-treatment   Time In           Time Out           Minutes                   Izabel Zhao, PT

## 2022-01-06 NOTE — PROGRESS NOTES
Physical Therapy  Facility/Department: [facility] MED [facility]  Daily Treatment Note  NAME: Payton Krueger  : 1945  MRN: [id number]    Date of Service: 2022    Discharge Recommendations:  Continue to assess pending progress      Assessment   Assessment: Patient Mod I with bed mobility. SBA to stand at bedside. Ambulated 75 feet x 2 with RW and CGA of 2. Patient declined sitting up in the chair and returned to bed. REQUIRES PT FOLLOW UP: Yes  Activity Tolerance  Activity Tolerance: Patient Tolerated treatment well     Patient Diagnosis(es): There were no encounter diagnoses. has a past medical history of Atrial fibrillation ([facility] Cardiomyopathy ([facility] Cataract, GERD (gastroesophageal reflux disease), History of DVT (deep vein thrombosis), Hyperlipidemia, Hypertension, LONG TERM ANTICOAGULENT USE, Osteoarthritis, and Unspecified sleep apnea. has a past surgical history that includes Cholecystectomy; Spine surgery; Ankle surgery; Hand surgery; Inner ear surgery; Nose surgery; Knee arthroscopy; hip surgery; eye surgery; Venous clot surgery (); knee surgery (11); and fracture surgery (Left). Restrictions  Restrictions/Precautions  Restrictions/Precautions: General Precautions,Fall Risk  Required Braces or Orthoses?: No  Subjective   General  Chart Reviewed: Yes  Response To Previous Treatment: Patient with no complaints from previous session. Family / Caregiver Present: No  Subjective  Subjective: Patient offers no new c/o.   Pain Screening  Patient Currently in Pain: Denies  Vital Signs  Patient Currently in Pain: Denies       Objective   Bed mobility  Rolling to Left: Modified independent  Rolling to Right: Modified independent  Supine to Sit: Modified independent  Sit to Supine: Modified independent  Scooting: Modified independent  Transfers  Sit to Stand: Stand by assistance  Stand to sit: Stand by assistance  Ambulation  Ambulation?: Yes  Ambulation 1  Surface: level tile  Device: Rolling Walker  Other Apparatus: Wheelchair follow  Assistance: Contact guard assistance;2 Person assistance  Distance: 75 feet x 2     Balance  Posture: Fair  Sitting - Static: Good  Sitting - Dynamic: Good  Standing - Static: Fair;+  Standing - Dynamic: Fair           Goals  Long term goals  Time Frame for Long term goals : 1-2 weeks  Long term goal 1: Achieve basic transfers with modified independence. Long term goal 2: Ambulate 150 feet x 2 with RW or standard cane with Supervision-modified independence, with good safety awareness. Long term goal 3: Provide patient-family education/training if indicated. Plan    Plan  Times per week: 4-5 x week  Plan weeks: 1-2 weeks  Current Treatment Recommendations: Strengthening,Neuromuscular Re-education,Home Exercise Program,Safety Education & Training,Balance Training,Functional Mobility Training,Transfer Training,Gait Training,Patient/Caregiver Education & Training  Safety Devices  Type of devices: Left in bed,Call light within reach,Bed alarm in place     Therapy Time   Individual Concurrent Group Co-treatment   Time In 0850         Time Out 0913         Minutes 23              This note serves as D/C summary if patient is discharged prior to next visit.   Dagmar Every, PTA

## 2022-01-06 NOTE — FLOWSHEET NOTE
01/05/22 2141   Assessment   Charting Type Shift assessment   Neurological   Neuro (WDL) WDL   Level of Consciousness Alert (0)   Orientation Level Oriented to situation;Oriented to person;Disoriented to place; Disoriented to time   Vanna commands; Appropriate judgement; Appropriate safety awareness   Language Clear   R Hand  Strong   L Hand  Strong   [place] Coma Scale   Eye Opening 4   Best Verbal Response 4   Best Motor Response 6   [place] Coma Scale Score 14   HEENT   HEENT (WDL) WDL   Respiratory   Respiratory (WDL) WDL   Respiratory Pattern Regular   Respiratory Depth Normal   Respiratory Quality/Effort Unlabored   Chest Assessment Chest expansion symmetrical   L Breath Sounds Clear;Diminished   R Breath Sounds Clear;Diminished   Breath Sounds   Right Upper Lobe Clear   Right Middle Lobe Clear   Right Lower Lobe Diminished   Left Upper Lobe Clear   Left Lower Lobe Diminished   Cardiac   Cardiac (WDL) X   Cardiac Regularity Irregular   Heart Sounds S1, S2   Cardiac Rhythm A flutter   Cardiac Monitor   Telemetry Monitor On Yes   Telemetry Audible Yes   Telemetry Alarms Set Yes   Telemetry Box Number    Gastrointestinal   Abdominal (WDL) WDL   Last BM (including prior to admit) 01/04/22   Peripheral Vascular   Peripheral Vascular (WDL) WDL   Edema None   Skin Color/Condition   Skin Color/Condition (WDL) X   Skin Color Pale   Skin Condition/Temp Dry;Flaky; Warm   Skin Integrity   Skin Integrity (WDL) X   Skin Integrity Abrasion   Location right hip   Preventative Dressing Yes   Musculoskeletal   Musculoskeletal (WDL) X   RUE Full movement   LUE Full movement   RL Extremity Weakness; Unsteady   LL Extremity Weakness; Unsteady   Genitourinary   Genitourinary (WDL) WDL   Urine Assessment   Incontinence Yes   Urine Color Yellow/straw   Urine Appearance SIVAN   Urine Odor SIVAN   Psychosocial   Psychosocial (WDL) WDL

## 2022-01-06 NOTE — H&P
History and Physical    Patient:  Fauzia Ansari    CHIEF COMPLAINT:    Declining functional status    HISTORY OF PRESENT ILLNESS:   The patient is a 64 y.o. male PMH PAF/flutter, HTN, HLD, BPH who presented to ED for c/o nausea, fatigue. Found to be in atrial flutter, afib rvr. Followed by Dr Cuong Cruz cardiology for hx afib/flutter. During stay, required IV cardizem gtt for few days to control rate. HR now controlled and now on oral cardizem, BB. Treated for pneumonia during stay too with improvement. Patient transitioned to swing bed for further medical management and rehab due to declining functional status and worsening sundowning/dementia. Past Medical History:      Diagnosis Date    Atrial fibrillation ([address]     Cardiomyopathy ([address]     Cataract     GERD (gastroesophageal reflux disease)     History of DVT (deep vein thrombosis)     s/p IVC filter    Hyperlipidemia     Hypertension     LONG TERM ANTICOAGULENT USE     Osteoarthritis     Unspecified sleep apnea        Past Surgical History:      Procedure Laterality Date    ANKLE SURGERY      left    CHOLECYSTECTOMY      EYE SURGERY      left & right cataract    FRACTURE SURGERY Left     femure    HAND SURGERY      left thumb    HIP SURGERY      left    INNER EAR SURGERY      left    KNEE ARTHROSCOPY      right    KNEE SURGERY  12/07/11    RIGHT    NOSE SURGERY      left    SPINE SURGERY      VENOUS CLOT SURGERY  2008    right leg after hip replacement       Medications Prior to Admission:    Prior to Admission medications    Medication Sig Start Date End Date Taking?  Authorizing Provider   loratadine (CLARITIN) 10 MG capsule Take 10 mg by mouth daily    Historical Provider, MD   spironolactone (ALDACTONE) 25 MG tablet TAKE 1 TABLET BY MOUTH DAILY 12/17/21   Narayan Kan MD   hydroCHLOROthiazide (MICROZIDE) 12.5 MG capsule TAKE ONE CAPSULE BY MOUTH ONCE DAILY 12/17/21   Narayan Kan MD   metoprolol tartrate (LOPRESSOR) 50 MG tablet TAKE 1 TABLET BY MOUTH 2 TIMES DAILY 10/1/21   Grupo Michael MD   dilTIAZem (CARDIZEM CD) 120 MG extended release capsule TAKE 1 CAPSULE BY MOUTH DAILY 10/1/21   Grupo Michael MD   apixaban (ELIQUIS) 5 MG TABS tablet TAKE 1 TABLET BY MOUTH EVERY 12 HOURS 9/7/21   Grupo Michael MD   atorvastatin (LIPITOR) 20 MG tablet TAKE ONE TABLET BY MOUTH ONCE DAILY 9/7/21   Grupo Michael MD   finasteride (PROSCAR) 5 MG tablet TAKE 1 TABLET BY MOUTH DAILY 8/13/21   Grupo Michael MD   tamsulosin (FLOMAX) 0.4 MG capsule TAKE ONE CAPSULE BY MOUTH ONCE DAILY 5/17/21   Grupo Michael MD   potassium chloride (KLOR-CON) 10 MEQ extended release tablet TAKE ONE TABLET BY MOUTH ONCE DAILY 4/26/21   Grupo Michael MD   losartan (COZAAR) 100 MG tablet TAKE 1 TABLET BY MOUTH DAILY 2/25/21   Grupo Michael MD   aspirin 81 MG tablet Take 81 mg by mouth daily. Grupo Michael MD       Allergies:  Codeine, Morphine, Cephalexin, and Pcn [penicillins]    Social History:   TOBACCO:   reports that he quit smoking about 34 years ago. His smoking use included cigarettes. He has a 40.00 pack-year smoking history. He has never used smokeless tobacco.  ETOH:   reports no history of alcohol use. OCCUPATION:  None     Family History:       Problem Relation Age of Onset    Heart Disease Mother     Heart Disease Father         chf    Heart Disease Sister     Cancer Brother         lungs    Cancer Brother     Heart Disease Sister     Heart Disease Brother        Review of system  Constitutional:  Denies fever or chills. +fatigue, generalized weakness  Eyes:  Denies change in visual acuity or discharge. HENT:  Denies nasal congestion or sore throat. Respiratory:  Denies cough or shortness of breath. Cardiovascular:  Denies chest pain, palpitation or swelling in LEs. GI:  Denies abdominal pain, nausea, vomiting, bloody stools or diarrhea. :  Denies dysuria or frequency. Musculoskeletal:  Denies acute back pain or joint pain.    Integument: Denies rash or itching. Neurologic:  Denies headache, focal weakness or sensory changes. Psychiatric:  Denies depression or anxiety. Vitals:    01/06/22 0830   BP: 128/65   Pulse: 96   Resp: 18   Temp: 97.9 °F (36.6 °C)   SpO2: 95%       Physical exam     Constitutional:  Well developed, well nourished, no acute distress. Eyes:  PERRL, conjunctiva normal, EOMI. HENT:  Atraumatic, external ears normal, external nose/nares normal, oropharynx moist, no pharyngeal exudates. Neck:  Supple. No JVD. Respiratory: RA. No respiratory distress, normal breath sounds, no rales, no wheezing. Cardiovascular:  Normal rate, no murmurs, no gallops, no rubs. GI:  Soft, nondistended, normal bowel sounds, nontender, no organomegaly, no mass. Musculoskeletal:  Trace edema BLE. No tenderness, no obvious deformities. Patient is moving all extremities. Integument:  Well hydrated, no rash. Neurologic:  Alert & oriented x 3,  no focal deficits noted. Strength is equal throughout. Confused conversation at times. Psychiatric:  Speech and behavior appropriate.         Lab Results   Component Value Date     01/06/2022    K 3.4 01/06/2022     01/06/2022    CO2 24 01/06/2022    BUN 7 01/06/2022    CREATININE 0.8 01/06/2022    GLUCOSE 104 01/06/2022    CALCIUM 8.0 (L) 01/06/2022    PROT 5.4 (L) 01/06/2022    LABALBU 2.6 (L) 01/06/2022    BILITOT 0.5 01/06/2022    ALKPHOS 137 (H) 01/06/2022    AST 20 01/06/2022    ALT 16 01/06/2022    LABGLOM >59 01/06/2022    GFRAA >59 01/06/2022    AGRATIO 0.9 01/06/2022    GLOB 2.8 01/06/2022       Lab Results   Component Value Date    WBC 4.0 01/06/2022    HGB 10.3 (L) 01/06/2022    HCT 31.5 (L) 01/06/2022    MCV 87.0 01/06/2022     01/06/2022       CT Head WO Contrast   Final Result       1. No acute intracranial abnormality.      2. Extensive nonspecific periventricular and subcortical white matter hypodensity compatible with chronic small vessel ischemic disease and/or age related degenerative change.           CT ABDOMEN PELVIS WO CONTRAST Additional Contrast? None   Final Result   1. No acute intra-abdominopelvic abnormality. 2. Sigmoid diverticulosis without diverticulitis   3. Multifocal patchy peripheral pulmonary airspace disease with improvement noted in the region of the lingula and right lower lobe and new airspace disease in the right middle lobe and lateral basal segment left lower lobe   4. Spondylosis with severe facet arthropathy and severe central canal stenosis at L4-L5         EKG: aflutter 80s    ECHO 10/2020  Summary   The patient appears to be in atrial flutter throughout the procedure. Normal LV systolic function and wall motion. LVEF estimated at 50%. Abnormal diastolic function. The mitral valve leaflets appear normal. No prolapse or stenosis. Trace   mitral regurgitation. The aortic valve is sclerotic and probably trileaflet. There is borderline   aortic stenosis and trace insufficiency. Mean gradient 9 mmHg. ALEAH calc 1.9   cm2. The tricuspid valve appears structurally normal. There is trace tricuspid   regurgitation. Assessment and Plan     Active Hospital Problems     Diagnosis Date Noted    PAF (paroxysmal atrial fibrillation) ([address] [I48.0]  -10/2020 ECHO EF 50-55% (see full report above). Followed by Dr Jerel Schilling cardiology. Last appt 11/2021.  -repeat ECHO 12/31/21 preliminary EF 50-55%. -Continue home medications cardizem, metoprolol, eliquis. -IV dig d/c 1/3, can do PRN. -Inpatient consult to cardiology   -cont telemetry. -TSH WNL, dig level WNL.    01/03/2022    Hypomagnesemia [E83.42]  -1/3 mag 1.3. Replace 1000mg IV. Add mag ox daily. Repeat mag in AM lab.   -1/4 mag 1.6. replaced and cont monitor.     01/03/2022    UTI (urinary tract infection) [N39.0]  -UA indicative UTI. -Cont IV rocephin    12/31/2021    Atrial flutter ([address] [I48.92]  -see PAF    05/10/2012    Essential hypertension [I10]  -stable.  Cont cardizem, BB. Adjust as needed. 01/09/2012   · Declining functional status  Manage all aspects of care from chronic to acute. Benefit from swing bed for further med management and rehab with his dementia, hx falls and remaining weak. Physical and occupational therapy following and working on improving overall strength and endurance. Continue PT/OT. Monitor lab work. · CHF  -IV bumex 1/5   -Last ECHO 10/2020 full report above. EF 50%. Diastolic dysfunction.   -diurese prn      Patient was seen and examined by Dr. Robert Begum and plan of care reviewed and recommendations per him.        Electronically signed by SANTIAGO Guevara - CNP on 1/6/2022 at 10:30 AM

## 2022-01-06 NOTE — CARE COORDINATION
Interdisciplinary rounding completed. Cindi Azar (provider rep), [facility] (), Cassy Hale (nursing), Sonja (PT), Amanda (OT), Chad Hawkins (pharmacy), and Laxmi (dietitian) all involved. Activities reviewed with OT. COVID precautions maintained. Bedside rounds not completed on this rounding. Care Coordination:  Lives with SO. Independent at baseline. Has RW, WC, BSC. Possible HH at [place]. DME per therapy    Pharmacy:  Rocephin 1g IV q24h D6 for Lelliottia Amnigena UTI, Valacyclovir 1g po TID x 7 days completed for shingles, and Eliquis 5mg po BID for a-fib. Therapy:  Pt able to perform bed mobility & maintain seated balance EOB w Mod I. Pt ambulated 60ft 2x requiring CGA w RW w rest break. Pt able to perform BADLs of face washing & hair combing EOB w setup. Pt refused transfer to bedside chair. Observation: Patient presents awake in bed, NAD, RA, pleasant and cooperative     Posture: Fair  Bed Mobility: Mod I  Sitting Balance: Good  Standing Balance: Fair  Functional Mobility: CGA w RW & w/c follow  Bed mobility  Rolling to Left: Modified independent  Rolling to Right: Modified independent  Supine to Sit: Modified independent  Sit to Supine: Modified independent  Scooting: Modified independent  Transfers  Sit to Stand: Stand by assistance  Stand to sit: Stand by assistance  Ambulation  Ambulation?: Yes  Ambulation 1  Surface: level tile  Device: Rolling Walker  Other Apparatus: Wheelchair follow  Assistance: Contact guard assistance;2 Person assistance  Gait Deviations: Slow Judy;Decreased step length;Decreased step height  Balance  Posture: Fair  Sitting - Static: Good  Sitting - Dynamic: Good  Standing - Static: Fair;+  Standing - Dynamic: Fair    Dietary:  No new weights only have a stated weight of 260lb  Regular diet no restrictions. ONS  ([place]) BID  Eating only 45-69% of meals. Has dry flaky skin. May benefit from a MVI. M.6; Alb: 2.6;  Alk Phos-132    Plan is home once cleared medically and physically. Will continue to monitor progress.

## 2022-01-06 NOTE — CARE COORDINATION
01/06/22 1016   Discharge Planning   [employer] Spouse/Significant Other;Family Members   Current Services Prior To Admission [facility]   DME Bedside Commode;Walker; Wheelchair  (RW)   Potential Assistance Needed Durable Medical Equipment;Home Care   Potential Assistance Purchasing Medications No   DME   (per therapy recs)   Type of Home Care Services OT;PT;Nursing Services   Patient expects to be discharged to: [facility]   Expected Discharge Date 01/19/22   Follow Up Appointment: Best Day/Time    (any)     Lives with SO. Independent at baseline. Has RW, WC, BSC. Possible HH at [place].   DME per therapy

## 2022-01-06 NOTE — ACP (ADVANCE CARE PLANNING)
Advance Care Planning     General Advance Care Planning (ACP) Conversation    Date of Conversation: 1/6/2022  Conducted with: Patient with Decision Making Capacity    Healthcare Decision Maker:    Primary Decision Maker: Tad Worthington - Spouse - [phone]    Secondary Decision Maker: Danielle Cornell - Child - [phone]  Click here to complete Healthcare Decision Makers including selection of the Healthcare Decision Maker Relationship (ie \"Primary\"). Today we documented Decision Maker(s) consistent with Legal Next of Kin hierarchy.     Content/Action Overview:  Has NO ACP documents/care preferences - information provided, considering goals and options  Reviewed DNR/DNI and patient elects Full Code (Attempt Resuscitation)  artificial nutrition, ventilation preferences and resuscitation preferences    Length of Voluntary ACP Conversation in minutes:  <16 minutes (Non-Billable)    Ximena Aguila

## 2022-01-07 NOTE — PROGRESS NOTES
Physical Therapy  Facility/Department: [facility]  Daily Treatment Note  NAME: Misti Lama  : 1945  MRN: [id number]    Date of Service: 2022    Discharge Recommendations:  Continue to assess pending progress      Assessment   Assessment: Cotreated with OT. Patient transitioned supine to sit with Mod I.  Stood with SBA and ambulated 150 feet with RW and CGA of 2 for equipment management. Patient returned to the room and transferred to the recliner. Performed B LE therex in sitting. Activity Tolerance  Activity Tolerance: Patient Tolerated treatment well     Patient Diagnosis(es): There were no encounter diagnoses. has a past medical history of Atrial fibrillation ([address] Cardiomyopathy ([address] Cataract, GERD (gastroesophageal reflux disease), History of DVT (deep vein thrombosis), Hyperlipidemia, Hypertension, LONG TERM ANTICOAGULENT USE, Osteoarthritis, and Unspecified sleep apnea. has a past surgical history that includes Cholecystectomy; Spine surgery; Ankle surgery; Hand surgery; Inner ear surgery; Nose surgery; Knee arthroscopy; hip surgery; eye surgery; Venous clot surgery (); knee surgery (11); and fracture surgery (Left). Restrictions  Restrictions/Precautions  Restrictions/Precautions: General Precautions,Fall Risk  Required Braces or Orthoses?: No  Subjective   General  Chart Reviewed: Yes  Response To Previous Treatment: Patient with no complaints from previous session. Family / Caregiver Present: No  Subjective  Subjective: Patient offered no new c/o but required encouragement to participate with therapy.   Pain Screening  Patient Currently in Pain: Denies  Vital Signs  Patient Currently in Pain: Denies       Objective   Bed mobility  Rolling to Left: Modified independent  Rolling to Right: Modified independent  Supine to Sit: Modified independent  Scooting: Modified independent  Transfers  Sit to Stand: Stand by assistance  Stand to sit: Stand by assistance  Bed to Chair: Stand by assistance  Ambulation  Ambulation?: Yes  Ambulation 1  Surface: level tile  Device: Rolling Walker  Assistance: Contact guard assistance;2 Person assistance  Distance: 150 feet     Balance  Posture: Fair  Sitting - Static: Good  Sitting - Dynamic: Good  Standing - Static: Fair;+  Standing - Dynamic: Fair  Exercises  Hip Flexion: 15  Hip Abduction: 15  Knee Long Arc Quad: 15  Ankle Pumps: 15      Goals  Long term goals  Time Frame for Long term goals : 1-2 weeks  Long term goal 1: Achieve basic transfers with modified independence. Long term goal 2: Ambulate 150 feet x 2 with RW or standard cane with Supervision-modified independence, with good safety awareness. Long term goal 3: Provide patient-family education/training if indicated. Plan    Plan  Times per week: 4-5 x week  Plan weeks: 1-2 weeks  Current Treatment Recommendations: Strengthening,Neuromuscular Re-education,Home Exercise Program,Safety Education & Training,Balance Training,Functional Mobility Training,Transfer Training,Gait Training,Patient/Caregiver Education & Training  Safety Devices  Type of devices: Left in chair,Chair alarm in place,Call light within reach     Therapy Time   Individual Concurrent Group Co-treatment   Time In 8563         Time Out 1409         Minutes 23              This note serves as D/C summary if patient is discharged prior to next visit.   Lorie Jarrett, PTA

## 2022-01-07 NOTE — FLOWSHEET NOTE
01/06/22 1952   Assessment   Charting Type Shift assessment   Neurological   Neuro (WDL) WDL   Level of Consciousness Alert (0)   Orientation Level Oriented to situation;Oriented to person;Disoriented to place; Disoriented to time   Vanna commands; Appropriate judgement; Appropriate safety awareness   Language Clear   R Hand  Strong   L Hand  Strong   [place] Coma Scale   Eye Opening 4   Best Verbal Response 4   Best Motor Response 6   Bang Coma Scale Score 14   HEENT   HEENT (WDL) WDL   Respiratory   Respiratory (WDL) WDL   Respiratory Pattern Regular   Respiratory Depth Normal   Respiratory Quality/Effort Unlabored   Chest Assessment Chest expansion symmetrical   L Breath Sounds Clear;Diminished   R Breath Sounds Clear;Diminished   Breath Sounds   Right Upper Lobe Clear   Right Middle Lobe Clear   Right Lower Lobe Diminished   Left Upper Lobe Clear   Left Lower Lobe Diminished   Cardiac   Cardiac (WDL) X   Cardiac Regularity Irregular   Heart Sounds S1, S2   Cardiac Rhythm A flutter   Cardiac Monitor   Telemetry Monitor On Yes   Telemetry Audible Yes   Telemetry Alarms Set Yes   Telemetry Box Number    Gastrointestinal   Abdominal (WDL) WDL   Last BM (including prior to admit) 01/04/22   Peripheral Vascular   Peripheral Vascular (WDL) WDL   Edema Right lower extremity; Left lower extremity   RLE Edema Trace   LLE Edema Trace   Skin Color/Condition   Skin Color/Condition (WDL) X   Skin Color Pale   Skin Condition/Temp Dry;Flaky; Warm   Skin Integrity   Skin Integrity (WDL) X   Skin Integrity Abrasion   Location right hip   Preventative Dressing Yes   Skin Integrity Site 2   Skin Integrity Location 2 Redness   Location 2 left hip   Musculoskeletal   Musculoskeletal (WDL) X   RUE Full movement   LUE Full movement   RL Extremity Weakness; Unsteady   LL Extremity Weakness; Unsteady   Genitourinary   Genitourinary (WDL) WDL   Urine Assessment   Incontinence Yes   Urine Color Yellow/straw   Urine Appearance SIVAN   Urine Odor SIVAN   Psychosocial   Psychosocial (WDL) WDL

## 2022-01-07 NOTE — CARE COORDINATION
CA spoke to son on the phone. Pt will have assist at home (someone coming in and checking on him), but not 24/7 care. Family would be available to get things for pt. Pt would benefit from home health at home. As of now, plan is to return home next week if continues to progress with therapy. CM to discuss DC plan and safety with therapy.

## 2022-01-07 NOTE — PROGRESS NOTES
Occupational Therapy  Facility/Department: [facility]  Daily Treatment Note  NAME: Jacoby Cobian  : 1945  MRN: [id number]    Date of Service: 2022    Discharge Recommendations:  Continue to assess pending progress       Assessment   Assessment: Pt in bed upon entry. Pt come to sit at EOB with MOD I. Co tx with PTA. Pt come to stand with SBA and ambulated in hallway 150 feet with CGA using RW in hallway with min verbal cuing for promity. Pt tolerated well with no rest breaks needed. Pt transferred to chair upon returning to room and completed seated ther ex. Pt able to tolerate x15 reps with minimal fatigue noted. Pt did require min rest breaks throughout exercises. Pt left seated upright in chair with call light in reach and alarm on. Patient Diagnosis(es): There were no encounter diagnoses. has a past medical history of Atrial fibrillation ([address] Cardiomyopathy ([address] Cataract, GERD (gastroesophageal reflux disease), History of DVT (deep vein thrombosis), Hyperlipidemia, Hypertension, LONG TERM ANTICOAGULENT USE, Osteoarthritis, and Unspecified sleep apnea. has a past surgical history that includes Cholecystectomy; Spine surgery; Ankle surgery; Hand surgery; Inner ear surgery; Nose surgery; Knee arthroscopy; hip surgery; eye surgery; Venous clot surgery (); knee surgery (11); and fracture surgery (Left). Restrictions  Restrictions/Precautions  Restrictions/Precautions: General Precautions,Fall Risk  Required Braces or Orthoses?: No  Subjective   General  Chart Reviewed: Yes  Patient assessed for rehabilitation services?: Yes  Family / Caregiver Present: Yes  Referring Practitioner: John Foster CNP  Diagnosis: Atrial Flutter  Subjective  Subjective: I had a hard time last night.  I woke up from a bad dream and got confused      Orientation     Objective          Type of ROM/Therapeutic Exercise  Comment: 3#  Exercises  Shoulder Flexion: x15  Shoulder ABduction: x15  Chair Push-ups: x10  Elbow Flexion: x15  Elbow Extension: x15     Plan   Plan  Times per week: 3-5  Times per day: Daily  Plan weeks: 1-2  Current Treatment Recommendations: Strengthening,Functional Mobility Training,Endurance Training,Balance Training,Patient/Caregiver Education & Training,Self-Care / ADL,Neuromuscular Re-education    Goals  Short term goals  Time Frame for Short term goals: 2 weeks  Short term goal 1: Pt to complete ADL transfers with MOD I using RW as needed for support. Short term goal 2: Pt to complete UB/LB dressing with MOD I. Short term goal 3: Pt to increase functional activity tolerance x15 minutes with no s/s of fatigue. Short term goal 4: Pt to complete toileting with MOD I. Short term goal 5: Pt to complete bathing with MOD I. Therapy Time   Individual Concurrent Group Co-treatment   Time In 0146         Time Out 0209         Minutes 23              This note serves as a DC summary in the event of pt discharge.      Amanda Wyman OTR/L

## 2022-01-08 NOTE — DISCHARGE SUMMARY
Discharge Summary      Patient ID: Mariely Wisdom      Patient's PCP: Talon Amezquita MD    Admit Date: 1/5/2022   /  Discharge Date:  1/8/22    Admitting Provider: Talon Amezquita MD    Discharging Provider: SAMMIE Clark     Reason for this admission:   Declining functional status    Discharge Diagnoses: Active Hospital Problems    Diagnosis Date Noted    CHF (congestive heart failure) ([facility] [I50.9] 01/06/2022    Declining functional status [R53.81] 01/05/2022    PAF (paroxysmal atrial fibrillation) ([facility] [I48.0] 01/03/2022    Hypomagnesemia [E83.42] 01/03/2022    UTI (urinary tract infection) [N39.0] 12/31/2021    Atrial flutter ([facility] [I48.92] 05/10/2012    B12 deficiency [E53.8] 03/14/2012    Essential hypertension [I10] 01/09/2012       Procedures:  No orders to display         Consults:   IP CONSULT TO CARDIOLOGY  PT OT  Case Management  Pharmacy    Briefly:   64year old male with PMH of PAF/flutter, HTN, HLD, BPH admitted for atrial fib with RVR. Rate controlled with IV cardizem gtt then converted to oral cardizem and BB. Also found to have pneumonia and received treatment with antibiotics. Found to be weak and was admitted to swing bed for ongoing medical management and PT OT. Pt cleared by PT OT for dc home with home health. He already has a walker. Hospital Course: Active Hospital Problems     Diagnosis Date Noted    PAF (paroxysmal atrial fibrillation) ([facility] [I48.0]  -10/2020 ECHO EF 50-55% (see full report above). Followed by Dr Chin Arce cardiology. Last appt 11/2021.  -repeat ECHO 12/31/21 preliminary EF 50-55%. -Continue home medications cardizem, metoprolol, eliquis. -IV dig d/c 1/3  -monitored on telemetry  -TSH WNL, dig level WNL.    01/03/2022    Hypomagnesemia [E83.42]  -replaced   01/03/2022    UTI (urinary tract infection) [N39.0]  -UA indicative UTI.   -treated with course of rocephin    12/31/2021    Atrial flutter ([facility] [I48.92]  -see PAF    05/10/2012    Essential hypertension [I10]  -stable. Cont cardizem, BB. Adjust as needed. 01/09/2012   · Declining functional status  · -PT OT followed and cleared for dc home with home health and family assistance     · Mild diastolic CHF exacerbation  -IV bumex 1/5   -Last ECHO 10/2020 full report above. EF 50%. Diastolic dysfunction.   -diurese prn      Disposition: home with home health    Discharged Condition: Stable    Vital Signs  Temp: 98.4 °F (36.9 °C)  Pulse: 93  Resp: 18  BP: (!) 126/58  SpO2: 96 %  O2 Device: None (Room air)       Vital signs reviewed in electronic chart. Physical exam  Constitutional:  Well developed, well nourished, no acute distress. Eyes:  PERRL, conjunctiva normal, EOMI. HENT:  Atraumatic, external ears normal, external nose/nares normal, oropharynx moist, no pharyngeal exudates. Neck:  Supple. No JVD. Respiratory: RA. No respiratory distress, normal breath sounds, no rales, no wheezing. Cardiovascular:  Normal rate, no murmurs, no gallops, no rubs. GI:  Soft, nondistended, normal bowel sounds, nontender, no organomegaly, no mass. Musculoskeletal:  Trace edema BLE. No tenderness, no obvious deformities. Patient is moving all extremities. Integument:  Well hydrated, no rash. Neurologic:  Alert & oriented x 3,  no focal deficits noted. Strength is equal throughout. Confused conversation at times. Psychiatric:  Speech and behavior appropriate. Activity: as tolerated  Diet: cardiac  Follow Up: Primary Care Physician in 2 weeks       Labs:  For convenience and continuity at follow-up the following most recent labs are provided:    CBC:   Lab Results   Component Value Date    WBC 4.5 01/07/2022    HGB 10.4 01/07/2022    HCT 32.0 01/07/2022     01/07/2022       RENAL:   Lab Results   Component Value Date     01/07/2022    K 3.4 01/07/2022     01/07/2022    CO2 27 01/07/2022    BUN 6 01/07/2022    CREATININE 0.9 01/07/2022         Discharge Medications:     Current Discharge Medication List           Details   gabapentin (NEURONTIN) 300 MG capsule Take 1 capsule by mouth 3 times daily for 14 days. Qty: 42 capsule, Refills: 0    Associated Diagnoses: Declining functional status      magnesium oxide (MAG-OX) 400 (240 Mg) MG tablet Take 1 tablet by mouth 2 times daily  Qty: 30 tablet, Refills: 0      pantoprazole (PROTONIX) 40 MG tablet Take 1 tablet by mouth daily  Qty: 30 tablet, Refills: 3              Details   metoprolol tartrate (LOPRESSOR) 100 MG tablet Take 1 tablet by mouth 2 times daily  Qty: 60 tablet, Refills: 3              Details   loratadine (CLARITIN) 10 MG capsule Take 10 mg by mouth daily      dilTIAZem (CARDIZEM CD) 120 MG extended release capsule TAKE 1 CAPSULE BY MOUTH DAILY  Qty: 30 capsule, Refills: 3      apixaban (ELIQUIS) 5 MG TABS tablet TAKE 1 TABLET BY MOUTH EVERY 12 HOURS  Qty: 60 tablet, Refills: 3      atorvastatin (LIPITOR) 20 MG tablet TAKE ONE TABLET BY MOUTH ONCE DAILY  Qty: 90 tablet, Refills: 3      finasteride (PROSCAR) 5 MG tablet TAKE 1 TABLET BY MOUTH DAILY  Qty: 90 tablet, Refills: 3      tamsulosin (FLOMAX) 0.4 MG capsule TAKE ONE CAPSULE BY MOUTH ONCE DAILY  Qty: 90 capsule, Refills: 3      potassium chloride (KLOR-CON) 10 MEQ extended release tablet TAKE ONE TABLET BY MOUTH ONCE DAILY  Qty: 90 tablet, Refills: 1      aspirin 81 MG tablet Take 81 mg by mouth daily. I am acting as a scribe for . Pt was seen and examined by   today. Plan of care was also formulated by him. Signed:  Electronically signed by SAMMIE Spencer on 1/8/2022 at 1:29 PM       Thank you Amado Gamble MD for the opportunity to be involved in this patient's care. If you have any questions or concerns please feel free to contact me at [phone].

## 2022-01-08 NOTE — FLOWSHEET NOTE
01/07/22 2118   Assessment   Charting Type Shift assessment   Neurological   Neuro (WDL) WDL   Level of Consciousness Alert (0)   Orientation Level Oriented to situation;Oriented to person;Disoriented to place; Disoriented to time   Vanna commands; Appropriate judgement; Appropriate safety awareness   Language Clear   R Hand  Strong   L Hand  Strong   [place] Coma Scale   Eye Opening 4   Best Verbal Response 5   Best Motor Response 6   Bang Coma Scale Score 15   NIHSS Stroke Scale   NIHSS Stroke Scale Assessed No   HEENT   HEENT (WDL) WDL   Respiratory   Respiratory (WDL) WDL   Respiratory Pattern Regular   Respiratory Depth Normal   Respiratory Quality/Effort Unlabored   Chest Assessment Chest expansion symmetrical   L Breath Sounds Clear;Diminished   R Breath Sounds Clear;Diminished   Breath Sounds   Right Upper Lobe Clear   Right Middle Lobe Clear   Right Lower Lobe Diminished   Left Upper Lobe Clear   Left Lower Lobe Diminished   Cardiac   Cardiac (WDL) X   Cardiac Regularity Irregular   Heart Sounds S1, S2   Cardiac Rhythm A flutter   Rhythm Interpretation   Pulse 94   Cardiac Monitor   Telemetry Monitor On Yes   Telemetry Audible Yes   Telemetry Alarms Set Yes   Telemetry Box Number    Gastrointestinal   Abdominal (WDL) WDL   Last BM (including prior to admit) 01/04/22   Peripheral Vascular   Peripheral Vascular (WDL) WDL   Edema Right lower extremity; Left lower extremity   RLE Edema Trace   LLE Edema Trace   Skin Color/Condition   Skin Color/Condition (WDL) X   Skin Color Pale   Skin Condition/Temp Warm;Flaky;Dry   Skin Integrity   Skin Integrity (WDL) X   Skin Integrity Abrasion   Location rt hip   Preventative Dressing Yes   Assessed this shift? Yes   Multiple Skin Integrity Sites Yes   Skin Integrity Site 2   Skin Integrity Location 2 Redness   Location 2 lt hip   Preventative Dressing Yes   Assessed this shift?  Yes   Musculoskeletal   Musculoskeletal (WDL) X   RUE Full movement   LUE Full movement   RL Extremity Weakness; Unsteady   LL Extremity Weakness; Unsteady   Genitourinary   Genitourinary (WDL) WDL   Urine Assessment   Incontinence Yes   Urine Color Yellow/straw   Psychosocial   Psychosocial (WDL) WDL

## 2022-01-08 NOTE — PROGRESS NOTES
Discharged home with son and daughter in law. Verbalized understanding of discharge instructions and is aware to  new prescriptions at pharmacy. Accompanied to exit.

## 2022-01-08 NOTE — FLOWSHEET NOTE
01/08/22 0925   Assessment   Charting Type Shift assessment   Neurological   Neuro (WDL) WDL   Level of Consciousness Alert (0)   Orientation Level Oriented to situation;Oriented to person;Disoriented to place; Disoriented to time   Vanna commands; Appropriate judgement; Appropriate safety awareness   Language Clear   R Hand  Strong   L Hand  Strong   [place] Coma Scale   Eye Opening 4   Best Verbal Response 5   Best Motor Response 6   Bang Coma Scale Score 15   HEENT   HEENT (WDL) WDL   Respiratory   Respiratory (WDL) WDL   Respiratory Pattern Regular   Respiratory Depth Normal   Respiratory Quality/Effort Unlabored   Chest Assessment Chest expansion symmetrical   L Breath Sounds Clear;Diminished   R Breath Sounds Clear;Diminished   Breath Sounds   Right Upper Lobe Clear   Right Middle Lobe Clear   Right Lower Lobe Diminished   Left Upper Lobe Clear   Left Lower Lobe Diminished   Cardiac   Cardiac (WDL) X   Cardiac Regularity Irregular   Heart Sounds S1, S2   Cardiac Rhythm A flutter   Cardiac Monitor   Telemetry Monitor On Yes   Telemetry Audible Yes   Telemetry Alarms Set Yes   Telemetry Box Number    Gastrointestinal   Abdominal (WDL) WDL   Peripheral Vascular   Peripheral Vascular (WDL) WDL   Edema Right lower extremity; Left lower extremity   RLE Edema Trace   LLE Edema Trace   Skin Color/Condition   Skin Color/Condition (WDL) X   Skin Color Pale   Skin Condition/Temp Warm;Flaky   Skin Integrity   Skin Integrity (WDL) X   Skin Integrity Abrasion   Location rt hip   Preventative Dressing Yes   Assessed this shift? Yes   Skin Integrity Site 2   Skin Integrity Location 2 Redness   Location 2 lt hip   Preventative Dressing Yes   Assessed this shift? Yes   Musculoskeletal   Musculoskeletal (WDL) X   RUE Full movement   LUE Full movement   RL Extremity Weakness; Unsteady   LL Extremity Weakness; Unsteady   Genitourinary   Genitourinary (WDL) WDL   Urine Assessment   Incontinence Yes   Urine Color Yellow/straw   Psychosocial   Psychosocial (WDL) WDL   Good appetite. Denies pain or discomfort. VSS.

## 2022-01-08 NOTE — PLAN OF CARE
Problem: Falls - Risk of:  Goal: Will remain free from falls  Description: Will remain free from falls  1/8/2022 1657 by Addis Mauricio RN  Outcome: Completed  1/8/2022 1438 by Addis Mauricio RN  Outcome: Met This Shift  Goal: Absence of physical injury  Description: Absence of physical injury  Outcome: Completed

## 2022-01-10 NOTE — CARE COORDINATION
[facility] Transitions Initial Follow Up Call    Call within 2 business days of discharge: Yes     Patient: Fauzia Ansari Patient : 1945 MRN: <[id number]>    Last Discharge [facility]       Complaint Diagnosis Description Type Department Provider    22  Declining functional status Admission (Discharged) [address] [place] Narayan Kan MD          RARS: Readmission Risk Score: 17.8 ( )       Spoke with: Attempting to contact for HFU, unsuccessful. Unable to leave a message.      Discharge department/facility: [facility]    Non-face-to-face services provided:  Scheduled appointment with PCP-Kristofer  Obtained and reviewed discharge summary and/or continuity of care documents    Follow Up  Future Appointments   Date Time Provider Evangelina Soto   2022 10:00 AM Narayan Kan, [address] [place] [address] EDUARDO [email or web address]       Sarahy Gilliland RN

## 2022-01-10 NOTE — CARE COORDINATION
Pt was DC'd over weekend. Orders for [facility] sent to [facility] (next on rotation). No DME needs noted.

## 2022-01-11 NOTE — CARE COORDINATION
Gee 45 Transitions Initial Follow Up Call    Call within 2 business days of discharge: Yes     Patient: Yaa Knight Patient : 1945 MRN: <[id number]>    Last Discharge [facility]       Complaint Diagnosis Description Type Department Provider    22  Declining functional status Admission (Discharged) [address] [place] Clarissa Robles MD          RARS: Readmission Risk Score: 17.8 ( )       Spoke with: I spoke with Cassy Blake and his wife today. They report he is doing well. He feels like he is getting stronger and makes his way around the house with a walker. They have not heard from home health yet but order was just placed yesterday. We did discuss new meds and his daughter in law had placed in his medset for him. We discussed his HFU appointment and wife verbalized understanding.      Discharge department/facility: [facility]    Non-face-to-face services provided:  Scheduled appointment with PCP-Kristofer  Obtained and reviewed discharge summary and/or continuity of care documents    Follow Up  Future Appointments   Date Time Provider Evangelina Soto   2022 10:00 AM Clarissa Robles, [address] [place] [address] EDUARDO CLARISAKY       Jie Hess RN

## 2022-01-31 NOTE — TELEPHONE ENCOUNTER
HH called to let us know pt has had a 10 lb weight loss since 01/20 states pt is still eating has regular appetite but staying in bed most of the time. I told her he no showed his HFU and he needs to make a fu with you since he didn't show up she said she would inform pt.

## 2022-02-01 NOTE — ED NOTES
Spoke with wife,notified her of discharge. She stated her had to call her son.       Vito Dubois RN  02/01/22 0113

## 2022-02-01 NOTE — ED PROVIDER NOTES
[address]  eMERGENCY dEPARTMENT eNCOUnter      Pt Name: Freedom Orr  MRN: [id number]  Zacharygfurt: 1945  Date of evaluation: 4/35/6875  Provider: Jimenez Bob MD    CHIEF COMPLAINT     No chief complaint on file. HISTORY OF PRESENT ILLNESS  (Location/Symptom, Timing/Onset, Context/Setting, Quality, Duration,Modifying Factors, Severity.)   Freedom Orr is a 64 y.o. male who presents to the emergency department with the onset of lightheadedness which he calls \"dizziness\". He denies chest pain or palpitations or shortness of breath. His wife called the ambulance because she was worried. EMS reports a glucose of 43. He was given oral glucose and says that he feels better. EMS also noted a HR up to 130's with moving but patient doesn't feel the fast heart rate or have any cardiac complaints. He is not on oxygen at home. Nursing notes were reviewed. REVIEW OF SYSTEMS    (2-9 systems for level 4, 10 or more for level 5)   ROS:  General:  No fevers, no chills, no weakness  Cardiovascular:  No chest pain, no palpitations  Respiratory:  No shortness of breath, no cough, no wheezing  Gastrointestinal:  No pain, no nausea, no vomiting, no diarrhea  Musculoskeletal:  No muscle pain, no joint pain  Skin:  No rash, no easy bruising  Neurologic:  No speech problems, no headache, no extremity numbness, no extremity  tingling, no extremity weakness  Psychiatric:  No anxiety  Genitourinary:  No dysuria, no hematuria  + dizziness only    Except as noted above the remainder of the review of systems was reviewed and negative.        PAST MEDICAL HISTORY     Past Medical History:   Diagnosis Date    Atrial fibrillation ([facility]     Cardiomyopathy ([facility]     Cataract     GERD (gastroesophageal reflux disease)     History of DVT (deep vein thrombosis)     s/p IVC filter    Hyperlipidemia     Hypertension     LONG TERM ANTICOAGULENT USE     Osteoarthritis     Unspecified sleep apnea          SURGICAL HISTORY       Past Surgical History:   Procedure Laterality Date    ANKLE SURGERY      left    CHOLECYSTECTOMY      EYE SURGERY      left & right cataract    FRACTURE SURGERY Left     femure    HAND SURGERY      left thumb    HIP SURGERY      left    INNER EAR SURGERY      left    KNEE ARTHROSCOPY      right    KNEE SURGERY  12/07/11    RIGHT    NOSE SURGERY      left    SPINE SURGERY      VENOUS CLOT SURGERY  2008    right leg after hip replacement         CURRENT MEDICATIONS       Previous Medications    APIXABAN (ELIQUIS) 5 MG TABS TABLET    TAKE 1 TABLET BY MOUTH EVERY 12 HOURS    ASPIRIN 81 MG TABLET    Take 81 mg by mouth daily. ATORVASTATIN (LIPITOR) 20 MG TABLET    TAKE ONE TABLET BY MOUTH ONCE DAILY    DILTIAZEM (CARDIZEM CD) 120 MG EXTENDED RELEASE CAPSULE    TAKE 1 CAPSULE BY MOUTH DAILY    FINASTERIDE (PROSCAR) 5 MG TABLET    TAKE 1 TABLET BY MOUTH DAILY    GABAPENTIN (NEURONTIN) 300 MG CAPSULE    Take 1 capsule by mouth 3 times daily for 14 days.     LORATADINE (CLARITIN) 10 MG CAPSULE    Take 10 mg by mouth daily    MAGNESIUM OXIDE (MAG-OX) 400 (240 MG) MG TABLET    TAKE 1 TABLET BY MOUTH 2 TIMES DAILY    METOPROLOL TARTRATE (LOPRESSOR) 100 MG TABLET    Take 1 tablet by mouth 2 times daily    PANTOPRAZOLE (PROTONIX) 40 MG TABLET    Take 1 tablet by mouth daily    POTASSIUM CHLORIDE (KLOR-CON) 10 MEQ EXTENDED RELEASE TABLET    TAKE ONE TABLET BY MOUTH ONCE DAILY    TAMSULOSIN (FLOMAX) 0.4 MG CAPSULE    TAKE ONE CAPSULE BY MOUTH ONCE DAILY       ALLERGIES     Codeine, Morphine, Cephalexin, and Pcn [penicillins]    FAMILY HISTORY       Family History   Problem Relation Age of Onset    Heart Disease Mother     Heart Disease Father         chf    Heart Disease Sister     Cancer Brother         lungs    Cancer Brother     Heart Disease Sister     Heart Disease Brother           SOCIAL HISTORY       Social History     Socioeconomic History    Marital status:      Spouse name: None    Number of children: None    Years of education: None    Highest education level: None   Occupational History    None   Tobacco Use    Smoking status: Former Smoker     Packs/day: 1.00     Years: 40.00     Pack years: 40.00     Types: Cigarettes     Quit date: 1988     Years since quittin.1    Smokeless tobacco: Never Used   Vaping Use    Vaping Use: Never used   Substance and Sexual Activity    Alcohol use: No    Drug use: No    Sexual activity: None   Other Topics Concern    None   Social History Narrative    None     Social Determinants of Health     Financial Resource Strain: Unknown    Difficulty of Paying Living Expenses: Patient refused   Food Insecurity: Unknown    Worried About Running Out of Food in the Last Year: Patient refused    Ran Out of Food in the Last Year: Patient refused   Transportation Needs:     Lack of Transportation (Medical): Not on file    Lack of Transportation (Non-Medical):  Not on file   Physical Activity:     Days of Exercise per Week: Not on file    Minutes of Exercise per Session: Not on file   Stress:     Feeling of Stress : Not on file   Social Connections:     Frequency of Communication with Friends and Family: Not on file    Frequency of Social Gatherings with Friends and Family: Not on file    Attends Ten[employer] Services: Not on file    Active Member of [address] [place] [address] [employer] or Organizations: Not on file    Attends Club or Organization Meetings: Not on file    Marital Status: Not on file   Intimate Partner Violence:     Fear of Current or Ex-Partner: Not on file    Emotionally Abused: Not on file    Physically Abused: Not on file    Sexually Abused: Not on file   Housing Stability:     Unable to Pay for Housing in the Last Year: Not on file    Number of [place] in the Last Year: Not on file    Unstable Housing in the Last Year: Not on file         PHYSICAL EXAM    (up to 7 for level 4, 8 or more for level 5)     ED Triage Vitals   BP Temp Temp src Pulse Resp SpO2 Height Weight   -- -- -- -- -- -- -- --       Physical Exam  General :Patient is awake, alert, oriented, in no acute distress, nontoxic appearing  HEENT: Pupils are equally round and reactive to light, EOMI. Oral mucosa is moist, no exudate. No pharyngeal erythema. Neck: Neck is supple, full range of motion  Cardiac:  Tachycardic; irregularly irregular  Lungs: Lungs are clear to auscultation, there is no wheezing, rhonchi, or rales. Chest wall: There is no tenderness to palpation over the chest wall or over ribs  Abdomen: Abdomen is soft, nontender, nondistended. There is no firm or pulsatile masses, no rebound, rigidity or guarding. Musculoskeletal: 5 out of 5 strength in all 4 extremities. No focal muscle deficits are appreciated  Back: No midline or bony tenderness. No CVAT. Neuro: Motor intact, sensory intact, level of consciousness is normal.  Dermatology: Skin is warm and dry  Psych: Mentation is grossly normal,cognition is grossly normal. Affect is appropriate. DIAGNOSTIC RESULTS     EKG: All EKG's are interpreted by [facility] Phys[facility] who either signs or Co-signs this chart in the absence of a cardiologist.    Atrial Flutter with 3:1 block  Rate of 94 (will go up to 120 on the monitor  LBBB    RADIOLOGY:   Non-plain film images such as CT, Ultrasound and MRI are read by the radiologist. Plain radiographic images are visualized and preliminarily interpreted by the emergency physician with the below findings:      []Radiologist's Report Reviewed:  CT CHEST PULMONARY EMBOLISM W CONTRAST   Final Result      1. No evidence of pulmonary embolus. 2. Mild patchy peripheral multifocal airspace disease. 3. Atherosclerotic disease. Coronary artery disease. XR CHEST PORTABLE   Final Result      1.  Mild bilateral basilar airspace disease                  ED BEDSIDE ULTRASOUND:   Performed by ED Physician - none    LABS:  Labs Reviewed   CBC WITH AUTO DIFFERENTIAL - Abnormal; Notable for the following components:       Result Value    MPV 11.4 (*)     Lymphocytes Absolute 1.1 (*)     All other components within normal limits    Narrative:     Performed at:  [facility]  [address] [place] [address],  [place], [place] [address] [address]   Phone [phone]   COMPREHENSIVE METABOLIC PANEL - Abnormal; Notable for the following components:    Potassium 3.1 (*)     Glucose 62 (*)     Total Protein 6.2 (*)     Albumin 3.3 (*)     Alkaline Phosphatase 125 (*)     All other components within normal limits    Narrative:     Performed at:  [address] [place] [address] Laboratory  [address] [place] [address],  [place], [place]   Phone [phone]   D-DIMER, QUANTITATIVE - Abnormal; Notable for the following components:    D-Dimer, Quant 0.63 (*)     All other components within normal limits    Narrative:     Gabriel Arambula tel. [phone],  Coag results called to and read back by Esvin Baxter, 01/31/2022 22:51, by  [facility]  Performed at:  [address] [place] [address] Laboratory  [address] [place] [address],  [place], [address]   Phone [phone], RAPID    Narrative:     Performed at:  [address] [place] [address] Laboratory  [address] [place] [address],  [place], [place]   Phone [phone]    Narrative:     Performed at:  [address] [place] [address] Laboratory  [address] [place] [address],  [place], [place]   Phone [phone]    Narrative:     Gabriel Arambula tel. [phone],  Coag results called to and read back by Esvin Baxter, 01/31/2022 22:51, by  [facility]  Performed at:  [address] [place] [address] Laboratory  [address] [place] [address],  [place], [place]   Phone [phone]   APTT    Narrative:     Performed at:  [facility]  [address] [facility],  [place], [place] [address]   Phone [phone]   TROPONIN    Narrative:     Performed at:  [address] [place] [address] Laboratory  [address] [place] [address],  [place], [address] [address]   Phone [phone]       I have reviewed and interpreted all ofthe currently available lab results from this visit (if applicable):  Results for orders placed or performed during the hospital encounter of 01/31/22   COVID-19, Rapid    Specimen: Nasopharyngeal Swab   Result Value Ref Range    SARS-CoV-2, NAAT Not Detected Not Detected   Brain Natriuretic Peptide   Result Value Ref Range    Pro-BNP 1,294 0 - 1,800 pg/mL   CBC Auto Differential   Result Value Ref Range    WBC 7.8 4.0 - 11.0 K/uL    RBC 4.59 4.50 - 6.00 M/uL    Hemoglobin 13.3 13.0 - 18.0 g/dL    Hematocrit 41.3 40.0 - 54.0 %    MCV 90.0 80.0 - 100.0 fL    MCH 29.0 27.0 - 32.0 pg    MCHC 32.2 31.0 - 35.0 g/dL    RDW 15.2 11.0 - 16.0 %    Platelets 053 761 - 331 K/uL    MPV 11.4 (H) 6.0 - 10.0 fL    Neutrophils % 75.9 %    Immature Granulocytes % 0.5 0.0 - 5.0 %    Lymphocytes % 14.5 %    Monocytes % 8.2 %    Eosinophils % 0.3 %    Basophils % 0.6 %    Neutrophils Absolute 5.9 2.0 - 7.5 K/uL    Immature Granulocytes # 0.0 K/uL    Lymphocytes Absolute 1.1 (L) 1.5 - 4.0 K/uL    Monocytes Absolute 0.6 0.2 - 0.8 K/uL    Eosinophils Absolute 0.0 0.0 - 0.4 K/uL    Basophils Absolute 0.1 0.0 - 0.1 K/uL   Comprehensive Metabolic Panel   Result Value Ref Range    Sodium 139 136 - 145 mmol/L    Potassium 3.1 (L) 3.4 - 5.1 mmol/L    Chloride 102 98 - 107 mmol/L    CO2 23 20 - 30 mmol/L    Anion Gap 14 3 - 16    Glucose 62 (L) 74 - 106 mg/dL    BUN 8 6 - 20 mg/dL    CREATININE 0.8 0.4 - 1.2 mg/dL    GFR Non-African American >59 >59    GFR African American >59 >59    Calcium 8.7 8.5 - 10.5 mg/dL    Total Protein 6.2 (L) 6.4 - 8.3 g/dL    Albumin 3.3 (L) 3.4 - 4.8 g/dL    Albumin/Globulin Ratio 1.1 0.8 - 2.0    Total Bilirubin 0.8 0.3 - 1.2 mg/dL    Alkaline Phosphatase 125 (H) 25 - 100 U/L    ALT 12 4 - 36 U/L    AST 18 8 - 33 U/L    Globulin 2.9 Not Established g/dL   D-Dimer, Quantitative   Result Value Ref Range    D-Dimer, Quant 0.63 (HH) 0.00 - 0.60 ug/mL FEU   Protime-INR   Result Value Ref Range    Protime 13.5 11.6 - 13.8 sec    INR 1.08 0.88 - 1.11   APTT   Result Value Ref Range    aPTT 22.8 22.5 - 34.9 sec   Troponin   Result Value Ref Range    Troponin <0.30 <0.30 ng/mL        All other labs were within normal range or not returned as of this dictation. EMERGENCY DEPARTMENT COURSE and DIFFERENTIAL DIAGNOSIS/MDM:   Vitals:  Vitals:    02/01/22 0007 02/01/22 0015 02/01/22 0030 02/01/22 0045   BP: 121/70 119/65 127/68 117/71   Pulse: 95 96 96 89   Resp: 17 16 11 16   Temp:       TempSrc:       SpO2: 99% 100% 99% 95%   Weight:       Height:             Is in a flutter with a 3-1 block. His rates would go up to as high as 140 if he was moving around in bed. Patient did not have any palpitations or chest pain but did complain of generalized weakness. He was given a Cardizem bolus and then taken to CT scan. He was not put on a drip because his heart rate came down to the upper 80s. Patient decided he wanted to leave the emergency room and stop all work-up. We called his wife who is willing to pick him up but then the patient changed his mind and agreed to stay to have his labs and imaging done. CBC normal  CMP normal with the exception of a potassium of 3.1. D-dimer elevated at 0.63. CTA of chest was ordered and was negative for PE but did show multifocal airspace disease. PT PTT normal  Troponin negative  BNP normal    Patient says his dizziness has now resolved. Potassium given. Levaquin given for bilateral patchy pneumonia. COVID negative. Will DC home on antibiotics. The patient will follow-up with their PCP in 1-2 days for reevaluation.   If the patient or family members have any further concerns or any worsening symptoms they will return to the ED for reevaluation. CONSULTS:  None    PROCEDURES:  Procedures    CRITICAL CARE TIME    Total Critical Care time was 0 minutes, excluding separately reportable procedures. There was a high probability of clinically significant/life threatening deterioration in the patient's condition which required my urgent intervention. FINAL IMPRESSION      1. Typical atrial flutter ([address]    2. Hypokalemia    3. Pneumonia of both lower lobes due to infectious organism          DISPOSITION/PLAN   DISPOSITION        PATIENT REFERRED TO:  Grupo Michael MD  [address] [place] [address]  [phone]    Schedule an appointment as soon as possible for a visit in 2 days  As needed      DISCHARGE MEDICATIONS:  New Prescriptions    LEVOFLOXACIN (LEVAQUIN) 500 MG TABLET    Take 1 tablet by mouth daily for 10 days       Comment: Please note this report has been produced using speech recognition software and may contain errorsrelated to that system including errors in grammar, punctuation, and spelling, as well as words and phrases that may be inappropriate. If there are any questions or concerns please feel free to contact the dictating providerfor clarification.     Julisa Caceres MD  Attending Emergency Physician                Julisa Caceres MD  02/01/22 7538

## 2022-02-01 NOTE — ED NOTES
PIV attempted x 3 without success. Will have another RN attempt.      Carlos Montenegro, JAZMYN  01/31/22 2042

## 2022-02-03 NOTE — CARE COORDINATION
Multiple attempts made to contact [facility] to follow up on his ED visit. Phone is busy.

## 2022-02-04 NOTE — ED PROVIDER NOTES
 GERD (gastroesophageal reflux disease)     History of DVT (deep vein thrombosis)     s/p IVC filter    Hyperlipidemia     Hypertension     LONG TERM ANTICOAGULENT USE     Osteoarthritis     Unspecified sleep apnea     UTI (urinary tract infection)          SURGICAL HISTORY       Past Surgical History:   Procedure Laterality Date    ANKLE SURGERY      left    CHOLECYSTECTOMY      EYE SURGERY      left & right cataract    FRACTURE SURGERY Left     femure    HAND SURGERY      left thumb    HIP SURGERY      left    INNER EAR SURGERY      left    KNEE ARTHROSCOPY      right    KNEE SURGERY  12/07/11    RIGHT    NOSE SURGERY      left    SPINE SURGERY      VENOUS CLOT SURGERY  2008    right leg after hip replacement         CURRENT MEDICATIONS       Current Discharge Medication List      CONTINUE these medications which have NOT CHANGED    Details   levoFLOXacin (LEVAQUIN) 500 MG tablet Take 1 tablet by mouth daily for 10 days  Qty: 10 tablet, Refills: 0      magnesium oxide (MAG-OX) 400 (240 Mg) MG tablet TAKE 1 TABLET BY MOUTH 2 TIMES DAILY  Qty: 30 tablet, Refills: 0      apixaban (ELIQUIS) 5 MG TABS tablet TAKE 1 TABLET BY MOUTH EVERY 12 HOURS  Qty: 60 tablet, Refills: 3      potassium chloride (KLOR-CON) 10 MEQ extended release tablet TAKE ONE TABLET BY MOUTH ONCE DAILY  Qty: 90 tablet, Refills: 1      gabapentin (NEURONTIN) 300 MG capsule Take 1 capsule by mouth 3 times daily for 14 days.   Qty: 42 capsule, Refills: 0    Associated Diagnoses: Declining functional status      metoprolol tartrate (LOPRESSOR) 100 MG tablet Take 1 tablet by mouth 2 times daily  Qty: 60 tablet, Refills: 3      pantoprazole (PROTONIX) 40 MG tablet Take 1 tablet by mouth daily  Qty: 30 tablet, Refills: 3      loratadine (CLARITIN) 10 MG capsule Take 10 mg by mouth daily      dilTIAZem (CARDIZEM CD) 120 MG extended release capsule TAKE 1 CAPSULE BY MOUTH DAILY  Qty: 30 capsule, Refills: 3      atorvastatin (LIPITOR) 20 Frequency of Communication with Friends and Family: Not on file    Frequency of Social Gatherings with Friends and Family: Not on file    Attends Restoration Services: Not on file    Active Member of Clubs or Organizations: Not on file    Attends Club or Organization Meetings: Not on file    Marital Status: Not on file   Intimate Partner Violence:     Fear of Current or Ex-Partner: Not on file    Emotionally Abused: Not on file    Physically Abused: Not on file    Sexually Abused: Not on file   Housing Stability:     Unable to Pay for Housing in the Last Year: Not on file    Number of [place] in the Last Year: Not on file    Unstable Housing in the Last Year: Not on file       SCREENINGS             PHYSICAL EXAM    (up to 7 for level 4, 8 or more for level 5)     ED Triage Vitals [02/03/22 1910]   BP Temp Temp Source Pulse Resp SpO2 Height Weight   127/74 98.3 °F (36.8 °C) Oral 91 16 98 % 6' (1.829 m) 220 lb (99.8 kg)       Physical Exam  Vitals and nursing note reviewed. Constitutional:       General: He is not in acute distress. Appearance: He is well-developed. He is not ill-appearing, toxic-appearing or diaphoretic. HENT:      Head: Normocephalic and atraumatic. Eyes:      Extraocular Movements: Extraocular movements intact. Pupils: Pupils are equal, round, and reactive to light. Cardiovascular:      Rate and Rhythm: Normal rate. Rhythm irregular. Heart sounds: Normal heart sounds. Pulmonary:      Effort: Pulmonary effort is normal. No respiratory distress. Breath sounds: Normal breath sounds. No wheezing, rhonchi or rales. Abdominal:      General: There is no distension. Palpations: Abdomen is soft. Tenderness: There is no abdominal tenderness. Musculoskeletal:         General: Normal range of motion. Cervical back: Neck supple. Skin:     General: Skin is warm and dry. Neurological:      Mental Status: He is alert.       GCS: GCS eye subscore is 4. GCS verbal subscore is 4. GCS motor subscore is 6. Cranial Nerves: No cranial nerve deficit, dysarthria or facial asymmetry. Sensory: No sensory deficit. Comments: GCS 14. Pt knows date of birth, month, hospital, situation but didn't know year. No current visual or auditory hallucinations  Pt with slow gait but able to stand and walk using urinal to give sample   Psychiatric:         Mood and Affect: Mood normal.         Speech: Speech normal.         Behavior: Behavior normal.         DIAGNOSTIC RESULTS     EKG: All EKG's are interpreted by the Emergency Department Physician who either signs or Co-signs this chart in the absence of a cardiologist.    HR 93; Atrial Flutter 3:1 AV Block; Unable to calculate DE;  ms; No acute ST-T elev/dep; Normal axis    RADIOLOGY:   Non-plain film images such as CT, Ultrasound and MRI are read by the radiologist. Abdirahman Nagy radiographic images are visualized andpreliminarily interpreted by the emergency physician with the below findings:    CXR - See Below  CT head - See Below    Interpretationper the Radiologist below, if available at the time of this note:    XR CHEST PORTABLE   Final Result   Impression:   1. Bilateral airspace disease slightly decreased. CT Head WO Contrast   Final Result   Impression:   1. No evidence of recent intracranial hemorrhage. 2. Parenchymal volume loss and chronic small vessel ischemic changes in the white matter.             ED BEDSIDE ULTRASOUND:   Performed by ED Physician - none    LABS:  Labs Reviewed   CBC WITH AUTO DIFFERENTIAL - Abnormal; Notable for the following components:       Result Value    MCHC 30.5 (*)     MPV 11.5 (*)     Lymphocytes Absolute 1.4 (*)     All other components within normal limits    Narrative:     Performed at:  [address] [place] [address],  [place], [place] [address]   Phone [phone]   COMPREHENSIVE METABOLIC PANEL W/ REFLEX TO MG FOR LOW K - Abnormal; Notable for the following components:    Sodium 132 (*)     Calcium 8.4 (*)     Total Protein 5.9 (*)     Albumin 3.0 (*)     Alkaline Phosphatase 122 (*)     All other components within normal limits    Narrative:     Performed at:  [address] [place] [address] Laboratory  [address] [place] [address],  [place], [place] [address]   Phone [phone]   CULTURE, BLOOD 1   CULTURE, BLOOD 2   TROPONIN    Narrative:     Performed at:  [address] [place] [address] Laboratory  [address] [place] [address]  [place], [place] [address]   Phone [phone]    Narrative:     Performed at:  [address] [place] [address] Laboratory  [address] [place] [address]  [place], [place] [address] [address]   Phone [phone]   LACTIC ACID, PLASMA    Narrative:     Performed at:  [address] [place] [address] Laboratory  [address] [place] [address]  [place], [place] [address] [address]   Phone [phone]   URINE RT REFLEX TO CULTURE   URINE RT REFLEX TO CULTURE       All other labs were within normal range or not returned as of this dictation. EMERGENCY DEPARTMENT COURSE and DIFFERENTIAL DIAGNOSIS/MDM:   Vitals:    Vitals:    02/03/22 1910   BP: 127/74   Pulse: 91   Resp: 16   Temp: 98.3 °F (36.8 °C)   TempSrc: Oral   SpO2: 98%   Weight: 220 lb (99.8 kg)   Height: 6' (1.829 m)           CRITICAL CARE TIME   Total Critical Care time was 0 minutes, excluding separatelyreportable procedures. There was a high probability ofclinically significant/life threatening deterioration in the patient's condition which required my urgent intervention. CONSULTS/Calls:  Called son Piper Kumar and discussed with him that I offered admission for the confusion jose antonio in light of him being newly confused. I didn't want them to think that I was discharging him confused with recent pneumonia.  I do think this could be early dementia as his earlier chart from 1/2022 admission for declining functional status and was known to have \"confusion at times'. They may want to have him be seen on Tues at his upcoming appt for dementia work up or Neurology consult    PROCEDURES:  None    PROGRESS NOTES:  Pt with confusion event today. Pt knew that he was seeing things and concerned about what was causing it. Told him that since he is on Eliquis and new, will obtain CT of head, jose antonio for any bleed, stroke. Will get labs including lactate, UA and blood cultures for any sepsis or worsening infection with recent pneumonia since CXR was included. No meningismus symptoms. No headache. Will obtain ECG, troponin, BNP for history of atrial flutter. Current vitals normal. No fever. PT alert, very nice, talkative. Recent COVID 3 days ago negative with no new exposure. Labs assessed WBC normal. Lactate negative. Normal renal function and BUN normal. No significantly low sodium. Pt isn't on any narcotic medications; No stroke like symptoms except confusion. Still in A flutter on rate control meds with HR 91. On Eliquis for anticoagulation. Pt tried to give urine sample but while helping him stand, he missed the urinal and peed in the floor. CT head was negative. CXR showing bilateral airspace disease slightly decreased. Pt is currently on antibiotics appropriate for his pneumonia and if he had UTI. Discussed with patient and wanted him to be admitted. Pt declining at this time. Had multiple discussions with him about it. Could also be some dementia cause his labs, physical exam, vitals aren't declining or seeming to head toward sepsis or infection causing the confusion. No evidence of intracranial bleed or stroke like symptoms. I did discuss about seeing his primary care about the confusion with patient and son Juan Mclain. I told patient about what signs and symptoms to watch and return for and he agreed. FINAL IMPRESSION      1. Altered mental status, unspecified altered mental status type New Problem   2.  Pneumonia due to infectious organism, unspecified laterality, unspecified part of lung Improving   3. Typical atrial flutter (HCC) Stable         DISPOSITION/PLAN   DISPOSITION        PATIENT REFERRED TO:    Pt to follow up with primary Tues. Discussed with patient that if his symptoms of confusion worsen, fever develops, worsening cough/shortness of breath to return to ED. Pt to continue the antibiotic.  May need to have dementia work up          [address]:  Current Discharge Medication List          (Please note that portions of this note were completed with a voice recognition program.  Efforts were made to edit the dictations but occasionallywords are mis-transcribed.)    Pat Boothe DO (electronically signed)  Attending Emergency Physician          Pat Boothe DO  02/03/22 [id number]

## 2022-02-04 NOTE — ED NOTES
IV d'c'ed/2x2's dsg.placed to site [facility] given to navin Haskins's son per . Left in satis. Cond. via w/c to car at [address] [place] [address] Dr. Hector Llamas, RN  02/03/22 [id number]

## 2022-02-04 NOTE — ED NOTES
Patient stated he did not think he could give a urine sample at this time.      Mulu Gordillo  02/03/22 1935

## 2022-02-05 PROBLEM — N39.0 UTI (URINARY TRACT INFECTION): Status: RESOLVED | Noted: 2021-01-01 | Resolved: 2022-01-01

## 2022-02-09 NOTE — CARE COORDINATION
I spoke with Mrs Allyson Alegre. She tells me that [facility] remains weak but his confusion has been better. I let her know that Dr Joseph Carvajal wanted to check in on him. They have not had a neurology appointment yet that was recommended by the ED. She states that she would like for [facility] to be seen asap but that they require advance notice to get their son to help. Appointment made for next week.

## 2022-02-10 NOTE — TELEPHONE ENCOUNTER
I had actually offered yesterday afternoon or today but she said she needed more time to plan.   Jeni Higginbotham

## 2022-02-11 NOTE — CARE COORDINATION
ACC: Marcus Jorge RN  CM Risk Score: 1  Charlson 10 Year Mortality Risk Score: 47%     I called Mrs. Alcala this morning to check on Jonatan. She tells me that he seems to be doing much better. Confusion is very much improved and he seems to be feeling better. I reminded her of his fu appointment next week with Dr Debbie Arce. She took note of the appointment.

## 2022-02-14 NOTE — TELEPHONE ENCOUNTER
Carols Arshad with [facility] called to report pt is still losing weight (another 3 lbs) Not eating , not taking meds like should.  Pt is supposed to be coming in tomorrow [phone]

## 2022-02-22 NOTE — CARE COORDINATION
Attempting to contact [facility] concerning appointment for tomorrow. It appears that they have called and cancelled again. Number is busy.

## 2022-02-23 NOTE — CARE COORDINATION
Call placed to fu on [facility] and cancellation of fu appointment. Message left with contact information.

## 2022-03-01 NOTE — ED PROVIDER NOTES
[facility] [address]  ENCOUNTER      Pt Name: Isaias Owen  MRN: [id number]  YOB: 1945  Date of evaluation: 3/1/2022  Provider: Tammy Echavarria MD    [address] [place] [address]       Chief Complaint   Patient presents with    Tachycardia     Pt via EMS from home d/t HR in 140's; pt has no complaints; Pt referred  by [facility]         HISTORY OF PRESENT ILLNESS  (Location/Symptom, Timing/Onset, Context/Setting, Quality, Duration, Modifying Factors, Severity.)   Isaias Owen is a 64 y.o. male who presents to the emergency department patient was at home and visited by home health they found his heart rate to be in the 140s they did not called his primary care Dr. Marjorie Meraz who sent him in to the emergency room for evaluation and 23-hour admit for observation. The patient has no complaints at all he does not feel that his heart racing he denies any chest pain denies shortness of breath denies diaphoresis denies nausea vomiting he states that he feels fine. Nursing notes were reviewed. REVIEW OFSYSTEMS    (2-9 systems for level 4, 10 or more for level 5)   ROS:  General:  No fevers, no chills, no weakness  Cardiovascular:  No chest pain, no palpitations  Respiratory:  No shortness of breath, no cough, no wheezing  Gastrointestinal:  No pain, no nausea, no vomiting, no diarrhea  Musculoskeletal:  No muscle pain, no joint pain  Skin:  No rash, no easy bruising  Neurologic:  No speech problems, no headache, no extremity weakness  Psychiatric:  No anxiety  Genitourinary:  No dysuria, no hematuria    Except as noted above the remainder of the review of systems was reviewed and negative.        PAST MEDICAL HISTORY     Past Medical History:   Diagnosis Date    Atrial fibrillation ([facility]     Cardiomyopathy ([facility]     Cataract     GERD (gastroesophageal reflux disease)     History of DVT (deep vein thrombosis)     s/p IVC filter    Hyperlipidemia     Hypertension  LONG TERM ANTICOAGULENT USE     Osteoarthritis     Unspecified sleep apnea     UTI (urinary tract infection)          SURGICAL HISTORY       Past Surgical History:   Procedure Laterality Date    ANKLE SURGERY      left    CHOLECYSTECTOMY      EYE SURGERY      left & right cataract    FRACTURE SURGERY Left     femure    HAND SURGERY      left thumb    HIP SURGERY      left    INNER EAR SURGERY      left    KNEE ARTHROSCOPY      right    KNEE SURGERY  12/07/11    RIGHT    NOSE SURGERY      left    SPINE SURGERY      VENOUS CLOT SURGERY  2008    right leg after hip replacement         CURRENT MEDICATIONS       Previous Medications    APIXABAN (ELIQUIS) 5 MG TABS TABLET    TAKE 1 TABLET BY MOUTH EVERY 12 HOURS    ASPIRIN 81 MG TABLET    Take 81 mg by mouth daily. ATORVASTATIN (LIPITOR) 20 MG TABLET    TAKE ONE TABLET BY MOUTH ONCE DAILY    DILTIAZEM (CARDIZEM CD) 120 MG EXTENDED RELEASE CAPSULE    TAKE 1 CAPSULE BY MOUTH DAILY    FINASTERIDE (PROSCAR) 5 MG TABLET    TAKE 1 TABLET BY MOUTH DAILY    GABAPENTIN (NEURONTIN) 300 MG CAPSULE    Take 1 capsule by mouth 3 times daily for 14 days.     LORATADINE (CLARITIN) 10 MG CAPSULE    Take 10 mg by mouth daily    MAGNESIUM OXIDE (MAG-OX) 400 (241.3 MG) MG TABS TABLET    TAKE 1 TABLET BY MOUTH 2 TIMES DAILY    METOPROLOL TARTRATE (LOPRESSOR) 100 MG TABLET    Take 1 tablet by mouth 2 times daily    PANTOPRAZOLE (PROTONIX) 40 MG TABLET    Take 1 tablet by mouth daily    POTASSIUM CHLORIDE (KLOR-CON) 10 MEQ EXTENDED RELEASE TABLET    TAKE ONE TABLET BY MOUTH ONCE DAILY    TAMSULOSIN (FLOMAX) 0.4 MG CAPSULE    TAKE ONE CAPSULE BY MOUTH ONCE DAILY       ALLERGIES     Codeine, Morphine, Cephalexin, and Pcn [penicillins]    FAMILY HISTORY       Family History   Problem Relation Age of Onset    Heart Disease Mother     Heart Disease Father         chf    Heart Disease Sister     Cancer Brother         lungs    Cancer Brother     Heart Disease Sister    Zuleikamireille Corie Heart Disease Brother           SOCIAL HISTORY       Social History     Socioeconomic History    Marital status:      Spouse name: None    Number of children: None    Years of education: None    Highest education level: None   Occupational History    None   Tobacco Use    Smoking status: Former Smoker     Packs/day: 1.00     Years: 40.00     Pack years: 40.00     Types: Cigarettes     Quit date: 1988     Years since quittin.1    Smokeless tobacco: Never Used   Vaping Use    Vaping Use: Never used   Substance and Sexual Activity    Alcohol use: No    Drug use: No    Sexual activity: None   Other Topics Concern    None   Social History Narrative    None     Social Determinants of Health     Financial Resource Strain: Unknown    Difficulty of Paying Living Expenses: Patient refused   Food Insecurity: Unknown    Worried About Running Out of Food in the Last Year: Patient refused    Ran Out of Food in the Last Year: Patient refused   Transportation Needs:     Lack of Transportation (Medical): Not on file    Lack of Transportation (Non-Medical):  Not on file   Physical Activity:     Days of Exercise per Week: Not on file    Minutes of Exercise per Session: Not on file   Stress:     Feeling of Stress : Not on file   Social Connections:     Frequency of Communication with Friends and Family: Not on file    Frequency of Social Gatherings with Friends and Family: Not on file    Attends Yazidism Services: Not on file    Active Member of Clubs or Organizations: Not on file    Attends Club or Organization Meetings: Not on file    Marital Status: Not on file   Intimate Partner Violence:     Fear of Current or Ex-Partner: Not on file    Emotionally Abused: Not on file    Physically Abused: Not on file    Sexually Abused: Not on file   Housing Stability:     Unable to Pay for Housing in the Last Year: Not on file    Number of Places Lived in the Last Year: Not on file    Unstable Housing in the Last Year: Not on file         PHYSICAL EXAM    (up to 7 for level 4, 8 or more for level 5)     ED Triage Vitals   BP Temp Temp src Pulse Resp SpO2 Height Weight   -- -- -- -- -- -- -- --       Physical Exam  General :Patient is awake, alert, oriented, in no acute distress, nontoxic appearing  HEENT: Pupils are equally round and reactive to light, EOMI, conjunctivae clear. Neck: Neck is supple, full range of motion, trachea midline  Cardiac: Heart tachycardic  Lungs: Lungs are clear to auscultation, there is no wheezing, rhonchi, or rales. There is no use of accessory muscles. Chest wall: There is no tenderness to palpation over the chest wall or over ribs  Abdomen: Abdomen is soft, nontender, nondistended. There is no firm or pulsatile masses, no rebound rigidity or guarding. Musculoskeletal: 5 out of 5 strength in all 4 extremities. No focal muscle deficits are appreciated  Neuro: Motor intact, sensory intact, level of consciousness is normal,Dermatology: Skin is warm and dry  Psych: Mentation is grossly normal, cognition is grossly normal. Affect is appropriate.       DIAGNOSTIC RESULTS     EKG: All EKG's are interpreted by the Emergency Department Physician who either signs or Co-signs this chart in the [address] a cardiologist.    The EKG interpreted by me shows ectopic atrial tachycardia with a rate 141 and occasional PVC    RADIOLOGY:   Non-plain film images such as CT, Ultrasound and MRI are read by the radiologist. Plain radiographic images are visualized and preliminarily interpreted by the emergency physician with the below findings:      ? Radiologist's Report Reviewed:  XR CHEST PORTABLE   Final Result      No acute disease               ED BEDSIDE ULTRASOUND:   Performed by ED Physician - none    LABS:    I have reviewed and interpreted all of the currently available lab results from this visit (ifapplicable):  Results for orders placed or performed during the hospital encounter of 03/01/22   CBC with Auto Differential   Result Value Ref Range    WBC 4.8 4.0 - 11.0 K/uL    RBC 5.48 4.50 - 6.00 M/uL    Hemoglobin 16.0 13.0 - 18.0 g/dL    Hematocrit 48.9 40.0 - 54.0 %    MCV 89.2 80.0 - 100.0 fL    MCH 29.2 27.0 - 32.0 pg    MCHC 32.7 31.0 - 35.0 g/dL    RDW 14.5 11.0 - 16.0 %    Platelets 548 068 - 847 K/uL    MPV 11.0 (H) 6.0 - 10.0 fL    Neutrophils % 67.0 %    Immature Granulocytes % 0.4 0.0 - 5.0 %    Lymphocytes % 24.6 %    Monocytes % 7.2 %    Eosinophils % 0.4 %    Basophils % 0.4 %    Neutrophils Absolute 3.2 2.0 - 7.5 K/uL    Immature Granulocytes # 0.0 K/uL    Lymphocytes Absolute 1.2 (L) 1.5 - 4.0 K/uL    Monocytes Absolute 0.4 0.2 - 0.8 K/uL    Eosinophils Absolute 0.0 0.0 - 0.4 K/uL    Basophils Absolute 0.0 0.0 - 0.1 K/uL   Comprehensive Metabolic Panel w/ Reflex to MG   Result Value Ref Range    Sodium 136 136 - 145 mmol/L    Potassium reflex Magnesium 3.6 3.4 - 5.1 mmol/L    Chloride 96 (L) 98 - 107 mmol/L    CO2 22 20 - 30 mmol/L    Anion Gap 18 (H) 3 - 16    Glucose 91 74 - 106 mg/dL    BUN 12 6 - 20 mg/dL    CREATININE 0.9 0.4 - 1.2 mg/dL    GFR Non-African American >59 >59    GFR African American >59 >59    Calcium 8.9 8.5 - 10.5 mg/dL    Total Protein 6.7 6.4 - 8.3 g/dL    Albumin 3.7 3.4 - 4.8 g/dL    Albumin/Globulin Ratio 1.2 0.8 - 2.0    Total Bilirubin 1.6 (H) 0.3 - 1.2 mg/dL    Alkaline Phosphatase 118 (H) 25 - 100 U/L    ALT 17 4 - 36 U/L    AST 22 8 - 33 U/L    Globulin 3.0 Not Established g/dL   Troponin   Result Value Ref Range    Troponin <0.30 <0.30 ng/mL   Brain Natriuretic Peptide   Result Value Ref Range    Pro- 0 - 1,800 pg/mL   Protime-INR   Result Value Ref Range    Protime 13.3 11.6 - 13.8 sec    INR 1.06 0.88 - 1.11   APTT   Result Value Ref Range    aPTT 28.3 22.5 - 34.9 sec        All other labs were within normal range or not returned as of this dictation.     EMERGENCY DEPARTMENT COURSE and DIFFERENTIAL DIAGNOSIS/MDM:   Vitals:    Vitals: 03/01/22 1630 03/01/22 1645 03/01/22 1651 03/01/22 1800   BP: 123/88      Pulse: 141 115 143 95   Resp: 13 16 18 22   Temp:   98.1 °F (36.7 °C)    TempSrc:   Oral    SpO2: 100% 99% 100% 100%   Weight:   220 lb (99.8 kg)    Height:   6' (1.829 m)        MEDICATIONS ADMINISTERED IN ED:  Medications   lactated ringers bolus (1,000 mLs IntraVENous New Bag 3/1/22 1711)       Patient has been stable throughout his stay EKG did show an SVT in the 140s but he has converted down to a sinus rhythm with a rate of in the 80s to 90s on his own while we have been evaluating him. His chest x-ray is clear his blood work is within normal limits Dr. Mark Ray had said when he called that he wanted him admitted for observation no matter what showed up I have spoken to Renuka Street the nurse practitioner and we will go ahead and admit him for 23-hour observation. The patient will follow-up with their PCP in 1-2 days for reevaluation. If the patient or family members have anyfurther concerns or any worsening symptoms they will return to the ED for reevaluation. CONSULTS:  None    PROCEDURES:  Procedures    CRITICAL CARE TIME    Total Critical Care time was 0 minutes, excluding separately reportable procedures. There was a high probability of clinically significant/life threatening deterioration in the patient's condition which required my urgent intervention. FINAL IMPRESSION      1. Paroxysmal supraventricular tachycardia (HCC) Stable         DISPOSITION/PLAN   DISPOSITION    Stable admit to hospital    PATIENT REFERRED TO:  No follow-up provider specified. DISCHARGE MEDICATIONS:  New Prescriptions    No medications on file       Comment: Please note this report has been produced using speech recognition software and may contain errorsrelated to that system including errors in grammar, punctuation, and spelling, as well as words and phrases that may be inappropriate.  If there are any questions or concerns please feel free to contact the dictating providerfor clarification.     Carlos Mascorro MD  Attending Emergency Physician              Carlos Mascorro MD  03/01/22 1726

## 2022-03-01 NOTE — LETTER
orders on Mariely Wisdom    #       Wilbert Jha, JAZMYN  Care Coordinator  [facility]  [address], [place]  Office:  [phone]  Fax:  [phone]  [email or web address]

## 2022-03-01 NOTE — CARE COORDINATION
Daisy Gomez from [facility] calls in to tell me that she is with Fauzia Ansari and that on arrival his heart rate was 143. She has been monitoring him and he got up to the bathroom but the lowest it has been was 137 with BP of 118/70. She also tells me that he has lost 3 more pounds from last visit which makes 22 pounds since January 31. I let her know that [facility] cancelled his appointment with Dr Joseph Carvajal again last week. I did go to Dr Joseph Carvajal and he advised that [facility] is to go to the ED. He will most likely be admitted. While [address] of Dr Joseph Carvajal orders she tells me that Iris's heart rate went down to 55 and is now back at 123. She did advise Iris that Dr Joseph Carvajal expects him to go to the hospital and that we need him to go by ambulance.

## 2022-03-02 PROBLEM — R41.3 MEMORY DEFICIT: Status: ACTIVE | Noted: 2022-01-01

## 2022-03-02 PROBLEM — I48.91 ATRIAL FIBRILLATION (HCC): Status: ACTIVE | Noted: 2022-01-01

## 2022-03-02 PROBLEM — R63.4 WEIGHT LOSS: Status: ACTIVE | Noted: 2022-01-01

## 2022-03-02 NOTE — PROGRESS NOTES
Pt discharged home-Pt received discharge instructions-Pt verbalized understanding of all instructions- IV removed -Cath tip intact- Gauze and tape applied-Telemetry removed and returned to unit-Pt awaiting son to pick him up- Will review discharge instructions with son when he comes to pick him up-

## 2022-03-02 NOTE — ACP (ADVANCE CARE PLANNING)
Advance Care Planning     General Advance Care Planning (ACP) Conversation    Date of Conversation: 3/2/2022  Conducted with: Patient with Decision Making Capacity    Healthcare Decision Maker:    Primary Decision Maker: Marianela De Guzman - Spouse - [phone]    Secondary Decision Maker: Boris Choi - Child - [phone]  Click here to complete Healthcare Decision Makers including selection of the Healthcare Decision Maker Relationship (ie \"Primary\"). Today we documented Decision Maker(s) consistent with Legal Next of Kin hierarchy. Content/Action Overview:   Has ACP document(s) on file - reflects the patient's care preferences  Reviewed DNR/DNI and patient elects Full Code (Attempt Resuscitation)        Length of Voluntary ACP Conversation in minutes:  <16 minutes (Non-Billable)    SAPNA Marie Intern

## 2022-03-02 NOTE — PLAN OF CARE
Problem: Falls - Risk of:  Goal: Will remain free from falls  Description: Will remain free from falls  3/2/2022 1037 by Saskia Campos RN  Outcome: Completed  3/2/2022 0315 by Juan F Gracia RN  Outcome: Ongoing  Goal: Absence of physical injury  Description: Absence of physical injury  3/2/2022 1037 by Saskia Campos RN  Outcome: Completed  3/2/2022 0315 by Juan F Gracia RN  Outcome: Ongoing     Problem: Skin Integrity:  Goal: Will show no infection signs and symptoms  Description: Will show no infection signs and symptoms  3/2/2022 1037 by Saskia Campos RN  Outcome: Completed  3/2/2022 0315 by Juan F Gracia RN  Outcome: Ongoing  Goal: Absence of new skin breakdown  Description: Absence of new skin breakdown  Outcome: Completed     Problem: Cardiac:  Goal: Cardiovascular alteration will improve  Description: Cardiovascular alteration will improve  3/2/2022 1037 by Saskia Campos RN  Outcome: Completed  3/2/2022 0939 by Fredrick Booker RN  Outcome: Ongoing

## 2022-03-02 NOTE — PROGRESS NOTES
Physical Therapy    Facility/Department: [facility] MED SURG  Initial Assessment    NAME: Ihsan Faustin  : 1945  MRN: [id number]    Date of Service: 3/2/2022    Discharge Recommendations:           Assessment   Body structures, Functions, Activity limitations: Decreased high-level IADLs  Assessment: Tachycardia; appears to be at baseline function  Treatment Diagnosis: Tachycardia  Prognosis: Excellent  Decision Making: Low Complexity  REQUIRES PT FOLLOW UP: No  Activity Tolerance  Activity Tolerance: Patient Tolerated treatment well       Patient Diagnosis(es): The encounter diagnosis was Paroxysmal supraventricular tachycardia ([address] has a past medical history of Atrial fibrillation ([address] Cardiomyopathy ([address] Cataract, GERD (gastroesophageal reflux disease), History of DVT (deep vein thrombosis), Hyperlipidemia, Hypertension, LONG TERM ANTICOAGULENT USE, Osteoarthritis, Unspecified sleep apnea, and UTI (urinary tract infection). has a past surgical history that includes Cholecystectomy; Spine surgery; Ankle surgery; Hand surgery; Inner ear surgery; Nose surgery; Knee arthroscopy; hip surgery; eye surgery; Venous clot surgery (); knee surgery (11); and fracture surgery (Left). Restrictions  Restrictions/Precautions  Restrictions/Precautions: General Precautions,Fall Risk  Required Braces or Orthoses?: No  Vision/Hearing  Vision: Impaired  Vision Exceptions: Wears glasses for reading  Hearing: Exceptions to [facility]  Hearing Exceptions: Hard of hearing/hearing concerns     Subjective  General  Chart Reviewed: Yes  Patient assessed for rehabilitation services?: Yes  Response To Previous Treatment: Not applicable  Family / Caregiver Present: No  Referring Practitioner: Abigail Eaton MD  Diagnosis: Tachycardia  Follows Commands: Within Functional Limits  Subjective  Subjective: Patient agreeablet to consult; is normally independent with functional mobility; uses a cane at times.   Pain Screening  Patient Currently in Pain: Denies  Vital Signs  Patient Currently in Pain: Denies       Orientation  Orientation  Overall Orientation Status: Within Functional Limits  Social/Functional History  Social/Functional History  Lives With: Spouse  Type of Home: House  Home Layout: Two level,Able to Live on Main level with bedroom/bathroom  Home Access: Stairs to enter with rails  Entrance Stairs - Number of Steps: 1  Bathroom Shower/Tub: Tub/Shower unit  Bathroom Toilet: Standard  Bathroom Equipment: Shower chair,3-in-1 commode  Home Equipment: Rolling walker,Cane,Wheelchair-manual,Oxygen  Receives Help From: Home health  ADL Assistance: Independent  Homemaking Responsibilities: No  Ambulation Assistance: Independent  Transfer Assistance: Independent  Active : No  Cognition        Objective     Observation/Palpation  Posture: Good  Observation: Pt lying in bed, NAD, room air, pleasant and cooperative,    AROM RLE (degrees)  RLE AROM: WFL  AROM LLE (degrees)  LLE AROM : WFL  AROM RUE (degrees)  RUE AROM : WFL  AROM LUE (degrees)  LUE AROM : WFL  Strength RLE  Strength RLE: WFL  Strength LLE  Strength LLE: WFL  Strength RUE  Strength RUE: WFL  Strength LUE  Strength LUE: WFL  Tone RLE  RLE Tone: Normotonic  Tone LLE  LLE Tone: Normotonic  Motor Control  Gross Motor?: WFL  Sensation  Overall Sensation Status: WFL  Bed mobility  Rolling to Left: Modified independent  Supine to Sit: Modified independent  Scooting: Modified independent  Transfers  Sit to Stand: Supervision  Stand to sit: Supervision  Bed to Chair: Supervision  Ambulation  Ambulation?: Yes  Ambulation 1  Surface: level tile  Device: Rolling Walker  Assistance: Stand by assistance;Supervision  Quality of Gait: steady  Gait Deviations: Slow Judy;Decreased step length;Decreased step height  Distance: 30 feet     Balance  Posture: Good  Sitting - Static: Good  Sitting - Dynamic: Good  Standing - Static: Good;-  Standing - Dynamic: Fair;+        Plan   Plan  Times per week: 1 visit  Plan weeks: 1 visit            Therapy Time   Individual Concurrent Group Co-treatment   Time In           Time Out           Minutes                   Olga Baker, PT

## 2022-03-02 NOTE — PLAN OF CARE
Problem: Falls - Risk of:  Goal: Will remain free from falls  Description: Will remain free from falls  3/2/2022 0315 by Aretha Puente RN  Outcome: Ongoing

## 2022-03-02 NOTE — FLOWSHEET NOTE
03/02/22 0830   Assessment   Charting Type Shift assessment   Neurological   Neuro (WDL) WDL   Level of Consciousness Alert (0)   Orientation Level Oriented to place;Oriented to situation;Oriented to person;Disoriented to time   Swallow Screening   Is the patient able to remain alert for testing? Yes   Was the Patient Eating a Modified Diet Prior to being Admitted? No   Bang Coma Scale   Eye Opening 4   Best Verbal Response 5   Best Motor Response 6   [employer] Coma Scale Score 15   NIHSS Stroke Scale   NIHSS Stroke Scale Assessed No   HEENT   HEENT (WDL) X   Right Ear Impaired hearing   Left Ear Impaired hearing   Respiratory   Respiratory (WDL) X   L Breath Sounds Clear   R Breath Sounds Clear   Cardiac   Cardiac (WDL) X  (afib at times)   Cardiac Rhythm Sinus rhythm   Cardiac Monitor   Telemetry Monitor On Yes   Telemetry Audible Yes   Telemetry Alarms Set Yes   Gastrointestinal   Abdominal (WDL) X   Abdomen Inspection Rotund   Tenderness Nontender   RUQ Bowel Sounds Active   LUQ Bowel Sounds Active   RLQ Bowel Sounds Active   LLQ Bowel Sounds Active   Peripheral Vascular   Peripheral Vascular (WDL) WDL   Edema Right lower extremity; Left lower extremity   RLE Edema Trace   LLE Edema Trace   Skin Color/Condition   Skin Color/Condition (WDL) X   Skin Color Pale   Skin Condition/Temp Dry; Warm   Skin Integrity   Skin Integrity (WDL) WDL   Musculoskeletal   Musculoskeletal (WDL) X   RUE Full movement   LUE Full movement   RL Extremity Weakness   LL Extremity Weakness   Genitourinary   Genitourinary (WDL) WDL   Anus/Rectum   Anus/Rectum (WDL) WDL   Psychosocial   Psychosocial (WDL) WDL   Pt alert times 3 this am- Am assessment completed- Breathing equal and unlabored- Pt able to take am medications well whole- No complaints of pain- Call bell at bedside- Will monitor

## 2022-03-02 NOTE — FLOWSHEET NOTE
03/02/22 0808   Vital Signs   Temp 98.1 °F (36.7 °C)   Temp Source Oral   Pulse 90   Heart Rate Source Monitor   /89   BP Location Right upper arm   MAP (mmHg) 101   Patient Position Sitting   Level of Consciousness Alert (0)   Patient Currently in Pain No   Oxygen Therapy   SpO2 99 %   O2 Device None (Room air)

## 2022-03-02 NOTE — FLOWSHEET NOTE
03/01/22 2143   Assessment   Charting Type Admission   Neurological   Neuro (WDL) WDL   Level of Consciousness Alert (0)   Bang Coma Scale   Eye Opening 4   Best Verbal Response 5   Best Motor Response 6   Bang Coma Scale Score 15   HEENT   HEENT (WDL) X   Respiratory   Respiratory (WDL) X   L Breath Sounds Clear   R Breath Sounds Clear   Cardiac   Cardiac (WDL) X   Cardiac Rhythm Atrial fib   Cardiac Monitor   Telemetry Monitor On Yes   Telemetry Audible Yes   Telemetry Alarms Set Yes   Gastrointestinal   Abdominal (WDL) X   Abdomen Inspection Rotund   Last BM (including prior to admit) 03/01/22   Tenderness Nontender   RUQ Bowel Sounds Active   LUQ Bowel Sounds Active   RLQ Bowel Sounds Active   LLQ Bowel Sounds Active   Peripheral Vascular   Peripheral Vascular (WDL) WDL   Edema Right lower extremity; Left lower extremity   RLE Edema Trace   LLE Edema Trace   Skin Color/Condition   Skin Color/Condition (WDL) X   Skin Color Pale   Skin Condition/Temp Warm;Dry   Skin Integrity   Skin Integrity (WDL) WDL   Musculoskeletal   Musculoskeletal (WDL) X   RUE Full movement   LUE Full movement   RL Extremity Weakness   LL Extremity Weakness   Genitourinary   Genitourinary (WDL) WDL

## 2022-03-02 NOTE — PLAN OF CARE
Problem: Cardiac:  Goal: Cardiovascular alteration will improve  Description: Cardiovascular alteration will improve  Outcome: Ongoing

## 2022-03-02 NOTE — PROGRESS NOTES
Occupational Therapy   Occupational Therapy Initial Assessment  Date: 3/2/2022   Patient Name: Shaan Plata  MRN: [id number]     : 1945    Date of Service: 3/2/2022    Discharge Recommendations:  Home with Home health OT  OT Equipment Recommendations  Equipment Needed: No    Assessment   Performance deficits / Impairments: Decreased endurance;Decreased high-level IADLs  Assessment: Pt agreeable to OT services. Pt referred to OT services upon recent onset of tachycardia. Pt at rest HR 83 and with activity 89. Pt ambulated in room with RW with SBA. Pt demo ability to complete LB dressing without assistance. Pt will benefit from skilled OT services while IP however, if DC will benefit from [facility] upon returning home. Prognosis: Good  Decision Making: Low Complexity  REQUIRES OT FOLLOW UP: Yes           Patient Diagnosis(es): The encounter diagnosis was Paroxysmal supraventricular tachycardia ([address] has a past medical history of Atrial fibrillation ([address] Cardiomyopathy ([address] Cataract, GERD (gastroesophageal reflux disease), History of DVT (deep vein thrombosis), Hyperlipidemia, Hypertension, LONG TERM ANTICOAGULENT USE, Osteoarthritis, Unspecified sleep apnea, and UTI (urinary tract infection). has a past surgical history that includes Cholecystectomy; Spine surgery; Ankle surgery; Hand surgery; Inner ear surgery; Nose surgery; Knee arthroscopy; hip surgery; eye surgery; Venous clot surgery (); knee surgery (11); and fracture surgery (Left). Restrictions  Restrictions/Precautions  Restrictions/Precautions: General Precautions,Fall Risk  Required Braces or Orthoses?: No    Subjective   General  Chart Reviewed: Yes  Patient assessed for rehabilitation services?: Yes  Family / Caregiver Present: No  Referring Practitioner: DRAKE Tao  Diagnosis: Tachycardia  Subjective  Subjective: Pt states Dr requested he come to hospital for his heart.   Patient Currently in Pain: Denies  Vital Signs  Temp: 98.1 °F (36.7 °C)  Temp Source: Oral  Pulse: 90  Heart Rate Source: Monitor  BP: 128/89  BP Location: Right upper arm  MAP (mmHg): 101  Patient Position: Sitting  Level of Consciousness: Alert (0)  Patient Currently in Pain: Denies  Oxygen Therapy  SpO2: 99 %  O2 Device: None (Room air)  Social/Functional History  Social/Functional History  Lives With: Spouse  Type of Home: House  Home Layout: Two level,Able to Live on Main level with bedroom/bathroom  Home Access: Stairs to enter with rails  Entrance Stairs - Number of Steps: 1  Bathroom Shower/Tub: Tub/Shower unit  Bathroom Toilet: Standard  Bathroom Equipment: Shower chair,3-in-1 commode  Home Equipment: Rolling walker,Cane,Wheelchair-manual,Oxygen  Receives Help From: Home health  ADL Assistance: Independent  Homemaking Responsibilities: No  Ambulation Assistance: Independent  Transfer Assistance: Independent  Active : No       Objective   Vision: Impaired  Vision Exceptions: Wears glasses for reading  Hearing: Exceptions to [facility]  Hearing Exceptions: Hard of hearing/hearing concerns (has hearing aid but doesn't wear it)    Orientation  Overall Orientation Status: Within Functional Limits  Observation/Palpation  Observation: Pt lying in bed, NAD, room air, pleasant and cooperative,              Bed mobility  Rolling to Left: Modified independent  Supine to Sit: Modified independent  Scooting: Modified independent  Transfers  Stand Pivot Transfers: Modified independent  Sit to stand: Modified independent  Stand to sit: Modified independent     Cognition  Cognition Comment: STM deficits                 LUE AROM (degrees)  LUE AROM : WFL  RUE AROM (degrees)  RUE AROM : WFL  LUE Strength  Gross LUE Strength: WFL  RUE Strength  Gross RUE Strength: WFL                   Plan   Plan  Times per week: 3-5  Times per day: Daily  Current Treatment Recommendations: Functional Mobility Training,Balance Training,Endurance Training,Self-Care / ADL      Goals  Short term goals  Time Frame for Short term goals: 3-5 days  Short term goal 1: pt to complete bathing with MOD I. Short term goal 2: Pt to complete functional reaching without LOB. Short term goal 3: Pt to tolerate x15 minutes of activity without s/s of fatigue. Therapy Time   Individual Concurrent Group Co-treatment   Time In 0900         Time Out 0923         Minutes 23              This note serves as a DC summary in the event of pt discharge.      Amanda Wyman, OTR/L

## 2022-03-02 NOTE — CARE COORDINATION
The Plan for Transition of Care is related to the following treatment goals: home with Home Health    The Patient and/or patient representative was provided with a choice of provider and agrees   with the discharge plan. [x] Yes [] No    Freedom of choice list was provided with basic dialogue that supports the patient's individualized plan of care/goals, treatment preferences and shares the quality data associated with the providers. [x] Yes [] No    Pt is cleared for DC to home with [place]. Orders for [place] sent to [facility] (pt's provider). No DME needs noted per therapy. Pt has RW, cane, WC, O2 ([facility]).

## 2022-03-02 NOTE — PROGRESS NOTES
Reviewed discharge instructions with son- Son verbalized understanding- All questions answered- Pt left via wheel chair with son art side- All items taken and accounted for -No acute distress noted

## 2022-03-02 NOTE — FLOWSHEET NOTE
03/02/22 0913   Discharge Planning   Current Residence Private Residence   Living Arrangements Spouse/Significant Other   Support Systems Children;Spouse/Significant Other;Family Members   Current Services Prior To Admission C-pap;Durable Medical Equipment   DME Wheelchair;Walker   Potential Assistance Needed N/A   Potential Assistance Purchasing Medications No   Type of Home Care Services None   Patient expects to be discharged to: [facility]   Expected Discharge Date 03/05/22   Follow Up Appointment: Best Day/Time    (Any)   Has RW, WC, and C-pap.  Nothing needed at DC

## 2022-03-02 NOTE — DISCHARGE SUMMARY
Short Stay Summary        Patient ID: Thom Mendoza                 Patient's PCP: Ester Diaz MD     Admit Date:    3/1/2022      Discharge Date:   3/2/2022     Admitting Physician: Ester Diaz MD     Discharge Physician: SAMMIE Herrmann      Reason for this admission:   Atrial fibrillation with RVR     Discharge Diagnoses: Active Hospital Problems     Diagnosis Date Noted    Weight loss [R63.4] 03/02/2022    Memory deficit [R41.3] 03/02/2022    Declining functional status [R53.81] 01/05/2022    Atrial fibrillation ([address] [I48.91] 01/03/2022    Tachycardia [R00.0] 10/27/2020    Essential hypertension [I10] 01/09/2012    Hyperlipidemia [E78.5] 01/09/2012         Procedures:  XR CHEST PORTABLE   Final Result       No acute disease                    Consults:   IP CONSULT TO CASE MANAGEMENT  IP CONSULT TO HOME CARE NEEDS  PT OT        HISTORY OF PRESENT ILLNESS:   The patient is a 64 y.o. male with PMH of GERD, PAF/flutter on Eliquis, HTN, BPH and memory deficit who presented to the ER via EMS for further evaluation of tachycardia. Per chart review and pt report, home health visited him yesterday and found his HR to be in the 140's. Pt's primary PCP, , was notified and instructed pt to go to the ER for evaluation. Upon arrival pt found to be in atrial fib with 's. Routine labs were obtained and essentially unremarkable. While in the ER on telemetry, HR improved to 90's without any intervention. Pt was then admitted for observation.     Review of system  Constitutional:  Denies fever or chills. Eyes:  Denies change in visual acuity or discharge. HENT:  Denies nasal congestion or sore throat. Respiratory:  Denies cough or shortness of breath. Cardiovascular:  Denies chest pain, palpitation or swelling in LEs. GI:  Denies abdominal pain, nausea, vomiting, bloody stools or diarrhea. :  Denies dysuria or frequency. Musculoskeletal:  Denies back pain or joint pain.    Integument: Denies rash or itching. Neurologic:  Denies headache, focal weakness or sensory changes. Endocrine:  Denies polyuria or polydipsia. Lymphatic:  Denies swollen glands or night sweats. Psychiatric:  Denies depression or anxiety.     Past Medical History:  Past Medical History             Diagnosis Date    Atrial fibrillation ([facility]      Cardiomyopathy ([facility]      Cataract      GERD (gastroesophageal reflux disease)      History of DVT (deep vein thrombosis)       s/p IVC filter    Hyperlipidemia      Hypertension      LONG TERM ANTICOAGULENT USE      Osteoarthritis      Unspecified sleep apnea      UTI (urinary tract infection)              Past Surgical History:  Past Surgical History       Procedure Laterality Date    ANKLE SURGERY         left    CHOLECYSTECTOMY        EYE SURGERY         left & right cataract    FRACTURE SURGERY Left       femure    HAND SURGERY         left thumb    HIP SURGERY         left    INNER EAR SURGERY         left    KNEE ARTHROSCOPY         right    KNEE SURGERY   12/07/11     RIGHT    NOSE SURGERY         left    SPINE SURGERY        VENOUS CLOT SURGERY   2008     right leg after hip replacement            Social History:   TOBACCO:   reports that he quit smoking about 34 years ago. His smoking use included cigarettes. He has a 40.00 pack-year smoking history. He has never used smokeless tobacco.  ETOH:   reports no history of alcohol use. OCCUPATION:  None     Family History:   Family History             Problem Relation Age of Onset    Heart Disease Mother      Heart Disease Father           chf    Heart Disease Sister      Cancer Brother           lungs    Cancer Brother      Heart Disease Sister      Heart Disease Brother              Allergies:  Codeine, Morphine, Cephalexin, and Pcn [penicillins]     Medications Prior to Admission:    Home Medications           Prior to Admission medications    Medication Sig Start Date End Date Taking? Authorizing Provider   apixaban (ELIQUIS) 5 MG TABS tablet TAKE 1 TABLET BY MOUTH EVERY 12 HOURS 1/13/22   Yes Maurice Cockayne, MD   potassium chloride (KLOR-CON) 10 MEQ extended release tablet TAKE ONE TABLET BY MOUTH ONCE DAILY 1/11/22   Yes Maurice Cockayne, MD   metoprolol tartrate (LOPRESSOR) 100 MG tablet Take 1 tablet by mouth 2 times daily 1/8/22   Yes SAMMIE Cox   loratadine (CLARITIN) 10 MG capsule Take 10 mg by mouth daily     Yes Historical Provider, MD   atorvastatin (LIPITOR) 20 MG tablet TAKE ONE TABLET BY MOUTH ONCE DAILY 9/7/21   Yes Maurice Cockayne, MD   finasteride (PROSCAR) 5 MG tablet TAKE 1 TABLET BY MOUTH DAILY 8/13/21   Yes Maurice Cockayne, MD   tamsulosin (FLOMAX) 0.4 MG capsule TAKE ONE CAPSULE BY MOUTH ONCE DAILY 5/17/21   Yes Maurice Cockayne, MD   aspirin 81 MG tablet Take 81 mg by mouth daily.     Yes Maurice Cockayne, MD            Vital Signs  Temp: 98.1 °F (36.7 °C)  Pulse: 90  Resp: 16  BP: 128/89  SpO2: 99 %  O2 Device: None (Room air)     Vital signs reviewed in electronic chart.     Physical exam  Constitutional:  Well developed, well nourished, no acute distress. Eyes:  conjunctiva normal, EOMI. HENT:  Atraumatic, external ears normal, external nose/nares normal, oropharynx moist, no pharyngeal exudates. Neck:  Supple. No JVD or thyromegaly. Respiratory:  No respiratory distress, normal breath sounds, no rales, no wheezing. Cardiovascular:  Irregularly irregular, no murmurs, no gallops, no rubs. GI:  Soft, nondistended, normal bowel sounds, nontender, no organomegaly, no mass. :  No costovertebral angle tenderness. Musculoskeletal:  No edema, no tenderness, no obvious deformities. Patient is moving all extremities. Integument:  Well hydrated, no rash. Lymphatic:  No cervical or axillary lymphadenopathy noted. Neurologic:  Alert & oriented x 3,  no focal deficits noted. Strength is equal throughout.   Psychiatric:  Speech and behavior appropriate.              Lab Results Component Value Date     WBC 5.0 03/02/2022     HGB 14.0 03/02/2022     HCT 42.4 03/02/2022     MCV 87.4 03/02/2022      03/02/2022               Lab Results   Component Value Date      03/02/2022     K 3.5 03/02/2022      03/02/2022     CO2 23 03/02/2022     BUN 11 03/02/2022     CREATININE 0.7 03/02/2022     GLUCOSE 78 03/02/2022     CALCIUM 8.5 03/02/2022     PROT 5.1 (L) 03/02/2022     LABALBU 3.2 (L) 03/02/2022     BILITOT 1.2 03/02/2022     ALKPHOS 98 03/02/2022     AST 21 03/02/2022     ALT 18 03/02/2022     LABGLOM >59 03/02/2022     GFRAA >59 03/02/2022     AGRATIO 1.7 03/02/2022     GLOB 1.9 03/02/2022               PA/lat CXR:   XR CHEST PORTABLE   Final Result       No acute disease                    EKG: atrial fib with      Assessment and Plan/Hospital course:           Active Hospital Problems     Diagnosis Date Noted    Weight loss [R63.4]  -Per chart review, patient weighed 265 pounds in October 2020. Most current weight 220 pounds. Patient denies dieting or trying to lose weight.  -Per chart review, patient had CT A/P without contrast on 12/28/2021 that showed no acute abdominopelvic abnormality. -CT chest pulmonary embolus protocol 1/31/2022 also without any acute abnormality  -TSH 12/31/2021 was 2.24  -PSA 6/8/2021 was 0.2  -No EGD or colonoscopy noted per chart review. Given patient's weight loss, would benefit from screening endoscopies. Defer to PCP.    03/02/2022    Memory deficit [R41.3]  -Add Namenda on discharge    03/02/2022    Declining functional status [R53.81]  -Suspect secondary to advanced age, memory deficit and chronic comorbidities  -PT OT consulted and has cleared patient for discharge home  -Case management consulted for discharge planning assistance    01/05/2022    Atrial fibrillation ([facility] [I48.91]  -Patient with A. fib with RVR upon presentation in the ER with heart rate 136. Patient was asymptomatic.   He reported compliance with home diltiazem and Eliquis. Per chart review and pharmacy review, patient also prescribed metoprolol on 2/17/2022 but he did not pick this medication up from the pharmacy. -Metoprolol restarted this admission at lower dose  -Heart rate has remained controlled in the 70s. Continue diltiazem, metoprolol and Eliquis on discharge.   Patient will be provided with new prescription.    01/03/2022    Essential hypertension [I10]  -Continue home Cardizem and metoprolol    01/09/2012    Hyperlipidemia [E78.5]  -Continue home atorvastatin 01/09/2012            Disposition: home     Discharged Condition: Stable     Activity: activity as tolerated  Diet: cardiac diet  Follow Up: Primary Care Physician in one week        Discharge Medications:      Discharge Medications        Current Discharge Medication List                 Details   memantine (NAMENDA) 5 MG tablet Take 1 tablet by mouth 2 times daily  Qty: 60 tablet, Refills: 0                     Details   dilTIAZem (CARDIZEM CD) 120 MG extended release capsule Take 1 capsule by mouth daily  Qty: 30 capsule, Refills: 3       metoprolol tartrate (LOPRESSOR) 25 MG tablet Take 2 tablets by mouth 2 times daily  Qty: 60 tablet, Refills: 0       pantoprazole (PROTONIX) 40 MG tablet Take 1 tablet by mouth daily  Qty: 30 tablet, Refills: 3                     Details   apixaban (ELIQUIS) 5 MG TABS tablet TAKE 1 TABLET BY MOUTH EVERY 12 HOURS  Qty: 60 tablet, Refills: 3       potassium chloride (KLOR-CON) 10 MEQ extended release tablet TAKE ONE TABLET BY MOUTH ONCE DAILY  Qty: 90 tablet, Refills: 1       loratadine (CLARITIN) 10 MG capsule Take 10 mg by mouth daily       atorvastatin (LIPITOR) 20 MG tablet TAKE ONE TABLET BY MOUTH ONCE DAILY  Qty: 90 tablet, Refills: 3       finasteride (PROSCAR) 5 MG tablet TAKE 1 TABLET BY MOUTH DAILY  Qty: 90 tablet, Refills: 3       tamsulosin (FLOMAX) 0.4 MG capsule TAKE ONE CAPSULE BY MOUTH ONCE DAILY  Qty: 90 capsule, Refills: 3       aspirin 81 MG tablet Take 81 mg by mouth daily.                    Patient was seen and examined by Dr. Natalie Mendoza and plan of care reviewed.  Treatment plan was formulated collaboratively.        Signed:  Electronically signed by SAMMIE Catalan on 3/2/2022 at 10:01 AM         Thank you Mehrdad Arvizu MD for the opportunity to be involved in this patient's care. If you have any questions or concerns please feel free to contact me at [phone].

## 2022-03-02 NOTE — H&P
Short Stay Summary      Patient ID: Crissy Officer      Patient's PCP: Dilma Nichols MD    Admit Date: 3/1/2022     Discharge Date:   3/2/2022    Admitting Physician: Dilma Nichols MD    Discharge Physician: SAMMIE Galeana     Reason for this admission:   Atrial fibrillation with RVR    Discharge Diagnoses: Active Hospital Problems    Diagnosis Date Noted    Weight loss [R63.4] 03/02/2022    Memory deficit [R41.3] 03/02/2022    Declining functional status [R53.81] 01/05/2022    Atrial fibrillation ([address] [I48.91] 01/03/2022    Tachycardia [R00.0] 10/27/2020    Essential hypertension [I10] 01/09/2012    Hyperlipidemia [E78.5] 01/09/2012       Procedures:  XR CHEST PORTABLE   Final Result      No acute disease               Consults:   IP CONSULT TO CASE MANAGEMENT  IP CONSULT TO HOME CARE NEEDS  PT OT      HISTORY OF PRESENT ILLNESS:   The patient is a 64 y.o. male with PMH of GERD, PAF/flutter on Eliquis, HTN, BPH and memory deficit who presented to the ER via EMS for further evaluation of tachycardia. Per chart review and pt report, home health visited him yesterday and found his HR to be in the 140's. Pt's primary PCP, , was notified and instructed pt to go to the ER for evaluation. Upon arrival pt found to be in atrial fib with 's. Routine labs were obtained and essentially unremarkable. While in the ER on telemetry, HR improved to 90's without any intervention. Pt was then admitted for observation. Review of system  Constitutional:  Denies fever or chills. Eyes:  Denies change in visual acuity or discharge. HENT:  Denies nasal congestion or sore throat. Respiratory:  Denies cough or shortness of breath. Cardiovascular:  Denies chest pain, palpitation or swelling in LEs. GI:  Denies abdominal pain, nausea, vomiting, bloody stools or diarrhea. :  Denies dysuria or frequency. Musculoskeletal:  Denies back pain or joint pain.    Integument:  Denies rash or ONE TABLET BY MOUTH ONCE DAILY 1/11/22  Yes Shweta Reece MD   metoprolol tartrate (LOPRESSOR) 100 MG tablet Take 1 tablet by mouth 2 times daily 1/8/22  Yes SAMMIE Cox   loratadine (CLARITIN) 10 MG capsule Take 10 mg by mouth daily   Yes Historical Provider, MD   atorvastatin (LIPITOR) 20 MG tablet TAKE ONE TABLET BY MOUTH ONCE DAILY 9/7/21  Yes Shweta Reece MD   finasteride (PROSCAR) 5 MG tablet TAKE 1 TABLET BY MOUTH DAILY 8/13/21  Yes Shweta Reece MD   tamsulosin (FLOMAX) 0.4 MG capsule TAKE ONE CAPSULE BY MOUTH ONCE DAILY 5/17/21  Yes Shweta Reece MD   aspirin 81 MG tablet Take 81 mg by mouth daily. Yes Shweta Reece MD       Vital Signs  Temp: 98.1 °F (36.7 °C)  Pulse: 90  Resp: 16  BP: 128/89  SpO2: 99 %  O2 Device: None (Room air)       Vital signs reviewed in electronic chart. Physical exam  Constitutional:  Well developed, well nourished, no acute distress. Eyes:  conjunctiva normal, EOMI. HENT:  Atraumatic, external ears normal, external nose/nares normal, oropharynx moist, no pharyngeal exudates. Neck:  Supple. No JVD or thyromegaly. Respiratory:  No respiratory distress, normal breath sounds, no rales, no wheezing. Cardiovascular:  Irregularly irregular, no murmurs, no gallops, no rubs. GI:  Soft, nondistended, normal bowel sounds, nontender, no organomegaly, no mass. :  No costovertebral angle tenderness. Musculoskeletal:  No edema, no tenderness, no obvious deformities. Patient is moving all extremities. Integument:  Well hydrated, no rash. Lymphatic:  No cervical or axillary lymphadenopathy noted. Neurologic:  Alert & oriented x 3,  no focal deficits noted. Strength is equal throughout. Psychiatric:  Speech and behavior appropriate.       Lab Results   Component Value Date    WBC 5.0 03/02/2022    HGB 14.0 03/02/2022    HCT 42.4 03/02/2022    MCV 87.4 03/02/2022     03/02/2022       Lab Results   Component Value Date     03/02/2022    K 3.5 03/02/2022    CL 102 03/02/2022    CO2 23 03/02/2022    BUN 11 03/02/2022    CREATININE 0.7 03/02/2022    GLUCOSE 78 03/02/2022    CALCIUM 8.5 03/02/2022    PROT 5.1 (L) 03/02/2022    LABALBU 3.2 (L) 03/02/2022    BILITOT 1.2 03/02/2022    ALKPHOS 98 03/02/2022    AST 21 03/02/2022    ALT 18 03/02/2022    LABGLOM >59 03/02/2022    GFRAA >59 03/02/2022    AGRATIO 1.7 03/02/2022    GLOB 1.9 03/02/2022           PA/lat CXR:   XR CHEST PORTABLE   Final Result      No acute disease               EKG: atrial fib with     Assessment and Plan/Hospital course: Active Hospital Problems    Diagnosis Date Noted    Weight loss [R63.4]  -Per chart review, patient weighed 265 pounds in October 2020. Most current weight 220 pounds. Patient denies dieting or trying to lose weight.  -Per chart review, patient had CT A/P without contrast on 12/28/2021 that showed no acute abdominopelvic abnormality. -CT chest pulmonary embolus protocol 1/31/2022 also without any acute abnormality  -TSH 12/31/2021 was 2.24  -PSA 6/8/2021 was 0.2  -No EGD or colonoscopy noted per chart review. Given patient's weight loss, would benefit from screening endoscopies. Defer to PCP.   03/02/2022    Memory deficit [R41.3]  -Add Namenda on discharge   03/02/2022    Declining functional status [R53.81]  -Suspect secondary to advanced age, memory deficit and chronic comorbidities  -PT OT consulted and has cleared patient for discharge home  -Case management consulted for discharge planning assistance   01/05/2022    Atrial fibrillation ([facility] [address] [I48.91]  -Patient with A. fib with RVR upon presentation in the ER with heart rate 136. Patient was asymptomatic. He reported compliance with home diltiazem and Eliquis. Per chart review and pharmacy review, patient also prescribed metoprolol on 2/17/2022 but he did not pick this medication up from the pharmacy. -Metoprolol restarted this admission at lower dose  -Heart rate has remained controlled in the 70s. Continue diltiazem, metoprolol and Eliquis on discharge. Patient will be provided with new prescription. 01/03/2022    Essential hypertension [I10]  -Continue home Cardizem and metoprolol   01/09/2012    Hyperlipidemia [E78.5]  -Continue home atorvastatin 01/09/2012         Disposition: home    Discharged Condition: Stable    Activity: activity as tolerated  Diet: cardiac diet  Follow Up: Primary Care Physician in one week      Discharge Medications:     Current Discharge Medication List           Details   memantine (NAMENDA) 5 MG tablet Take 1 tablet by mouth 2 times daily  Qty: 60 tablet, Refills: 0              Details   dilTIAZem (CARDIZEM CD) 120 MG extended release capsule Take 1 capsule by mouth daily  Qty: 30 capsule, Refills: 3      metoprolol tartrate (LOPRESSOR) 25 MG tablet Take 2 tablets by mouth 2 times daily  Qty: 60 tablet, Refills: 0      pantoprazole (PROTONIX) 40 MG tablet Take 1 tablet by mouth daily  Qty: 30 tablet, Refills: 3              Details   apixaban (ELIQUIS) 5 MG TABS tablet TAKE 1 TABLET BY MOUTH EVERY 12 HOURS  Qty: 60 tablet, Refills: 3      potassium chloride (KLOR-CON) 10 MEQ extended release tablet TAKE ONE TABLET BY MOUTH ONCE DAILY  Qty: 90 tablet, Refills: 1      loratadine (CLARITIN) 10 MG capsule Take 10 mg by mouth daily      atorvastatin (LIPITOR) 20 MG tablet TAKE ONE TABLET BY MOUTH ONCE DAILY  Qty: 90 tablet, Refills: 3      finasteride (PROSCAR) 5 MG tablet TAKE 1 TABLET BY MOUTH DAILY  Qty: 90 tablet, Refills: 3      tamsulosin (FLOMAX) 0.4 MG capsule TAKE ONE CAPSULE BY MOUTH ONCE DAILY  Qty: 90 capsule, Refills: 3      aspirin 81 MG tablet Take 81 mg by mouth daily. Patient was seen and examined by Dr. Dhaval Kahn and plan of care reviewed. Treatment plan was formulated collaboratively.       Signed:  Electronically signed by SAMMIE Rose on 3/2/2022 at 10:01 AM       Thank you Gabbie Martinez MD for the opportunity to be involved in this patient's care. If you have any questions or concerns please feel free to contact me at [phone].

## 2022-03-02 NOTE — PROGRESS NOTES
Med rec performed uitlizing fill history from [facility]. See notes below:  -Removed the following meds per fill history and patient wife: Magnesium oxide, Gabapentin, Diltiazem. -Patient recently filled Hydrochlorothiazide on 12/17/21 with 90 day supply and Metoprolol TAR on 2/17/22 with 30 day supply. Wife stated they never pick these medication up.      Nusrat Oneal, CMA

## 2022-03-04 NOTE — CARE COORDINATION
G[facility] Transitions Initial Follow Up Call    Call within 2 business days of discharge: Yes     Patient: Kaleigh Oneil Patient : 1945 MRN: <[id number]>    Last Discharge [facility]       Complaint Diagnosis Description Type Department Provider    3/1/22 Tachycardia Paroxysmal supraventricular tachycardia [facility]) ED to Hosp-Admission (Discharged) (ADMITTED) NAA Wellington MD; Marizol Patel,. .. RARS: Readmission Risk Score: 17.8 ( )       Spoke with:   Attempting HFU call, unsuccessful. Unable to leave a message. First call had busy signal.  Second call no answer.       Discharge department/facility: [facility]    Non-face-to-face services provided:  Scheduled appointment with PCP-velma  Obtained and reviewed discharge summary and/or continuity of care documents    Follow Up  Future Appointments   Date Time Provider Evangelina Soto   3/10/2022  1:15 PM Tatianna Wellington, [address] [place] [address] [facility]       Elvis Fabian RN

## 2022-03-07 NOTE — CARE COORDINATION
[facility] Transitions Initial Follow Up Call    Call within 2 business days of discharge: Yes     Patient: Aliza Correa Patient : 1945 MRN: <[id number]>    Last Discharge [facility]       Complaint Diagnosis Description Type Department Provider    3/1/22 Tachycardia Paroxysmal supraventricular tachycardia [facility]) ED to Hosp-Admission (Discharged) (ADMITTED) [facility] Marisol Trujillo MD; Rodri Thompson,. .. RARS: Readmission Risk Score: 17.8 ( )       Spoke with: I spoke with Mrs Owen Erazo as well as Iris . He does not want to come to his appointment today. Michel Vazquez states that he doesn't feel good and may vomit. Mrs. Owen Erazo wasn't aware that his appointment was today. I called at 10 am and his appointment is at 1230. I told Michel Vazquez that it is important for Dr Iain Soriano to see him especially if he isn't feeling well. Mrs. Owen Erazo asked what she should do and I suggested that maybe one of their sons could help. She states that she is going to call him and if she cannot make it happen she will call the office.       Discharge department/facility: [facility]    Non-face-to-face services provided:  Scheduled appointment with PCP-Kristofer  Obtained and reviewed discharge summary and/or continuity of care documents    Follow Up  Future Appointments   Date Time Provider Evangelina Soto   3/9/2022  3:30 PM Marisol Trujillo, [address] [facility]       Alonzo Ramires RN

## 2022-03-07 NOTE — CARE COORDINATION
Mrs. Washburn Diamond called the office and reschedule for Wednesday which is still in 1 week fu time period.

## 2022-03-08 NOTE — TELEPHONE ENCOUNTER
Marine with [facility] called to report that pt is still losing weight (total of 31 lbs per son) and has no appetite.  Also reports that they are going to do a social work consult r/t son Marco Antonio Sor concerns with pt and his wife becoming so forgetful and not eating properly nor taking meds regularly

## 2022-03-09 NOTE — PROGRESS NOTES
Chief Complaint   Patient presents with    Follow-Up from Hospital       Have you seen any other physician or provider since your last visit yes -     Have you had any other diagnostic tests since your last visit? no    Have you changed or stopped any medications since your last visit? no

## 2022-03-09 NOTE — PROGRESS NOTES
This visit was conducted via [employer]. me visit pursuant to the emergency declaration and the Coronavirus Preparedness and Response. Patient was made aware that accurate medical decisions and definite diagnosis are difficult to obtain without direct evaluation and patient was agreeable. Verbal consent from the patient to proceed with this type of visit was obtained. The patient has also been advised to contact this office for worsening conditions or problems, and seek emergency medical treatment and/or call 911 if deemed necessary. The patient was at home during this visit and the provider was located at [facility]. Post-Discharge Transitional Care Management Services or Hospital Follow Up      Scotty Leung   YOB: 1945    Date of Office Visit:  3/9/2022  Date of Hospital Admission: 3/1/22  Date of Hospital Discharge: 3/2/22  Readmission Risk Score(high >=14%.  Medium >=10%):Readmission Risk Score: 17.8 ( )      Care management risk score Rising risk (score 2-5) and Complex Care (Scores >=6): 1     Non face to face  following discharge, date last encounter closed (first attempt may have been earlier):   3/7/2022 10:20 AM 3/7/2022 10:20 AM    Call initiated 2 business days of discharge: Yes     Patient Active Problem List   Diagnosis    Hyperlipidemia    Essential hypertension    B12 deficiency    Anticoagulant long-term use    Atrial flutter ([address]    Deep vein thrombosis (HCC)    Glucose intolerance (impaired glucose tolerance)    Osteoporosis    Hypogonadism male    Coronary artery calcification seen on CT scan    Tachycardia    Ectopic atrial tachycardia (HCC)    Shingles    Atrial fibrillation (HCC)    Hypomagnesemia    Declining functional status    CHF (congestive heart failure) (HCC)    Weight loss    Memory deficit       Allergies   Allergen Reactions    Codeine Shortness Of Breath    Morphine Shortness Of Breath    Cephalexin Rash    Pcn [Penicillins] Rash Medications listed as ordered at the time of discharge from hospital     Medication List          Accurate as of March 9, 2022  4:41 PM. If you have any questions, ask your nurse or doctor. CONTINUE taking these medications    apixaban 5 MG Tabs tablet  Commonly known as: Eliquis  TAKE 1 TABLET BY MOUTH EVERY 12 HOURS     aspirin 81 MG tablet     atorvastatin 20 MG tablet  Commonly known as: LIPITOR  TAKE ONE TABLET BY MOUTH ONCE DAILY     dilTIAZem 120 MG extended release capsule  Commonly known as: CARDIZEM CD  Take 1 capsule by mouth daily     finasteride 5 MG tablet  Commonly known as: PROSCAR  TAKE 1 TABLET BY MOUTH DAILY     loratadine 10 MG capsule  Commonly known as: CLARITIN     memantine 5 MG tablet  Commonly known as: NAMENDA  Take 1 tablet by mouth 2 times daily     metoprolol tartrate 25 MG tablet  Commonly known as: LOPRESSOR  Take 2 tablets by mouth 2 times daily     * pantoprazole 40 MG tablet  Commonly known as: PROTONIX     * pantoprazole 40 MG tablet  Commonly known as: PROTONIX  Take 1 tablet by mouth daily     potassium chloride 10 MEQ extended release tablet  Commonly known as: KLOR-CON  TAKE ONE TABLET BY MOUTH ONCE DAILY     tamsulosin 0.4 MG capsule  Commonly known as: FLOMAX  TAKE ONE CAPSULE BY MOUTH ONCE DAILY         * This list has 2 medication(s) that are the same as other medications prescribed for you. Read the directions carefully, and ask your doctor or other care provider to review them with you. Medications marked \"taking\" at this time  No outpatient medications have been marked as taking for the 3/9/22 encounter (Telemedicine) with Mitali Babb MD.        Medications patient taking as of now reconciled against medications ordered at time of hospital discharge: Yes    Chief Complaint   Patient presents with    Follow-Up from Hospital       HPI   Interval history/Current status:  Patient seen over [facility]. me virtual visit for HFU.   Information was obtained directly from the patient but also from the son who assisted during this visit specially with patient's memory. Patient was admitted with elevated heart rate. Patient is also having some issues with his memory and progressive weight loss. He was monitored on telemetry in the hospital. Blood work and recent CT's were unremarkable. Patient did improve and was discharged home. He was started on Namenda for his memory. His son reported that appetite has not been good at all. Weight continues to decline. Patientreportednoissuesexcept he is not eating like before. Stays in bed almost all the time. Not going out unless he really have to. Memory also getting somewhat worse. Patient seems to be getting slowly weaker. No nausea, vomiting or abdominal pain. Inpatient course: Discharge summary reviewed- see chart. Review of Systems   Constitutional: Positive for appetite change, fatigue and unexpected weight change. Negative for chills and fever. HENT: Negative for congestion, ear pain and sinus pressure. Eyes: Negative for discharge and visual disturbance. Respiratory: Negative for cough, shortness of breath and wheezing. Cardiovascular: Negative for chest pain and palpitations. JOY   Gastrointestinal: Negative for abdominal pain, nausea and vomiting. Endocrine: Negative for cold intolerance and heat intolerance. Genitourinary: Negative for dysuria, frequency and urgency. Musculoskeletal: Positive for arthralgias and gait problem. Negative for back pain. Skin: Negative for pallor and rash. Allergic/Immunologic: Negative for food allergies and immunocompromised state. Neurological: Positive for weakness. Negative for dizziness, numbness and headaches. Impaired Memory   Hematological: Negative for adenopathy. Does not bruise/bleed easily. Psychiatric/Behavioral: Negative for agitation and sleep disturbance. The patient is not nervous/anxious.         There were no vitals filed for this visit. There is no height or weight on file to calculate BMI. Wt Readings from Last 3 Encounters:   03/01/22 220 lb (99.8 kg)   02/03/22 220 lb (99.8 kg)   01/31/22 270 lb (122.5 kg)     BP Readings from Last 3 Encounters:   03/02/22 128/89   02/03/22 132/79   02/01/22 123/69       Physical Exam  Patient is in no acute distress. Alert and oriented to place and person. He knew the month and the year but not the day of the week or what day of the month. Had atraumatic normocephalic. Patient seems to have lost some weight compared to before. Seems to have good range of motion of his upper extremities. His beard is long and does not seem that he shaved it for long time. He is in bed. Speech is clear. Breath sounds unremarkable during conversation. Lab Results   Component Value Date     03/02/2022    K 3.5 03/02/2022     03/02/2022    CO2 23 03/02/2022    GLUCOSE 78 03/02/2022    BUN 11 03/02/2022    CREATININE 0.7 03/02/2022    CALCIUM 8.5 03/02/2022    PROT 5.1 03/02/2022    LABALBU 3.2 03/02/2022    BILITOT 1.2 03/02/2022    ALT 18 03/02/2022    AST 21 03/02/2022       Hemoglobin A1C (%)   Date Value   06/08/2021 6.4 (H)     Microscopic Examination (no units)   Date Value   12/28/2021 YES     LDL Calculated (mg/dL)   Date Value   06/08/2021 91         Lab Results   Component Value Date    WBC 5.0 03/02/2022    NEUTROABS 3.2 03/01/2022    HGB 14.0 03/02/2022    HCT 42.4 03/02/2022    MCV 87.4 03/02/2022     03/02/2022       Lab Results   Component Value Date    TSH 2.24 12/31/2021       Assessment/Plan:  1. Weight loss  60-70 pound weight loss over the past 8-9-months. I did review imaging studies of his CT head 2/3/2022. Nothing acute except for volume loss and chronic small vessel ischemic changes. CT chest PE protocol 1/31/2022 showed no PE but mild perihilar infiltrate. No evidence of malignancy.   CT scan of the abdomen pelvis 12/28/2021 showed some diverticulosis otherwise no acute findings. Not sure exactly about the etiology. Work-up has been unremarkable. Not sure if this is purely related to his underlying/progressive dementia complicated by possible depression. Long discussion with pt and the family regarding the process of the disease. I informed them that meds will slow the progression of the disease. Will check MMSE every few months. I will continue on current medication. Monitor nutrition status and activity level. Monitor basic lab periodically. Will consider adding antidepressant in the next few weeks if no better. - MT DISCHARGE MEDS RECONCILED W/ CURRENT OUTPATIENT MED LIST    2. Impaired memory  Started on Trbon[place]. Continue for now. Monitor nutrition status, activity level, any signs of depression and basic labs periodically. As per #1.    - MT DISCHARGE MEDS RECONCILED W/ CURRENT OUTPATIENT MED LIST    3. Declining functional status  Patient condition seems to be declining slowly. Discussed the importance of increased activity level and appropriate diet. Try to figure out the issues regarding his progressive weight loss. Encouraged exercising on a regular basis if able. 4. Essential hypertension  Seems to be stable per report. Monitor periodically. Monitor renal function periodically. 5. Atrial fibrillation, unspecified type ([address]  Unable to assess due to telemetry visit. Patient/family to monitor vitals. Call with any issues regarding palpitation or chest pain. - MT DISCHARGE MEDS RECONCILED W/ CURRENT OUTPATIENT MED LIST    6. Glucose intolerance (impaired glucose tolerance)  I think should not be an issue at this time due to his progressive and significant weight loss. Monitor A1c periodically.     Medical Decision Making: moderate complexity      ITan MA am scribing for and in the presence of Dinah Faulkner MD on this date of 03/09/22 at 4:42 PM    I, Dr. Dinah Faulkner, personally performed the services described in the documentation as scribed by Guanakito Yepez MA, in my presence and it is both accurate and complete.

## 2022-03-09 NOTE — CARE COORDINATION
Cristela Murphy, RN  Manager Care Coordination  [phone]    I placed a call to Bony's son Steff Aguilar to discuss his fu appointment today. Juan Mclain tells me that he is going to call his dad and most likely go to them and bring him to the appointment today. We discussed his concerns about weight loss, lack of appetite and memory issues. I asked that he come back with his dad during his fu so that he can hear what Dr Sourav Larson has to say. He is engaged to do that.

## 2022-03-11 NOTE — TELEPHONE ENCOUNTER
Patient's son called and said they have been unable to get the patient to eat anything the last 2 days. He is only drinking water. He has been spitting up/coughing up greenish phlegm. Son wants to know if there is something he can take to increase his appetite.

## 2022-03-11 NOTE — TELEPHONE ENCOUNTER
Notified patient's son the the prescription had been sent. Instructed them to call the weekend provider tomorrow if they had any issues getting the medication or if they decided they wanted to take him to the hospital. I will leave a note with weekend staff to call if they have any questions regarding this patient.

## 2022-03-13 NOTE — ED NOTES
Dr Kai Alva on phone with patients step son at this time in regards to patient dc and lab findings.      Argelia Moser RN  03/13/22 [id number]

## 2022-03-13 NOTE — ED NOTES
Pt attempted to eat soup, pudding and ice water and spit it out. Pt sts \"everything tastes like poop smells\"; MD notified. Pt A&Ox4 at this time, VSS. Son en route to transport home.       Barron Oppenheim, RN  03/13/22 [id number]

## 2022-03-13 NOTE — ED PROVIDER NOTES
Damari Good [address] [facility] [address] ENCOUNTER      Pt Name: Crissy Crowder  MRN: [id number]  [place]: 1945  Date of evaluation: 3/13/2022  Provider: Scarlet Bazzi MD    [address]       Chief Complaint   Patient presents with    Emesis    Nausea    Diarrhea         HISTORY OF PRESENT ILLNESS  (Location/Symptom, Timing/Onset, Context/Setting, Quality, Duration, Modifying Factors, Severity.)   Crissy Jeffersonr is a 64 y.o. male who presents to the emergency department that his wife called the ambulance because he has been sick to his stomach he denies any vomiting diarrhea or abdominal pain. Nursing notes were reviewed. REVIEW OFSYSTEMS    (2-9 systems for level 4, 10 or more for level 5)   ROS:  General:  No fevers, no chills, no weakness  Cardiovascular:  No chest pain, no palpitations  Respiratory:  No shortness of breath, no cough, no wheezing  Gastrointestinal:  No pain,+ nausea, no vomiting, no diarrhea  Musculoskeletal:  No muscle pain, no joint pain  Skin:  No rash, no easy bruising  Neurologic:  No speech problems, no headache, no extremity weakness  Psychiatric:  No anxiety  Genitourinary:  No dysuria, no hematuria    Except as noted above the remainder of the review of systems was reviewed and negative.        PAST MEDICAL HISTORY     Past Medical History:   Diagnosis Date    Atrial fibrillation ([address]     Cardiomyopathy ([facility]     Cataract     GERD (gastroesophageal reflux disease)     History of DVT (deep vein thrombosis)     s/p IVC filter    Hyperlipidemia     Hypertension     LONG TERM ANTICOAGULENT USE     Osteoarthritis     Unspecified sleep apnea     UTI (urinary tract infection)          SURGICAL HISTORY       Past Surgical History:   Procedure Laterality Date    ANKLE SURGERY      left    CHOLECYSTECTOMY      EYE SURGERY      left & right cataract    FRACTURE SURGERY Left     femure    HAND SURGERY      left thumb    HIP SURGERY      left    INNER EAR SURGERY      left    KNEE ARTHROSCOPY      right    KNEE SURGERY  12/07/11    RIGHT    NOSE SURGERY      left    SPINE SURGERY      VENOUS CLOT SURGERY  2008    right leg after hip replacement         CURRENT MEDICATIONS       Discharge Medication List as of 3/13/2022  4:46 PM      CONTINUE these medications which have NOT CHANGED    Details   megestrol (MEGACE) 40 MG/ML suspension Take 10 mLs by mouth daily, Disp-300 mL, R-0Normal      dilTIAZem (CARDIZEM CD) 120 MG extended release capsule Take 1 capsule by mouth daily, Disp-30 capsule, R-3Normal      metoprolol tartrate (LOPRESSOR) 25 MG tablet Take 2 tablets by mouth 2 times daily, Disp-60 tablet, R-0Normal      !! pantoprazole (PROTONIX) 40 MG tablet Take 1 tablet by mouth daily, Disp-30 tablet, R-3Normal      memantine (NAMENDA) 5 MG tablet Take 1 tablet by mouth 2 times daily, Disp-60 tablet, R-0Normal      !! pantoprazole (PROTONIX) 40 MG tablet Take 40 mg by mouth dailyHistorical Med      apixaban (ELIQUIS) 5 MG TABS tablet TAKE 1 TABLET BY MOUTH EVERY 12 HOURS, Disp-60 tablet, R-3Normal      potassium chloride (KLOR-CON) 10 MEQ extended release tablet TAKE ONE TABLET BY MOUTH ONCE DAILY, Disp-90 tablet, R-1Normal      loratadine (CLARITIN) 10 MG capsule Take 10 mg by mouth dailyHistorical Med      atorvastatin (LIPITOR) 20 MG tablet TAKE ONE TABLET BY MOUTH ONCE DAILY, Disp-90 tablet, R-3Normal      finasteride (PROSCAR) 5 MG tablet TAKE 1 TABLET BY MOUTH DAILY, Disp-90 tablet, R-3Normal      tamsulosin (FLOMAX) 0.4 MG capsule TAKE ONE CAPSULE BY MOUTH ONCE DAILY, Disp-90 capsule, R-3Normal      aspirin 81 MG tablet Take 81 mg by mouth daily. !! - Potential duplicate medications found. Please discuss with provider.           ALLERGIES     Codeine, Morphine, Cephalexin, and Pcn [penicillins]    FAMILY HISTORY       Family History   Problem Relation Age of Onset    Heart Disease Mother     Heart Disease Father chf    Heart Disease Sister     Cancer Brother         lungs    Cancer Brother     Heart Disease Sister     Heart Disease Brother           SOCIAL HISTORY       Social History     Socioeconomic History    Marital status:      Spouse name: None    Number of children: None    Years of education: None    Highest education level: None   Occupational History    None   Tobacco Use    Smoking status: Former Smoker     Packs/day: 1.00     Years: 40.00     Pack years: 40.00     Types: Cigarettes     Quit date: 1988     Years since quittin.2    Smokeless tobacco: Never Used   Vaping Use    Vaping Use: Never used   Substance and Sexual Activity    Alcohol use: No    Drug use: No    Sexual activity: None   Other Topics Concern    None   Social History Narrative    None     Social Determinants of Health     Financial Resource Strain: Unknown    Difficulty of Paying Living Expenses: Patient refused   Food Insecurity: Unknown    Worried About Running Out of Food in the Last Year: Patient refused    Ran Out of Food in the Last Year: Patient refused   Transportation Needs:     Lack of Transportation (Medical): Not on file    Lack of Transportation (Non-Medical):  Not on file   Physical Activity:     Days of Exercise per Week: Not on file    Minutes of Exercise per Session: Not on file   Stress:     Feeling of Stress : Not on file   Social Connections:     Frequency of Communication with Friends and Family: Not on file    Frequency of Social Gatherings with Friends and Family: Not on file    Attends Rastafari Services: Not on file    Active Member of Clubs or Organizations: Not on file    Attends Club or Organization Meetings: Not on file    Marital Status: Not on file   Intimate Partner Violence:     Fear of Current or Ex-Partner: Not on file    Emotionally Abused: Not on file    Physically Abused: Not on file    Sexually Abused: Not on file   Housing Stability:     Unable to Pay for Housing in the Last Year: Not on file    Number of Places Lived in the Last Year: Not on file    Unstable Housing in the Last Year: Not on file         PHYSICAL EXAM    (up to 7 for level 4, 8 or more for level 5)     ED Triage Vitals [03/13/22 1350]   BP Temp Temp Source Pulse Resp SpO2 Height Weight   126/81 98.7 °F (37.1 °C) Oral 96 18 98 % 6' (1.829 m) 220 lb (99.8 kg)       Physical Exam  General :Patient is awake, alert, oriented, in no acute distress, nontoxic appearing  HEENT: Pupils are equally round and reactive to light, EOMI, conjunctivae clear. Neck: Neck is supple, full range of motion, trachea midline  Cardiac: Heart regular rate, rhythm, no murmurs, rubs, or gallops  Lungs: Lungs are clear to auscultation, there is no wheezing, rhonchi, or rales. There is no use of accessory muscles. Chest wall: There is no tenderness to palpation over the chest wall or over ribs  Abdomen: Abdomen is soft, nontender, nondistended. There is no firm or pulsatile masses, no rebound rigidity or guarding. Musculoskeletal: 5 out of 5 strength in all 4 extremities. No focal muscle deficits are appreciated  Neuro: Motor intact, sensory intact, level of consciousness is normal, Dermatology: Skin is warm and dry  Psych: Mentation is grossly normal, cognition is grossly normal. Affect is appropriate.       DIAGNOSTIC RESULTS     EKG: All EKG's are interpreted by the Emergency Department Physician who either signs or Co-signs this chart in the [address] a cardiologist.    The EKG interpreted by me shows ectopic atrial rhythm rate of 95 with nonspecific intraventricular conduction just delay no acute ST changes    RADIOLOGY:   Non-plain film images such as CT, Ultrasound and MRI are read by the radiologist. Plain radiographic images are visualized and preliminarily interpreted by the emergency physician with the below findings:      ? Radiologist's Report Reviewed:  CT ABDOMEN PELVIS WO CONTRAST Additional Contrast? None   Final Result      1. No acute findings in the abdomen or pelvis. 2. Status post cholecystectomy with mild prominence of the extrahepatic common duct measuring up to 1 cm similar to previous CT and presumably related to benign postcholecystectomy changes. XR CHEST PORTABLE   Final Result      No acute cardiopulmonary findings.                ED BEDSIDE ULTRASOUND:   Performed by ED Physician - none    LABS:    I have reviewed and interpreted all of the currently available lab results from this visit (ifapplicable):  Results for orders placed or performed during the hospital encounter of 03/13/22   Troponin   Result Value Ref Range    Troponin <0.30 <0.30 ng/mL   CBC with Auto Differential   Result Value Ref Range    WBC 6.1 4.0 - 11.0 K/uL    RBC 5.74 4.50 - 6.00 M/uL    Hemoglobin 16.9 13.0 - 18.0 g/dL    Hematocrit 49.0 40.0 - 54.0 %    MCV 85.4 80.0 - 100.0 fL    MCH 29.4 27.0 - 32.0 pg    MCHC 34.5 31.0 - 35.0 g/dL    RDW 14.5 11.0 - 16.0 %    Platelets 333 163 - 688 K/uL    MPV 10.7 (H) 6.0 - 10.0 fL    Neutrophils % 70.1 %    Immature Granulocytes % 0.2 0.0 - 5.0 %    Lymphocytes % 22.6 %    Monocytes % 6.3 %    Eosinophils % 0.3 %    Basophils % 0.5 %    Neutrophils Absolute 4.3 2.0 - 7.5 K/uL    Immature Granulocytes # 0.0 K/uL    Lymphocytes Absolute 1.4 (L) 1.5 - 4.0 K/uL    Monocytes Absolute 0.4 0.2 - 0.8 K/uL    Eosinophils Absolute 0.0 0.0 - 0.4 K/uL    Basophils Absolute 0.0 0.0 - 0.1 K/uL   Comprehensive Metabolic Panel w/ Reflex to MG   Result Value Ref Range    Sodium 140 136 - 145 mmol/L    Potassium reflex Magnesium 3.9 3.4 - 5.1 mmol/L    Chloride 99 98 - 107 mmol/L    CO2 21 20 - 30 mmol/L    Anion Gap 20 (H) 3 - 16    Glucose 100 74 - 106 mg/dL    BUN 16 6 - 20 mg/dL    CREATININE 0.8 0.4 - 1.2 mg/dL    GFR Non-African American >59 >59    GFR African American >59 >59    Calcium 8.9 8.5 - 10.5 mg/dL    Total Protein 6.7 6.4 - 8.3 g/dL    Albumin 3.7 3.4 - 4.8 g/dL Albumin/Globulin Ratio 1.2 0.8 - 2.0    Total Bilirubin 2.0 (H) 0.3 - 1.2 mg/dL    Alkaline Phosphatase 106 (H) 25 - 100 U/L    ALT 15 4 - 36 U/L    AST 17 8 - 33 U/L    Globulin 3.0 Not Established g/dL   Lipase   Result Value Ref Range    Lipase 92.0 (H) 5.6 - 51.3 U/L        All other labs were within normal range or not returned as of this dictation. EMERGENCY DEPARTMENT COURSE and DIFFERENTIAL DIAGNOSIS/MDM:   Vitals:    Vitals:    03/13/22 1632 03/13/22 1645 03/13/22 1647 03/13/22 1700   BP: 128/77 125/84  (!) 141/87   Pulse: 95 95 95 95   Resp: 14 15 18 14   Temp:       TempSrc:       SpO2: 100% 99% 100% 96%   Weight:       Height:           MEDICATIONS ADMINISTERED IN ED:  Medications   lactated ringers bolus (0 mLs IntraVENous Stopped 3/13/22 1715)   ondansetron (ZOFRAN) injection 4 mg (4 mg IntraVENous Given 3/13/22 1555)       Patient has been stable with no vomiting since being here is blood work is only abnormal for a slightly elevated lipase and bilirubin CT of the abdomen shows no biliary obstruction or other abnormality so I will discharge the patient to home on antimedics to follow-up with his primary care in a day or 2. The patient will follow-up with their PCP in 1-2 days for reevaluation. If the patient or family members have anyfurther concerns or any worsening symptoms they will return to the ED for reevaluation. CONSULTS:  None    PROCEDURES:  Procedures    CRITICAL CARE TIME    Total Critical Care time was 0 minutes, excluding separately reportable procedures. There was a high probability of clinically significant/life threatening deterioration in the patient's condition which required my urgent intervention. FINAL IMPRESSION      1.  Nausea New Problem         DISPOSITION/PLAN   DISPOSITION    Stable discharge to home    PATIENT REFERRED TO:  Shun Gongora MD  [id number]  [phone]    Schedule an appointment as soon as possible for a visit in 1 day        DISCHARGE MEDICATIONS:  Discharge Medication List as of 3/13/2022  4:46 PM      START taking these medications    Details   ondansetron (ZOFRAN-ODT) 4 MG disintegrating tablet Place 1 tablet under the tongue every 8 hours as needed for Nausea or Vomiting May Sub regular tablet (non-ODT) if insurance does not cover ODT., Disp-20 tablet, R-0Normal             Comment: Please note this report has been produced using speech recognition software and may contain errorsrelated to that system including errors in grammar, punctuation, and spelling, as well as words and phrases that may be inappropriate. If there are any questions or concerns please feel free to contact the dictating providerfor clarification.     Tammy Echavarria MD  Attending Emergency Physician              Tammy Echavarria MD  03/13/22 342 Hao Cardenas MD  03/16/22 8804

## 2022-03-13 NOTE — ED NOTES
Patient with c/o nausea, vomiting and diarrhea x 3 or 4 days. Family states that patient has vomited dark emesis.      Frida Bernal RN  03/13/22 2537

## 2022-03-13 NOTE — ED NOTES
Dr Kai Alva asks nurse to go to room and help the others discharge patient. Nurse to room, patient asked if he wants to stay or go home. Patient stated that he wanted to go home. Patient is told that his son was waiting for him to take him home he keeps saying \"let me lay down for 5 minutes\". When staff asks him to get up to wheelchair he keeps saying let me rest for few minutes. Patient again asked if wanted to stay and he stated no. Patient tells nurse that she is mean but wants to lay down for a few minutes before leaving. Patient asked again if he wanted to stay or go home. He again states that he wanted to go home. Patient then told that he needed to sit up and he is assisted to wheelchair. Patient is taken to pov via wheelchair with [address].      Paramjit Oneal, JAZMYN  03/13/22 5416

## 2022-03-14 NOTE — TELEPHONE ENCOUNTER
We can admit to the hospital possibly tomorrow if bed available. Not sure what else to offer over the phone. They can take him to other ER if felt necessary since there is no available bed at [facility] for now.

## 2022-03-14 NOTE — TELEPHONE ENCOUNTER
Called home health to give instructions on another pt and she was at [facility]. said pt is weaker than she has ever seen him , per family still not eating on day 3 of megace. Family just had him at ED yesterday and everything was okay. Stating they probably can't bring him in for apt on Wednesday due to weakness I offered to do virtual if you were okay with it he said if virtual would have to be Thursday due to son's schedule. [facility] nurse said she just wanted to inform us.

## 2022-03-14 NOTE — TELEPHONE ENCOUNTER
Likely dementia but sometimes brain tumor can cause stuff like this. His CT few weeks ago was unremarkable but may need MRI. The problem is he is not following up with me or following recommendations to get him well. APS should be involved to see if we can help with some. Not sure about doing swing bed if he qualifies with his recent admission.

## 2022-03-15 NOTE — TELEPHONE ENCOUNTER
I did talk with pt's son and told him of options available. He is going to go see pt today and try to get him to eat . He said he doesn't want to really proceed with admitting him yet until he tries to get POA over him so he can't sign himself out and the ER didn't find anything wrong with him the other night. He did place a call to a  yesterday but hasn't heard back from them yet. Will call and keep us updated and let us know if he wants to still proceed with virtual visit on Thursday.

## 2022-03-17 PROBLEM — R53.1 WEAKNESS: Status: ACTIVE | Noted: 2022-01-01

## 2022-03-17 NOTE — PLAN OF CARE
Problem: Nutrition  Goal: Optimal nutrition therapy  Outcome: Ongoing  Goal: Understanding of nutritional guidelines  Outcome: Ongoing     Problem: Musculor/Skeletal Functional Status  Goal: Highest potential functional level  Outcome: Ongoing  Goal: Absence of falls  Outcome: Ongoing

## 2022-03-18 PROBLEM — R41.82 CHANGE IN MENTAL STATE: Status: ACTIVE | Noted: 2022-01-01

## 2022-03-18 PROBLEM — E87.6 HYPOKALEMIA: Status: ACTIVE | Noted: 2022-01-01

## 2022-03-18 PROBLEM — E46 MALNUTRITION (HCC): Status: ACTIVE | Noted: 2022-01-01

## 2022-03-18 NOTE — PROGRESS NOTES
Physical Therapy    Facility/Department: [facility] MED SURG  Initial Assessment    NAME: Zay Stone  : 1945  MRN: [id number]    Date of Service: 3/18/2022    Discharge Recommendations:  Continue to assess pending progress,Home with assist PRN        Assessment   Assessment: Pt answered all social / functional history questions. Pt moved from supine to sit x CGA and demonstrated good sitting balance but after sitting for a short period he became nauseous and laid back down. During assessment pt was frequently spitting foamy sputum into a cup. Pt was too nauseated to participate in functional mobility beyond sitting at EOB. He will benefit from skilled PT while in the acute setting to further address mobility and allow for return to prior level of function. Treatment Diagnosis: weakness  Prognosis: Good  Decision Making: Low Complexity  REQUIRES PT FOLLOW UP: Yes  Activity Tolerance  Activity Tolerance: limited due to nausea       Patient Diagnosis(es): There were no encounter diagnoses. has a past medical history of Atrial fibrillation ([address] Cardiomyopathy ([address] Cataract, GERD (gastroesophageal reflux disease), History of DVT (deep vein thrombosis), Hyperlipidemia, Hypertension, LONG TERM ANTICOAGULENT USE, Osteoarthritis, Unspecified sleep apnea, and UTI (urinary tract infection). has a past surgical history that includes Cholecystectomy; Spine surgery; Ankle surgery; Hand surgery; Inner ear surgery; Nose surgery; Knee arthroscopy; hip surgery; eye surgery; Venous clot surgery (); knee surgery (11); and fracture surgery (Left).     Restrictions  Restrictions/Precautions  Restrictions/Precautions: General Precautions,Fall Risk  Required Braces or Orthoses?: No     Vision/Hearing  Vision: Impaired  Vision Exceptions: Wears glasses for reading  Hearing: Exceptions to [facility]  Hearing Exceptions: Hard of hearing/hearing concerns       Subjective  General  Chart Reviewed: Yes  Patient assessed for rehabilitation services?: Yes  Family / Caregiver Present: No  Referring Practitioner: Victoria Barton PA-C  Referral Date : 03/17/22  Diagnosis: Weakness  Subjective  Subjective: Pt presents supine in bed. He reports he came to the hospital due to \"not feeling well. \"  He denies any recent falls.   Pain Screening  Patient Currently in Pain: Denies  Vital Signs  Patient Currently in Pain: Denies       Orientation  Orientation  Overall Orientation Status: Within Normal Limits     Social/Functional History  Social/Functional History  Lives With: Spouse  Type of Home: House  Home Layout: Two level (bedroom is upstairs)  Home Access: Stairs to enter with rails  Entrance Stairs - Number of Steps: 1  Bathroom Shower/Tub: Walk-in shower  Bathroom Toilet: Standard  Bathroom Equipment: Shower chair,3-in-1 commode  Bathroom Accessibility: Accessible  Home Equipment: Cane,Rolling walker,Wheelchair-manual  Receives Help From: Home health,Family (therapy and nursing HHS)  ADL Assistance: Independent  Homemaking Responsibilities: No  Ambulation Assistance: Independent (uses RW as needed)  Transfer Assistance: Independent  Active : Yes    Objective     Observation/Palpation  Observation: Pt lying in bed, NAD, pleasant and cooperative on room air    AROM RLE (degrees)  RLE AROM: WFL  AROM LLE (degrees)  LLE AROM : WFL  AROM RUE (degrees)  RUE AROM : WFL  AROM LUE (degrees)  LUE AROM : WFL     Strength RLE  Strength RLE: WFL  Comment: formal MMT not performed; strength appears 3+/5 or greater with functional movement  Strength LLE  Strength LLE: WFL     Tone RLE  RLE Tone: Normotonic  Tone LLE  LLE Tone: Normotonic     Bed mobility  Rolling to Right: Contact guard assistance  Supine to Sit: Contact guard assistance  Sit to Supine: Stand by assistance  Scooting: Contact guard assistance     Transfers  Sit to Stand: Unable to assess  Stand to sit: Unable to assess  Ambulation  Ambulation?: No              Plan   Plan  Times per week: 3-5 days per week  Times per day: Daily  Plan weeks: 3 days  Current Treatment Recommendations: Strengthening,Home Exercise Program,Safety Education & [employer] Training,Patient/Caregiver Education & Training,Equipment Evaluation, Education, & procurement,Functional Mobility Training,Transfer Training,Gait Training  Safety Devices  Type of devices: Call light within reach,Bed alarm in place,Left in bed    Goals  Short term goals  Time Frame for Short term goals: 3 days  Short term goal 1: Pt to be able to ambulate 100 feet with RW  Short term goal 2: Pt to perform bed mobility x Mod I  Short term goal 3: Pt to demonstrate good safety awareness with all functional mobility       Therapy Time   Individual Concurrent Group Co-treatment   Time In 1113         Time Out 1128         Minutes 13                 Angelito Mercado PT       This note serves as D/C summary if patient is discharged prior to next visit.

## 2022-03-18 NOTE — FLOWSHEET NOTE
03/17/22 2102   Assessment   Charting Type Admission   Neurological   Neuro (WDL) X   Level of Consciousness Alert (0)   Orientation Level Oriented to situation;Oriented to person   Bang Coma Scale   Eye Opening 4   Best Verbal Response 4   Best Motor Response 6   [place] Coma Scale Score 14   HEENT   HEENT (WDL) X   Right Ear Impaired hearing   Left Ear Impaired hearing   Teeth Missing teeth   Respiratory   Respiratory (WDL) WDL   L Breath Sounds Clear   R Breath Sounds Clear   Cardiac   Cardiac (WDL) WDL   Cardiac Rhythm Sinus rhythm   Gastrointestinal   Abdominal (WDL) X   RUQ Bowel Sounds Active   LUQ Bowel Sounds Active   RLQ Bowel Sounds Active   LLQ Bowel Sounds Active   Abdomen Inspection Soft   Tenderness Nontender   GI Symptoms Loss of appetite   Peripheral Vascular   Peripheral Vascular (WDL) X   Edema Right lower extremity; Left lower extremity   RLE Edema Trace   LLE Edema Trace   RLE Neurovascular Assessment   Capillary Refill Less than/equal to 3 seconds   Color Pale   Temperature Cool   R Pedal Pulse +1   LLE Neurovascular Assessment   Capillary Refill Less than/equal to 3 seconds   Color Pale   Temperature Cool   L Pedal Pulse +1   Skin Color/Condition   Skin Color/Condition (WDL) X   Skin Color Pale   Skin Condition/Temp Warm   Skin Integrity   Skin Integrity (WDL) WDL   Musculoskeletal   Musculoskeletal (WDL) X   RUE Full movement   LUE Full movement   RL Extremity Weakness   LL Extremity Weakness   Genitourinary   Genitourinary (WDL) WDL   Anus/Rectum   Anus/Rectum (WDL) WDL   Psychosocial   Psychosocial (WDL) WDL

## 2022-03-18 NOTE — TELEPHONE ENCOUNTER
----- Message from Robert Murray sent at 3/18/2022  9:06 AM EDT -----  Subject: Message to Provider    QUESTIONS  Information for Provider? South needs a verbal from   the Akash Cardenas to put this patient's [address] plan of care on hold.     ---------------------------------------------------------------------------  --------------  [address]  What is the best way for the office to contact you? OK to leave message on   voicemail  Preferred Call Back Phone Number? [phone]  ---------------------------------------------------------------------------  --------------  SCRIPT ANSWERS  Relationship to Patient? Third Party  Representative Name? Marine? [place] [address] [place]?  [phone]

## 2022-03-18 NOTE — FLOWSHEET NOTE
03/18/22 1003   Discharge Planning   [address] Spouse/Significant Other;Children   Current Services Prior To Admission Durable Medical Equipment   DME [address] Medications No   Type of Home Care Services None   Patient [facility]: [facility]   Expected Discharge Date 03/21/22   Follow Up Appointment: Best Day/Time    (Any)   Has RW, no additional needs.

## 2022-03-18 NOTE — PROGRESS NOTES
Occupational Therapy   Occupational Therapy Initial Assessment  Date: 3/18/2022   Patient Name: Floyd Monroy  MRN: [id number]     : 1945    Date of Service: 3/18/2022    Discharge Recommendations:  Continue to assess pending progress       Assessment   Performance deficits / Impairments: Decreased high-level IADLs;Decreased safe awareness;Decreased ADL status; Decreased functional mobility ; Decreased endurance  Assessment: Pt agreeable to OT services. Pt reports nausea. Pt answered all questions appropriately. Pt come to sit at EOB with CGA. Pt sat unsupported without any LOB. Pt declined to ambulate. At this time pt transferred to supine and reports he is unable to attempt further activity secondary to nausea. Pt will benefit from skilled OT services while IP. Prognosis: Good  Decision Making: Low Complexity  REQUIRES OT FOLLOW UP: Yes  Activity Tolerance  Activity Tolerance: Treatment limited secondary to medical complications (free text)  Activity Tolerance: OT evaluation limited secondary to nausea           Patient Diagnosis(es): There were no encounter diagnoses. has a past medical history of Atrial fibrillation ([address] Cardiomyopathy ([address] Cataract, GERD (gastroesophageal reflux disease), History of DVT (deep vein thrombosis), Hyperlipidemia, Hypertension, LONG TERM ANTICOAGULENT USE, Osteoarthritis, Unspecified sleep apnea, and UTI (urinary tract infection). has a past surgical history that includes Cholecystectomy; Spine surgery; Ankle surgery; Hand surgery; Inner ear surgery; Nose surgery; Knee arthroscopy; hip surgery; eye surgery; Venous clot surgery (); knee surgery (11); and fracture surgery (Left).            Restrictions  Restrictions/Precautions  Restrictions/Precautions: General Precautions,Fall Risk  Required Braces or Orthoses?: No    Subjective   General  Chart Reviewed: Yes  Patient assessed for rehabilitation services?: Yes  Family / Caregiver Present: No  Referring Practitioner: Pam See PA-C  Diagnosis: Weakness  Subjective  Subjective: Pt states [facility] referred him to hospital yesterday. Pt reports he has been feeling \"weak\". Pt agreeable to OT services.   Patient Currently in Pain: Denies  Vital Signs  Level of Consciousness: Alert (0)  Patient Currently in Pain: Denies  Social/Functional History  Social/Functional History  Lives With: Spouse  Type of Home: House  Home Layout: Two level (bedroom is upstairs)  Home Access: Stairs to enter with rails  Entrance Stairs - Number of Steps: 1  Bathroom Shower/Tub: Walk-in shower  Bathroom Toilet: Standard  Bathroom Equipment: Shower chair,3-in-1 commode  Bathroom Accessibility: Accessible  Home Equipment: Jobool Help From: Home health,Family (therapy and nursing [facility])  ADL Assistance: Independent  Homemaking Responsibilities: No  Ambulation Assistance: Independent (uses RW as needed)  Transfer Assistance: Independent  Active : Yes       Objective   Vision: Impaired  Vision Exceptions: Wears glasses for reading  Hearing: Exceptions to [facility]  Hearing Exceptions: Hard of hearing/hearing concerns    Orientation  Overall Orientation Status: Within Functional Limits  Observation/Palpation  Observation: Pt lying in bed, NAD, pleasant and cooperative on room air              Bed mobility  Rolling to Right: Contact guard assistance  Supine to Sit: Contact guard assistance  Sit to Supine: Stand by assistance  Scooting: Contact guard assistance                          LUE AROM (degrees)  LUE AROM : WFL  RUE AROM (degrees)  RUE AROM : WFL  RUE Strength  RUE Strength Comment: SIVAN secondary to nausea                   Plan   Plan  Times per week: 3-5  Times per day: Daily  Plan weeks: 1  Current Treatment Recommendations: Strengthening,Balance Training,Functional Mobility Training,Endurance Training,Self-Care / ADL,Patient/Caregiver Education & Training    Goals  Short term goals  Time Frame for Short term goals: 1 week  Short term goal 1: pt to complete dressing with MOD I. Short term goal 2: Pt to complete toileting with MOD I. Short term goal 3: Pt to tolerate x15 minutes of activity to increase functional activity tolerance. Short term goal 4: Pt to complete bathing with CARMONA. Short term goal 5: Pt to complete hygiene/grooming standing at sink x4 minutes with no LOB MOD I. Therapy Time   Individual Concurrent Group Co-treatment   Time In 1113         Time Out 1128         Minutes 15              This note serves as a DC summary in the event of pt discharge.      Amanda Wyman, OTR/L

## 2022-03-18 NOTE — FLOWSHEET NOTE
03/18/22 1036   Assessment   Charting Type Shift assessment   Neurological   Neuro (WDL) X   Level of Consciousness Alert (0)   Orientation Level Oriented to situation;Oriented to person   [place]ang Coma Scale   Eye Opening 4   Best Verbal Response 5   Best Motor Response 6   [place] Coma Scale Score 15   HEENT   HEENT (WDL) X   Right Ear Impaired hearing   Left Ear Impaired hearing   Teeth Missing teeth   Respiratory   Respiratory (WDL) WDL   L Breath Sounds Clear   R Breath Sounds Clear   Cardiac   Cardiac (WDL) WDL   Cardiac Rhythm Sinus rhythm   Gastrointestinal   Abdominal (WDL) X   RUQ Bowel Sounds Active   LUQ Bowel Sounds Active   RLQ Bowel Sounds Active   LLQ Bowel Sounds Active   Abdomen Inspection Soft   Tenderness Nontender   GI Symptoms Loss of appetite;Nausea   Peripheral Vascular   Peripheral Vascular (WDL) X   Edema Right lower extremity; Left lower extremity   RLE Edema Trace   LLE Edema Trace   RLE Neurovascular Assessment   Capillary Refill Less than/equal to 3 seconds   Color Pale   Temperature Cool   LLE Neurovascular Assessment   Capillary Refill Less than/equal to 3 seconds   Color Pale   Temperature Cool   Skin Color/Condition   Skin Color/Condition (WDL) X   Skin Integrity   Skin Integrity (WDL) WDL   Musculoskeletal   Musculoskeletal (WDL) X   RUE Full movement   LUE Full movement   RL Extremity Weakness   LL Extremity Weakness   Genitourinary   Genitourinary (WDL) WDL   Anus/Rectum   Anus/Rectum (WDL) WDL   Psychosocial   Psychosocial (WDL) WDL

## 2022-03-18 NOTE — PROGRESS NOTES
Speech Language Pathology  Facility/Department: [facility] MED SURG   CLINICAL BEDSIDE SWALLOW EVALUATION    NAME: Payton Krueger  : 1945  MRN: [id number]    ADMISSION DATE: 3/17/2022  ADMITTING DIAGNOSIS: has Hyperlipidemia; Essential hypertension; B12 deficiency; Anticoagulant long-term use; Atrial flutter ([address] Deep vein thrombosis ([address] Glucose intolerance (impaired glucose tolerance); Osteoporosis; Hypogonadism male; Coronary artery calcification seen on CT scan; Tachycardia; Ectopic atrial tachycardia ([address] Shingles; Atrial fibrillation ([address] Hypomagnesemia; Declining functional status; CHF (congestive heart failure) ([address] Weight loss; Memory deficit; Weakness; and Hypokalemia on their problem list.  ONSET DATE: 3/17/2022    Recent Chest Xray/CT of Chest:   Date: 3/17/2022    Impression       No acute pulmonary disease.             Date of Eval: 3/18/2022  Evaluating Therapist: GRETCHEN Mandujano    Current Diet level:  Current Diet :  (Easy to Chew)  Current Liquid Diet : Thin    Primary Complaint  Patient Complaint: N/A    Pain:  Pain Assessment  Pain Assessment: 0-10  Pain Level: 0    Reason for Referral  Payton Krueger was referred for a bedside swallow evaluation to assess the efficiency of his swallow function, identify signs and symptoms of aspiration and make recommendations regarding safe dietary consistencies, effective compensatory strategies, and safe eating environment. Impression  Dysphagia Diagnosis: Severe oral stage dysphagia  Dysphagia Impression : Patient presents with moderate to severe oral stage dysphagia characterized by oral holding, decreased A/P oral transit, spitting out bolus with regular, soft solid trials, patient trialed with thin liquids and puree textures with increased oral transit and bolus manipulation.  patient presents with no signs of aspiration with puree and thin liquid trials, Patient demonstrates with symptoms of GERD including: burping and regurgitation with po trials, patient demonstrates with spitting out clear slightly thick mucous after each po trial.   Education with PA and ST recommendation with referral for GI consult. Dysphagia Outcome Severity Scale: Level 3: Moderate dysphagia- Total assisstance, supervision or strategies. Two or more diet consistencies restricted     Treatment Plan  Requires SLP Intervention: Yes  Duration/Frequency of Treatment: 3-5 x week x 4 weeks  D/C Recommendations: To be determined  Referral To: GI (Patient presents with signs of GERD with po trials including burping and regurgitation ,)    Recommended Diet and Intervention  Diet Solids Recommendation: Dysphagia Pureed (Dysphagia I)  Liquid Consistency Recommendation: Thin  Recommended Form of Meds: PO  Recommendations: Dysphagia treatment; Therapeutic feeds with SLP only  Therapeutic Interventions: Patient/Family education; Therapeutic PO trials with SLP; Diet tolerance monitoring    Compensatory Swallowing Strategies  Compensatory Swallowing Strategies: Alternate solids and liquids;Small bites/sips; Remain upright for 30-45 minutes after meals;Upright as possible for all oral intake    Treatment/Goals  Short-term Goals  Timeframe for Short-term Goals: 2 weeks  Goal 1: The patient will improve oral preparation phase with various po trials 10/10x  Goal 2: Patient will demonstrate with increased po intake consuming 75% of meals to decrease risk of malnutrition and dehydration 10/10x  Long-term Goals  Timeframe for Long-term Goals: 4 weeks  Goal 1: Patient will tolerate least restrictive diet with no signs of oral dysphagia 100% accuracy    General  Chart Reviewed: Yes  Comments: Patient seated upright in bed, patient oriented x 4, speech clear  Subjective  Subjective: patient pleasant and cooperative and agrees to ST evaluation  Behavior/Cognition: Alert; Cooperative;Pleasant mood  Respiratory Status: Room air  O2 Device: None (Room air)  Communication Observation: Functional  Follows Directions: Simple  Dentition: Poor dental/oral hygeine  Patient Positioning: Upright in bed  Baseline Vocal Quality: Normal  Volitional Cough: Wet  Prior Dysphagia History: no prior hx of dysphagia noted, patient has a hx of GERD  Consistencies Administered: Reg solid; Dysphagia Pureed (Dysphagia I); Thin - straw; Thin - cup;Dysphagia Minced and Moist (Dysphagia II)    Vision/Hearing  Vision  Vision: Impaired  Vision Exceptions: Wears glasses for reading  Hearing  Hearing: Exceptions to [facility]  Hearing Exceptions: Hard of hearing/hearing concerns    Oral Motor Deficits  Oral/Motor  Oral Motor: Exceptions to [facility]  Lingual Strength: Reduced  Lingual Coordination: Reduced    Oral Phase Dysfunction  Oral Phase  Oral Phase: Exceptions  Oral Phase Dysfunction  Impaired Mastication:  (unable to assess due to patient spitting out po trials with regular and solid consistencies)  Decreased Anterior to Posterior Transit: Soft solid;Reg solid;Puree  Oral Phase  Oral Phase - Comment: Patient trialed with regular, soft , puree and thin liquid consistenies.  Patient presents with spitting out 13/15 trials, patient attempted to chew regular consistency and soft solids which resulted in paitent spitting out bolus, Patient swallowed 1/2 trials with puree consistency, Patient presents with oral holding with thin liquids 1/5 trials with a straw, patient presents with spitting out clear thick mucous through out this evaluation     Indicators of Pharyngeal Phase Dysfunction   Pharyngeal Phase  Pharyngeal Phase: Exceptions  Pharyngeal Phase   Pharyngeal: Patient presents with adequate swallow initiation and laryngeal elevation with thin liquid and puree consistency trials with no overt signs of aspiration, ST not able to assess pharyngeal phase with additonal solid trials due to patient spitting out bolus    Prognosis  Prognosis  Prognosis for safe diet advancement: fair  Barriers to reach goals: severity of dysphagia  Barriers/Prognosis Comment: Patient presents with severe oral dysphagia and signs of esophageal dysphagia  Individuals consulted  Consulted and agree with results and recommendations: Patient;RN    Education  Patient Education: Patient education with general swallow precautions  Patient Education Response: Needs reinforcement  Safety Devices in place: Yes  Type of devices: Bed alarm in place;Call light within reach; Patient at risk for falls; Left in bed       Therapy Time  SLP Individual Minutes  Time In: 4055  Time Out: 4886  Minutes: [address] [place], [place]  3/18/2022 11:09 AM

## 2022-03-18 NOTE — CARE COORDINATION
Warren Almendarez, APS worker, is at [facility] checking on pt. APS states he will follow at home after DC.

## 2022-03-18 NOTE — CONSULTS
Comprehensive Nutrition Assessment    Type and Reason for Visit:  Initial,Positive Nutrition Screen,Consult    Nutrition Recommendations/Plan: Recommend to continue with current diet and will add ONS TID to help improve intakes. Encourage intakes of meals and ONS as appropriate. Nutrition Assessment:  Pt admitted with significant weight loss,and weakness. Pt appears to be at moderate-high risk for ongoing nutritional compromise r/t poor intakes and appetite, poor dentition, and SPL states severe GERD. Malnutrition Assessment:  Malnutrition Status: Moderate malnutrition    Context:  Acute Illness     Findings of the 6 clinical characteristics of malnutrition:  Energy Intake:  7 - 50% or less of estimated energy requirements for 5 or more days  Weight Loss:  7 - Greater than 5% over 1 month     Body Fat Loss:  1 - Mild body fat loss Buccal region   Muscle Mass Loss:  Unable to assess    Fluid Accumulation:  1 - Mild Extremities   Strength:       Estimated Daily Nutrient Needs:  Energy (kcal):  8708-5964; Weight Used for Energy Requirements:  Current     Protein (g):   (1.2-1.3 gm/kg IBW); Weight Used for Protein Requirements:  Ideal        Fluid (ml/day):  9051-3877; Method Used for Fluid Requirements:  1 ml/kcal      Nutrition Related Findings:  Pt presents slighly overweight, but has lost about 12.72% body weight in last month and has trace edema BLE. .  Pt appears to have mild fat loss and has had poor intakes greater than past 5 day. Speech has noted that pt has extreme GERD and swallowing difficulties. Pt has poor dentition. PMH:  hyperlipidemia, hypertension, GERD, DVT. Labs: Potasim-3.3; Bili-2.1; Alb-3.2      Wounds:  None       Current Nutrition Therapies:    ADULT DIET;  Dysphagia - Pureed    Anthropometric Measures:  · Height: 6' (182.9 cm)  · Current Body Weight: 192 lb (87.1 kg)   · Admission Body Weight:      · Usual Body Weight: 234 lb (106.1 kg) (average weight)     · Ideal Body Weight: 178 lbs; % Ideal Body Weight 107.9 %   · BMI: 26  · Adjusted Body Weight:  ; No Adjustment   · Adjusted BMI:      · BMI Categories: Overweight (BMI 25.0-29. 9)       Nutrition Diagnosis:   · Moderate Malnutrition related to inadequate protein-energy intake,altered GI function,swallowing difficulty as evidenced by weight loss,weight loss greater than or equal to 5% in 1 month,lab values,poor dentition      Nutrition Interventions:   Food and/or Nutrient Delivery:  Continue Current Diet,Start Oral Nutrition Supplement  Nutrition Education/Counseling:  Education not appropriate   Coordination of Nutrition Care:  Continue to monitor while inpatient    Goals:  to meet est needs for age/condition and improve nutritional status. Nutrition Monitoring and Evaluation:   Behavioral-Environmental Outcomes:      Food/Nutrient Intake Outcomes:  Food and Nutrient Intake,Supplement Intake  Physical Signs/Symptoms Outcomes:  Biochemical Data,Chewing or Swallowing,Nutrition Focused Physical Findings,Weight     Discharge Planning:     Too soon to determine     Electronically signed by Quintin Quick MS, RD, LD on 3/18/22 at 3:20 PM EDT

## 2022-03-18 NOTE — PROGRESS NOTES
Medication Reconciliation  Med rec performed utilizing fill history from [facility]. Also went over all home medication in-depth with pt's caregiver Lcuy Pond who read off all med bottles over the phone. See notes below:  -Removed zofran as pt has not filled this med in the last six months and pt does not have any at home per caregiver.  -Removed duplicate protonix.  -Changed loratadine frequency from daily to BID per fill history and caregiver.  -Lastly, the pt does not have any metoprolol at home per caregiver, however he filled a 15 DS on 3/2/22 and would likely be out as of yesterday if pt was compliant with this med. The strength and dose that the pt filled on 3/2/22 was metoprolol 25 mg tablets- take two tablets by mouth twice daily. Left this med on home med list as pt did fill it after recent admission at this facility.     Clari Marquez, PharmD

## 2022-03-18 NOTE — TELEPHONE ENCOUNTER
Chau Frey needs a letter for him to establish emergency guardianship. He says his dad keeps signing himself out AMA when he needs treatment and Chau Frey has no way to keep him in the hospital. It needs to say that [address] make his own medical decisions.

## 2022-03-19 NOTE — H&P
History and Physical    Patient:  Aliza Correa    CHIEF COMPLAINT:    Weakness, weight loss and mental status changes    HISTORY OF PRESENT ILLNESS:   The patient is a 64 y.o. male with past medical history significant for hypertension and A. Fib. Patient was admitted directly to the hospital related to progressive weight loss, weakness and mental status changes/impaired memory. This seems to be going on for a while but significantly got worse over the past few weeks. Patient with significant decreased appetite and significant and progressive weight loss. Patient lost about 60 pounds over the past 4-5 months. No vomiting or diarrhea. Mild nausea reported. Patient is poor historian and most of the H&P was obtained from records/family members. Patient did not know why he is really in the hospital.  He did not recognize how much weight he lost.  He was admitted few weeks ago related to A. fib with RVR and another admission 2 months ago related to pneumonia and A. fib with RVR. Patient had few additional ER visit in between. Patient staying in bed most of the time per family report. He lives with his wife who seems to suffer from mild dementia per family report. APS has been involved.     Past Medical History:      Diagnosis Date    Atrial fibrillation ([address]     Cardiomyopathy ([address]     Cataract     GERD (gastroesophageal reflux disease)     History of DVT (deep vein thrombosis)     s/p IVC filter    Hyperlipidemia     Hypertension     LONG TERM ANTICOAGULENT USE     Osteoarthritis     Unspecified sleep apnea     UTI (urinary tract infection)        Past Surgical History:      Procedure Laterality Date    ANKLE SURGERY      left    CHOLECYSTECTOMY      EYE SURGERY      left & right cataract    FRACTURE SURGERY Left     femure    HAND SURGERY      left thumb    HIP SURGERY      left    INNER EAR SURGERY      left    KNEE ARTHROSCOPY      right    KNEE SURGERY  12/07/11    RIGHT    Brother         lungs    Cancer Brother     Heart Disease Sister     Heart Disease Brother        Review of system  Constitutional:  Denies fever or chills. Positive for fatigue and weight loss  Eyes:  Denies change in visual acuity or discharge  HENT:  Denies nasal congestion or sore throat   Respiratory:  Denies cough or shortness of breath   Cardiovascular:  Denies chest pain, palpitation or swelling in LEs. Positive for JOY  GI:  Denies abdominal pain, nausea, vomiting, bloody stools or diarrhea   :  Denies dysuria or frequency   Musculoskeletal:  Denies back pain. Positive for arthralgia and generalized weakness  Integument:  Denies rash or itching  Neurologic:  Denies headache, focal weakness or sensory. changes. Positive for impaired memory   Endocrine:  Denies polyuria or polydipsia   Lymphatic:  Denies swollen glands or night sweats  Psychiatric:  Denies depression or anxiety   Review of system was obtained from patient, records and family members. Vital Signs  Temp: 98.4 °F (36.9 °C)  Pulse: 96  Resp: 18  BP: 115/78  SpO2: 100 %  O2 Device: None (Room air)       vital signs reviewed in electronic chart. Physical exam  Constitutional:  Well developed, no acute distress  Eyes:  PERRL, conjunctiva normal, EOMI  HENT:  Atraumatic, external ears normal, external nose/nares normal, oropharynx moist, no pharyngeal exudates. Neck:  Supple. No JVD or thyromegaly  Respiratory:  No respiratory distress, normal breath sounds, no rales, no wheezing   Cardiovascular: Irregularly irregular, slightly tachycardic, no murmurs, no gallops, no rubs   GI:  Soft, nondistended, normal bowel sounds, nontender, no organomegaly, no mass  :  No costovertebral angle tenderness   Musculoskeletal:  No edema, no tenderness, no obvious deformities. Patient is moving all extremities.    Integument:  Well hydrated, no rash   Lymphatic:  No cervical or axillary lymphadenopathy noted   Neurologic:  Alert & oriented x month, related to postcholecystectomy changes. CT scan of the head without contrast 2/3/2022  No acute process. Parenchymal volume loss and chronic small vessel ischemic changes in the white matter. CT chest PE protocol 1/31/2022  Mild patchy peripheral multifocal airspace disease otherwise no acute findings. Blood work unremarkable. TSH within normal limits few months ago. Not sure if this is related to progressive dementia, depression or possible personality changes. May require MRI of the brain for further evaluation which may require sedation. Encourage increase oral intake. Appetite stimulant. Gentle hydration. Add Zoloft. Consider titrating Namenda. I do recommend family members to seek guardianship since patient condition has been significantly declined over the past few months. He did miss several appointment with PCP despite reminders and care coordinator phone call per PCP office. 03/02/2022    Declining functional status [R53.81]  Worsening problem. Patient with a progressive and ongoing weight loss. Try to improve weight loss and other medical problems. PT/OT evaluation. Blood work without significant abnormalities. Patient recently has multiple CT scans that were unremarkable. Consider titrating Namenda. Appetite stimulant. Will add low-dose Zoloft. 01/05/2022    Atrial fibrillation (HCC) [I48.91]  Heart rate is borderline elevated. Could be related to dehydration/weight loss/noncompliance with medication. Gentle hydration. Monitor on telemetry. Consider titrating medications if felt necessary. 01/03/2022    B12 deficiency [E53.8]  Continue B12 replacement. 03/14/2012    Essential hypertension [I10]  Seems to be in acceptable range. Continue to monitor for now. Continue current regimen.    01/09/2012     Terra Mullen MD certifies per CMS regulation for 42 .15(a), that the patient may reasonably be expected to be discharged or transferred to a hospital within 96 hours after admission to [address] [place] [address].        Electronically signed by Bret Barrios MD on 3/18/2022 at 10:47 PM

## 2022-03-19 NOTE — PROGRESS NOTES
Progress Note      Subjective:   Chief complaint:   Weakness, weight loss and mental status changes    Interval History:   Pt seen and examined. Per RN, pt refusing to swallow anything. ..meds and/or food. Pt states he just doesn't want to eat. He also reports he was trying to lose weight. Review of systems:   Constitutional:  Denies fever or chills. Positive for fatigue and weight loss  Eyes:  Denies change in visual acuity or discharge  HENT:  Denies nasal congestion or sore throat   Respiratory:  Denies cough or shortness of breath   Cardiovascular:  Denies chest pain, palpitation or swelling in LEs. Positive for JOY  GI:  Denies abdominal pain, nausea, vomiting, bloody stools or diarrhea   :  Denies dysuria or frequency   Musculoskeletal:  Denies back pain. Positive for arthralgia and generalized weakness  Integument:  Denies rash or itching  Neurologic:  Denies headache, focal weakness or sensory. changes. Positive for impaired memory   Endocrine:  Denies polyuria or polydipsia   Lymphatic:  Denies swollen glands or night sweats  Psychiatric:  Denies depression or anxiety     Past medical history, surgical history, family history and social history reviewed and unchanged compared to H&P earlier this admission.     Medications:   Scheduled Meds:   sertraline  25 mg Oral Daily    apixaban  5 mg Oral BID    aspirin  81 mg Oral Daily    atorvastatin  20 mg Oral Daily    dilTIAZem  120 mg Oral Daily    finasteride  5 mg Oral Daily    cetirizine  10 mg Oral Daily    megestrol  400 mg Oral Daily    memantine  5 mg Oral BID    metoprolol tartrate  50 mg Oral BID    pantoprazole  40 mg Oral Daily    potassium chloride  10 mEq Oral Daily    tamsulosin  0.4 mg Oral Daily     Continuous Infusions:   sodium chloride 100 mL/hr at 03/19/22 0333       Objective:     Vital Signs  Temp: 98.4 °F (36.9 °C)  Pulse: 94  Resp: 18  BP: 127/71  SpO2: 98 %  O2 Device: None (Room air)       Vital signs reviewed in electronic charts. Physical exam  Constitutional:  Well developed, no acute distress  Eyes:  PERRL, conjunctiva normal, EOMI  HENT:  Atraumatic, external ears normal, external nose/nares normal, oropharynx moist, no pharyngeal exudates. Neck:  Supple. No JVD or thyromegaly  Respiratory:  No respiratory distress, normal breath sounds, no rales, no wheezing   Cardiovascular: Irregularly irregular, slightly tachycardic, no murmurs, no gallops, no rubs   GI:  Soft, nondistended, normal bowel sounds, nontender, no organomegaly, no mass  :  No costovertebral angle tenderness   Musculoskeletal:  No edema, no tenderness, no obvious deformities. Patient is moving all extremities. Integument:  Well hydrated, no rash   Lymphatic:  No cervical or axillary lymphadenopathy noted   Neurologic:  Alert & oriented x month, year but not to the day or the date of the month. He is oriented to place. He knew that I am his doctor but did not remember my name even though I am his PCP for years,  no focal deficits noted. Strength is equal throughout  Psychiatric:  Speech is intact. Behavioral slightly withdrawn. Seems to be slightly depressed    Results:     Lab Results   Component Value Date    WBC 5.9 03/18/2022    HGB 15.4 03/18/2022    HCT 45.2 03/18/2022    MCV 86.8 03/18/2022     03/18/2022       Lab Results   Component Value Date     03/18/2022    K 3.7 03/18/2022     03/18/2022    CO2 23 03/18/2022    BUN 16 03/18/2022    CREATININE 0.9 03/18/2022    GLUCOSE 97 03/18/2022    CALCIUM 8.7 03/18/2022        Assessment and Plan: Active Hospital Problems     Diagnosis Date Noted    Hypokalemia [E87.6]  -replace  -monitor and replete PRN   03/18/2022    Malnutrition ([address] [E46]  -Encourage increased oral intake.   Appetite stimulant.    03/18/2022    Change in mental state [R41.82]  -This seems to be related to possibly worsening dementia complicating by possible depression, social isolation and declining functional status. Try to treat acute issues and improve other chronic issues.    -Add Zoloft and consider titrating Namenda. -Frequent orientation.    03/18/2022    Weakness [R53.1]  -Treat acute issues and try to improve other chronic issues. -PT/OT consulted    03/17/2022    Weight loss [R63.4]  -Significant worsening weight loss. -CT scan of the abdomen pelvis without contrast 3/13/2022: Unremarkable except for mild prominence of the extrahepatic common duct likely related to postcholecystectomy changes. -CT scan of the head without contrast 2/3/2022  No acute process. Parenchymal volume loss and chronic small vessel ischemic changes in the white matter. -CT chest PE protocol 1/31/2022: Mild patchy peripheral multifocal airspace disease otherwise no acute findings.  -Blood work unremarkable. TSH within normal limits few months ago.    -suspect this is due to progressive dementia, depression and/or possible personality changes. -May require MRI of the brain for further evaluation which may require sedation.  -Encourage increase oral intake. Appetite stimulant. Gentle hydration. Add Zoloft. Consider titrating Namenda.  -family members tying to obtain guardianship since patient condition has been significantly declined over the past few months. He did miss several appointment with PCP despite reminders and care coordinator phone call per PCP office.    03/02/2022    Declining functional status [R53.81]  -Worsening problem. Patient with a progressive and ongoing weight loss. Try to improve weight loss and other medical problems. PT/OT evaluation. Blood work without significant abnormalities. Patient recently has multiple CT scans that were unremarkable. Consider titrating Namenda. Appetite stimulant.    -added low-dose Zoloft     01/05/2022    Atrial fibrillation (HCC) [I48.91]  -Heart rate is borderline elevated.   Could be related to dehydration/weight loss/noncompliance with medication. Gentle hydration. Monitor on telemetry. -Consider titrating medications if felt necessary.     01/03/2022    B12 deficiency [E53.8]  -Continue B12 replacement.    03/14/2012    Essential hypertension [I10]  -Seems to be in acceptable range. Continue to monitor for now. Continue current regimen. Patient was seen and examined by Dr. Bobbi Cervantes and plan of care reviewed. Treatment plan was formulated collaboratively.       Electronically signed by SAMMIE Reynolds on 3/19/2022 at 1:37 PM

## 2022-03-19 NOTE — FLOWSHEET NOTE
03/19/22 1005   Assessment   Charting Type Shift assessment   Neurological   Neuro (WDL) X   Level of Consciousness Alert (0)   Orientation Level Oriented to situation;Oriented to person   Bang Coma Scale   Eye Opening 4   Best Verbal Response 5   Best Motor Response 6   [place] Coma Scale Score 15   HEENT   HEENT (WDL) X   Right Ear Impaired hearing   Left Ear Impaired hearing   Teeth Missing teeth   Respiratory   Respiratory (WDL) WDL   L Breath Sounds Clear   R Breath Sounds Clear   Cardiac   Cardiac (WDL) WDL   Cardiac Rhythm Sinus rhythm   Cardiac Monitor   Telemetry Monitor On No   Gastrointestinal   Abdominal (WDL) X   RUQ Bowel Sounds Hypoactive   LUQ Bowel Sounds Hypoactive   RLQ Bowel Sounds Hypoactive   LLQ Bowel Sounds Hypoactive   Abdomen Inspection Soft   Tenderness Nontender   GI Symptoms Loss of appetite;Nausea   Peripheral Vascular   Peripheral Vascular (WDL) X   Edema Right lower extremity; Left lower extremity   RLE Edema Trace   LLE Edema Trace   RLE Neurovascular Assessment   Capillary Refill Less than/equal to 3 seconds   Color Pale   Temperature Cool   LLE Neurovascular Assessment   Capillary Refill Less than/equal to 3 seconds   Color Pale   Temperature Cool   Skin Color/Condition   Skin Color/Condition (WDL) X   Skin Color Pale;Dusky   Skin Condition/Temp Dry; Warm   Skin Integrity   Skin Integrity (WDL) WDL   Musculoskeletal   Musculoskeletal (WDL) X   RUE Full movement   LUE Full movement   RL Extremity Weakness   LL Extremity Weakness   Genitourinary   Genitourinary (WDL) WDL   Urine Assessment   Incontinence No   Anus/Rectum   Anus/Rectum (WDL) WDL   Psychosocial   Psychosocial (WDL) WDL

## 2022-03-20 PROBLEM — F03.90 DEMENTIA (HCC): Status: ACTIVE | Noted: 2022-01-01

## 2022-03-20 PROBLEM — E83.39 HYPOPHOSPHATEMIA: Status: ACTIVE | Noted: 2022-01-01

## 2022-03-20 NOTE — DISCHARGE SUMMARY
Discharge Summary      Patient ID: Floyd Monroy      Patient's PCP: Gabbie Martinez MD    Admit Date: 3/17/2022     Discharge Date:  3/20/2022    Admitting Provider: Gabbie Martinez MD    Discharging Provider: SAMMIE Rose     Reason for this admission:   Declining functional status    Reason for transfer:   Atrial flutter with RVR    Discharge Diagnoses: Active Hospital Problems    Diagnosis Date Noted    Hypophosphatemia [E83.39] 03/20/2022    Hypokalemia [E87.6] 03/18/2022    Malnutrition ([address] [E46] 03/18/2022    Weakness [R53.1] 03/17/2022    Weight loss [R63.4] 03/02/2022    Memory deficit [R41.3] 03/02/2022    Declining functional status [R53.81] 01/05/2022    Hypomagnesemia [E83.42] 01/03/2022    UTI (urinary tract infection) [N39.0] 12/31/2021    Atrial flutter ([address] [I48.92] 05/10/2012    Deep vein thrombosis ([address] [I82.409] 05/10/2012    Essential hypertension [I10] 01/09/2012    Hyperlipidemia [E78.5] 01/09/2012       Procedures:  CT HEAD WO CONTRAST   Final Result      1. No findings for acute intracranial abnormality. 2.  Diffuse cerebral atrophy with extensive periventricular and deep white matter changes bilaterally consistent with chronic small vessel ischemia. XR CHEST PORTABLE   Final Result      No acute pulmonary disease. Consults:   IP CONSULT TO CASE MANAGEMENT  IP CONSULT TO DIETITIAN  PT OT      Briefly:   64year old male with PMH of atrial fib/flutter (s/p ECV x 2) on Eliquis, AV sclerosis, MARIO ALBERTO on CPAP, HTN, HLD, memory deficit and DVT s/p IVC filter who was directly admitted from home for declining functional status. Per family report, pt has been refusing to eat or drink much for the past several months. He has continued to take his home medications under the supervision of his sons. Pt has lost approximately 60 lbs in the past 4-5 months. Son, Meg Ramirez, in the process of obtaining emergency guardianship.     Routine labs obtained on except for mild prominence of the extrahepatic common duct likely related to postcholecystectomy changes. -CT scan of the head without contrast 2/3/2022  No acute process.  Parenchymal volume loss and chronic small vessel ischemic changes in the white matter. -CT chest PE protocol 1/31/2022: Mild patchy peripheral multifocal airspace disease otherwise no acute findings.  -Blood work unremarkable.  TSH within normal limits few months ago.    -suspect this is due to progressive dementia, depression and/or possible personality changes. -May require MRI of the brain for further evaluation which may require sedation.  -Encourage increase oral intake  -continue megace, namenda if pt willing to take po meds  -zoloft added this admission, although pt refuses most po meds.    -family members tying to obtain guardianship since patient condition has been significantly declined over the past few months. He did miss several appointment with PCP despite reminders and care coordinator phone call per PCP office. 03/02/2022    Memory deficit [R41.3]  -continue namenda    03/02/2022    Declining functional status [R53.81]  -Suspect due to progression of dementia with some component of depression. Continue Namenda and Megace if patient willing to take oral medications. Zoloft added. 01/05/2022    Hypomagnesemia [E83.42]  -Replace  -Return replete as needed   01/03/2022    UTI (urinary tract infection) [N39.0]  -UA c/w UTI; UCx pending  -covering with Rocephin   12/31/2021    Atrial flutter (HCC) [I48.92]  -History of A. fib/a flutter status post ECV x 2 by   -Home regimen: Eliquis, metoprolol and Cardizem  -Patient has been refusing oral medication since arrival.  Patient went into atrial flutter with RVR with heart rates 140s earlier this morning. He was given IV metoprolol 5 mg x 2 doses with no improvement to 110s. Pt was then agreeable to take oral cardizem 120 LA.  Shortly thereafter, HR increased to 140s, bite sized    Labs: For convenience and continuity at follow-up the following most recent labs are provided:    CBC:   Lab Results   Component Value Date    WBC 5.2 03/20/2022    HGB 14.8 03/20/2022    HCT 45.0 03/20/2022     03/20/2022       RENAL:   Lab Results   Component Value Date     03/20/2022    K 3.1 03/20/2022    K 3.7 03/18/2022     03/20/2022    CO2 17 03/20/2022    BUN 10 03/20/2022    CREATININE 0.7 03/20/2022         Discharge Medications:     Current Discharge Medication List           Details   sertraline (ZOLOFT) 25 MG tablet Take 1 tablet by mouth daily  Qty: 30 tablet, Refills: 3              Details   megestrol (MEGACE) 40 MG/ML suspension Take 10 mLs by mouth daily  Qty: 300 mL, Refills: 0      dilTIAZem (CARDIZEM CD) 120 MG extended release capsule Take 1 capsule by mouth daily  Qty: 30 capsule, Refills: 3      metoprolol tartrate (LOPRESSOR) 25 MG tablet Take 2 tablets by mouth 2 times daily  Qty: 60 tablet, Refills: 0      pantoprazole (PROTONIX) 40 MG tablet Take 1 tablet by mouth daily  Qty: 30 tablet, Refills: 3      memantine (NAMENDA) 5 MG tablet Take 1 tablet by mouth 2 times daily  Qty: 60 tablet, Refills: 0      apixaban (ELIQUIS) 5 MG TABS tablet TAKE 1 TABLET BY MOUTH EVERY 12 HOURS  Qty: 60 tablet, Refills: 3      potassium chloride (KLOR-CON) 10 MEQ extended release tablet TAKE ONE TABLET BY MOUTH ONCE DAILY  Qty: 90 tablet, Refills: 1      loratadine (CLARITIN) 10 MG capsule Take 10 mg by mouth in the morning and at bedtime       atorvastatin (LIPITOR) 20 MG tablet TAKE ONE TABLET BY MOUTH ONCE DAILY  Qty: 90 tablet, Refills: 3      finasteride (PROSCAR) 5 MG tablet TAKE 1 TABLET BY MOUTH DAILY  Qty: 90 tablet, Refills: 3      tamsulosin (FLOMAX) 0.4 MG capsule TAKE ONE CAPSULE BY MOUTH ONCE DAILY  Qty: 90 capsule, Refills: 3      aspirin 81 MG tablet Take 81 mg by mouth daily.            Rocephin 1gm IV Q24 hours  Lovenox 80 mg SQ BID  Cardizem gtt titrate per protocol        Pt's case was reviewed and discussed with  over the phone. Signed:  Electronically signed by SAMMIE Nugent on 3/20/2022 at 12:34 PM       Thank you Bing Parker MD for the opportunity to be involved in this patient's care. If you have any questions or concerns please feel free to contact me at [phone].

## 2022-03-20 NOTE — PROGRESS NOTES
Report called to Brittany Long at [facility], CCU unit.

## 2022-03-20 NOTE — FLOWSHEET NOTE
03/20/22 1049   Assessment   Charting Type Shift assessment   Neurological   Neuro (WDL) X   Level of Consciousness Alert (0)   Orientation Level Oriented to person;Oriented to place; Disoriented to time;Disoriented to situation   [place] Coma Scale   Eye Opening 4   Best Verbal Response 4   Best Motor Response 6   Bang Coma Scale Score 14   HEENT   HEENT (WDL) X   Right Ear Impaired hearing   Left Ear Impaired hearing   Teeth Missing teeth   Respiratory   Respiratory (WDL) WDL   L Breath Sounds Clear;Diminished   R Breath Sounds Clear;Diminished   Cardiac   Cardiac (WDL) WDL   Cardiac Rhythm Sinus tachy   Rhythm Interpretation   Pulse 139   Cardiac Monitor   Telemetry Monitor On Yes   Telemetry Audible Yes   Telemetry Alarms Set Yes   Gastrointestinal   Abdominal (WDL) X   RUQ Bowel Sounds Hypoactive   LUQ Bowel Sounds Hypoactive   RLQ Bowel Sounds Hypoactive   LLQ Bowel Sounds Hypoactive   Abdomen Inspection Soft   Tenderness Nontender   GI Symptoms Loss of appetite;Nausea   Peripheral Vascular   Peripheral Vascular (WDL) X   Edema Right lower extremity; Left lower extremity   RLE Edema Trace   LLE Edema Trace   RLE Neurovascular Assessment   Capillary Refill Less than/equal to 3 seconds   Color Pale   Temperature Cool   LLE Neurovascular Assessment   Capillary Refill Less than/equal to 3 seconds   Color Pale   Temperature Cool   Skin Color/Condition   Skin Color/Condition (WDL) X   Skin Color Pale;Dusky   Skin Condition/Temp Dry; Warm   Skin Integrity   Skin Integrity (WDL) WDL   Musculoskeletal   Musculoskeletal (WDL) X   RUE Full movement   LUE Full movement   RL Extremity Weakness   LL Extremity Weakness   Genitourinary   Genitourinary (WDL) WDL   Urine Assessment   Incontinence No   Urine Color Orange  (redish orange)   Urine Appearance Cloudy   Psychosocial   Psychosocial (WDL) X   Patient Behaviors Flat affect

## 2022-03-20 NOTE — FLOWSHEET NOTE
03/19/22 2020   Assessment   Charting Type Shift assessment   Neurological   Neuro (WDL) X   Level of Consciousness Alert (0)   Orientation Level Oriented to person;Oriented to place; Disoriented to time;Disoriented to situation   Bang Coma Scale   Eye Opening 4   Best Verbal Response 4   Best Motor Response 6   Bang Coma Scale Score 14   HEENT   HEENT (WDL) X   Right Ear Impaired hearing   Left Ear Impaired hearing   Teeth Missing teeth   Respiratory   Respiratory (WDL) WDL   L Breath Sounds Clear;Diminished   R Breath Sounds Clear;Diminished   Cardiac   Cardiac (WDL) WDL   Cardiac Monitor   Telemetry Monitor On No   Gastrointestinal   Abdominal (WDL) X   RUQ Bowel Sounds Hypoactive   LUQ Bowel Sounds Hypoactive   RLQ Bowel Sounds Hypoactive   LLQ Bowel Sounds Hypoactive   Abdomen Inspection Soft   Tenderness Nontender   GI Symptoms Loss of appetite;Nausea   Peripheral Vascular   Peripheral Vascular (WDL) X   Edema Right lower extremity; Left lower extremity   RLE Edema Trace   LLE Edema Trace   RLE Neurovascular Assessment   Capillary Refill Less than/equal to 3 seconds   Color Pale   Temperature Cool   LLE Neurovascular Assessment   Capillary Refill Less than/equal to 3 seconds   Color Pale   Temperature Cool   Skin Color/Condition   Skin Color/Condition (WDL) X   Skin Color Pale;Dusky   Skin Condition/Temp Warm;Dry   Skin Integrity   Skin Integrity (WDL) WDL   Musculoskeletal   Musculoskeletal (WDL) X   RUE Full movement   LUE Full movement   RL Extremity Weakness   LL Extremity Weakness   Genitourinary   Genitourinary (WDL) WDL   Anus/Rectum   Anus/Rectum (WDL) WDL   Psychosocial   Psychosocial (WDL) WDL

## 2022-03-20 NOTE — PROGRESS NOTES
Pt unable to eat or drink anything att. Pt tried taking a sip of water. Pt held water in mouth, spit it out, then stuck his finger in his throat making himself gag. Pt states this makes him feel better. When asked how long this has been going on, Pt states he doesn't know. Pt disoriented to time and situation. Pt not able to take any PO medications att.

## 2022-03-30 NOTE — TELEPHONE ENCOUNTER
I tried to call but the pt/pts son are not wanting to come in even if they got transportation assistance. Ricardo Rogel said that he would like to see if Candy Owen could do a home visit.

## 2022-03-30 NOTE — TELEPHONE ENCOUNTER
----- Message from [facility] sent at 3/30/2022 11:38 AM EDT -----  Subject: Hospital Follow Up    QUESTIONS  What hospital was the Patient [facility]   Date of Discharge? 2022-03-30  Discharge Location? Home  Reason for hospitalization as patient stated? AFib  What question does the patient have, if applicable? Hospital would like   for office to leave voicemail with them as well. to update chart   [phone] [facility] phone number   ---------------------------------------------------------------------------  --------------  [address]  What is the best way for the office to contact you? OK to leave message on   voicemail  Preferred Call Back Phone Number? [phone]  ---------------------------------------------------------------------------  --------------  SCRIPT ANSWERS  Relationship to Patient? Third Party  Third Party Type? Hospital?   Representative Name?  Arlette Garcia

## 2022-03-30 NOTE — TELEPHONE ENCOUNTER
Can someone schedule him for next week double book is okay may check with son to see when he can come

## 2022-03-31 NOTE — TELEPHONE ENCOUNTER
Do not do home visit. I can help with once sometimes next week but they need to find a physician that do home visit or not sure if he is going to a nursing home. Not sure his bili willing to follow-up.

## 2022-04-01 NOTE — CARE COORDINATION
[facility] Initial Follow Up Call    Call within 2 business days of discharge: Yes     Patient: Heavenly Cui Patient : 1945 MRN: [id number]    Last Discharge [facility]       Complaint Diagnosis Description Type Department Provider    3/17/22   Admission (Discharged) [address] [place] Justyna Farooq MD   [address] [place] [address],  3/30/22  Atrial flutter, dementia, malnutrition       RARS: Readmission Risk Score: 22.3 ( )       Spoke with: Son Santos Bishop tells me that Diallo Martin is about the same. He continues to not want to eat or drink very much. The sitter or himself try to push fluid but not much gets down. He fell from the bed last evening but EMS was able to get him up and no injuries. Santos Bishop purchased a side rail from [facility] to help but is requesting a hospital bed. We discussed that in [facility] he mentioned that they would potentially be interested in hospice. He asked if I could make that referral for him so that he could get more information. He expressed that just having the support and not having to worry that Diallo Martin will not or cannot get in to the doctor's office and getting some DME would be a \"God send\". I placed a referral to [address] [place] [address] and provided Wild contact information.         Discharge department/facility: [facility]    Non-face-to-face services provided:  Obtained and reviewed discharge summary and/or continuity of care documents    Follow Up  Future Appointments   Date Time Provider Evangelina Soto   2022  3:15 PM Cathy Du, [address] [place] [address] PC [facility]       Padmini Partida RN

## 2022-04-05 NOTE — CARE COORDINATION
Lorna Alvarenga, RN  Manager Care Coordination  [phone]    I called [facility] to fu on hospice referral.  He tells me that they met with them yesterday and were advised that Iris's passing is expectant over the next few days. They are working with hospice at this point. I told him that I would let Dr Lilia Velazco know and to please let us know of any needs.   He V/O.

## 2022-04-19 PROBLEM — N39.0 UTI (URINARY TRACT INFECTION): Status: RESOLVED | Noted: 2021-01-01 | Resolved: 2022-04-19
